# Patient Record
Sex: FEMALE | Race: BLACK OR AFRICAN AMERICAN | Employment: OTHER | URBAN - METROPOLITAN AREA
[De-identification: names, ages, dates, MRNs, and addresses within clinical notes are randomized per-mention and may not be internally consistent; named-entity substitution may affect disease eponyms.]

---

## 2020-04-14 ENCOUNTER — APPOINTMENT (OUTPATIENT)
Dept: GENERAL RADIOLOGY | Age: 70
DRG: 871 | End: 2020-04-14
Payer: MEDICARE

## 2020-04-14 ENCOUNTER — HOSPITAL ENCOUNTER (INPATIENT)
Age: 70
LOS: 14 days | Discharge: NURSING, REHAB OR LONG-TERM CARE | DRG: 871 | End: 2020-04-28
Attending: EMERGENCY MEDICINE | Admitting: INTERNAL MEDICINE
Payer: MEDICARE

## 2020-04-14 PROBLEM — Z20.822 SUSPECTED COVID-19 VIRUS INFECTION: Status: ACTIVE | Noted: 2020-04-14

## 2020-04-14 PROBLEM — N19 RENAL FAILURE: Status: ACTIVE | Noted: 2020-04-14

## 2020-04-14 PROBLEM — N17.9 ACUTE RENAL FAILURE SUPERIMPOSED ON STAGE 3 CHRONIC KIDNEY DISEASE (HCC): Status: ACTIVE | Noted: 2020-04-14

## 2020-04-14 PROBLEM — R65.21 SEPTIC SHOCK (HCC): Status: ACTIVE | Noted: 2020-04-14

## 2020-04-14 PROBLEM — R73.9 HYPERGLYCEMIA: Status: ACTIVE | Noted: 2020-04-14

## 2020-04-14 PROBLEM — A41.9 SEPTIC SHOCK (HCC): Status: ACTIVE | Noted: 2020-04-14

## 2020-04-14 PROBLEM — J18.9 PNEUMONIA: Status: ACTIVE | Noted: 2020-04-14

## 2020-04-14 PROBLEM — Z20.822 EXPOSURE TO COVID-19 VIRUS: Status: ACTIVE | Noted: 2020-04-14

## 2020-04-14 PROBLEM — U07.1 COVID-19 VIRUS INFECTION: Status: ACTIVE | Noted: 2020-04-14

## 2020-04-14 PROBLEM — N18.30 HYPERTENSIVE KIDNEY DISEASE, STAGE III (HCC): Status: ACTIVE | Noted: 2018-11-09

## 2020-04-14 PROBLEM — N18.30 ACUTE RENAL FAILURE SUPERIMPOSED ON STAGE 3 CHRONIC KIDNEY DISEASE (HCC): Status: ACTIVE | Noted: 2020-04-14

## 2020-04-14 PROBLEM — A41.9 SEPSIS (HCC): Status: ACTIVE | Noted: 2020-04-14

## 2020-04-14 PROBLEM — R50.9 FEVER: Status: ACTIVE | Noted: 2020-04-14

## 2020-04-14 PROBLEM — I12.9 HYPERTENSIVE KIDNEY DISEASE, STAGE III (HCC): Status: ACTIVE | Noted: 2018-11-09

## 2020-04-14 PROBLEM — J96.01 ACUTE RESPIRATORY FAILURE WITH HYPOXIA (HCC): Status: ACTIVE | Noted: 2020-04-14

## 2020-04-14 PROBLEM — F03.90 DEMENTIA (HCC): Status: ACTIVE | Noted: 2020-04-14

## 2020-04-14 PROBLEM — R00.0 TACHYCARDIA: Status: ACTIVE | Noted: 2020-04-14

## 2020-04-14 LAB
ANION GAP SERPL CALCULATED.3IONS-SCNC: 10 MMOL/L (ref 3–16)
ANION GAP SERPL CALCULATED.3IONS-SCNC: 16 MMOL/L (ref 3–16)
BASE EXCESS VENOUS: 1.4 MMOL/L
BASE EXCESS VENOUS: 1.9 MMOL/L
BASOPHILS ABSOLUTE: 0 K/UL (ref 0–0.2)
BASOPHILS ABSOLUTE: 0.1 K/UL (ref 0–0.2)
BASOPHILS RELATIVE PERCENT: 0.6 %
BASOPHILS RELATIVE PERCENT: 1 %
BILIRUBIN URINE: NEGATIVE
BLOOD, URINE: ABNORMAL
BUN BLDV-MCNC: 23 MG/DL (ref 7–20)
BUN BLDV-MCNC: 23 MG/DL (ref 7–20)
CALCIUM SERPL-MCNC: 8.5 MG/DL (ref 8.3–10.6)
CALCIUM SERPL-MCNC: 9.3 MG/DL (ref 8.3–10.6)
CARBOXYHEMOGLOBIN: 1 %
CARBOXYHEMOGLOBIN: 1.2 %
CHLORIDE BLD-SCNC: 104 MMOL/L (ref 99–110)
CHLORIDE BLD-SCNC: 97 MMOL/L (ref 99–110)
CLARITY: CLEAR
CO2: 25 MMOL/L (ref 21–32)
CO2: 26 MMOL/L (ref 21–32)
COLOR: YELLOW
CREAT SERPL-MCNC: 1.4 MG/DL (ref 0.6–1.2)
CREAT SERPL-MCNC: 1.6 MG/DL (ref 0.6–1.2)
EOSINOPHILS ABSOLUTE: 0.1 K/UL (ref 0–0.6)
EOSINOPHILS ABSOLUTE: 0.1 K/UL (ref 0–0.6)
EOSINOPHILS RELATIVE PERCENT: 2 %
EOSINOPHILS RELATIVE PERCENT: 2.1 %
EPITHELIAL CELLS, UA: 3 /HPF (ref 0–5)
GFR AFRICAN AMERICAN: 38
GFR AFRICAN AMERICAN: 45
GFR NON-AFRICAN AMERICAN: 32
GFR NON-AFRICAN AMERICAN: 37
GLUCOSE BLD-MCNC: 104 MG/DL (ref 70–99)
GLUCOSE BLD-MCNC: 106 MG/DL (ref 70–99)
GLUCOSE BLD-MCNC: 118 MG/DL (ref 70–99)
GLUCOSE BLD-MCNC: 121 MG/DL (ref 70–99)
GLUCOSE BLD-MCNC: 125 MG/DL (ref 70–99)
GLUCOSE BLD-MCNC: 126 MG/DL (ref 70–99)
GLUCOSE BLD-MCNC: 256 MG/DL (ref 70–99)
GLUCOSE URINE: NEGATIVE MG/DL
HCO3 VENOUS: 28 MMOL/L (ref 23–29)
HCO3 VENOUS: 29 MMOL/L (ref 23–29)
HCT VFR BLD CALC: 30.4 % (ref 36–48)
HCT VFR BLD CALC: 37.1 % (ref 36–48)
HEMOGLOBIN: 11.9 G/DL (ref 12–16)
HEMOGLOBIN: 9.4 G/DL (ref 12–16)
HYALINE CASTS: 11 /LPF (ref 0–8)
KETONES, URINE: NEGATIVE MG/DL
L. PNEUMOPHILA SEROGP 1 UR AG: NORMAL
LACTIC ACID: 0.6 MMOL/L (ref 0.4–2)
LACTIC ACID: 1.3 MMOL/L (ref 0.4–2)
LEUKOCYTE ESTERASE, URINE: ABNORMAL
LYMPHOCYTES ABSOLUTE: 1.5 K/UL (ref 1–5.1)
LYMPHOCYTES ABSOLUTE: 1.5 K/UL (ref 1–5.1)
LYMPHOCYTES RELATIVE PERCENT: 25.9 %
LYMPHOCYTES RELATIVE PERCENT: 28.1 %
MAGNESIUM: 1.9 MG/DL (ref 1.8–2.4)
MCH RBC QN AUTO: 25.8 PG (ref 26–34)
MCH RBC QN AUTO: 26.4 PG (ref 26–34)
MCHC RBC AUTO-ENTMCNC: 31 G/DL (ref 31–36)
MCHC RBC AUTO-ENTMCNC: 32 G/DL (ref 31–36)
MCV RBC AUTO: 82.4 FL (ref 80–100)
MCV RBC AUTO: 83.1 FL (ref 80–100)
METHEMOGLOBIN VENOUS: 0.6 %
METHEMOGLOBIN VENOUS: 0.7 %
MICROSCOPIC EXAMINATION: YES
MONOCYTES ABSOLUTE: 0.4 K/UL (ref 0–1.3)
MONOCYTES ABSOLUTE: 0.5 K/UL (ref 0–1.3)
MONOCYTES RELATIVE PERCENT: 6.8 %
MONOCYTES RELATIVE PERCENT: 8.9 %
MRSA SCREEN RT-PCR: NORMAL
NEUTROPHILS ABSOLUTE: 3.2 K/UL (ref 1.7–7.7)
NEUTROPHILS ABSOLUTE: 3.7 K/UL (ref 1.7–7.7)
NEUTROPHILS RELATIVE PERCENT: 60.3 %
NEUTROPHILS RELATIVE PERCENT: 64.3 %
NITRITE, URINE: NEGATIVE
O2 CONTENT, VEN: 10 ML/DL
O2 CONTENT, VEN: 11 ML/DL
O2 SAT, VEN: 78 %
O2 SAT, VEN: 80 %
O2 THERAPY: ABNORMAL
O2 THERAPY: ABNORMAL
PCO2, VEN: 56.5 MMHG (ref 40–50)
PCO2, VEN: 58.4 MMHG (ref 40–50)
PDW BLD-RTO: 14.2 % (ref 12.4–15.4)
PDW BLD-RTO: 14.3 % (ref 12.4–15.4)
PERFORMED ON: ABNORMAL
PH UA: 5.5 (ref 5–8)
PH VENOUS: 7.31 (ref 7.35–7.45)
PH VENOUS: 7.31 (ref 7.35–7.45)
PHOSPHORUS: 3.8 MG/DL (ref 2.5–4.9)
PLATELET # BLD: 167 K/UL (ref 135–450)
PLATELET # BLD: 185 K/UL (ref 135–450)
PMV BLD AUTO: 9.6 FL (ref 5–10.5)
PMV BLD AUTO: 9.7 FL (ref 5–10.5)
PO2, VEN: 47 MMHG
PO2, VEN: 48 MMHG
POTASSIUM REFLEX MAGNESIUM: 4.5 MMOL/L (ref 3.5–5.1)
POTASSIUM REFLEX MAGNESIUM: 4.6 MMOL/L (ref 3.5–5.1)
PROCALCITONIN: 0.17 NG/ML (ref 0–0.15)
PROTEIN UA: ABNORMAL MG/DL
RAPID INFLUENZA  B AGN: NEGATIVE
RAPID INFLUENZA A AGN: NEGATIVE
RBC # BLD: 3.65 M/UL (ref 4–5.2)
RBC # BLD: 4.5 M/UL (ref 4–5.2)
RBC UA: 25 /HPF (ref 0–4)
SARS-COV-2, PCR: DETECTED
SODIUM BLD-SCNC: 138 MMOL/L (ref 136–145)
SODIUM BLD-SCNC: 140 MMOL/L (ref 136–145)
SPECIFIC GRAVITY UA: 1.02 (ref 1–1.03)
TCO2 CALC VENOUS: 30 MMOL/L
TCO2 CALC VENOUS: 31 MMOL/L
TOTAL CK: 45 U/L (ref 26–192)
URINE REFLEX TO CULTURE: YES
URINE TYPE: ABNORMAL
UROBILINOGEN, URINE: 0.2 E.U./DL
WBC # BLD: 5.4 K/UL (ref 4–11)
WBC # BLD: 5.8 K/UL (ref 4–11)
WBC UA: 52 /HPF (ref 0–5)

## 2020-04-14 PROCEDURE — 6370000000 HC RX 637 (ALT 250 FOR IP): Performed by: EMERGENCY MEDICINE

## 2020-04-14 PROCEDURE — 6360000002 HC RX W HCPCS: Performed by: INTERNAL MEDICINE

## 2020-04-14 PROCEDURE — 94761 N-INVAS EAR/PLS OXIMETRY MLT: CPT

## 2020-04-14 PROCEDURE — 99285 EMERGENCY DEPT VISIT HI MDM: CPT

## 2020-04-14 PROCEDURE — 6360000002 HC RX W HCPCS: Performed by: EMERGENCY MEDICINE

## 2020-04-14 PROCEDURE — 83735 ASSAY OF MAGNESIUM: CPT

## 2020-04-14 PROCEDURE — 87040 BLOOD CULTURE FOR BACTERIA: CPT

## 2020-04-14 PROCEDURE — APPNB15 APP NON BILLABLE TIME 0-15 MINS: Performed by: NURSE PRACTITIONER

## 2020-04-14 PROCEDURE — 02HV33Z INSERTION OF INFUSION DEVICE INTO SUPERIOR VENA CAVA, PERCUTANEOUS APPROACH: ICD-10-PCS | Performed by: EMERGENCY MEDICINE

## 2020-04-14 PROCEDURE — 36415 COLL VENOUS BLD VENIPUNCTURE: CPT

## 2020-04-14 PROCEDURE — 6370000000 HC RX 637 (ALT 250 FOR IP): Performed by: INTERNAL MEDICINE

## 2020-04-14 PROCEDURE — 82803 BLOOD GASES ANY COMBINATION: CPT

## 2020-04-14 PROCEDURE — 71045 X-RAY EXAM CHEST 1 VIEW: CPT

## 2020-04-14 PROCEDURE — 81001 URINALYSIS AUTO W/SCOPE: CPT

## 2020-04-14 PROCEDURE — 2060000000 HC ICU INTERMEDIATE R&B

## 2020-04-14 PROCEDURE — 51702 INSERT TEMP BLADDER CATH: CPT

## 2020-04-14 PROCEDURE — U0002 COVID-19 LAB TEST NON-CDC: HCPCS

## 2020-04-14 PROCEDURE — 6360000002 HC RX W HCPCS: Performed by: NURSE PRACTITIONER

## 2020-04-14 PROCEDURE — 87804 INFLUENZA ASSAY W/OPTIC: CPT

## 2020-04-14 PROCEDURE — 2500000003 HC RX 250 WO HCPCS: Performed by: INTERNAL MEDICINE

## 2020-04-14 PROCEDURE — 83605 ASSAY OF LACTIC ACID: CPT

## 2020-04-14 PROCEDURE — 82550 ASSAY OF CK (CPK): CPT

## 2020-04-14 PROCEDURE — 87086 URINE CULTURE/COLONY COUNT: CPT

## 2020-04-14 PROCEDURE — 2580000003 HC RX 258: Performed by: EMERGENCY MEDICINE

## 2020-04-14 PROCEDURE — 2580000003 HC RX 258: Performed by: INTERNAL MEDICINE

## 2020-04-14 PROCEDURE — 2700000000 HC OXYGEN THERAPY PER DAY

## 2020-04-14 PROCEDURE — 6370000000 HC RX 637 (ALT 250 FOR IP): Performed by: NURSE PRACTITIONER

## 2020-04-14 PROCEDURE — 85025 COMPLETE CBC W/AUTO DIFF WBC: CPT

## 2020-04-14 PROCEDURE — 99223 1ST HOSP IP/OBS HIGH 75: CPT | Performed by: INTERNAL MEDICINE

## 2020-04-14 PROCEDURE — 2580000003 HC RX 258: Performed by: NURSE PRACTITIONER

## 2020-04-14 PROCEDURE — 80048 BASIC METABOLIC PNL TOTAL CA: CPT

## 2020-04-14 PROCEDURE — 84145 PROCALCITONIN (PCT): CPT

## 2020-04-14 PROCEDURE — 87449 NOS EACH ORGANISM AG IA: CPT

## 2020-04-14 PROCEDURE — 87641 MR-STAPH DNA AMP PROBE: CPT

## 2020-04-14 PROCEDURE — 84100 ASSAY OF PHOSPHORUS: CPT

## 2020-04-14 RX ORDER — DEXTROSE MONOHYDRATE 25 G/50ML
12.5 INJECTION, SOLUTION INTRAVENOUS PRN
Status: DISCONTINUED | OUTPATIENT
Start: 2020-04-14 | End: 2020-04-28 | Stop reason: HOSPADM

## 2020-04-14 RX ORDER — PREGABALIN 150 MG/1
150 CAPSULE ORAL EVERY 12 HOURS SCHEDULED
COMMUNITY

## 2020-04-14 RX ORDER — FLUTICASONE PROPIONATE 50 MCG
2 SPRAY, SUSPENSION (ML) NASAL DAILY
Status: ON HOLD | COMMUNITY
End: 2021-12-12

## 2020-04-14 RX ORDER — LANOLIN ALCOHOL/MO/W.PET/CERES
3 CREAM (GRAM) TOPICAL NIGHTLY
COMMUNITY

## 2020-04-14 RX ORDER — SIMVASTATIN 10 MG
10 TABLET ORAL NIGHTLY
COMMUNITY

## 2020-04-14 RX ORDER — ASCORBATE CALCIUM 500 MG
100 TABLET ORAL DAILY
COMMUNITY

## 2020-04-14 RX ORDER — LOSARTAN POTASSIUM 100 MG/1
100 TABLET ORAL DAILY
Status: ON HOLD | COMMUNITY
End: 2020-04-28 | Stop reason: HOSPADM

## 2020-04-14 RX ORDER — HYDRALAZINE HYDROCHLORIDE 25 MG/1
25 TABLET, FILM COATED ORAL 3 TIMES DAILY
COMMUNITY

## 2020-04-14 RX ORDER — ACETAMINOPHEN 325 MG/1
650 TABLET ORAL EVERY 4 HOURS PRN
Status: DISCONTINUED | OUTPATIENT
Start: 2020-04-14 | End: 2020-04-28 | Stop reason: HOSPADM

## 2020-04-14 RX ORDER — PHENYTOIN 50 MG/1
100 TABLET, CHEWABLE ORAL 3 TIMES DAILY
COMMUNITY

## 2020-04-14 RX ORDER — MAGNESIUM SULFATE IN WATER 40 MG/ML
2 INJECTION, SOLUTION INTRAVENOUS ONCE
Status: COMPLETED | OUTPATIENT
Start: 2020-04-14 | End: 2020-04-14

## 2020-04-14 RX ORDER — POTASSIUM CHLORIDE 750 MG/1
10 TABLET, EXTENDED RELEASE ORAL DAILY
Status: ON HOLD | COMMUNITY
End: 2021-12-12 | Stop reason: ALTCHOICE

## 2020-04-14 RX ORDER — OMEPRAZOLE 20 MG/1
20 CAPSULE, DELAYED RELEASE ORAL NIGHTLY
Status: ON HOLD | COMMUNITY
End: 2021-12-12 | Stop reason: ALTCHOICE

## 2020-04-14 RX ORDER — HYDROXYCHLOROQUINE SULFATE 200 MG/1
300 TABLET, FILM COATED ORAL DAILY
Status: DISCONTINUED | OUTPATIENT
Start: 2020-04-15 | End: 2020-04-21

## 2020-04-14 RX ORDER — PHENYTOIN SODIUM 100 MG/1
100 CAPSULE, EXTENDED RELEASE ORAL 2 TIMES DAILY
Status: DISCONTINUED | OUTPATIENT
Start: 2020-04-14 | End: 2020-04-14

## 2020-04-14 RX ORDER — OXYCODONE HYDROCHLORIDE AND ACETAMINOPHEN 5; 325 MG/1; MG/1
1 TABLET ORAL EVERY 6 HOURS PRN
Status: ON HOLD | COMMUNITY
End: 2020-04-28 | Stop reason: SDUPTHER

## 2020-04-14 RX ORDER — ACETAMINOPHEN 325 MG/1
650 TABLET ORAL ONCE
Status: COMPLETED | OUTPATIENT
Start: 2020-04-14 | End: 2020-04-14

## 2020-04-14 RX ORDER — FUROSEMIDE 20 MG/1
20 TABLET ORAL DAILY
Status: ON HOLD | COMMUNITY
End: 2022-02-14 | Stop reason: SDUPTHER

## 2020-04-14 RX ORDER — ONDANSETRON 2 MG/ML
4 INJECTION INTRAMUSCULAR; INTRAVENOUS EVERY 4 HOURS PRN
Status: DISCONTINUED | OUTPATIENT
Start: 2020-04-14 | End: 2020-04-28 | Stop reason: HOSPADM

## 2020-04-14 RX ORDER — TIOTROPIUM BROMIDE INHALATION SPRAY 1.56 UG/1
2 SPRAY, METERED RESPIRATORY (INHALATION) DAILY
Status: ON HOLD | COMMUNITY
End: 2021-12-12 | Stop reason: ALTCHOICE

## 2020-04-14 RX ORDER — LEVETIRACETAM 500 MG/1
500 TABLET ORAL 2 TIMES DAILY
COMMUNITY

## 2020-04-14 RX ORDER — HYDROXYCHLOROQUINE SULFATE 200 MG/1
400 TABLET, FILM COATED ORAL 2 TIMES DAILY
Status: DISCONTINUED | OUTPATIENT
Start: 2020-04-14 | End: 2020-04-14

## 2020-04-14 RX ORDER — SODIUM CHLORIDE 9 MG/ML
INJECTION, SOLUTION INTRAVENOUS CONTINUOUS
Status: DISCONTINUED | OUTPATIENT
Start: 2020-04-14 | End: 2020-04-20

## 2020-04-14 RX ORDER — GABAPENTIN 100 MG/1
100 CAPSULE ORAL NIGHTLY
Status: DISCONTINUED | OUTPATIENT
Start: 2020-04-14 | End: 2020-04-28 | Stop reason: HOSPADM

## 2020-04-14 RX ORDER — ASPIRIN 81 MG/1
81 TABLET, CHEWABLE ORAL DAILY
Status: DISCONTINUED | OUTPATIENT
Start: 2020-04-14 | End: 2020-04-28 | Stop reason: HOSPADM

## 2020-04-14 RX ORDER — FLUTICASONE FUROATE AND VILANTEROL TRIFENATATE 100; 25 UG/1; UG/1
1 POWDER RESPIRATORY (INHALATION) DAILY
COMMUNITY

## 2020-04-14 RX ORDER — SODIUM CHLORIDE 0.9 % (FLUSH) 0.9 %
10 SYRINGE (ML) INJECTION PRN
Status: DISCONTINUED | OUTPATIENT
Start: 2020-04-14 | End: 2020-04-20 | Stop reason: SDUPTHER

## 2020-04-14 RX ORDER — NICOTINE POLACRILEX 4 MG
15 LOZENGE BUCCAL PRN
Status: DISCONTINUED | OUTPATIENT
Start: 2020-04-14 | End: 2020-04-28 | Stop reason: HOSPADM

## 2020-04-14 RX ORDER — LANOLIN ALCOHOL/MO/W.PET/CERES
6 CREAM (GRAM) TOPICAL NIGHTLY
Status: DISCONTINUED | OUTPATIENT
Start: 2020-04-14 | End: 2020-04-28 | Stop reason: HOSPADM

## 2020-04-14 RX ORDER — BUDESONIDE AND FORMOTEROL FUMARATE DIHYDRATE 80; 4.5 UG/1; UG/1
2 AEROSOL RESPIRATORY (INHALATION) 2 TIMES DAILY
Status: DISCONTINUED | OUTPATIENT
Start: 2020-04-14 | End: 2020-04-28 | Stop reason: HOSPADM

## 2020-04-14 RX ORDER — ASPIRIN 81 MG/1
81 TABLET, CHEWABLE ORAL DAILY
COMMUNITY

## 2020-04-14 RX ORDER — ESCITALOPRAM OXALATE 10 MG/1
10 TABLET ORAL DAILY
Status: DISCONTINUED | OUTPATIENT
Start: 2020-04-14 | End: 2020-04-28 | Stop reason: HOSPADM

## 2020-04-14 RX ORDER — HYDROXYCHLOROQUINE SULFATE 200 MG/1
300 TABLET, FILM COATED ORAL DAILY
Status: ON HOLD | COMMUNITY
End: 2020-04-28 | Stop reason: SDUPTHER

## 2020-04-14 RX ORDER — GABAPENTIN 100 MG/1
100 CAPSULE ORAL NIGHTLY
COMMUNITY

## 2020-04-14 RX ORDER — PHENYTOIN SODIUM 100 MG/1
100 CAPSULE, EXTENDED RELEASE ORAL 3 TIMES DAILY
Status: DISCONTINUED | OUTPATIENT
Start: 2020-04-14 | End: 2020-04-20

## 2020-04-14 RX ORDER — LEVETIRACETAM 500 MG/1
500 TABLET ORAL 2 TIMES DAILY
Status: DISCONTINUED | OUTPATIENT
Start: 2020-04-14 | End: 2020-04-18

## 2020-04-14 RX ORDER — 0.9 % SODIUM CHLORIDE 0.9 %
500 INTRAVENOUS SOLUTION INTRAVENOUS ONCE
Status: COMPLETED | OUTPATIENT
Start: 2020-04-14 | End: 2020-04-14

## 2020-04-14 RX ORDER — ACETAMINOPHEN 650 MG/1
650 SUPPOSITORY RECTAL EVERY 4 HOURS PRN
Status: DISCONTINUED | OUTPATIENT
Start: 2020-04-14 | End: 2020-04-28 | Stop reason: HOSPADM

## 2020-04-14 RX ORDER — ALLOPURINOL 100 MG/1
100 TABLET ORAL NIGHTLY
COMMUNITY

## 2020-04-14 RX ORDER — FERROUS SULFATE TAB EC 324 MG (65 MG FE EQUIVALENT) 324 (65 FE) MG
324 TABLET DELAYED RESPONSE ORAL
Status: DISCONTINUED | OUTPATIENT
Start: 2020-04-15 | End: 2020-04-28 | Stop reason: HOSPADM

## 2020-04-14 RX ORDER — BISACODYL 10 MG
10 SUPPOSITORY, RECTAL RECTAL DAILY PRN
Status: DISCONTINUED | OUTPATIENT
Start: 2020-04-14 | End: 2020-04-28 | Stop reason: HOSPADM

## 2020-04-14 RX ORDER — ALLOPURINOL 100 MG/1
100 TABLET ORAL NIGHTLY
Status: DISCONTINUED | OUTPATIENT
Start: 2020-04-14 | End: 2020-04-28 | Stop reason: HOSPADM

## 2020-04-14 RX ORDER — FERROUS SULFATE 325(65) MG
325 TABLET ORAL
COMMUNITY

## 2020-04-14 RX ORDER — OXYCODONE HYDROCHLORIDE AND ACETAMINOPHEN 5; 325 MG/1; MG/1
1 TABLET ORAL EVERY 6 HOURS PRN
Status: DISCONTINUED | OUTPATIENT
Start: 2020-04-14 | End: 2020-04-28 | Stop reason: HOSPADM

## 2020-04-14 RX ORDER — ZINC CITRATE/PHYTASE 25MG-500MG
1 CAPSULE ORAL EVERY 12 HOURS SCHEDULED
Status: ON HOLD | COMMUNITY
End: 2021-12-12

## 2020-04-14 RX ORDER — MONTELUKAST SODIUM 10 MG/1
10 TABLET ORAL NIGHTLY
Status: DISCONTINUED | OUTPATIENT
Start: 2020-04-14 | End: 2020-04-28 | Stop reason: HOSPADM

## 2020-04-14 RX ORDER — LACOSAMIDE 50 MG/1
50 TABLET ORAL EVERY 12 HOURS SCHEDULED
COMMUNITY

## 2020-04-14 RX ORDER — LACTOBACILLUS RHAMNOSUS GG 10B CELL
1 CAPSULE ORAL 2 TIMES DAILY WITH MEALS
Status: DISCONTINUED | OUTPATIENT
Start: 2020-04-14 | End: 2020-04-28 | Stop reason: HOSPADM

## 2020-04-14 RX ORDER — ESCITALOPRAM OXALATE 10 MG/1
10 TABLET ORAL DAILY
Status: ON HOLD | COMMUNITY
End: 2021-12-12 | Stop reason: ALTCHOICE

## 2020-04-14 RX ORDER — LACOSAMIDE 100 MG/1
100 TABLET ORAL 2 TIMES DAILY
Status: DISCONTINUED | OUTPATIENT
Start: 2020-04-14 | End: 2020-04-20

## 2020-04-14 RX ORDER — PREGABALIN 75 MG/1
150 CAPSULE ORAL EVERY 12 HOURS SCHEDULED
Status: DISCONTINUED | OUTPATIENT
Start: 2020-04-14 | End: 2020-04-28 | Stop reason: HOSPADM

## 2020-04-14 RX ORDER — HEPARIN SODIUM 5000 [USP'U]/ML
5000 INJECTION, SOLUTION INTRAVENOUS; SUBCUTANEOUS EVERY 8 HOURS SCHEDULED
Status: DISCONTINUED | OUTPATIENT
Start: 2020-04-14 | End: 2020-04-28 | Stop reason: HOSPADM

## 2020-04-14 RX ORDER — HYDROXYCHLOROQUINE SULFATE 200 MG/1
200 TABLET, FILM COATED ORAL 2 TIMES DAILY
Status: DISCONTINUED | OUTPATIENT
Start: 2020-04-15 | End: 2020-04-14

## 2020-04-14 RX ORDER — DEXTROSE MONOHYDRATE 50 MG/ML
100 INJECTION, SOLUTION INTRAVENOUS PRN
Status: DISCONTINUED | OUTPATIENT
Start: 2020-04-14 | End: 2020-04-28 | Stop reason: HOSPADM

## 2020-04-14 RX ORDER — MONTELUKAST SODIUM 10 MG/1
10 TABLET ORAL NIGHTLY
Status: ON HOLD | COMMUNITY
End: 2021-12-12

## 2020-04-14 RX ORDER — TOLTERODINE 2 MG/1
2 CAPSULE, EXTENDED RELEASE ORAL DAILY
COMMUNITY

## 2020-04-14 RX ORDER — SODIUM CHLORIDE 0.9 % (FLUSH) 0.9 %
10 SYRINGE (ML) INJECTION EVERY 12 HOURS SCHEDULED
Status: DISCONTINUED | OUTPATIENT
Start: 2020-04-14 | End: 2020-04-20 | Stop reason: SDUPTHER

## 2020-04-14 RX ADMIN — ESCITALOPRAM OXALATE 10 MG: 10 TABLET ORAL at 18:20

## 2020-04-14 RX ADMIN — CEFEPIME HYDROCHLORIDE 2 G: 2 INJECTION, POWDER, FOR SOLUTION INTRAVENOUS at 11:37

## 2020-04-14 RX ADMIN — ONDANSETRON HYDROCHLORIDE 4 MG: 2 SOLUTION INTRAMUSCULAR; INTRAVENOUS at 03:11

## 2020-04-14 RX ADMIN — PHENYTOIN SODIUM 100 MG: 100 CAPSULE ORAL at 13:40

## 2020-04-14 RX ADMIN — HYDROXYCHLOROQUINE SULFATE 400 MG: 200 TABLET, FILM COATED ORAL at 05:32

## 2020-04-14 RX ADMIN — VANCOMYCIN HYDROCHLORIDE 1000 MG: 1 INJECTION, POWDER, LYOPHILIZED, FOR SOLUTION INTRAVENOUS at 03:45

## 2020-04-14 RX ADMIN — PHENYTOIN SODIUM 100 MG: 100 CAPSULE ORAL at 20:33

## 2020-04-14 RX ADMIN — ANORECTAL OINTMENT 1 EACH: 15.7; .44; 24; 20.6 OINTMENT TOPICAL at 13:41

## 2020-04-14 RX ADMIN — LACOSAMIDE 100 MG: 100 TABLET, FILM COATED ORAL at 20:33

## 2020-04-14 RX ADMIN — Medication 2 MCG/MIN: at 04:05

## 2020-04-14 RX ADMIN — LEVETIRACETAM 500 MG: 500 TABLET ORAL at 20:33

## 2020-04-14 RX ADMIN — AZITHROMYCIN MONOHYDRATE 500 MG: 500 INJECTION, POWDER, LYOPHILIZED, FOR SOLUTION INTRAVENOUS at 02:37

## 2020-04-14 RX ADMIN — MAGNESIUM SULFATE HEPTAHYDRATE 2 G: 40 INJECTION, SOLUTION INTRAVENOUS at 11:34

## 2020-04-14 RX ADMIN — ALLOPURINOL 100 MG: 100 TABLET ORAL at 20:33

## 2020-04-14 RX ADMIN — OXYCODONE HYDROCHLORIDE AND ACETAMINOPHEN 1 TABLET: 5; 325 TABLET ORAL at 17:30

## 2020-04-14 RX ADMIN — SODIUM CHLORIDE: 9 INJECTION, SOLUTION INTRAVENOUS at 05:33

## 2020-04-14 RX ADMIN — LEVETIRACETAM 500 MG: 500 TABLET ORAL at 13:44

## 2020-04-14 RX ADMIN — HEPARIN SODIUM 5000 UNITS: 5000 INJECTION INTRAVENOUS; SUBCUTANEOUS at 06:50

## 2020-04-14 RX ADMIN — SODIUM CHLORIDE 500 ML: 9 INJECTION, SOLUTION INTRAVENOUS at 20:38

## 2020-04-14 RX ADMIN — ANORECTAL OINTMENT 4 EACH: 15.7; .44; 24; 20.6 OINTMENT TOPICAL at 20:33

## 2020-04-14 RX ADMIN — SODIUM CHLORIDE 500 ML: 9 INJECTION, SOLUTION INTRAVENOUS at 02:37

## 2020-04-14 RX ADMIN — HEPARIN SODIUM 5000 UNITS: 5000 INJECTION INTRAVENOUS; SUBCUTANEOUS at 20:41

## 2020-04-14 RX ADMIN — WATER 1 G: 1 INJECTION INTRAMUSCULAR; INTRAVENOUS; SUBCUTANEOUS at 03:11

## 2020-04-14 RX ADMIN — PREGABALIN 150 MG: 75 CAPSULE ORAL at 20:33

## 2020-04-14 RX ADMIN — SODIUM CHLORIDE, PRESERVATIVE FREE 10 ML: 5 INJECTION INTRAVENOUS at 10:35

## 2020-04-14 RX ADMIN — Medication 1 CAPSULE: at 17:29

## 2020-04-14 RX ADMIN — BUDESONIDE AND FORMOTEROL FUMARATE DIHYDRATE 2 PUFF: 80; 4.5 AEROSOL RESPIRATORY (INHALATION) at 21:50

## 2020-04-14 RX ADMIN — LACOSAMIDE 100 MG: 100 TABLET, FILM COATED ORAL at 13:41

## 2020-04-14 RX ADMIN — ACETAMINOPHEN 650 MG: 325 TABLET, FILM COATED ORAL at 02:38

## 2020-04-14 RX ADMIN — ASPIRIN 81 MG 81 MG: 81 TABLET ORAL at 18:20

## 2020-04-14 RX ADMIN — MONTELUKAST SODIUM 10 MG: 10 TABLET, FILM COATED ORAL at 20:33

## 2020-04-14 RX ADMIN — GABAPENTIN 100 MG: 100 CAPSULE ORAL at 20:33

## 2020-04-14 RX ADMIN — HEPARIN SODIUM 5000 UNITS: 5000 INJECTION INTRAVENOUS; SUBCUTANEOUS at 13:53

## 2020-04-14 SDOH — HEALTH STABILITY: MENTAL HEALTH: HOW OFTEN DO YOU HAVE A DRINK CONTAINING ALCOHOL?: NEVER

## 2020-04-14 ASSESSMENT — PAIN SCALES - GENERAL
PAINLEVEL_OUTOF10: 5
PAINLEVEL_OUTOF10: 0
PAINLEVEL_OUTOF10: 0
PAINLEVEL_OUTOF10: 5
PAINLEVEL_OUTOF10: 0

## 2020-04-14 NOTE — ED PROVIDER NOTES
[facility]  eMERGENCY dEPARTMENT eNCOUnter      Pt Name: Bibiana Villegas  MRN: [id number]  [place] 1950  Date of evaluation: 4/14/2020  Provider: Fuad Baxter MD    CHIEF COMPLAINT       Chief Complaint   Patient presents with    Fever     102.6 x 2 days, nursing hoem reports multiple covid patients at her facility. HISTORY OF PRESENT ILLNESS    Bibiana Villegas is a 65 y.o. female who presents to the emergency department with SOB. Presents from NH with SOB and fever. Patient endorses SOB and chills on arrival. She has dementia therefore history limited. + for COVID exposure at [facility]. Nursing Notes were reviewed. REVIEW OF SYSTEMS       Review of Systems   Unable to perform ROS: Dementia     PAST MEDICAL HISTORY   No past medical history on file. SURGICAL HISTORY     No past surgical history on file. CURRENT MEDICATIONS       Previous Medications    No medications on file       ALLERGIES     Patient has no allergy information on record. FAMILY HISTORY      No family history on file.     SOCIAL HISTORY       Social History     Socioeconomic History    Marital status: Not on file     Spouse name: Not on file    Number of children: Not on file    Years of education: Not on file    Highest education level: Not on file   Occupational History    Not on file   Social Needs    Financial resource strain: Not on file    Food insecurity     Worry: Not on file     Inability: Not on file    Transportation needs     Medical: Not on file     Non-medical: Not on file   Tobacco Use    Smoking status: Not on file   Substance and Sexual Activity    Alcohol use: Not on file    Drug use: Not on file    Sexual activity: Not on file   Lifestyle    Physical activity     Days per week: Not on file     Minutes per session: Not on file    Stress: Not on file   Relationships    Social connections     Talks on phone: Not on file     Gets together: Not on file     Attends muscle tone.            DIAGNOSTIC RESULTS     EKG: All EKG's are interpreted by the Emergency Department Physician who either signs or Co-signs this chart in the absence of acardiologist.    None    RADIOLOGY:   Non-plain film images such as CT, Ultrasoundand MRI are read by the radiologist. Plain radiographic images are visualized and preliminarily interpreted by the emergency physician with the below findings:    CXR shows interstitial pattern     ED BEDSIDE ULTRASOUND:   Performed by ED Physician - none    LABS:  Labs Reviewed   CBC WITH AUTO DIFFERENTIAL - Abnormal; Notable for the following components:       Result Value    Hemoglobin 11.9 (*)     All other components within normal limits    Narrative:     Performed at:  [address] [facility] [address]   Phone [phone]   BASIC METABOLIC PANEL W/ REFLEX TO MG FOR LOW K - Abnormal; Notable for the following components:    Chloride 97 (*)     Glucose 104 (*)     BUN 23 (*)     CREATININE 1.6 (*)     GFR Non- 32 (*)     GFR  38 (*)     All other components within normal limits    Narrative:     Performed at:  [address] [facility] [address]   Phone [phone]   PROCALCITONIN - Abnormal; Notable for the following components:    Procalcitonin 0.17 (*)     All other components within normal limits    Narrative:     Performed at:  [address] [facility] [address]   Phone [phone]   BLOOD GAS, VENOUS - Abnormal; Notable for the following components:    pH, Marcello 7.310 (*)     pCO2, Marcello 56.5 (*)     All other components within normal limits    Narrative:     Performed at:  [address] [facility] [address]   Phone [phone]   POCT GLUCOSE - Abnormal; Notable for the following components:    POC Glucose Consent:     Consent obtained:  Verbal and emergent situation    Risks discussed:  Arterial puncture, bleeding, infection, incorrect placement, pneumothorax and nerve damage  Pre-procedure details:     Hand hygiene: Hand hygiene performed prior to insertion      Sterile barrier technique: All elements of maximal sterile technique followed      Skin preparation:  2% chlorhexidine    Skin preparation agent: Skin preparation agent completely dried prior to procedure    Procedure details:     Location:  R femoral    Procedural supplies:  Triple lumen    Landmarks identified: yes      Ultrasound guidance: yes      Sterile ultrasound techniques: Sterile gel and sterile probe covers were used      Number of attempts:  1    Successful placement: yes    Post-procedure details:     Post-procedure:  Dressing applied    Assessment:  Blood return through all ports and free fluid flow    Patient tolerance of procedure: Tolerated well, no immediate complications      FINAL IMPRESSION      1.  Hypoxia          DISPOSITION/PLAN   DISPOSITION      Admission    (Please note that portions ofthis note were completed with a voice recognition program.  Efforts were made to edit the dictations but occasionally words are mis-transcribed.)    Anthony Lin MD(electronically signed)  Attending Emergency Physician         Anthony Lin MD  04/14/20 5478

## 2020-04-14 NOTE — CARE COORDINATION
Advance Care Planning         Date of ACP Conversation: 4/14/2020    Conversation Conducted with:    Healthcare Decision Maker: Named in Advance Directive or Healthcare Power of  (name) Faustino Gutierres [phone]      Current Designated Health Care Decision Maker: Faustino Gutierres [phone] daughter/MOLINA    Hospitalization: \"If your health were to worsen and it became clear that your chance of recovery was unlikely, what would your preference be regarding hospitalization? Yes       Ventilation: \"If you were in your present state of health and suddenly became very ill and were unable to breathe on your own, what would your preference be about the use of a ventilator (breathing machine) if it were available to you? \" yes    \"If your health were to worsen and it became clear that your chance of recovery was unlikely, would that change your answer? \" yes, daughter going to speak with her brother before making decision    Resuscitation:  \"CPR works best to restart the heart when there is a sudden event, like a heart attack, in someone who is otherwise healthy. Unfortunately, CPR does not typically restart the heart for people who have serious health conditions or who are very sick. \"    \"In the event your heart stopped, would you want attempts to restart your heart (answer \"yes\" for attempt to resuscitate) or would you prefer a natural death (answer \"no\" for do not attempt to resuscitate)? \" yes    \"If your health were to worsen and it became clear that your chance of recovery was unlikely, would that change your answer? \"   yes, daughter going to speak to her brother         Conversation Outcomes / Follow-Up Plan: Faustino Gutierres, marianela/MOLINA going to discuss code status with her brother before changing code status.        Length of ACP Conversation in minutes:  20 minutes    Electronically signed by Sindy Fisher RN,CHPN on 4/14/2020 at 12:17 PM  Palliative Care Nurse  Phone [phone]

## 2020-04-14 NOTE — PROGRESS NOTES
Hospitalist ICU Progress Note    CC: COVID-19 virus infection    Hospital course:  65yo AAF with advanced dementia, iron def anemia, asthma, CKDIII, DMII with CKD, GERD, gout, hyerlipidemia, HTN, COPD, OA, weight gain, seizure disorder who presents from her long term care facility with shortness of breath, fever, acute metabolic encephalopathy. Pt initially admitted to the ICU for septic shock criteria with hypotension refractory to IVF bolus and requiring levophed. Test did return positive for COVID19. Admit date: 2020  Days in hospital:  0    24 Hour Events: now off of levophed    Subjective: awaiting testing results - pt more alert and oriented to self today - hungry - [facility] currently has an outbreak, so suspicion is high for COVID19 and now confirmed- oxygenation down from 6L to 2L - still febrile - hypotension is improved and levophed is turned off, creatinine improved    ROS:  Review of systems not obtained due to patient factors. dementia, but awake and alert and hungry.     Objective:    VITALS:  BP (!) 125/55   Pulse 64   Temp 99.8 °F (37.7 °C) (Oral)   Resp 16   Ht 5' 4\" (1.626 m)   Wt 185 lb 13.6 oz (84.3 kg)   SpO2 99%   BMI 31.90 kg/m²   MAXIMUM TEMPERATURE OVER 24HRS:  Temp (24hrs), Av °F (38.3 °C), Min:99.8 °F (37.7 °C), Max:102.6 °F (39.2 °C)    24HR PULSE OXIMETRY RANGE:  SpO2  Av.7 %  Min: 77 %  Max: 100 %  24HR INTAKE/OUTPUT:      Intake/Output Summary (Last 24 hours) at 2020 1333  Last data filed at 2020 1208  Gross per 24 hour   Intake 120 ml   Output 1490 ml   Net -1370 ml       Physical exam:      ( based on new provisions and guidance offered by [facility] on 2020 in setting of COVID 19 outbreak and in order to preserve personal protective equipment in accordance with the flexibilities announced by CMS on 2020)   References: range of motion. General: No deformity. Skin:     General: Skin is warm. Findings: No erythema or rash. Neurological:      Mental Status: She is alert. Cranial Nerves: No cranial nerve deficit. Motor: No atrophy or abnormal muscle tone. Radiology (personally reviewed by me):  CXR 4/14:     Impression:       Cardiomegaly.  Interstitial opacities compatible pulmonary edema or  interstitial pneumonitis.                 Assessment:    Principal Problem:    COVID-19 virus infection  Active Problems:    Acute respiratory failure with hypoxia (HCC)    Pneumonia    Sepsis ([facility] [address]    Fever    Tachycardia    Exposure to COVID-19 virus    Hyperglycemia    Dementia (HCC)    Renal failure    Asthma    Essential hypertension    Systemic lupus erythematosus ([facility] [address]    Acute renal failure superimposed on stage 3 chronic kidney disease (HCC)    Septic shock ([facility])  Resolved Problems:    * No resolved hospital problems. *      Plan:  1. Suspected COVID19 based on outbreak at nursing facility along with clinical symptoms - on plaquenil already and adding azithromycin  2. Septic shock - POA based on hypotension, documented pneumonia, leukopenia, tachypnea, fever, elevated procalcitonin - now improved and off of levophed  3. Acute resp failure with hypoxia - weaning oxygen as tolerated - down to 2L from 6L on admission  4. Viral pneumonia - awaiting COVID19 testing - pt started on plaquenil and azithromycin as well as cefepime and adding lactobacillus  5. Acute renal failure - improving with gentle IV hydration  6. Seizure disorder - will need to add keppra, vimpat and phenytoin back ASAP and await rest of med rec completion from pharmacy for correct dosing. 7.  DMII = accuchecks and SSI  8.   Acute metabolic encephalopathy - much improved today        Prognosis:  Fair - palliative care consulted and have had discussions with POA - family will address code status further in light of current

## 2020-04-14 NOTE — PLAN OF CARE
Problem: Falls - Risk of:  Goal: Will remain free from falls  Description: Will remain free from falls  4/14/2020 1616 by Cosette Skiff, RN  Outcome: Ongoing  4/14/2020 1615 by Cosette Skiff, RN  Outcome: Ongoing     Problem: Infection:  Goal: Will remain free from infection  Description: Will remain free from infection  Outcome: Ongoing     Problem: Daily Care:  Goal: Daily care needs are met  Description: Daily care needs are met  Outcome: Ongoing

## 2020-04-14 NOTE — CARE COORDINATION
Wound consult noted. Due to COVID-19 isolation, and efforts to decrease traffic on unit, pt not seen. Chart reviewed. Consult was placed for skin wounds. Charting reflects excoriation to periarea and inner thighs. Has order for Calmoseptine. REC: cont prevention, calmoseptine. If pt goes to floor she will need an air mattress; Shekhar score is 13.  Elkin Cool, MSN, RN, [employer]

## 2020-04-14 NOTE — ED TRIAGE NOTES
Patient brought by EMS for two days of fever 102. 6. Patient resides at [facility], with multiple COVID-19 positive patients. Patient with a productive cough, denies vomiting and diarrhea. Patient denies SOB, but patient is not a valid historian due to dementia. Patient was desaturating on arrival at 78% while on RA. Patient was then placed on 6L NC to recover her O2 Sats. Patient with diminished breath sounds in bases. BP stable, HR 90. Patient is tearful over IV sticks, and not feeling well.

## 2020-04-14 NOTE — H&P
cooperative  HEENT: Head: Normocephalic, no lesions, without obvious abnormality. Eye: Normal external eye, conjunctiva, lids cornea, TAHIR. Ears: Normal external ears. Non-tender. Nose: Normal external nose, mucus membranes and septum. Pharynx: Dental Hygiene adequate. Normal buccal mucosa. Normal pharynx. Neck: no adenopathy, no carotid bruit, no JVD, supple, symmetrical, trachea midline and thyroid not enlarged, symmetric, no tenderness/mass/nodules  Lungs: Making a verbal \"Cackling\" sound during exam, but lungs are clear to auscultation bilaterally and no use of accessory muscles. Heart: regular rate and rhythm, S1, S2 normal, no murmur, click, rub or gallop and normal apical impulse  Abdomen: soft, non-tender; bowel sounds normal; no masses,  no organomegaly  Extremities: extremities atraumatic, no cyanosis or edema and Homans sign is negative, no sign of DVT. Capillary Refill: Acceptable < 3 seconds  Peripheral Pulses: +3 easily felt, not easily obliterated with pressures  Skin: Skin color, texture, turgor normal. No rashes or lesions on exposed skin  Neurologic: Neurovascularly intact without any focal sensory/motor deficits. Cranial nerves: II-XII intact, grossly non-focal. Gait was not tested. Psychiatric: Alert and oriented to self only    CBC:   Recent Labs     04/14/20  0200   WBC 5.8   HGB 11.9*        BMP:    Recent Labs     04/14/20  0200      K 4.6   CL 97*   CO2 25   BUN 23*   CREATININE 1.6*   GLUCOSE 104*     Troponin: No results for input(s): TROPONINI in the last 72 hours. PT/INR:  No results found for: PTINR  U/A:  No results found for: LEUKOCYTESUR, NITRITE, RBCUA, SPECGRAV, [address], BILIRUBINUR, BLOODU, GLUCOSEU, [address]      RAD:   I have independently reviewed and interpreted the imaging studies below and based my recommendations to the patient on those findings.     Xr Chest Portable    Result Date: 4/14/2020  EXAMINATION: ONE XRAY VIEW OF THE CHEST 4/14/2020

## 2020-04-14 NOTE — PROGRESS NOTES
Patient drowsy but arouses. Doesn't want anything to eat or drink at this time. Aware of place, month, and name. I called her daughter and updated her per phone. titrating levo and O2.

## 2020-04-14 NOTE — CONSULTS
Patient is seen at the request of Dr. Josue Stewart for respiratory failure     PCP: Natasha Beckett    Please note that most history is obtained from chart. Patient is unable to provide a reliable history due to dementia. HISTORY OF PRESENT ILLNESS: 65y.o. year old female who was admitted on 4/14/20 for respiratory failure. She was sent from her nursing home due to fever and shortness of breath. Per report her nursing home has had several patients with COVID-19. In the ER she was found to be febrile to 102.6 °F.  She was also hypoxic to 77% on room air. While in the emergency room she became hypotensive to 80s/20s and started on Levophed.       PAST MEDICAL HISTORY:  Past Medical History:   Diagnosis Date    Anemia     Asthma     CKD stage 3 due to type 2 diabetes mellitus (HCC)     COPD (chronic obstructive pulmonary disease) (HCC)     Dementia (HCC)     DM (diabetes mellitus) (HCC)     GERD (gastroesophageal reflux disease)     Gout     HTN (hypertension)     Seizure (HCC)        MEDICATIONS:  Current Facility-Administered Medications: sodium chloride flush 0.9 % injection 10 mL, 10 mL, Intravenous, 2 times per day  sodium chloride flush 0.9 % injection 10 mL, 10 mL, Intravenous, PRN  acetaminophen (TYLENOL) tablet 650 mg, 650 mg, Oral, Q4H PRN **OR** acetaminophen (TYLENOL) suppository 650 mg, 650 mg, Rectal, Q4H PRN  ondansetron (ZOFRAN) injection 4 mg, 4 mg, Intravenous, Q4H PRN  0.9 % sodium chloride infusion, , Intravenous, Continuous  bisacodyl (DULCOLAX) suppository 10 mg, 10 mg, Rectal, Daily PRN  heparin (porcine) injection 5,000 Units, 5,000 Units, Subcutaneous, 3 times per day  ceFEPIme (MAXIPIME) 2 g in sterile water 20 mL IV syringe, 2 g, Intravenous, Q12H  [START ON 4/15/2020] azithromycin (ZITHROMAX) 500 mg in D5W 250ml addavial, 500 mg, Intravenous, Q24H  vancomycin (VANCOCIN) intermittent dosing (placeholder), , Other, RX Placeholder  insulin lispro (HUMALOG) injection vial Transportation needs     Medical: Not on file     Non-medical: Not on file   Tobacco Use    Smoking status: Never Smoker    Smokeless tobacco: Never Used   Substance and Sexual Activity    Alcohol use: Never     Frequency: Never    Drug use: Never    Sexual activity: Not Currently   Lifestyle    Physical activity     Days per week: Not on file     Minutes per session: Not on file    Stress: Not on file   Relationships    Social connections     Talks on phone: Not on file     Gets together: Not on file     Attends [employer] service: Not on file     Active member of club or organization: Not on file     Attends meetings of clubs or organizations: Not on file     Relationship status: Not on file    Intimate partner violence     Fear of current or ex partner: Not on file     Emotionally abused: Not on file     Physically abused: Not on file     Forced sexual activity: Not on file   Other Topics Concern    Not on file   Social History Narrative    Not on file      reports that she has never smoked. She has never used smokeless tobacco.    REVIEW OF SYSTEMS:  Constitutional: + for fever  Eyes: Negative for redness   Respiratory: +for dyspnea, no cough  Gastrointestinal: Negative for vomiting, diarrhea   Genitourinary: Negative for hematuria   Skin: Negative for rash  Neurological: +dementia   Hematological: Negative for adenopathy  Psychiatric/Behavorial: Negative for anxiety    Otherwise unable to obtain due to dementia    Objective:   PHYSICAL EXAM:  Blood pressure (!) 112/47, pulse 65, temperature 99.8 °F (37.7 °C), temperature source Oral, resp. rate 19, height 5' 4\" (1.626 m), weight 185 lb 13.6 oz (84.3 kg), SpO2 95 %. on 6L NC    Gen: Well developed; well nourished  Eyes: No scleral icterus. No conjunctival injection. ENT:  Oropharynx clear. External appearance of ears and nose normal.  Neck: Trachea midline. No obvious mass.  No visible thyroid enlargement    Respiratory: Clear to auscultation bilaterally on anterior exam, no accessory muscle use  Cardiovascular: Regular rate and rhythm, no appreciable murmurs. No edema. Gastrointestinal: Soft, non-tender. No hernia  Skin: Warm and dry. No rashes or ulcers on visible areas. Normal texture and turgor  Lymphatic: No cervical LAD. No supraclavicular LAD. Musculoskeletal: No cyanosis, clubbing or joint deformity. Psychiatric: Normal mood and affect; exhibits diminished insight and judgement       Data Reviewed:   LABS:  CBC:   Recent Labs     04/14/20  0200 04/14/20  0653   WBC 5.8 5.4   HGB 11.9* 9.4*   HCT 37.1 30.4*   MCV 82.4 83.1    167     BMP:   Recent Labs     04/14/20  0200 04/14/20  0653    140   K 4.6 4.5   CL 97* 104   CO2 25 26   PHOS  --  3.8   BUN 23* 23*   CREATININE 1.6* 1.4*     LIVER PROFILE: No results for input(s): AST, ALT, LIPASE, BILIDIR, BILITOT, ALKPHOS in the last 72 hours. Invalid input(s): AMYLASE,  ALB  PT/INR: No results for input(s): PROTIME, INR in the last 72 hours. APTT: No results for input(s): APTT in the last 72 hours. Images and reports from chest imaging were reviewed by me. My interpretation is:  CXR (4/14/20): Subtle bilateral infiltrates       Assessment:     Acute hypoxic respiratory failure  COVID-19 pneumonia  Septic shock  Chronic kidney disease    Plan:      Acute hypoxic respiratory failure  -Due to COVID-19 pneumonia  -Wean supplemental oxygen as able to keep saturation greater than 90%    COVID-19 pneumonia  -Azithromycin and Plaquenil    Septic shock  -IV fluids  -Cefepime and vancomycin empirically  -Levophed to keep MAP greater than 65    Chronic kidney disease  -Avoid nephrotoxins  -Strict I/Os      Prophylaxis  DVT- SQ heparin  C.difficile- lactobacillus    Patient is at high risk given the presence of septic shock and respiratory failure.     Prabhjot Weller MD  [facility]

## 2020-04-15 LAB
ALBUMIN SERPL-MCNC: 3.1 G/DL (ref 3.4–5)
ALP BLD-CCNC: 132 U/L (ref 40–129)
ALT SERPL-CCNC: 20 U/L (ref 10–40)
ANION GAP SERPL CALCULATED.3IONS-SCNC: 11 MMOL/L (ref 3–16)
AST SERPL-CCNC: 33 U/L (ref 15–37)
BASOPHILS ABSOLUTE: 0 K/UL (ref 0–0.2)
BASOPHILS RELATIVE PERCENT: 0.4 %
BILIRUB SERPL-MCNC: <0.2 MG/DL (ref 0–1)
BILIRUBIN DIRECT: <0.2 MG/DL (ref 0–0.3)
BILIRUBIN, INDIRECT: ABNORMAL MG/DL (ref 0–1)
BUN BLDV-MCNC: 18 MG/DL (ref 7–20)
C-REACTIVE PROTEIN: 167.2 MG/L (ref 0–5.1)
CALCIUM SERPL-MCNC: 8.5 MG/DL (ref 8.3–10.6)
CHLORIDE BLD-SCNC: 104 MMOL/L (ref 99–110)
CO2: 24 MMOL/L (ref 21–32)
CREAT SERPL-MCNC: 1 MG/DL (ref 0.6–1.2)
D DIMER: 912 NG/ML DDU (ref 0–229)
EOSINOPHILS ABSOLUTE: 0.2 K/UL (ref 0–0.6)
EOSINOPHILS RELATIVE PERCENT: 4.6 %
FERRITIN: 1194 NG/ML (ref 15–150)
GFR AFRICAN AMERICAN: >60
GFR NON-AFRICAN AMERICAN: 54
GLUCOSE BLD-MCNC: 104 MG/DL (ref 70–99)
GLUCOSE BLD-MCNC: 106 MG/DL (ref 70–99)
GLUCOSE BLD-MCNC: 115 MG/DL (ref 70–99)
GLUCOSE BLD-MCNC: 126 MG/DL (ref 70–99)
GLUCOSE BLD-MCNC: 88 MG/DL (ref 70–99)
GLUCOSE BLD-MCNC: 89 MG/DL (ref 70–99)
HCT VFR BLD CALC: 29.6 % (ref 36–48)
HEMOGLOBIN: 9.3 G/DL (ref 12–16)
LACTATE DEHYDROGENASE: 246 U/L (ref 100–190)
LYMPHOCYTES ABSOLUTE: 0.9 K/UL (ref 1–5.1)
LYMPHOCYTES RELATIVE PERCENT: 20 %
MAGNESIUM: 2.3 MG/DL (ref 1.8–2.4)
MCH RBC QN AUTO: 25.9 PG (ref 26–34)
MCHC RBC AUTO-ENTMCNC: 31.4 G/DL (ref 31–36)
MCV RBC AUTO: 82.5 FL (ref 80–100)
MONOCYTES ABSOLUTE: 0.4 K/UL (ref 0–1.3)
MONOCYTES RELATIVE PERCENT: 9.7 %
NEUTROPHILS ABSOLUTE: 2.8 K/UL (ref 1.7–7.7)
NEUTROPHILS RELATIVE PERCENT: 65.3 %
PDW BLD-RTO: 14.1 % (ref 12.4–15.4)
PERFORMED ON: ABNORMAL
PERFORMED ON: NORMAL
PERFORMED ON: NORMAL
PLATELET # BLD: 183 K/UL (ref 135–450)
PMV BLD AUTO: 9.7 FL (ref 5–10.5)
POTASSIUM REFLEX MAGNESIUM: 4.8 MMOL/L (ref 3.5–5.1)
PROCALCITONIN: 0.17 NG/ML (ref 0–0.15)
RBC # BLD: 3.59 M/UL (ref 4–5.2)
SEDIMENTATION RATE, ERYTHROCYTE: 56 MM/HR (ref 0–30)
SODIUM BLD-SCNC: 139 MMOL/L (ref 136–145)
STREP PNEUMONIAE ANTIGEN, URINE: NORMAL
TOTAL PROTEIN: 6.3 G/DL (ref 6.4–8.2)
URINE CULTURE, ROUTINE: NORMAL
WBC # BLD: 4.3 K/UL (ref 4–11)

## 2020-04-15 PROCEDURE — 2060000000 HC ICU INTERMEDIATE R&B

## 2020-04-15 PROCEDURE — 86140 C-REACTIVE PROTEIN: CPT

## 2020-04-15 PROCEDURE — 80076 HEPATIC FUNCTION PANEL: CPT

## 2020-04-15 PROCEDURE — 83735 ASSAY OF MAGNESIUM: CPT

## 2020-04-15 PROCEDURE — 6370000000 HC RX 637 (ALT 250 FOR IP): Performed by: INTERNAL MEDICINE

## 2020-04-15 PROCEDURE — 6360000002 HC RX W HCPCS: Performed by: INTERNAL MEDICINE

## 2020-04-15 PROCEDURE — 84145 PROCALCITONIN (PCT): CPT

## 2020-04-15 PROCEDURE — 82728 ASSAY OF FERRITIN: CPT

## 2020-04-15 PROCEDURE — 80048 BASIC METABOLIC PNL TOTAL CA: CPT

## 2020-04-15 PROCEDURE — 83615 LACTATE (LD) (LDH) ENZYME: CPT

## 2020-04-15 PROCEDURE — 85025 COMPLETE CBC W/AUTO DIFF WBC: CPT

## 2020-04-15 PROCEDURE — 2580000003 HC RX 258: Performed by: INTERNAL MEDICINE

## 2020-04-15 PROCEDURE — 85652 RBC SED RATE AUTOMATED: CPT

## 2020-04-15 PROCEDURE — 85379 FIBRIN DEGRADATION QUANT: CPT

## 2020-04-15 RX ADMIN — LACOSAMIDE 100 MG: 100 TABLET, FILM COATED ORAL at 08:18

## 2020-04-15 RX ADMIN — Medication 1 CAPSULE: at 17:55

## 2020-04-15 RX ADMIN — GABAPENTIN 100 MG: 100 CAPSULE ORAL at 20:37

## 2020-04-15 RX ADMIN — PHENYTOIN SODIUM 100 MG: 100 CAPSULE ORAL at 14:48

## 2020-04-15 RX ADMIN — HYDROXYCHLOROQUINE SULFATE 300 MG: 200 TABLET ORAL at 08:19

## 2020-04-15 RX ADMIN — ESCITALOPRAM OXALATE 10 MG: 10 TABLET ORAL at 08:18

## 2020-04-15 RX ADMIN — ASPIRIN 81 MG 81 MG: 81 TABLET ORAL at 08:18

## 2020-04-15 RX ADMIN — LACOSAMIDE 100 MG: 100 TABLET, FILM COATED ORAL at 20:37

## 2020-04-15 RX ADMIN — PREGABALIN 150 MG: 75 CAPSULE ORAL at 08:18

## 2020-04-15 RX ADMIN — ACETAMINOPHEN 650 MG: 325 TABLET ORAL at 08:18

## 2020-04-15 RX ADMIN — Medication 1 CAPSULE: at 08:18

## 2020-04-15 RX ADMIN — FERROUS SULFATE TAB EC 324 MG (65 MG FE EQUIVALENT) 324 MG: 324 (65 FE) TABLET DELAYED RESPONSE at 08:19

## 2020-04-15 RX ADMIN — SODIUM CHLORIDE, PRESERVATIVE FREE 10 ML: 5 INJECTION INTRAVENOUS at 08:36

## 2020-04-15 RX ADMIN — HEPARIN SODIUM 5000 UNITS: 5000 INJECTION INTRAVENOUS; SUBCUTANEOUS at 14:50

## 2020-04-15 RX ADMIN — PHENYTOIN SODIUM 100 MG: 100 CAPSULE ORAL at 08:19

## 2020-04-15 RX ADMIN — CEFEPIME HYDROCHLORIDE 2 G: 2 INJECTION, POWDER, FOR SOLUTION INTRAVENOUS at 11:25

## 2020-04-15 RX ADMIN — AZITHROMYCIN MONOHYDRATE 500 MG: 500 INJECTION, POWDER, LYOPHILIZED, FOR SOLUTION INTRAVENOUS at 03:20

## 2020-04-15 RX ADMIN — ACETAMINOPHEN 650 MG: 325 TABLET ORAL at 20:39

## 2020-04-15 RX ADMIN — MONTELUKAST SODIUM 10 MG: 10 TABLET, FILM COATED ORAL at 20:37

## 2020-04-15 RX ADMIN — CEFEPIME HYDROCHLORIDE 2 G: 2 INJECTION, POWDER, FOR SOLUTION INTRAVENOUS at 00:19

## 2020-04-15 RX ADMIN — LEVETIRACETAM 500 MG: 500 TABLET ORAL at 08:18

## 2020-04-15 RX ADMIN — PREGABALIN 150 MG: 75 CAPSULE ORAL at 20:37

## 2020-04-15 RX ADMIN — HEPARIN SODIUM 5000 UNITS: 5000 INJECTION INTRAVENOUS; SUBCUTANEOUS at 20:43

## 2020-04-15 RX ADMIN — BUDESONIDE AND FORMOTEROL FUMARATE DIHYDRATE 2 PUFF: 80; 4.5 AEROSOL RESPIRATORY (INHALATION) at 20:38

## 2020-04-15 RX ADMIN — ALLOPURINOL 100 MG: 100 TABLET ORAL at 20:37

## 2020-04-15 RX ADMIN — BUDESONIDE AND FORMOTEROL FUMARATE DIHYDRATE 2 PUFF: 80; 4.5 AEROSOL RESPIRATORY (INHALATION) at 08:36

## 2020-04-15 RX ADMIN — ANORECTAL OINTMENT 2 EACH: 15.7; .44; 24; 20.6 OINTMENT TOPICAL at 20:37

## 2020-04-15 RX ADMIN — PHENYTOIN SODIUM 100 MG: 100 CAPSULE ORAL at 20:37

## 2020-04-15 RX ADMIN — MELATONIN 6 MG: at 20:37

## 2020-04-15 RX ADMIN — OXYCODONE HYDROCHLORIDE AND ACETAMINOPHEN 1 TABLET: 5; 325 TABLET ORAL at 08:19

## 2020-04-15 RX ADMIN — LEVETIRACETAM 500 MG: 500 TABLET ORAL at 20:37

## 2020-04-15 RX ADMIN — HEPARIN SODIUM 5000 UNITS: 5000 INJECTION INTRAVENOUS; SUBCUTANEOUS at 04:00

## 2020-04-15 ASSESSMENT — PAIN - FUNCTIONAL ASSESSMENT: PAIN_FUNCTIONAL_ASSESSMENT: PREVENTS OR INTERFERES WITH ALL ACTIVE AND SOME PASSIVE ACTIVITIES

## 2020-04-15 ASSESSMENT — PAIN DESCRIPTION - PAIN TYPE
TYPE: ACUTE PAIN;CHRONIC PAIN
TYPE: CHRONIC PAIN

## 2020-04-15 ASSESSMENT — PAIN SCALES - GENERAL
PAINLEVEL_OUTOF10: 5
PAINLEVEL_OUTOF10: 5
PAINLEVEL_OUTOF10: 4
PAINLEVEL_OUTOF10: 0
PAINLEVEL_OUTOF10: 0
PAINLEVEL_OUTOF10: 8
PAINLEVEL_OUTOF10: 0

## 2020-04-15 ASSESSMENT — PAIN DESCRIPTION - ORIENTATION
ORIENTATION: LEFT;RIGHT
ORIENTATION: RIGHT;LEFT

## 2020-04-15 ASSESSMENT — PAIN DESCRIPTION - FREQUENCY: FREQUENCY: INTERMITTENT

## 2020-04-15 ASSESSMENT — PAIN DESCRIPTION - ONSET: ONSET: ON-GOING

## 2020-04-15 ASSESSMENT — PAIN DESCRIPTION - LOCATION
LOCATION: GENERALIZED
LOCATION: KNEE

## 2020-04-15 ASSESSMENT — PAIN DESCRIPTION - DESCRIPTORS: DESCRIPTORS: ACHING;CONSTANT

## 2020-04-15 ASSESSMENT — PAIN DESCRIPTION - PROGRESSION: CLINICAL_PROGRESSION: NOT CHANGED

## 2020-04-15 NOTE — PROGRESS NOTES
Hospitalist Progress Note      PCP: Lela Lefort    Date of Admission: 4/14/2020    Chief Complaint: COVID 19  infectio    Hospital Course: This is a 77-year-old female with history of advanced dementia, asthma, CKD stage III, diabetes mellitus type 2 COPD, seizure disorder resident of [facility] was admitted for metabolic encephalopathy and septic shock requiring pressure support with Levophed. Subjective: Patient was drowsy today but arousable. She cannot recall why she came into the hospital.  Denies any nausea, vomiting, fever, chills, chest pain, shortness of breath. Still on 2 L of oxygen through nasal cannula.       Medications:  Reviewed    Infusion Medications    sodium chloride 50 mL/hr at 04/14/20 1545    dextrose       Scheduled Medications    sodium chloride flush  10 mL Intravenous 2 times per day    heparin (porcine)  5,000 Units Subcutaneous 3 times per day    cefepime  2 g Intravenous Q12H    azithromycin  500 mg Intravenous Q24H    insulin lispro  0-12 Units Subcutaneous TID WC    insulin lispro  0-6 Units Subcutaneous Nightly    lactobacillus  1 capsule Oral BID WC    levETIRAcetam  500 mg Oral BID    lacosamide  100 mg Oral BID    allopurinol  100 mg Oral Nightly    aspirin  81 mg Oral Daily    escitalopram  10 mg Oral Daily    ferrous sulfate  324 mg Oral Daily with breakfast    budesonide-formoterol  2 puff Inhalation BID    gabapentin  100 mg Oral Nightly    hydroxychloroquine  300 mg Oral Daily    melatonin  6 mg Oral Nightly    montelukast  10 mg Oral Nightly    pregabalin  150 mg Oral 2 times per day    phenytoin  100 mg Oral TID     PRN Meds: sodium chloride flush, acetaminophen **OR** acetaminophen, ondansetron, bisacodyl, glucose, dextrose, glucagon (rDNA), dextrose, menthol-zinc oxide, oxyCODONE-acetaminophen      Intake/Output Summary (Last 24 hours) at 4/15/2020 1647  Last data filed at 4/15/2020 0836  Gross per 24 hour   Intake 656 ml   Output 950 ml pneumonitis. Assessment/Plan:    Active Hospital Problems    Diagnosis Date Noted    Acute respiratory failure with hypoxia ([facility] [J96.01] 04/14/2020    COVID-19 virus infection [U07.1] 04/14/2020    Pneumonia [J18.9] 04/14/2020    Sepsis ([facility] [A41.9] 04/14/2020    Fever [R50.9] 04/14/2020    Tachycardia [R00.0] 04/14/2020    Exposure to COVID-19 virus [Z20.828] 04/14/2020    Hyperglycemia [R73.9] 04/14/2020    Dementia ([facility] [F03.90] 04/14/2020    Renal failure [N19] 04/14/2020    Acute renal failure superimposed on stage 3 chronic kidney disease ([facility] [N17.9, N18.3] 04/14/2020    Septic shock ([facility] [A41.9, R65.21] 04/14/2020    Pneumonia due to COVID-19 virus [U07.1, J12.89]     Asthma [J45.909] 10/04/2005    Systemic lupus erythematosus ([facility] [M32.9] 10/04/2005    Essential hypertension [I10] 10/04/2005       COVID 19 positive   - droplet plus isolation  - Palliative care following  - Plaquenil and Azithromycin day 2    Pneumonia 2/2 COVID 19possible -  - Legionella pneumophilia and Strep pneumoniae urine neg  - Rapid Influenza A&B negative  - MRSA DNA Nasal Probe negative  -d/c cefepime     Septic Shock: Improved   - Blood cultures x 2 NGTD     #Acute respiratory failure/Hypoxia/Shortness of Breath - due to above; provide supplemental oxygen as necessary to keep SaO2 92% or greater.   Pulmonary following    #Cough - will provide antitussive medications as needed.      #LUCIA on CKD stage II likely prerenal improving  Continue IV fluids.     #Hyperglycemia - Pt does not have a history of Diabetes Mellitus. Likely secondary to steroid use.   Hemoglobin A1c pending, and start Sliding Scale insulin and a carbohydrate Controlled Diet.      #Dementia ([facility] - Will monitor and provide supportive care    DVT Prophylaxis: SQ heparin  Diet: DIET CARB CONTROL;  Code Status: Full Code    PT/OT Eval Status: consulted    Dispo - ECF once medical issues resolves    Adrienne Gan MD

## 2020-04-15 NOTE — PLAN OF CARE
Ability to maintain a clear airway will improve  Outcome: Ongoing     Problem: Fluid Volume - Deficit:  Goal: Achieves intake and output within specified parameters  Description: Achieves intake and output within specified parameters  Outcome: Ongoing     Problem: Gas Exchange - Impaired:  Goal: Levels of oxygenation will improve  Description: Levels of oxygenation will improve  Outcome: Ongoing

## 2020-04-15 NOTE — PROGRESS NOTES
2000H - Pt BP is 82/46. Rechecked three times but still hypotensive. Radial pulse are weak. Informed NP Ms. Ingrid Warner and she reviewed pt's chart. 4247L - order 500ml NSS bolus.

## 2020-04-16 LAB
ANION GAP SERPL CALCULATED.3IONS-SCNC: 12 MMOL/L (ref 3–16)
BASOPHILS ABSOLUTE: 0 K/UL (ref 0–0.2)
BASOPHILS RELATIVE PERCENT: 0.3 %
BUN BLDV-MCNC: 18 MG/DL (ref 7–20)
CALCIUM SERPL-MCNC: 8.7 MG/DL (ref 8.3–10.6)
CHLORIDE BLD-SCNC: 103 MMOL/L (ref 99–110)
CO2: 24 MMOL/L (ref 21–32)
CREAT SERPL-MCNC: 1.2 MG/DL (ref 0.6–1.2)
EOSINOPHILS ABSOLUTE: 0.2 K/UL (ref 0–0.6)
EOSINOPHILS RELATIVE PERCENT: 5.9 %
GFR AFRICAN AMERICAN: 53
GFR NON-AFRICAN AMERICAN: 44
GLUCOSE BLD-MCNC: 102 MG/DL (ref 70–99)
GLUCOSE BLD-MCNC: 104 MG/DL (ref 70–99)
GLUCOSE BLD-MCNC: 110 MG/DL (ref 70–99)
GLUCOSE BLD-MCNC: 112 MG/DL (ref 70–99)
GLUCOSE BLD-MCNC: 113 MG/DL (ref 70–99)
GLUCOSE BLD-MCNC: 88 MG/DL (ref 70–99)
HCT VFR BLD CALC: 28.4 % (ref 36–48)
HEMOGLOBIN: 9 G/DL (ref 12–16)
LYMPHOCYTES ABSOLUTE: 1.5 K/UL (ref 1–5.1)
LYMPHOCYTES RELATIVE PERCENT: 39.3 %
MAGNESIUM: 2.3 MG/DL (ref 1.8–2.4)
MCH RBC QN AUTO: 26.4 PG (ref 26–34)
MCHC RBC AUTO-ENTMCNC: 31.8 G/DL (ref 31–36)
MCV RBC AUTO: 83 FL (ref 80–100)
MONOCYTES ABSOLUTE: 0.4 K/UL (ref 0–1.3)
MONOCYTES RELATIVE PERCENT: 9.8 %
NEUTROPHILS ABSOLUTE: 1.7 K/UL (ref 1.7–7.7)
NEUTROPHILS RELATIVE PERCENT: 44.7 %
PDW BLD-RTO: 14.1 % (ref 12.4–15.4)
PERFORMED ON: ABNORMAL
PLATELET # BLD: 200 K/UL (ref 135–450)
PMV BLD AUTO: 9 FL (ref 5–10.5)
POTASSIUM REFLEX MAGNESIUM: 4.6 MMOL/L (ref 3.5–5.1)
RBC # BLD: 3.42 M/UL (ref 4–5.2)
SODIUM BLD-SCNC: 139 MMOL/L (ref 136–145)
WBC # BLD: 3.7 K/UL (ref 4–11)

## 2020-04-16 PROCEDURE — 97167 OT EVAL HIGH COMPLEX 60 MIN: CPT

## 2020-04-16 PROCEDURE — 6370000000 HC RX 637 (ALT 250 FOR IP): Performed by: INTERNAL MEDICINE

## 2020-04-16 PROCEDURE — 80048 BASIC METABOLIC PNL TOTAL CA: CPT

## 2020-04-16 PROCEDURE — 2580000003 HC RX 258: Performed by: INTERNAL MEDICINE

## 2020-04-16 PROCEDURE — 97163 PT EVAL HIGH COMPLEX 45 MIN: CPT

## 2020-04-16 PROCEDURE — 6360000002 HC RX W HCPCS: Performed by: INTERNAL MEDICINE

## 2020-04-16 PROCEDURE — 85025 COMPLETE CBC W/AUTO DIFF WBC: CPT

## 2020-04-16 PROCEDURE — 97535 SELF CARE MNGMENT TRAINING: CPT

## 2020-04-16 PROCEDURE — 97530 THERAPEUTIC ACTIVITIES: CPT

## 2020-04-16 PROCEDURE — 83735 ASSAY OF MAGNESIUM: CPT

## 2020-04-16 PROCEDURE — 2060000000 HC ICU INTERMEDIATE R&B

## 2020-04-16 RX ADMIN — ALLOPURINOL 100 MG: 100 TABLET ORAL at 21:22

## 2020-04-16 RX ADMIN — HYDROXYCHLOROQUINE SULFATE 300 MG: 200 TABLET ORAL at 08:58

## 2020-04-16 RX ADMIN — SODIUM CHLORIDE: 9 INJECTION, SOLUTION INTRAVENOUS at 15:07

## 2020-04-16 RX ADMIN — OXYCODONE HYDROCHLORIDE AND ACETAMINOPHEN 1 TABLET: 5; 325 TABLET ORAL at 06:32

## 2020-04-16 RX ADMIN — GABAPENTIN 100 MG: 100 CAPSULE ORAL at 21:22

## 2020-04-16 RX ADMIN — PHENYTOIN SODIUM 100 MG: 100 CAPSULE ORAL at 08:58

## 2020-04-16 RX ADMIN — PHENYTOIN SODIUM 100 MG: 100 CAPSULE ORAL at 15:03

## 2020-04-16 RX ADMIN — MONTELUKAST SODIUM 10 MG: 10 TABLET, FILM COATED ORAL at 21:22

## 2020-04-16 RX ADMIN — ESCITALOPRAM OXALATE 10 MG: 10 TABLET ORAL at 08:57

## 2020-04-16 RX ADMIN — AZITHROMYCIN MONOHYDRATE 500 MG: 500 INJECTION, POWDER, LYOPHILIZED, FOR SOLUTION INTRAVENOUS at 04:32

## 2020-04-16 RX ADMIN — SODIUM CHLORIDE, PRESERVATIVE FREE 10 ML: 5 INJECTION INTRAVENOUS at 09:02

## 2020-04-16 RX ADMIN — SODIUM CHLORIDE, PRESERVATIVE FREE 10 ML: 5 INJECTION INTRAVENOUS at 21:22

## 2020-04-16 RX ADMIN — BUDESONIDE AND FORMOTEROL FUMARATE DIHYDRATE 2 PUFF: 80; 4.5 AEROSOL RESPIRATORY (INHALATION) at 09:02

## 2020-04-16 RX ADMIN — HEPARIN SODIUM 5000 UNITS: 5000 INJECTION INTRAVENOUS; SUBCUTANEOUS at 23:07

## 2020-04-16 RX ADMIN — ACETAMINOPHEN 650 MG: 325 TABLET ORAL at 21:22

## 2020-04-16 RX ADMIN — HEPARIN SODIUM 5000 UNITS: 5000 INJECTION INTRAVENOUS; SUBCUTANEOUS at 04:40

## 2020-04-16 RX ADMIN — LACOSAMIDE 100 MG: 100 TABLET, FILM COATED ORAL at 08:58

## 2020-04-16 RX ADMIN — PHENYTOIN SODIUM 100 MG: 100 CAPSULE ORAL at 21:22

## 2020-04-16 RX ADMIN — LACOSAMIDE 100 MG: 100 TABLET, FILM COATED ORAL at 21:22

## 2020-04-16 RX ADMIN — PREGABALIN 150 MG: 75 CAPSULE ORAL at 08:58

## 2020-04-16 RX ADMIN — ASPIRIN 81 MG 81 MG: 81 TABLET ORAL at 08:58

## 2020-04-16 RX ADMIN — LEVETIRACETAM 500 MG: 500 TABLET ORAL at 08:58

## 2020-04-16 RX ADMIN — LEVETIRACETAM 500 MG: 500 TABLET ORAL at 21:21

## 2020-04-16 RX ADMIN — BUDESONIDE AND FORMOTEROL FUMARATE DIHYDRATE 2 PUFF: 80; 4.5 AEROSOL RESPIRATORY (INHALATION) at 21:23

## 2020-04-16 RX ADMIN — FERROUS SULFATE TAB EC 324 MG (65 MG FE EQUIVALENT) 324 MG: 324 (65 FE) TABLET DELAYED RESPONSE at 08:57

## 2020-04-16 RX ADMIN — Medication 1 CAPSULE: at 17:22

## 2020-04-16 RX ADMIN — MELATONIN 6 MG: at 21:21

## 2020-04-16 RX ADMIN — PREGABALIN 150 MG: 75 CAPSULE ORAL at 21:21

## 2020-04-16 RX ADMIN — Medication 1 CAPSULE: at 08:57

## 2020-04-16 RX ADMIN — HEPARIN SODIUM 5000 UNITS: 5000 INJECTION INTRAVENOUS; SUBCUTANEOUS at 12:25

## 2020-04-16 ASSESSMENT — PAIN DESCRIPTION - LOCATION: LOCATION: GENERALIZED

## 2020-04-16 ASSESSMENT — PAIN DESCRIPTION - DESCRIPTORS: DESCRIPTORS: ACHING;CONSTANT;DISCOMFORT

## 2020-04-16 ASSESSMENT — PAIN DESCRIPTION - ONSET: ONSET: ON-GOING

## 2020-04-16 ASSESSMENT — PAIN DESCRIPTION - PROGRESSION: CLINICAL_PROGRESSION: NOT CHANGED

## 2020-04-16 ASSESSMENT — PAIN DESCRIPTION - PAIN TYPE: TYPE: ACUTE PAIN;CHRONIC PAIN

## 2020-04-16 ASSESSMENT — PAIN DESCRIPTION - FREQUENCY: FREQUENCY: CONTINUOUS

## 2020-04-16 ASSESSMENT — PAIN - FUNCTIONAL ASSESSMENT: PAIN_FUNCTIONAL_ASSESSMENT: PREVENTS OR INTERFERES WITH MANY ACTIVE NOT PASSIVE ACTIVITIES

## 2020-04-16 NOTE — PROGRESS NOTES
Orientation Status: Impaired(difficulty assessing due to pt minimally verbal)  Orientation Level: Oriented to person;Disoriented to person;Disoriented to place; Disoriented to time     Balance  Sitting Balance: Unable to assess(comment)  Standing Balance: Unable to assess(comment)  ADL  Feeding: Dependent/Total;Bringing food to mouth assist;Beverage management(assist to feed self seated in bed; pt attempting to feed self but difficulty grasping utensil; pt reports feeding self at baseline although unsure of accuracy )  Additional Comments: Anticipate pt needing Total A for ADLs including dressing, bathing, and toileting based on ROM, strength, and cognition; pt needing significant assist at baseline  Tone RUE  RUE Tone: Normotonic  Tone LUE  LUE Tone: Normotonic     Bed mobility  Rolling to Left: 2 Person assistance;Dependent/Total  Rolling to Right: 2 Person assistance;Dependent/Total  Scootin Person assistance;Dependent/Total(scooting up to [facility])  Transfers  Sit to stand: Unable to assess  Stand to sit: Unable to assess  Transfer Comments: pt w/c bound at baseline     Cognition  Overall Cognitive Status: Exceptions  Arousal/Alertness: Delayed responses to stimuli;Inconsistent responses to stimuli  Following Commands: Follows one step commands with increased time; Follows one step commands with repetition; Inconsistently follows commands  Attention Span: Attends with cues to redirect  Initiation: Requires cues for all  Sequencing: Requires cues for all  Cognition Comment: difficulty assessing due to pt minimally verbal        Sensation  Overall Sensation Status: (N/T)        LUE PROM (degrees)  LUE PROM: WFL  LUE AROM (degrees)  LUE AROM : Exceptions  LUE General AROM: difficulty assessing; WFL with AAROM  RUE PROM (degrees)  RUE PROM: WFL  RUE AROM (degrees)  RUE AROM : Exceptions  RUE General AROM: difficulty assessing; WFL with AAROM  LUE Strength  Gross LUE Strength: Exceptions to [facility]  L Hand General: 2+/5  RUE Strength  Gross RUE Strength: Exceptions to [facility] Hand General: 2+/5                   Plan   Plan  Times per week: D/C from OT      AM-PAC Score        AM-PAC Inpatient Daily Activity Raw Score: 6 (04/16/20 0922)  AM-PAC Inpatient ADL T-Scale Score : 17.07 (04/16/20 0922)  ADL Inpatient CMS 0-100% Score: 100 (04/16/20 0922)  ADL Inpatient CMS G-Code Modifier : CN (04/16/20 9947)    Goals  Short term goals  Time Frame for Short term goals: no ongoing OT goals  Patient Goals   Patient goals : no reported goals       Therapy Time   Individual Concurrent Group Co-treatment   Time In 0833         Time Out 0900         Minutes 27         Timed Code Treatment Minutes: 12 Minutes(15 minute eval)       Renee Aranda OTR/L

## 2020-04-16 NOTE — PLAN OF CARE
Problem: Falls - Risk of:  Goal: Will remain free from falls  Description: Will remain free from falls  Outcome: Ongoing  Note: Fall risk assessment completed every shift. All precautions in place. Pt has call light within reach at all times. Room clear of clutter. Pt aware to call for assistance when getting up. Bed wheels locked. Side rails raised. Nonskid socks on. Bed at lowest setting. Bed alarm on. Goal: Absence of physical injury  Description: Absence of physical injury  Outcome: Ongoing     Problem: Infection:  Goal: Will remain free from infection  Description: Will remain free from infection  Outcome: Ongoing     Problem: Safety:  Goal: Free from accidental physical injury  Description: Free from accidental physical injury  Outcome: Ongoing  Goal: Free from intentional harm  Description: Free from intentional harm  Outcome: Ongoing     Problem: Pain:  Goal: Patient's pain/discomfort is manageable  Description: Patient's pain/discomfort is manageable  Outcome: Ongoing  Note: Pain/discomfort being managed with PRN analgesics per MD orders. Pt able to express presence and absence of pain and rate pain appropriately using numerical scale. With pain whenever moved, refuses percocet but agreed to take tylenol. Problem: Skin Integrity:  Goal: Skin integrity will stabilize  Description: Skin integrity will stabilize  Outcome: Ongoing  Placed patient on a specialty mattress. Interdry placed under breast and abdominal pannus. Periarea cleaned with bathwipes and calmoseptine applied. Assisted in turning when possible. Problem: Discharge Planning:  Goal: Patients continuum of care needs are met  Description: Patients continuum of care needs are met  Outcome: Ongoing     Problem: Airway Clearance - Ineffective:  Goal: Clear lung sounds  Description: Clear lung sounds  Outcome: Ongoing  Note: With productive cough but pt can cough it out. Placed an oral suction for patient to use.  With concerns regarding

## 2020-04-17 LAB
ANION GAP SERPL CALCULATED.3IONS-SCNC: 13 MMOL/L (ref 3–16)
BASOPHILS ABSOLUTE: 0 K/UL (ref 0–0.2)
BASOPHILS RELATIVE PERCENT: 1.1 %
BUN BLDV-MCNC: 12 MG/DL (ref 7–20)
CALCIUM SERPL-MCNC: 9.1 MG/DL (ref 8.3–10.6)
CHLORIDE BLD-SCNC: 102 MMOL/L (ref 99–110)
CO2: 23 MMOL/L (ref 21–32)
CREAT SERPL-MCNC: 0.8 MG/DL (ref 0.6–1.2)
EOSINOPHILS ABSOLUTE: 0.1 K/UL (ref 0–0.6)
EOSINOPHILS RELATIVE PERCENT: 3.3 %
GFR AFRICAN AMERICAN: >60
GFR NON-AFRICAN AMERICAN: >60
GLUCOSE BLD-MCNC: 103 MG/DL (ref 70–99)
GLUCOSE BLD-MCNC: 88 MG/DL (ref 70–99)
GLUCOSE BLD-MCNC: 93 MG/DL (ref 70–99)
GLUCOSE BLD-MCNC: 93 MG/DL (ref 70–99)
GLUCOSE BLD-MCNC: 99 MG/DL (ref 70–99)
HCT VFR BLD CALC: 30 % (ref 36–48)
HEMOGLOBIN: 9.7 G/DL (ref 12–16)
LYMPHOCYTES ABSOLUTE: 1.2 K/UL (ref 1–5.1)
LYMPHOCYTES RELATIVE PERCENT: 27.8 %
MAGNESIUM: 2 MG/DL (ref 1.8–2.4)
MCH RBC QN AUTO: 26.5 PG (ref 26–34)
MCHC RBC AUTO-ENTMCNC: 32.3 G/DL (ref 31–36)
MCV RBC AUTO: 82 FL (ref 80–100)
MONOCYTES ABSOLUTE: 0.3 K/UL (ref 0–1.3)
MONOCYTES RELATIVE PERCENT: 8.3 %
NEUTROPHILS ABSOLUTE: 2.5 K/UL (ref 1.7–7.7)
NEUTROPHILS RELATIVE PERCENT: 59.5 %
PDW BLD-RTO: 14.1 % (ref 12.4–15.4)
PERFORMED ON: ABNORMAL
PERFORMED ON: NORMAL
PLATELET # BLD: 221 K/UL (ref 135–450)
PMV BLD AUTO: 9.2 FL (ref 5–10.5)
POTASSIUM REFLEX MAGNESIUM: 4.4 MMOL/L (ref 3.5–5.1)
RBC # BLD: 3.65 M/UL (ref 4–5.2)
SODIUM BLD-SCNC: 138 MMOL/L (ref 136–145)
WBC # BLD: 4.2 K/UL (ref 4–11)

## 2020-04-17 PROCEDURE — 85025 COMPLETE CBC W/AUTO DIFF WBC: CPT

## 2020-04-17 PROCEDURE — 6370000000 HC RX 637 (ALT 250 FOR IP): Performed by: INTERNAL MEDICINE

## 2020-04-17 PROCEDURE — 2580000003 HC RX 258: Performed by: INTERNAL MEDICINE

## 2020-04-17 PROCEDURE — 6360000002 HC RX W HCPCS: Performed by: INTERNAL MEDICINE

## 2020-04-17 PROCEDURE — 83735 ASSAY OF MAGNESIUM: CPT

## 2020-04-17 PROCEDURE — 2060000000 HC ICU INTERMEDIATE R&B

## 2020-04-17 PROCEDURE — 80048 BASIC METABOLIC PNL TOTAL CA: CPT

## 2020-04-17 RX ADMIN — ACETAMINOPHEN 650 MG: 650 SUPPOSITORY RECTAL at 15:58

## 2020-04-17 RX ADMIN — AZITHROMYCIN MONOHYDRATE 500 MG: 500 INJECTION, POWDER, LYOPHILIZED, FOR SOLUTION INTRAVENOUS at 05:03

## 2020-04-17 RX ADMIN — SODIUM CHLORIDE, PRESERVATIVE FREE 10 ML: 5 INJECTION INTRAVENOUS at 09:36

## 2020-04-17 RX ADMIN — HEPARIN SODIUM 5000 UNITS: 5000 INJECTION INTRAVENOUS; SUBCUTANEOUS at 06:44

## 2020-04-17 RX ADMIN — ACETAMINOPHEN 650 MG: 650 SUPPOSITORY RECTAL at 21:56

## 2020-04-17 RX ADMIN — HEPARIN SODIUM 5000 UNITS: 5000 INJECTION INTRAVENOUS; SUBCUTANEOUS at 13:49

## 2020-04-17 RX ADMIN — HEPARIN SODIUM 5000 UNITS: 5000 INJECTION INTRAVENOUS; SUBCUTANEOUS at 23:14

## 2020-04-17 RX ADMIN — ACETAMINOPHEN 650 MG: 650 SUPPOSITORY RECTAL at 09:27

## 2020-04-17 RX ADMIN — SODIUM CHLORIDE: 9 INJECTION, SOLUTION INTRAVENOUS at 15:48

## 2020-04-17 RX ADMIN — SODIUM CHLORIDE, PRESERVATIVE FREE 10 ML: 5 INJECTION INTRAVENOUS at 21:45

## 2020-04-17 ASSESSMENT — PAIN SCALES - WONG BAKER: WONGBAKER_NUMERICALRESPONSE: 4

## 2020-04-17 ASSESSMENT — PAIN DESCRIPTION - LOCATION: LOCATION: GENERALIZED

## 2020-04-17 ASSESSMENT — PAIN SCALES - GENERAL: PAINLEVEL_OUTOF10: 0

## 2020-04-17 ASSESSMENT — PAIN DESCRIPTION - PAIN TYPE: TYPE: ACUTE PAIN;CHRONIC PAIN

## 2020-04-17 NOTE — PLAN OF CARE
Problem: Falls - Risk of:  Goal: Will remain free from falls  Description: Will remain free from falls  Outcome: Ongoing     Problem: Falls - Risk of:  Goal: Absence of physical injury  Description: Absence of physical injury  Outcome: Ongoing     Problem: Infection:  Goal: Will remain free from infection  Description: Will remain free from infection  Outcome: Ongoing     Problem: Safety:  Goal: Free from accidental physical injury  Description: Free from accidental physical injury  Outcome: Ongoing     Problem: Safety:  Goal: Free from intentional harm  Description: Free from intentional harm  Outcome: Ongoing     Problem: Daily Care:  Goal: Daily care needs are met  Description: Daily care needs are met  Outcome: Ongoing     Problem: Pain:  Goal: Patient's pain/discomfort is manageable  Description: Patient's pain/discomfort is manageable  Outcome: Ongoing     Problem: Skin Integrity:  Goal: Skin integrity will stabilize  Description: Skin integrity will stabilize  Outcome: Ongoing     Problem: Discharge Planning:  Goal: Patients continuum of care needs are met  Description: Patients continuum of care needs are met  Outcome: Ongoing     Problem: Airway Clearance - Ineffective:  Goal: Clear lung sounds  Description: Clear lung sounds  Outcome: Ongoing     Problem: Airway Clearance - Ineffective:  Goal: Ability to maintain a clear airway will improve  Description: Ability to maintain a clear airway will improve  Outcome: Ongoing     Problem: Fluid Volume - Deficit:  Goal: Achieves intake and output within specified parameters  Description: Achieves intake and output within specified parameters  Outcome: Ongoing     Problem: Gas Exchange - Impaired:  Goal: Levels of oxygenation will improve  Description: Levels of oxygenation will improve  Outcome: Ongoing

## 2020-04-17 NOTE — PROGRESS NOTES
185 lb 13.6 oz (84.3 kg)   SpO2 91%   BMI 31.90 kg/m²     General appearance: Drowsy but arousable  HEENT: Pupils equal, round, and reactive to light. Conjunctivae/corneas clear. Neck: Supple, with full range of motion. No jugular venous distention. Trachea midline. Respiratory:  Normal respiratory effort. Clear to auscultation, bilaterally without Rales/Wheezes/Rhonchi. Cardiovascular: Regular rate and rhythm with normal S1/S2 without murmurs, rubs or gallops. Abdomen: Soft, non-tender, non-distended with normal bowel sounds. Musculoskeletal: No clubbing, cyanosis or edema bilaterally. Full range of motion without deformity. Skin: Skin color, texture, turgor normal.  No rashes or lesions. Neurologic:  Neurovascularly intact without any focal sensory/motor deficits. Cranial nerves: II-XII intact, grossly non-focal.  Psychiatric: Alert     Labs:   Recent Labs     04/15/20  0415 04/16/20  0455 04/17/20  0511   WBC 4.3 3.7* 4.2   HGB 9.3* 9.0* 9.7*   HCT 29.6* 28.4* 30.0*    200 221     Recent Labs     04/15/20  0415 04/16/20  0455 04/17/20  0511    139 138   K 4.8 4.6 4.4    103 102   CO2 24 24 23   BUN 18 18 12   CREATININE 1.0 1.2 0.8   CALCIUM 8.5 8.7 9.1     Recent Labs     04/15/20  0415   AST 33   ALT 20   BILIDIR <0.2   BILITOT <0.2   ALKPHOS 132*     No results for input(s): INR in the last 72 hours. No results for input(s): Gare[place] Junk in the last 72 hours. Urinalysis:      Lab Results   Component Value Date    NITRU Negative 04/14/2020    WBCUA 52 04/14/2020    RBCUA 25 04/14/2020    BLOODU MODERATE 04/14/2020    SPECGRAV 1.025 04/14/2020    GLUCOSEU Negative 04/14/2020       Radiology:  XR CHEST PORTABLE   Final Result   Cardiomegaly. Interstitial opacities compatible pulmonary edema or   interstitial pneumonitis.                  Assessment/Plan:    Active Hospital Problems    Diagnosis Date Noted    Acute respiratory failure with hypoxia ([address] [J96.01] 04/14/2020

## 2020-04-17 NOTE — PROGRESS NOTES
Pt current temp is 101.9- Tylenol suppository given. Pt is refusing to take anything PO and is constantly moaning out like the pt is in pain. MD made aware, will continue to monitor.          Electronically signed by Андрей López RN on 4/17/2020 at 9:55 AM

## 2020-04-18 LAB
ANION GAP SERPL CALCULATED.3IONS-SCNC: 18 MMOL/L (ref 3–16)
BANDED NEUTROPHILS RELATIVE PERCENT: 2 % (ref 0–7)
BASOPHILS ABSOLUTE: 0 K/UL (ref 0–0.2)
BASOPHILS RELATIVE PERCENT: 0 %
BLOOD CULTURE, ROUTINE: NORMAL
BUN BLDV-MCNC: 9 MG/DL (ref 7–20)
CALCIUM SERPL-MCNC: 9.2 MG/DL (ref 8.3–10.6)
CHLORIDE BLD-SCNC: 103 MMOL/L (ref 99–110)
CO2: 20 MMOL/L (ref 21–32)
CREAT SERPL-MCNC: 0.7 MG/DL (ref 0.6–1.2)
EOSINOPHILS ABSOLUTE: 0 K/UL (ref 0–0.6)
EOSINOPHILS RELATIVE PERCENT: 0 %
GFR AFRICAN AMERICAN: >60
GFR NON-AFRICAN AMERICAN: >60
GLUCOSE BLD-MCNC: 100 MG/DL (ref 70–99)
GLUCOSE BLD-MCNC: 103 MG/DL (ref 70–99)
GLUCOSE BLD-MCNC: 84 MG/DL (ref 70–99)
GLUCOSE BLD-MCNC: 92 MG/DL (ref 70–99)
GLUCOSE BLD-MCNC: 99 MG/DL (ref 70–99)
HCT VFR BLD CALC: 30.9 % (ref 36–48)
HEMOGLOBIN: 9.9 G/DL (ref 12–16)
LYMPHOCYTES ABSOLUTE: 0.5 K/UL (ref 1–5.1)
LYMPHOCYTES RELATIVE PERCENT: 10 %
MAGNESIUM: 1.8 MG/DL (ref 1.8–2.4)
MCH RBC QN AUTO: 26.2 PG (ref 26–34)
MCHC RBC AUTO-ENTMCNC: 32.1 G/DL (ref 31–36)
MCV RBC AUTO: 81.5 FL (ref 80–100)
MONOCYTES ABSOLUTE: 0.4 K/UL (ref 0–1.3)
MONOCYTES RELATIVE PERCENT: 8 %
NEUTROPHILS ABSOLUTE: 4.4 K/UL (ref 1.7–7.7)
NEUTROPHILS RELATIVE PERCENT: 80 %
PDW BLD-RTO: 14.2 % (ref 12.4–15.4)
PERFORMED ON: ABNORMAL
PERFORMED ON: NORMAL
PLATELET # BLD: 271 K/UL (ref 135–450)
PLATELET SLIDE REVIEW: ADEQUATE
PMV BLD AUTO: 8.8 FL (ref 5–10.5)
POTASSIUM REFLEX MAGNESIUM: 4.1 MMOL/L (ref 3.5–5.1)
RBC # BLD: 3.8 M/UL (ref 4–5.2)
RBC # BLD: NORMAL 10*6/UL
SLIDE REVIEW: ABNORMAL
SODIUM BLD-SCNC: 141 MMOL/L (ref 136–145)
WBC # BLD: 5.4 K/UL (ref 4–11)

## 2020-04-18 PROCEDURE — 83735 ASSAY OF MAGNESIUM: CPT

## 2020-04-18 PROCEDURE — 85025 COMPLETE CBC W/AUTO DIFF WBC: CPT

## 2020-04-18 PROCEDURE — 83036 HEMOGLOBIN GLYCOSYLATED A1C: CPT

## 2020-04-18 PROCEDURE — 2580000003 HC RX 258: Performed by: INTERNAL MEDICINE

## 2020-04-18 PROCEDURE — 6360000002 HC RX W HCPCS: Performed by: INTERNAL MEDICINE

## 2020-04-18 PROCEDURE — C1751 CATH, INF, PER/CENT/MIDLINE: HCPCS

## 2020-04-18 PROCEDURE — 36569 INSJ PICC 5 YR+ W/O IMAGING: CPT

## 2020-04-18 PROCEDURE — 76937 US GUIDE VASCULAR ACCESS: CPT

## 2020-04-18 PROCEDURE — 6370000000 HC RX 637 (ALT 250 FOR IP): Performed by: INTERNAL MEDICINE

## 2020-04-18 PROCEDURE — 6360000002 HC RX W HCPCS: Performed by: NURSE PRACTITIONER

## 2020-04-18 PROCEDURE — 2060000000 HC ICU INTERMEDIATE R&B

## 2020-04-18 PROCEDURE — 80048 BASIC METABOLIC PNL TOTAL CA: CPT

## 2020-04-18 PROCEDURE — 2500000003 HC RX 250 WO HCPCS: Performed by: INTERNAL MEDICINE

## 2020-04-18 RX ORDER — LEVETIRACETAM 5 MG/ML
500 INJECTION INTRAVASCULAR EVERY 12 HOURS
Status: DISCONTINUED | OUTPATIENT
Start: 2020-04-18 | End: 2020-04-27

## 2020-04-18 RX ORDER — SODIUM CHLORIDE 0.9 % (FLUSH) 0.9 %
10 SYRINGE (ML) INJECTION EVERY 12 HOURS SCHEDULED
Status: DISCONTINUED | OUTPATIENT
Start: 2020-04-18 | End: 2020-04-28 | Stop reason: HOSPADM

## 2020-04-18 RX ORDER — MAGNESIUM SULFATE IN WATER 40 MG/ML
2 INJECTION, SOLUTION INTRAVENOUS ONCE
Status: COMPLETED | OUTPATIENT
Start: 2020-04-18 | End: 2020-04-18

## 2020-04-18 RX ORDER — PHENYTOIN SODIUM 50 MG/ML
100 INJECTION, SOLUTION INTRAMUSCULAR; INTRAVENOUS ONCE
Status: COMPLETED | OUTPATIENT
Start: 2020-04-18 | End: 2020-04-18

## 2020-04-18 RX ORDER — LOPERAMIDE HYDROCHLORIDE 2 MG/1
2 CAPSULE ORAL 4 TIMES DAILY PRN
Status: DISCONTINUED | OUTPATIENT
Start: 2020-04-18 | End: 2020-04-28 | Stop reason: HOSPADM

## 2020-04-18 RX ORDER — PHENYTOIN SODIUM 50 MG/ML
100 INJECTION, SOLUTION INTRAMUSCULAR; INTRAVENOUS EVERY 8 HOURS
Status: DISCONTINUED | OUTPATIENT
Start: 2020-04-18 | End: 2020-04-18

## 2020-04-18 RX ORDER — LIDOCAINE HYDROCHLORIDE 10 MG/ML
5 INJECTION, SOLUTION EPIDURAL; INFILTRATION; INTRACAUDAL; PERINEURAL ONCE
Status: COMPLETED | OUTPATIENT
Start: 2020-04-18 | End: 2020-04-18

## 2020-04-18 RX ORDER — SODIUM CHLORIDE 0.9 % (FLUSH) 0.9 %
10 SYRINGE (ML) INJECTION PRN
Status: DISCONTINUED | OUTPATIENT
Start: 2020-04-18 | End: 2020-04-28 | Stop reason: HOSPADM

## 2020-04-18 RX ADMIN — HEPARIN SODIUM 5000 UNITS: 5000 INJECTION INTRAVENOUS; SUBCUTANEOUS at 07:52

## 2020-04-18 RX ADMIN — HEPARIN SODIUM 5000 UNITS: 5000 INJECTION INTRAVENOUS; SUBCUTANEOUS at 12:19

## 2020-04-18 RX ADMIN — HEPARIN SODIUM 5000 UNITS: 5000 INJECTION INTRAVENOUS; SUBCUTANEOUS at 22:47

## 2020-04-18 RX ADMIN — ACETAMINOPHEN 650 MG: 325 TABLET ORAL at 05:30

## 2020-04-18 RX ADMIN — ONDANSETRON HYDROCHLORIDE 4 MG: 2 SOLUTION INTRAMUSCULAR; INTRAVENOUS at 00:17

## 2020-04-18 RX ADMIN — PHENYTOIN SODIUM 100 MG: 50 INJECTION INTRAMUSCULAR; INTRAVENOUS at 22:47

## 2020-04-18 RX ADMIN — AZITHROMYCIN MONOHYDRATE 500 MG: 500 INJECTION, POWDER, LYOPHILIZED, FOR SOLUTION INTRAVENOUS at 05:16

## 2020-04-18 RX ADMIN — LIDOCAINE HYDROCHLORIDE 5 ML: 10 INJECTION, SOLUTION EPIDURAL; INFILTRATION; INTRACAUDAL; PERINEURAL at 21:50

## 2020-04-18 RX ADMIN — MAGNESIUM SULFATE HEPTAHYDRATE 2 G: 40 INJECTION, SOLUTION INTRAVENOUS at 15:03

## 2020-04-18 RX ADMIN — LEVETIRACETAM 500 MG: 5 INJECTION INTRAVENOUS at 22:47

## 2020-04-18 ASSESSMENT — PAIN DESCRIPTION - DESCRIPTORS: DESCRIPTORS: PATIENT UNABLE TO DESCRIBE

## 2020-04-18 ASSESSMENT — PAIN DESCRIPTION - ORIENTATION: ORIENTATION: OTHER (COMMENT)

## 2020-04-18 ASSESSMENT — PAIN DESCRIPTION - ONSET: ONSET: ON-GOING

## 2020-04-18 ASSESSMENT — PAIN DESCRIPTION - PAIN TYPE: TYPE: ACUTE PAIN

## 2020-04-18 ASSESSMENT — PAIN SCALES - WONG BAKER: WONGBAKER_NUMERICALRESPONSE: 6

## 2020-04-18 ASSESSMENT — PAIN DESCRIPTION - LOCATION: LOCATION: OTHER (COMMENT)

## 2020-04-18 ASSESSMENT — PAIN DESCRIPTION - PROGRESSION: CLINICAL_PROGRESSION: OTHER (COMMENT)

## 2020-04-18 ASSESSMENT — PAIN DESCRIPTION - FREQUENCY: FREQUENCY: CONTINUOUS

## 2020-04-18 NOTE — PROGRESS NOTES
assess due to advanced dementia. Assessment:    Principal Problem:    COVID-19 virus infection  Active Problems:    Acute respiratory failure with hypoxia (HCC)    Pneumonia    Sepsis (HCC)    Fever    Tachycardia    Exposure to COVID-19 virus    Hyperglycemia    Dementia (HCC)    Renal failure    Asthma    Essential hypertension    Systemic lupus erythematosus ([facility]    Acute renal failure superimposed on stage 3 chronic kidney disease ([facility]    Septic shock (HCC)    Pneumonia due to COVID-19 virus  Resolved Problems:    * No resolved hospital problems. *      Plan:  1. COVID 19 infection - still febrile and hypoxic  2. Acute resp failure with hypoxia -  on 3L oxygen at present  3. Seizure disorder - continue with dilantin, neurontin, vimpat, keppra - no seizures noted  4. Sepsis POA based on fever, tachypnea, documented COVID19 infection, suspected UTI  5. Diarrhea - add immodium    Prognosis:  Guarded    Code status:  Full code    DVT prophylaxis: Heparin SQ  GI prophylaxis: none  Antibiotic prophylaxis indicated:   no  Diet:  DIET CARB CONTROL;   Dietary Nutrition Supplements: Diabetic Oral Supplement    Disposition:  Long term nursing facility or group home    Medications:  Scheduled Meds:   sodium chloride flush  10 mL Intravenous 2 times per day    heparin (porcine)  5,000 Units Subcutaneous 3 times per day    azithromycin  500 mg Intravenous Q24H    insulin lispro  0-12 Units Subcutaneous TID WC    insulin lispro  0-6 Units Subcutaneous Nightly    lactobacillus  1 capsule Oral BID WC    levETIRAcetam  500 mg Oral BID    lacosamide  100 mg Oral BID    allopurinol  100 mg Oral Nightly    aspirin  81 mg Oral Daily    escitalopram  10 mg Oral Daily    ferrous sulfate  324 mg Oral Daily with breakfast    budesonide-formoterol  2 puff Inhalation BID    gabapentin  100 mg Oral Nightly    hydroxychloroquine  300 mg Oral Daily    melatonin  6 mg Oral Nightly    montelukast  10 mg Oral Nightly    pregabalin  150 mg Oral 2 times per day    phenytoin  100 mg Oral TID       PRN Meds:  sodium chloride flush, acetaminophen **OR** acetaminophen, ondansetron, bisacodyl, glucose, dextrose, glucagon (rDNA), dextrose, menthol-zinc oxide, oxyCODONE-acetaminophen    IV:   sodium chloride 50 mL/hr at 04/17/20 1548    dextrose           Intake/Output Summary (Last 24 hours) at 4/18/2020 1133  Last data filed at 4/18/2020 0526  Gross per 24 hour   Intake 1354 ml   Output 1475 ml   Net -121 ml       Results:  CBC:   Recent Labs     04/16/20  0455 04/17/20  0511 04/18/20  0540   WBC 3.7* 4.2 5.4   HGB 9.0* 9.7* 9.9*   HCT 28.4* 30.0* 30.9*   MCV 83.0 82.0 81.5    221 271     BMP:   Recent Labs     04/16/20  0455 04/17/20  0511 04/18/20  0540    138 141   K 4.6 4.4 4.1    102 103   CO2 24 23 20*   BUN 18 12 9   CREATININE 1.2 0.8 0.7     Mag: No results for input(s): MAG in the last 72 hours. Phos:   Lab Results   Component Value Date    PHOS 3.8 04/14/2020     No components found for: GLU    LIVER PROFILE: No results for input(s): AST, ALT, LIPASE, BILIDIR, BILITOT, ALKPHOS in the last 72 hours. Invalid input(s): AMYLASE,  ALB  PT/INR: No results for input(s): PROTIME, INR in the last 72 hours. APTT: No results for input(s): APTT in the last 72 hours. UA:No results for input(s): NITRITE, COLORU, PHUR, LABCAST, WBCUA, RBCUA, MUCUS, TRICHOMONAS, YEAST, BACTERIA, CLARITYU, SPECGRAV, LEUKOCYTESUR, UROBILINOGEN, BILIRUBINUR, BLOODU, GLUCOSEU, AMORPHOUS in the last 72 hours.     Invalid input(s): Radha Medina input(s): ABG  Lab Results   Component Value Date    CALCIUM 9.2 04/18/2020    PHOS 3.8 04/14/2020                 Electronically signed by Alfredo Leahy MD on 4/18/2020 at 11:33 AM

## 2020-04-19 LAB
ANION GAP SERPL CALCULATED.3IONS-SCNC: 20 MMOL/L (ref 3–16)
BUN BLDV-MCNC: 8 MG/DL (ref 7–20)
CALCIUM SERPL-MCNC: 9 MG/DL (ref 8.3–10.6)
CHLORIDE BLD-SCNC: 105 MMOL/L (ref 99–110)
CO2: 16 MMOL/L (ref 21–32)
CREAT SERPL-MCNC: 0.7 MG/DL (ref 0.6–1.2)
ESTIMATED AVERAGE GLUCOSE: 114 MG/DL
GFR AFRICAN AMERICAN: >60
GFR NON-AFRICAN AMERICAN: >60
GLUCOSE BLD-MCNC: 102 MG/DL (ref 70–99)
GLUCOSE BLD-MCNC: 89 MG/DL (ref 70–99)
GLUCOSE BLD-MCNC: 94 MG/DL (ref 70–99)
HBA1C MFR BLD: 5.6 %
MAGNESIUM: 2.1 MG/DL (ref 1.8–2.4)
PERFORMED ON: NORMAL
PERFORMED ON: NORMAL
POTASSIUM REFLEX MAGNESIUM: 4.5 MMOL/L (ref 3.5–5.1)
REASON FOR REJECTION: NORMAL
REJECTED TEST: NORMAL
SODIUM BLD-SCNC: 141 MMOL/L (ref 136–145)

## 2020-04-19 PROCEDURE — 2580000003 HC RX 258: Performed by: INTERNAL MEDICINE

## 2020-04-19 PROCEDURE — 80048 BASIC METABOLIC PNL TOTAL CA: CPT

## 2020-04-19 PROCEDURE — 6360000002 HC RX W HCPCS: Performed by: NURSE PRACTITIONER

## 2020-04-19 PROCEDURE — 2060000000 HC ICU INTERMEDIATE R&B

## 2020-04-19 PROCEDURE — 6360000002 HC RX W HCPCS: Performed by: INTERNAL MEDICINE

## 2020-04-19 PROCEDURE — 83735 ASSAY OF MAGNESIUM: CPT

## 2020-04-19 RX ORDER — KETOROLAC TROMETHAMINE 15 MG/ML
15 INJECTION, SOLUTION INTRAMUSCULAR; INTRAVENOUS ONCE
Status: COMPLETED | OUTPATIENT
Start: 2020-04-19 | End: 2020-04-19

## 2020-04-19 RX ORDER — KETOROLAC TROMETHAMINE 15 MG/ML
15 INJECTION, SOLUTION INTRAMUSCULAR; INTRAVENOUS EVERY 6 HOURS PRN
Status: DISCONTINUED | OUTPATIENT
Start: 2020-04-19 | End: 2020-04-22

## 2020-04-19 RX ADMIN — LEVETIRACETAM 500 MG: 5 INJECTION INTRAVENOUS at 13:54

## 2020-04-19 RX ADMIN — SODIUM CHLORIDE: 9 INJECTION, SOLUTION INTRAVENOUS at 20:41

## 2020-04-19 RX ADMIN — HEPARIN SODIUM 5000 UNITS: 5000 INJECTION INTRAVENOUS; SUBCUTANEOUS at 04:57

## 2020-04-19 RX ADMIN — HEPARIN SODIUM 5000 UNITS: 5000 INJECTION INTRAVENOUS; SUBCUTANEOUS at 13:53

## 2020-04-19 RX ADMIN — LEVETIRACETAM 500 MG: 5 INJECTION INTRAVENOUS at 20:41

## 2020-04-19 RX ADMIN — KETOROLAC TROMETHAMINE 15 MG: 15 INJECTION, SOLUTION INTRAMUSCULAR; INTRAVENOUS at 05:27

## 2020-04-19 RX ADMIN — HEPARIN SODIUM 5000 UNITS: 5000 INJECTION INTRAVENOUS; SUBCUTANEOUS at 20:40

## 2020-04-19 RX ADMIN — SODIUM CHLORIDE, PRESERVATIVE FREE 10 ML: 5 INJECTION INTRAVENOUS at 12:25

## 2020-04-19 RX ADMIN — AZITHROMYCIN MONOHYDRATE 500 MG: 500 INJECTION, POWDER, LYOPHILIZED, FOR SOLUTION INTRAVENOUS at 04:57

## 2020-04-19 RX ADMIN — KETOROLAC TROMETHAMINE 15 MG: 15 INJECTION, SOLUTION INTRAMUSCULAR; INTRAVENOUS at 21:39

## 2020-04-19 ASSESSMENT — PAIN SCALES - GENERAL
PAINLEVEL_OUTOF10: 0
PAINLEVEL_OUTOF10: 0

## 2020-04-19 ASSESSMENT — PAIN SCALES - WONG BAKER
WONGBAKER_NUMERICALRESPONSE: 4
WONGBAKER_NUMERICALRESPONSE: 6

## 2020-04-19 NOTE — PROGRESS NOTES
Hospitalist Progress Note    CC: COVID-19 virus infection    Hospital course:  63yo AAF with advanced dementia, iron def anemia, asthma, CKDIII, DMII with CKD, GERD, gout, hyerlipidemia, HTN, COPD, OA, weight gain, seizure disorder who presents from her long term care facility with shortness of breath, fever, acute metabolic encephalopathy. Pt initially admitted to the ICU for septic shock criteria with hypotension refractory to IVF bolus and requiring levophed. Test did return positive for COVID19. Only moans currently - still with fevers and ongoing acute resp failure with hypoxia. Palliative care involved, but family unrealistic about expectations for getting better. Pt is part of outbreak of COVID19 at [facility]. Admit date: 4/14/2020  Days in hospital:  5    24 Hour Events: still febrile, looks chronically ill    Subjective: still febrile - leukopenia present today - ferritin elevated - currently has completed course of zithromax and is on chronic plaquenil - pt with profuse diarrhea - pt refusing to eat -     ROS:   Review of systems not obtained due to patient factors. advanced dementia. Objective:    BP (!) 170/78   Pulse 111   Temp 102.6 °F (39.2 °C) (Axillary)   Resp 26   Ht 5' 4\" (1.626 m)   Wt 185 lb 13.6 oz (84.3 kg)   SpO2 91%   BMI 31.90 kg/m²     Gen: chronically ill appearing  HEENT: NC/AT, moist mucous membranes, no oropharyngeal erythema or exudate  Neck: supple, trachea midline, no anterior cervical or SC LAD  Heart:  Normal s1/s2, RRR, no murmurs, gallops, or rubs.  no leg edema  Lungs:  diminished bilaterally, no wheeze, no rales, no rhonchi, no crackles, no use of accessory muscles  Abd: bowel sounds present, soft, nontender, nondistended, no masses  Extrem:  No clubbing, cyanosis,  no edema  Skin: no rashes or lesions  Psych: Not oriented to date or Not oriented to place  Neuro: weak Unable to fully assess due to advanced dementia. Assessment:    Principal Problem:    COVID-19 virus infection  Active Problems:    Acute respiratory failure with hypoxia (HCC)    Pneumonia    Sepsis (HCC)    Fever    Tachycardia    Exposure to COVID-19 virus    Hyperglycemia    Dementia (HCC)    Renal failure    Asthma    Essential hypertension    Systemic lupus erythematosus ([facility]    Acute renal failure superimposed on stage 3 chronic kidney disease ([facility]    Septic shock (HCC)    Pneumonia due to COVID-19 virus  Resolved Problems:    * No resolved hospital problems. *      Plan:  1. COVID 19 infection - still febrile and hypoxic - not really improving at present  2. Acute resp failure with hypoxia -  on 3L oxygen at present  3. Seizure disorder - continue with dilantin, neurontin, vimpat, keppra - no seizures noted  4. Sepsis POA based on fever, tachypnea, documented COVID19 infection  5. Diarrhea - add immodium    Prognosis:  Guarded    Code status:  Full code    DVT prophylaxis: Heparin SQ  GI prophylaxis: none  Antibiotic prophylaxis indicated:   no  Diet:  DIET CARB CONTROL;   Dietary Nutrition Supplements: Diabetic Oral Supplement    Disposition:  Long term nursing facility or group home    Medications:  Scheduled Meds:   sodium chloride flush  10 mL Intravenous 2 times per day    levetiracetam  500 mg Intravenous Q12H    sodium chloride flush  10 mL Intravenous 2 times per day    heparin (porcine)  5,000 Units Subcutaneous 3 times per day    azithromycin  500 mg Intravenous Q24H    insulin lispro  0-12 Units Subcutaneous TID WC    insulin lispro  0-6 Units Subcutaneous Nightly    lactobacillus  1 capsule Oral BID WC    lacosamide  100 mg Oral BID    allopurinol  100 mg Oral Nightly    aspirin  81 mg Oral Daily    escitalopram  10 mg Oral Daily    ferrous sulfate  324 mg Oral Daily with breakfast    budesonide-formoterol  2 puff Inhalation BID    gabapentin  100 mg Oral Nightly    hydroxychloroquine  300 mg Oral

## 2020-04-19 NOTE — PROGRESS NOTES
DIT arrived to place PICC for pt. Call to Leonidas Charissa, daughter, for consent to be obtained. Jessika Garcia spoke to both this RN and Estella Aguilar and agreed for PICC placement.

## 2020-04-20 ENCOUNTER — APPOINTMENT (OUTPATIENT)
Dept: GENERAL RADIOLOGY | Age: 70
DRG: 871 | End: 2020-04-20
Payer: MEDICARE

## 2020-04-20 LAB
ANION GAP SERPL CALCULATED.3IONS-SCNC: 23 MMOL/L (ref 3–16)
BASE EXCESS ARTERIAL: -9.2 MMOL/L (ref -3–3)
BUN BLDV-MCNC: 10 MG/DL (ref 7–20)
C-REACTIVE PROTEIN: 270.4 MG/L (ref 0–5.1)
CALCIUM SERPL-MCNC: 9.1 MG/DL (ref 8.3–10.6)
CARBOXYHEMOGLOBIN ARTERIAL: 1 % (ref 0–1.5)
CHLORIDE BLD-SCNC: 110 MMOL/L (ref 99–110)
CO2: 12 MMOL/L (ref 21–32)
CREAT SERPL-MCNC: 0.8 MG/DL (ref 0.6–1.2)
GFR AFRICAN AMERICAN: >60
GFR NON-AFRICAN AMERICAN: >60
GLUCOSE BLD-MCNC: 104 MG/DL (ref 70–99)
GLUCOSE BLD-MCNC: 123 MG/DL (ref 70–99)
GLUCOSE BLD-MCNC: 93 MG/DL (ref 70–99)
GLUCOSE BLD-MCNC: 96 MG/DL (ref 70–99)
GLUCOSE BLD-MCNC: 99 MG/DL (ref 70–99)
HCO3 ARTERIAL: 15.5 MMOL/L (ref 21–29)
HEMOGLOBIN, ART, EXTENDED: 11 G/DL (ref 12–16)
LACTIC ACID: 0.9 MMOL/L (ref 0.4–2)
MAGNESIUM: 2 MG/DL (ref 1.8–2.4)
METHEMOGLOBIN ARTERIAL: 0.5 %
O2 CONTENT ARTERIAL: 14 ML/DL
O2 SAT, ARTERIAL: 92.7 %
O2 THERAPY: ABNORMAL
PCO2 ARTERIAL: 29.5 MMHG (ref 35–45)
PERFORMED ON: ABNORMAL
PERFORMED ON: ABNORMAL
PERFORMED ON: NORMAL
PERFORMED ON: NORMAL
PH ARTERIAL: 7.33 (ref 7.35–7.45)
PO2 ARTERIAL: 65.7 MMHG (ref 75–108)
POTASSIUM REFLEX MAGNESIUM: 3.7 MMOL/L (ref 3.5–5.1)
PRO-BNP: 6279 PG/ML (ref 0–124)
REASON FOR REJECTION: NORMAL
REJECTED TEST: NORMAL
SODIUM BLD-SCNC: 145 MMOL/L (ref 136–145)
TCO2 ARTERIAL: 16.4 MMOL/L

## 2020-04-20 PROCEDURE — 71045 X-RAY EXAM CHEST 1 VIEW: CPT

## 2020-04-20 PROCEDURE — 99222 1ST HOSP IP/OBS MODERATE 55: CPT | Performed by: INTERNAL MEDICINE

## 2020-04-20 PROCEDURE — 86140 C-REACTIVE PROTEIN: CPT

## 2020-04-20 PROCEDURE — 2060000000 HC ICU INTERMEDIATE R&B

## 2020-04-20 PROCEDURE — 2580000003 HC RX 258: Performed by: INTERNAL MEDICINE

## 2020-04-20 PROCEDURE — 6360000002 HC RX W HCPCS: Performed by: INTERNAL MEDICINE

## 2020-04-20 PROCEDURE — 82803 BLOOD GASES ANY COMBINATION: CPT

## 2020-04-20 PROCEDURE — 83735 ASSAY OF MAGNESIUM: CPT

## 2020-04-20 PROCEDURE — 36600 WITHDRAWAL OF ARTERIAL BLOOD: CPT

## 2020-04-20 PROCEDURE — 2700000000 HC OXYGEN THERAPY PER DAY

## 2020-04-20 PROCEDURE — C9254 INJECTION, LACOSAMIDE: HCPCS | Performed by: FAMILY MEDICINE

## 2020-04-20 PROCEDURE — 6360000002 HC RX W HCPCS: Performed by: FAMILY MEDICINE

## 2020-04-20 PROCEDURE — 2500000003 HC RX 250 WO HCPCS: Performed by: FAMILY MEDICINE

## 2020-04-20 PROCEDURE — 6360000002 HC RX W HCPCS: Performed by: NURSE PRACTITIONER

## 2020-04-20 PROCEDURE — 83605 ASSAY OF LACTIC ACID: CPT

## 2020-04-20 PROCEDURE — 94760 N-INVAS EAR/PLS OXIMETRY 1: CPT

## 2020-04-20 PROCEDURE — 83880 ASSAY OF NATRIURETIC PEPTIDE: CPT

## 2020-04-20 PROCEDURE — 80048 BASIC METABOLIC PNL TOTAL CA: CPT

## 2020-04-20 PROCEDURE — 2580000003 HC RX 258: Performed by: FAMILY MEDICINE

## 2020-04-20 RX ORDER — FUROSEMIDE 10 MG/ML
20 INJECTION INTRAMUSCULAR; INTRAVENOUS ONCE
Status: COMPLETED | OUTPATIENT
Start: 2020-04-20 | End: 2020-04-20

## 2020-04-20 RX ORDER — PHENYTOIN SODIUM 50 MG/ML
100 INJECTION, SOLUTION INTRAMUSCULAR; INTRAVENOUS EVERY 8 HOURS
Status: DISCONTINUED | OUTPATIENT
Start: 2020-04-20 | End: 2020-04-28 | Stop reason: HOSPADM

## 2020-04-20 RX ADMIN — HEPARIN SODIUM 5000 UNITS: 5000 INJECTION INTRAVENOUS; SUBCUTANEOUS at 20:52

## 2020-04-20 RX ADMIN — PHENYTOIN SODIUM 100 MG: 50 INJECTION INTRAMUSCULAR; INTRAVENOUS at 23:11

## 2020-04-20 RX ADMIN — KETOROLAC TROMETHAMINE 15 MG: 15 INJECTION, SOLUTION INTRAMUSCULAR; INTRAVENOUS at 19:37

## 2020-04-20 RX ADMIN — PIPERACILLIN AND TAZOBACTAM 3.38 G: 3; .375 INJECTION, POWDER, LYOPHILIZED, FOR SOLUTION INTRAVENOUS at 19:36

## 2020-04-20 RX ADMIN — KETOROLAC TROMETHAMINE 15 MG: 15 INJECTION, SOLUTION INTRAMUSCULAR; INTRAVENOUS at 09:59

## 2020-04-20 RX ADMIN — DEXTROSE MONOHYDRATE 100 MG: 50 INJECTION, SOLUTION INTRAVENOUS at 23:35

## 2020-04-20 RX ADMIN — LEVETIRACETAM 500 MG: 5 INJECTION INTRAVENOUS at 09:58

## 2020-04-20 RX ADMIN — PIPERACILLIN AND TAZOBACTAM 3.38 G: 3; .375 INJECTION, POWDER, LYOPHILIZED, FOR SOLUTION INTRAVENOUS at 11:12

## 2020-04-20 RX ADMIN — SODIUM CHLORIDE, PRESERVATIVE FREE 10 ML: 5 INJECTION INTRAVENOUS at 21:28

## 2020-04-20 RX ADMIN — HEPARIN SODIUM 5000 UNITS: 5000 INJECTION INTRAVENOUS; SUBCUTANEOUS at 15:52

## 2020-04-20 RX ADMIN — SODIUM BICARBONATE 25 MEQ: 84 INJECTION, SOLUTION INTRAVENOUS at 19:35

## 2020-04-20 RX ADMIN — HEPARIN SODIUM 5000 UNITS: 5000 INJECTION INTRAVENOUS; SUBCUTANEOUS at 04:47

## 2020-04-20 RX ADMIN — PHENYTOIN SODIUM 100 MG: 50 INJECTION INTRAMUSCULAR; INTRAVENOUS at 15:46

## 2020-04-20 RX ADMIN — FUROSEMIDE 20 MG: 10 INJECTION, SOLUTION INTRAMUSCULAR; INTRAVENOUS at 19:38

## 2020-04-20 RX ADMIN — FUROSEMIDE 20 MG: 10 INJECTION, SOLUTION INTRAMUSCULAR; INTRAVENOUS at 13:12

## 2020-04-20 RX ADMIN — LEVETIRACETAM 500 MG: 5 INJECTION INTRAVENOUS at 23:09

## 2020-04-20 RX ADMIN — DEXTROSE MONOHYDRATE 100 MG: 50 INJECTION, SOLUTION INTRAVENOUS at 15:46

## 2020-04-20 ASSESSMENT — PAIN SCALES - PAIN ASSESSMENT IN ADVANCED DEMENTIA (PAINAD)
FACIALEXPRESSION: 1
CONSOLABILITY: 1
NEGVOCALIZATION: 1
CONSOLABILITY: 1
BODYLANGUAGE: 1
BODYLANGUAGE: 1
NEGVOCALIZATION: 1
TOTALSCORE: 5
TOTALSCORE: 5
BREATHING: 1
BREATHING: 1
FACIALEXPRESSION: 1

## 2020-04-20 ASSESSMENT — PAIN SCALES - GENERAL: PAINLEVEL_OUTOF10: 0

## 2020-04-20 ASSESSMENT — PAIN SCALES - WONG BAKER: WONGBAKER_NUMERICALRESPONSE: 2

## 2020-04-20 NOTE — PROGRESS NOTES
Supple, with full range of motion. No jugular venous distention. Trachea midline. Respiratory:  Normal respiratory effort. Clear to auscultation, bilaterally without Rales/Wheezes/Rhonchi. Cardiovascular: Regular rhythm with tachycardia in low 100's, with normal S1/S2 without murmurs, rubs or gallops. Abdomen: Soft, non-tender, non-distended with normal bowel sounds. Musculoskeletal: No clubbing, cyanosis or edema bilaterally. Full range of motion without deformity. Skin: Skin color, texture, turgor normal.  No rashes or lesions. Neurologic:  Moans and withdraws to painful stimuli. Cranial nerves: II-XII intact, grossly non-focal.  Capillary Refill: Brisk,< 3 seconds   Peripheral Pulses: +2 palpable, equal bilaterally       Labs:   Recent Labs     04/18/20  0540   WBC 5.4   HGB 9.9*   HCT 30.9*        Recent Labs     04/18/20  0540 04/19/20  0830    141   K 4.1 4.5    105   CO2 20* 16*   BUN 9 8   CREATININE 0.7 0.7   CALCIUM 9.2 9.0     No results for input(s): AST, ALT, BILIDIR, BILITOT, ALKPHOS in the last 72 hours. No results for input(s): INR in the last 72 hours. No results for input(s): Williemae Brennan in the last 72 hours. Urinalysis:      Lab Results   Component Value Date    NITRU Negative 04/14/2020    WBCUA 52 04/14/2020    RBCUA 25 04/14/2020    BLOODU MODERATE 04/14/2020    SPECGRAV 1.025 04/14/2020    GLUCOSEU Negative 04/14/2020       Radiology:  XR CHEST PORTABLE   Final Result   Cardiomegaly. Interstitial opacities compatible pulmonary edema or   interstitial pneumonitis.          XR CHEST 1 VW    (Results Pending)           Assessment/Plan:    Active Hospital Problems    Diagnosis Date Noted    Acute respiratory failure with hypoxia ([facility] [J96.01] 04/14/2020    COVID-19 virus infection [U07.1] 04/14/2020    Pneumonia [J18.9] 04/14/2020    Sepsis ([facility] [A41.9] 04/14/2020    Fever [R50.9] 04/14/2020    Tachycardia [R00.0] 04/14/2020    Exposure to COVID-19

## 2020-04-20 NOTE — PROGRESS NOTES
RN attempted to give patient medication and breakfast. Pt did not open her eyes and would turn her head away from nurse. Pt would scream when RN would touch her to give any care.      Electronically signed by Tana Berry RN on 4/20/2020 at 10:16 AM

## 2020-04-21 ENCOUNTER — APPOINTMENT (OUTPATIENT)
Dept: GENERAL RADIOLOGY | Age: 70
DRG: 871 | End: 2020-04-21
Payer: MEDICARE

## 2020-04-21 LAB
BASE EXCESS ARTERIAL: 1 MMOL/L (ref -3–3)
CARBOXYHEMOGLOBIN ARTERIAL: 0.7 % (ref 0–1.5)
EKG ATRIAL RATE: 107 BPM
EKG DIAGNOSIS: NORMAL
EKG P AXIS: 57 DEGREES
EKG P-R INTERVAL: 184 MS
EKG Q-T INTERVAL: 448 MS
EKG QRS DURATION: 134 MS
EKG QTC CALCULATION (BAZETT): 598 MS
EKG R AXIS: -58 DEGREES
EKG T AXIS: -24 DEGREES
EKG VENTRICULAR RATE: 107 BPM
GLUCOSE BLD-MCNC: 111 MG/DL (ref 70–99)
GLUCOSE BLD-MCNC: 113 MG/DL (ref 70–99)
GLUCOSE BLD-MCNC: 116 MG/DL (ref 70–99)
GLUCOSE BLD-MCNC: 131 MG/DL (ref 70–99)
HCO3 ARTERIAL: 25.7 MMOL/L (ref 21–29)
HEMOGLOBIN, ART, EXTENDED: 10.9 G/DL (ref 12–16)
METHEMOGLOBIN ARTERIAL: 0 %
O2 CONTENT ARTERIAL: 14 ML/DL
O2 SAT, ARTERIAL: 91.7 %
O2 THERAPY: ABNORMAL
PCO2 ARTERIAL: 40.4 MMHG (ref 35–45)
PERFORMED ON: ABNORMAL
PH ARTERIAL: 7.41 (ref 7.35–7.45)
PO2 ARTERIAL: 59.7 MMHG (ref 75–108)
TCO2 ARTERIAL: 26.9 MMOL/L

## 2020-04-21 PROCEDURE — 2060000000 HC ICU INTERMEDIATE R&B

## 2020-04-21 PROCEDURE — C9254 INJECTION, LACOSAMIDE: HCPCS | Performed by: FAMILY MEDICINE

## 2020-04-21 PROCEDURE — 6360000002 HC RX W HCPCS: Performed by: FAMILY MEDICINE

## 2020-04-21 PROCEDURE — 71045 X-RAY EXAM CHEST 1 VIEW: CPT

## 2020-04-21 PROCEDURE — 2500000003 HC RX 250 WO HCPCS: Performed by: INTERNAL MEDICINE

## 2020-04-21 PROCEDURE — 6370000000 HC RX 637 (ALT 250 FOR IP): Performed by: INTERNAL MEDICINE

## 2020-04-21 PROCEDURE — 6360000002 HC RX W HCPCS: Performed by: INTERNAL MEDICINE

## 2020-04-21 PROCEDURE — 36415 COLL VENOUS BLD VENIPUNCTURE: CPT

## 2020-04-21 PROCEDURE — 6360000002 HC RX W HCPCS: Performed by: NURSE PRACTITIONER

## 2020-04-21 PROCEDURE — 2500000003 HC RX 250 WO HCPCS: Performed by: FAMILY MEDICINE

## 2020-04-21 PROCEDURE — 99223 1ST HOSP IP/OBS HIGH 75: CPT | Performed by: INTERNAL MEDICINE

## 2020-04-21 PROCEDURE — 93010 ELECTROCARDIOGRAM REPORT: CPT | Performed by: INTERNAL MEDICINE

## 2020-04-21 PROCEDURE — 93005 ELECTROCARDIOGRAM TRACING: CPT | Performed by: FAMILY MEDICINE

## 2020-04-21 PROCEDURE — 82803 BLOOD GASES ANY COMBINATION: CPT

## 2020-04-21 PROCEDURE — 2580000003 HC RX 258: Performed by: FAMILY MEDICINE

## 2020-04-21 PROCEDURE — 6370000000 HC RX 637 (ALT 250 FOR IP): Performed by: FAMILY MEDICINE

## 2020-04-21 RX ORDER — ATROPINE SULFATE 10 MG/ML
1 SOLUTION/ DROPS OPHTHALMIC EVERY 4 HOURS PRN
Status: DISCONTINUED | OUTPATIENT
Start: 2020-04-21 | End: 2020-04-28 | Stop reason: HOSPADM

## 2020-04-21 RX ORDER — HYDROXYCHLOROQUINE SULFATE 200 MG/1
400 TABLET, FILM COATED ORAL 2 TIMES DAILY
Status: DISCONTINUED | OUTPATIENT
Start: 2020-04-21 | End: 2020-04-21

## 2020-04-21 RX ORDER — LIDOCAINE HYDROCHLORIDE 20 MG/ML
JELLY TOPICAL ONCE
Status: COMPLETED | OUTPATIENT
Start: 2020-04-21 | End: 2020-04-21

## 2020-04-21 RX ORDER — METHYLPREDNISOLONE SODIUM SUCCINATE 40 MG/ML
40 INJECTION, POWDER, LYOPHILIZED, FOR SOLUTION INTRAMUSCULAR; INTRAVENOUS DAILY
Status: DISCONTINUED | OUTPATIENT
Start: 2020-04-21 | End: 2020-04-28 | Stop reason: HOSPADM

## 2020-04-21 RX ORDER — HYDROXYCHLOROQUINE SULFATE 200 MG/1
200 TABLET, FILM COATED ORAL 2 TIMES DAILY
Status: DISCONTINUED | OUTPATIENT
Start: 2020-04-22 | End: 2020-04-21

## 2020-04-21 RX ORDER — OXYMETAZOLINE HYDROCHLORIDE 0.05 G/100ML
2 SPRAY NASAL ONCE
Status: COMPLETED | OUTPATIENT
Start: 2020-04-21 | End: 2020-04-21

## 2020-04-21 RX ORDER — FUROSEMIDE 10 MG/ML
20 INJECTION INTRAMUSCULAR; INTRAVENOUS ONCE
Status: COMPLETED | OUTPATIENT
Start: 2020-04-21 | End: 2020-04-21

## 2020-04-21 RX ADMIN — SODIUM BICARBONATE 100 MEQ: 84 INJECTION, SOLUTION INTRAVENOUS at 11:34

## 2020-04-21 RX ADMIN — MONTELUKAST SODIUM 10 MG: 10 TABLET, FILM COATED ORAL at 21:23

## 2020-04-21 RX ADMIN — Medication 1 CAPSULE: at 18:42

## 2020-04-21 RX ADMIN — PIPERACILLIN AND TAZOBACTAM 3.38 G: 3; .375 INJECTION, POWDER, LYOPHILIZED, FOR SOLUTION INTRAVENOUS at 22:48

## 2020-04-21 RX ADMIN — LEVETIRACETAM 500 MG: 5 INJECTION INTRAVENOUS at 08:45

## 2020-04-21 RX ADMIN — PHENYTOIN SODIUM 100 MG: 50 INJECTION INTRAMUSCULAR; INTRAVENOUS at 21:44

## 2020-04-21 RX ADMIN — GABAPENTIN 100 MG: 100 CAPSULE ORAL at 21:23

## 2020-04-21 RX ADMIN — DEXTROSE MONOHYDRATE 100 MG: 50 INJECTION, SOLUTION INTRAVENOUS at 22:03

## 2020-04-21 RX ADMIN — ALLOPURINOL 100 MG: 100 TABLET ORAL at 21:23

## 2020-04-21 RX ADMIN — LEVETIRACETAM 500 MG: 5 INJECTION INTRAVENOUS at 21:39

## 2020-04-21 RX ADMIN — LIDOCAINE HYDROCHLORIDE: 20 JELLY TOPICAL at 11:34

## 2020-04-21 RX ADMIN — PHENYTOIN SODIUM 100 MG: 50 INJECTION INTRAMUSCULAR; INTRAVENOUS at 15:37

## 2020-04-21 RX ADMIN — OXYCODONE HYDROCHLORIDE AND ACETAMINOPHEN 1 TABLET: 5; 325 TABLET ORAL at 21:23

## 2020-04-21 RX ADMIN — OXYMETAZOLINE HCL 2 SPRAY: 0.05 SPRAY NASAL at 11:34

## 2020-04-21 RX ADMIN — PHENYTOIN SODIUM 100 MG: 50 INJECTION INTRAMUSCULAR; INTRAVENOUS at 05:50

## 2020-04-21 RX ADMIN — HYDROXYCHLOROQUINE SULFATE 400 MG: 200 TABLET ORAL at 15:37

## 2020-04-21 RX ADMIN — HEPARIN SODIUM 5000 UNITS: 5000 INJECTION INTRAVENOUS; SUBCUTANEOUS at 05:15

## 2020-04-21 RX ADMIN — PREGABALIN 150 MG: 75 CAPSULE ORAL at 21:23

## 2020-04-21 RX ADMIN — KETOROLAC TROMETHAMINE 15 MG: 15 INJECTION, SOLUTION INTRAMUSCULAR; INTRAVENOUS at 05:50

## 2020-04-21 RX ADMIN — METHYLPREDNISOLONE SODIUM SUCCINATE 40 MG: 40 INJECTION, POWDER, FOR SOLUTION INTRAMUSCULAR; INTRAVENOUS at 15:37

## 2020-04-21 RX ADMIN — PIPERACILLIN AND TAZOBACTAM 3.38 G: 3; .375 INJECTION, POWDER, LYOPHILIZED, FOR SOLUTION INTRAVENOUS at 05:53

## 2020-04-21 RX ADMIN — DEXTROSE MONOHYDRATE 100 MG: 50 INJECTION, SOLUTION INTRAVENOUS at 11:34

## 2020-04-21 RX ADMIN — FUROSEMIDE 20 MG: 10 INJECTION, SOLUTION INTRAMUSCULAR; INTRAVENOUS at 22:51

## 2020-04-21 RX ADMIN — PIPERACILLIN AND TAZOBACTAM 3.38 G: 3; .375 INJECTION, POWDER, LYOPHILIZED, FOR SOLUTION INTRAVENOUS at 15:48

## 2020-04-21 RX ADMIN — SODIUM BICARBONATE 25 MEQ: 84 INJECTION, SOLUTION INTRAVENOUS at 00:06

## 2020-04-21 RX ADMIN — ATROPINE SULFATE 1 DROP: 10 SOLUTION/ DROPS OPHTHALMIC at 18:42

## 2020-04-21 RX ADMIN — MELATONIN 6 MG: at 21:23

## 2020-04-21 RX ADMIN — HEPARIN SODIUM 5000 UNITS: 5000 INJECTION INTRAVENOUS; SUBCUTANEOUS at 21:27

## 2020-04-21 RX ADMIN — HEPARIN SODIUM 5000 UNITS: 5000 INJECTION INTRAVENOUS; SUBCUTANEOUS at 15:38

## 2020-04-21 ASSESSMENT — PAIN SCALES - PAIN ASSESSMENT IN ADVANCED DEMENTIA (PAINAD)
NEGVOCALIZATION: 0
FACIALEXPRESSION: 0
TOTALSCORE: 1
BREATHING: 1
FACIALEXPRESSION: 0
BODYLANGUAGE: 2
FACIALEXPRESSION: 1
FACIALEXPRESSION: 0
BODYLANGUAGE: 0
TOTALSCORE: 1
FACIALEXPRESSION: 0
NEGVOCALIZATION: 0
BODYLANGUAGE: 0
CONSOLABILITY: 0
NEGVOCALIZATION: 0
CONSOLABILITY: 0
FACIALEXPRESSION: 0
NEGVOCALIZATION: 0
TOTALSCORE: 1
FACIALEXPRESSION: 0
CONSOLABILITY: 2
TOTALSCORE: 1
FACIALEXPRESSION: 0
BREATHING: 1
BREATHING: 1
TOTALSCORE: 4
BREATHING: 1
NEGVOCALIZATION: 0
CONSOLABILITY: 0
BODYLANGUAGE: 0
TOTALSCORE: 1
FACIALEXPRESSION: 0
TOTALSCORE: 1
BREATHING: 1
CONSOLABILITY: 0
TOTALSCORE: 1
CONSOLABILITY: 2
TOTALSCORE: 10
TOTALSCORE: 1
CONSOLABILITY: 0
CONSOLABILITY: 0
NEGVOCALIZATION: 1
BREATHING: 2
BODYLANGUAGE: 0
BODYLANGUAGE: 0
CONSOLABILITY: 0
CONSOLABILITY: 1
TOTALSCORE: 1
BODYLANGUAGE: 0
FACIALEXPRESSION: 0
NEGVOCALIZATION: 2
BREATHING: 1
BREATHING: 2
BREATHING: 1
BODYLANGUAGE: 0
BREATHING: 1
BREATHING: 1
CONSOLABILITY: 0
BODYLANGUAGE: 1
NEGVOCALIZATION: 0
BODYLANGUAGE: 0
NEGVOCALIZATION: 0
BODYLANGUAGE: 0
FACIALEXPRESSION: 2
FACIALEXPRESSION: 0
CONSOLABILITY: 0
BREATHING: 1
NEGVOCALIZATION: 0
NEGVOCALIZATION: 0
TOTALSCORE: 8
NEGVOCALIZATION: 2
BODYLANGUAGE: 1

## 2020-04-21 NOTE — PROGRESS NOTES
Intake, Diet Tolerance, Skin Integrity, Mental Status/Confusion, Weight, Pertinent Labs      Electronically signed by Elian Lema RD, CAM on 4/21/20 at 12:25 PM EDT    Contact Number: [phone]

## 2020-04-21 NOTE — PROGRESS NOTES
Hospitalist Progress Note      PCP: Harpreet Roque    Date of Admission: 4/14/2020    Chief Complaint:     Subjective:   Contines to be febrile, tachycardic, tachypneic, moaning. Hypoxia briefly responded to lasix, but hypoxia soon rebounds.     NGT attempted for medication administration, especially plaquenil, but unsuccessful    Medications:  Reviewed    Infusion Medications    dextrose       Scheduled Medications    sodium bicarbonate  100 mEq Intravenous Once    piperacillin-tazobactam  3.375 g Intravenous Q8H    lacosamide (VIMPAT) IVPB  100 mg Intravenous BID    phenytoin  100 mg Intravenous Q8H    sodium chloride flush  10 mL Intravenous 2 times per day    levetiracetam  500 mg Intravenous Q12H    heparin (porcine)  5,000 Units Subcutaneous 3 times per day    insulin lispro  0-12 Units Subcutaneous TID WC    insulin lispro  0-6 Units Subcutaneous Nightly    lactobacillus  1 capsule Oral BID WC    allopurinol  100 mg Oral Nightly    aspirin  81 mg Oral Daily    escitalopram  10 mg Oral Daily    ferrous sulfate  324 mg Oral Daily with breakfast    budesonide-formoterol  2 puff Inhalation BID    gabapentin  100 mg Oral Nightly    hydroxychloroquine  300 mg Oral Daily    melatonin  6 mg Oral Nightly    montelukast  10 mg Oral Nightly    pregabalin  150 mg Oral 2 times per day     PRN Meds: ketorolac, loperamide, sodium chloride flush, acetaminophen **OR** acetaminophen, ondansetron, bisacodyl, glucose, dextrose, glucagon (rDNA), dextrose, menthol-zinc oxide, oxyCODONE-acetaminophen      Intake/Output Summary (Last 24 hours) at 4/21/2020 1005  Last data filed at 4/21/2020 0600  Gross per 24 hour   Intake 3957.7 ml   Output 1750 ml   Net 2207.7 ml       Exam:    /72   Pulse 100   Temp 99 °F (37.2 °C) (Oral)   Resp 24   Ht 5' 4\" (1.626 m)   Wt 185 lb 13.6 oz (84.3 kg)   SpO2 99%   BMI 31.90 kg/m²     General appearance: Encephalopathic, moaning, mild distress  HEENT: Pupils equal, round, and reactive to light. Conjunctivae/corneas clear. Neck: Supple, with full range of motion. No jugular venous distention. Trachea midline. Respiratory:  Normal respiratory effort. Clear to auscultation, bilaterally without Rales/Wheezes/Rhonchi. Cardiovascular: Regular rhythm with tachycardia in low 100's, with normal S1/S2 without murmurs, rubs or gallops. Abdomen: Soft, non-tender, non-distended with normal bowel sounds. Musculoskeletal: No clubbing, cyanosis or edema bilaterally. Full range of motion without deformity. Skin: Skin color, texture, turgor normal.  No rashes or lesions. Neurologic:  Moans and withdraws to painful stimuli. Cranial nerves: II-XII intact, grossly non-focal.  Capillary Refill: Brisk,< 3 seconds   Peripheral Pulses: +2 palpable, equal bilaterally       Labs:   No results for input(s): WBC, HGB, HCT, PLT in the last 72 hours. Recent Labs     04/19/20  0830 04/20/20  1145    145   K 4.5 3.7    110   CO2 16* 12*   BUN 8 10   CREATININE 0.7 0.8   CALCIUM 9.0 9.1     No results for input(s): AST, ALT, BILIDIR, BILITOT, ALKPHOS in the last 72 hours. No results for input(s): INR in the last 72 hours. No results for input(s): [facility] in the last 72 hours. Urinalysis:      Lab Results   Component Value Date    NITRU Negative 04/14/2020    WBCUA 52 04/14/2020    RBCUA 25 04/14/2020    BLOODU MODERATE 04/14/2020    SPECGRAV 1.025 04/14/2020    GLUCOSEU Negative 04/14/2020       Radiology:  XR CHEST 1 VW   Final Result   CHF with mixed pattern of airspace change with slight improvement on the left   and slight worsening on the right. Overall pattern would favor underlying   pulmonary edema. An underlying viral infection can not be excluded. XR CHEST PORTABLE   Final Result   Cardiomegaly. Interstitial opacities compatible pulmonary edema or   interstitial pneumonitis.                  Assessment/Plan:    Active Hospital Problems Diabetic Oral Supplement  DIET CARB CONTROL; Low Sodium (2 GM);  Daily Fluid Restriction: 1800 ml  Code Status: Full Code    Dispo - inpatient, pending clinical improvement    Iqra Law MD

## 2020-04-21 NOTE — CONSULTS
ferrous sulfate (IRON 325) 325 (65 Fe) MG tablet Take 325 mg by mouth daily (with breakfast)   Yes Historical Provider, MD   fluticasone (FLONASE) 50 MCG/ACT nasal spray 2 sprays by Each Nostril route daily   Yes Historical Provider, MD   furosemide (LASIX) 20 MG tablet Take 20 mg by mouth daily   Yes Historical Provider, MD   gabapentin (NEURONTIN) 100 MG capsule Take 100 mg by mouth nightly. Yes Historical Provider, MD   hydrALAZINE (APRESOLINE) 25 MG tablet Take 25 mg by mouth every 8 hours   Yes Historical Provider, MD   hydroxychloroquine (PLAQUENIL) 200 MG tablet Take 300 mg by mouth daily   Yes Historical Provider, MD   levETIRAcetam (KEPPRA) 500 MG tablet Take 500 mg by mouth every 12 hours   Yes Historical Provider, MD   losartan (COZAAR) 100 MG tablet Take 100 mg by mouth daily   Yes Historical Provider, MD   pregabalin (LYRICA) 150 MG capsule Take 150 mg by mouth every 12 hours. Yes Historical Provider, MD   melatonin 3 MG TABS tablet Take 6 mg by mouth nightly   Yes Historical Provider, MD   magnesium hydroxide (MILK OF MAGNESIA) 400 MG/5ML suspension Take by mouth daily as needed for Constipation   Yes Historical Provider, MD   montelukast (SINGULAIR) 10 MG tablet Take 10 mg by mouth nightly   Yes Historical Provider, MD   omeprazole (PRILOSEC) 20 MG delayed release capsule Take 20 mg by mouth nightly   Yes Historical Provider, MD   oxyCODONE-acetaminophen (PERCOCET) 5-325 MG per tablet Take 1 tablet by mouth every 6 hours as needed for Pain.    Yes Historical Provider, MD   phenytoin (DILANTIN) 50 MG tablet Take 100 mg by mouth every 8 hours   Yes Historical Provider, MD   potassium chloride (KLOR-CON M) 10 MEQ extended release tablet Take 10 mEq by mouth daily   Yes Historical Provider, MD   Light Mineral Oil-Mineral Oil (RETAINE MGD) 0.5-0.5 % EMUL Place 1 drop into both eyes every 12 hours Brand name Retaine MGD ophthalmic emulsion   Yes Historical Provider, MD   simvastatin (ZOCOR) 10 MG Subcutaneous, Nightly  glucose (GLUTOSE) 40 % oral gel 15 g, 15 g, Oral, PRN  dextrose 50 % IV solution, 12.5 g, Intravenous, PRN  glucagon (rDNA) injection 1 mg, 1 mg, Intramuscular, PRN  dextrose 5 % solution, 100 mL/hr, Intravenous, PRN  menthol-zinc oxide (CALMOSEPTINE) 0.44-20.6 % ointment 1-4 each, 1-4 each, Topical, BID PRN  lactobacillus (CULTURELLE) capsule 1 capsule, 1 capsule, Oral, BID WC  allopurinol (ZYLOPRIM) tablet 100 mg, 100 mg, Oral, Nightly  aspirin chewable tablet 81 mg, 81 mg, Oral, Daily  escitalopram (LEXAPRO) tablet 10 mg, 10 mg, Oral, Daily  ferrous sulfate EC tablet 324 mg, 324 mg, Oral, Daily with breakfast  budesonide-formoterol (SYMBICORT) 80-4.5 MCG/ACT inhaler 2 puff, 2 puff, Inhalation, BID  gabapentin (NEURONTIN) capsule 100 mg, 100 mg, Oral, Nightly  hydroxychloroquine (PLAQUENIL) tablet 300 mg, 300 mg, Oral, Daily  melatonin tablet 6 mg, 6 mg, Oral, Nightly  montelukast (SINGULAIR) tablet 10 mg, 10 mg, Oral, Nightly  oxyCODONE-acetaminophen (PERCOCET) 5-325 MG per tablet 1 tablet, 1 tablet, Oral, Q6H PRN  pregabalin (LYRICA) capsule 150 mg, 150 mg, Oral, 2 times per day     Cardiac testing     EKG:     Echo:     Stress Test:     Cath:      All above diagnostic testing was independently visualized and reviewed by me (not simply review of report)     Patient Active Problem List   Diagnosis    Acute respiratory failure with hypoxia ([facility]    COVID-19 virus infection    Pneumonia    Sepsis ([address]    Fever    Tachycardia    Exposure to COVID-19 virus    Hyperglycemia    Dementia ([address]    Renal failure    Asthma    Essential hypertension    Systemic lupus erythematosus (HCC)    Acute renal failure superimposed on stage 3 chronic kidney disease (HCC)    Septic shock ([address]    Pneumonia due to COVID-19 virus         Assessment and Plan   1) Pulmonary edema  - likely component of diastolic dysfunction  - responded to lasix, okay to continue PO as needed  - poor overall

## 2020-04-21 NOTE — CONSULTS
an additional 2 amps of sodium bicarbonate    Tachypnea  - In compensation for metabolic acidosis  - Sodium bicarbonate as above       Patient is at high risk due to the presence of COVID-19 pneumonia with acute respiratory failure and acute encephalopathy. Thank you for allowing me to participate in the care of this patient. Will follow.      Ary Silva MD  [facility] Pulmonary, Critical Care and Sleep

## 2020-04-21 NOTE — PLAN OF CARE
Ongoing  Goal: Demonstrates accurate environmental perceptions  Description: Demonstrates accurate environmental perceptions  Outcome: Ongoing  Goal: Able to distinguish between reality-based and nonreality-based thinking  Description: Able to distinguish between reality-based and nonreality-based thinking  Outcome: Ongoing  Goal: Able to interrupt nonreality-based thinking  Description: Able to interrupt nonreality-based thinking  Outcome: Ongoing     Problem: Sleep Pattern Disturbance:  Goal: Appears well-rested  Description: Appears well-rested  Outcome: Ongoing

## 2020-04-22 LAB
A/G RATIO: 0.9 (ref 1.1–2.2)
ALBUMIN SERPL-MCNC: 2.7 G/DL (ref 3.4–5)
ALP BLD-CCNC: 211 U/L (ref 40–129)
ALT SERPL-CCNC: 28 U/L (ref 10–40)
ANION GAP SERPL CALCULATED.3IONS-SCNC: 14 MMOL/L (ref 3–16)
AST SERPL-CCNC: 58 U/L (ref 15–37)
BANDED NEUTROPHILS RELATIVE PERCENT: 2 % (ref 0–7)
BASE EXCESS ARTERIAL: 2.3 MMOL/L (ref -3–3)
BASOPHILS ABSOLUTE: 0 K/UL (ref 0–0.2)
BASOPHILS RELATIVE PERCENT: 0 %
BILIRUB SERPL-MCNC: 0.4 MG/DL (ref 0–1)
BUN BLDV-MCNC: 18 MG/DL (ref 7–20)
CALCIUM SERPL-MCNC: 8.9 MG/DL (ref 8.3–10.6)
CARBOXYHEMOGLOBIN ARTERIAL: 1.2 % (ref 0–1.5)
CHLORIDE BLD-SCNC: 114 MMOL/L (ref 99–110)
CO2: 25 MMOL/L (ref 21–32)
CREAT SERPL-MCNC: 1.3 MG/DL (ref 0.6–1.2)
EKG ATRIAL RATE: 64 BPM
EKG ATRIAL RATE: 81 BPM
EKG DIAGNOSIS: NORMAL
EKG DIAGNOSIS: NORMAL
EKG P AXIS: 27 DEGREES
EKG P AXIS: 39 DEGREES
EKG P-R INTERVAL: 134 MS
EKG P-R INTERVAL: 138 MS
EKG Q-T INTERVAL: 458 MS
EKG Q-T INTERVAL: 468 MS
EKG QRS DURATION: 158 MS
EKG QRS DURATION: 164 MS
EKG QTC CALCULATION (BAZETT): 482 MS
EKG QTC CALCULATION (BAZETT): 532 MS
EKG R AXIS: -52 DEGREES
EKG R AXIS: -59 DEGREES
EKG T AXIS: -33 DEGREES
EKG T AXIS: -35 DEGREES
EKG VENTRICULAR RATE: 64 BPM
EKG VENTRICULAR RATE: 81 BPM
EOSINOPHILS ABSOLUTE: 0.1 K/UL (ref 0–0.6)
EOSINOPHILS RELATIVE PERCENT: 2 %
GFR AFRICAN AMERICAN: 49
GFR NON-AFRICAN AMERICAN: 40
GLOBULIN: 3 G/DL
GLUCOSE BLD-MCNC: 102 MG/DL (ref 70–99)
GLUCOSE BLD-MCNC: 121 MG/DL (ref 70–99)
GLUCOSE BLD-MCNC: 124 MG/DL (ref 70–99)
GLUCOSE BLD-MCNC: 158 MG/DL (ref 70–99)
GLUCOSE BLD-MCNC: 173 MG/DL (ref 70–99)
HCO3 ARTERIAL: 27.8 MMOL/L (ref 21–29)
HCT VFR BLD CALC: 26.4 % (ref 36–48)
HEMOGLOBIN, ART, EXTENDED: 8.4 G/DL (ref 12–16)
HEMOGLOBIN: 8.4 G/DL (ref 12–16)
LYMPHOCYTES ABSOLUTE: 1 K/UL (ref 1–5.1)
LYMPHOCYTES RELATIVE PERCENT: 13 %
MAGNESIUM: 2 MG/DL (ref 1.8–2.4)
MCH RBC QN AUTO: 25.7 PG (ref 26–34)
MCHC RBC AUTO-ENTMCNC: 31.7 G/DL (ref 31–36)
MCV RBC AUTO: 80.9 FL (ref 80–100)
METHEMOGLOBIN ARTERIAL: 0.5 %
MONOCYTES ABSOLUTE: 0.2 K/UL (ref 0–1.3)
MONOCYTES RELATIVE PERCENT: 3 %
NEUTROPHILS ABSOLUTE: 6.1 K/UL (ref 1.7–7.7)
NEUTROPHILS RELATIVE PERCENT: 80 %
O2 CONTENT ARTERIAL: 11 ML/DL
O2 SAT, ARTERIAL: 96.3 %
O2 THERAPY: ABNORMAL
OVALOCYTES: ABNORMAL
PCO2 ARTERIAL: 46.9 MMHG (ref 35–45)
PDW BLD-RTO: 14.6 % (ref 12.4–15.4)
PERFORMED ON: ABNORMAL
PH ARTERIAL: 7.38 (ref 7.35–7.45)
PLATELET # BLD: 417 K/UL (ref 135–450)
PMV BLD AUTO: 8.4 FL (ref 5–10.5)
PO2 ARTERIAL: 79.1 MMHG (ref 75–108)
POTASSIUM REFLEX MAGNESIUM: 3.1 MMOL/L (ref 3.5–5.1)
RBC # BLD: 3.26 M/UL (ref 4–5.2)
SODIUM BLD-SCNC: 153 MMOL/L (ref 136–145)
TARGET CELLS: ABNORMAL
TCO2 ARTERIAL: 29.2 MMOL/L
TOTAL PROTEIN: 5.7 G/DL (ref 6.4–8.2)
WBC # BLD: 7.4 K/UL (ref 4–11)

## 2020-04-22 PROCEDURE — 6360000002 HC RX W HCPCS: Performed by: FAMILY MEDICINE

## 2020-04-22 PROCEDURE — 99232 SBSQ HOSP IP/OBS MODERATE 35: CPT | Performed by: INTERNAL MEDICINE

## 2020-04-22 PROCEDURE — 6360000002 HC RX W HCPCS: Performed by: NURSE PRACTITIONER

## 2020-04-22 PROCEDURE — 2580000003 HC RX 258: Performed by: INTERNAL MEDICINE

## 2020-04-22 PROCEDURE — 6360000002 HC RX W HCPCS: Performed by: INTERNAL MEDICINE

## 2020-04-22 PROCEDURE — 82803 BLOOD GASES ANY COMBINATION: CPT

## 2020-04-22 PROCEDURE — 2060000000 HC ICU INTERMEDIATE R&B

## 2020-04-22 PROCEDURE — 2580000003 HC RX 258: Performed by: FAMILY MEDICINE

## 2020-04-22 PROCEDURE — 80053 COMPREHEN METABOLIC PANEL: CPT

## 2020-04-22 PROCEDURE — 6370000000 HC RX 637 (ALT 250 FOR IP): Performed by: INTERNAL MEDICINE

## 2020-04-22 PROCEDURE — 93010 ELECTROCARDIOGRAM REPORT: CPT | Performed by: INTERNAL MEDICINE

## 2020-04-22 PROCEDURE — C9254 INJECTION, LACOSAMIDE: HCPCS | Performed by: FAMILY MEDICINE

## 2020-04-22 PROCEDURE — 85025 COMPLETE CBC W/AUTO DIFF WBC: CPT

## 2020-04-22 PROCEDURE — 83735 ASSAY OF MAGNESIUM: CPT

## 2020-04-22 PROCEDURE — 93005 ELECTROCARDIOGRAM TRACING: CPT | Performed by: INTERNAL MEDICINE

## 2020-04-22 RX ORDER — POTASSIUM CHLORIDE 7.45 MG/ML
10 INJECTION INTRAVENOUS
Status: COMPLETED | OUTPATIENT
Start: 2020-04-22 | End: 2020-04-22

## 2020-04-22 RX ADMIN — PIPERACILLIN AND TAZOBACTAM 3.38 G: 3; .375 INJECTION, POWDER, LYOPHILIZED, FOR SOLUTION INTRAVENOUS at 05:31

## 2020-04-22 RX ADMIN — Medication 10 MEQ: at 12:44

## 2020-04-22 RX ADMIN — METHYLPREDNISOLONE SODIUM SUCCINATE 40 MG: 40 INJECTION, POWDER, FOR SOLUTION INTRAMUSCULAR; INTRAVENOUS at 08:30

## 2020-04-22 RX ADMIN — Medication 1 CAPSULE: at 17:04

## 2020-04-22 RX ADMIN — PREGABALIN 150 MG: 75 CAPSULE ORAL at 08:29

## 2020-04-22 RX ADMIN — LEVETIRACETAM 500 MG: 5 INJECTION INTRAVENOUS at 08:30

## 2020-04-22 RX ADMIN — PHENYTOIN SODIUM 100 MG: 50 INJECTION INTRAMUSCULAR; INTRAVENOUS at 05:25

## 2020-04-22 RX ADMIN — PHENYTOIN SODIUM 100 MG: 50 INJECTION INTRAMUSCULAR; INTRAVENOUS at 15:21

## 2020-04-22 RX ADMIN — MONTELUKAST SODIUM 10 MG: 10 TABLET, FILM COATED ORAL at 23:32

## 2020-04-22 RX ADMIN — MELATONIN 6 MG: at 23:34

## 2020-04-22 RX ADMIN — GABAPENTIN 100 MG: 100 CAPSULE ORAL at 23:30

## 2020-04-22 RX ADMIN — Medication 10 MEQ: at 11:17

## 2020-04-22 RX ADMIN — LEVETIRACETAM 500 MG: 5 INJECTION INTRAVENOUS at 23:16

## 2020-04-22 RX ADMIN — PREGABALIN 150 MG: 75 CAPSULE ORAL at 23:27

## 2020-04-22 RX ADMIN — Medication 10 MEQ: at 15:21

## 2020-04-22 RX ADMIN — SODIUM CHLORIDE, PRESERVATIVE FREE 10 ML: 5 INJECTION INTRAVENOUS at 09:26

## 2020-04-22 RX ADMIN — BUDESONIDE AND FORMOTEROL FUMARATE DIHYDRATE 2 PUFF: 80; 4.5 AEROSOL RESPIRATORY (INHALATION) at 20:19

## 2020-04-22 RX ADMIN — HEPARIN SODIUM 5000 UNITS: 5000 INJECTION INTRAVENOUS; SUBCUTANEOUS at 23:38

## 2020-04-22 RX ADMIN — PIPERACILLIN AND TAZOBACTAM 3.38 G: 3; .375 INJECTION, POWDER, LYOPHILIZED, FOR SOLUTION INTRAVENOUS at 17:00

## 2020-04-22 RX ADMIN — INSULIN LISPRO 2 UNITS: 100 INJECTION, SOLUTION INTRAVENOUS; SUBCUTANEOUS at 11:20

## 2020-04-22 RX ADMIN — ALLOPURINOL 100 MG: 100 TABLET ORAL at 23:06

## 2020-04-22 RX ADMIN — DEXTROSE MONOHYDRATE 100 MG: 50 INJECTION, SOLUTION INTRAVENOUS at 10:55

## 2020-04-22 RX ADMIN — HEPARIN SODIUM 5000 UNITS: 5000 INJECTION INTRAVENOUS; SUBCUTANEOUS at 05:25

## 2020-04-22 RX ADMIN — Medication 1 CAPSULE: at 08:29

## 2020-04-22 RX ADMIN — ASPIRIN 81 MG 81 MG: 81 TABLET ORAL at 08:29

## 2020-04-22 RX ADMIN — ESCITALOPRAM OXALATE 10 MG: 10 TABLET ORAL at 08:29

## 2020-04-22 RX ADMIN — INSULIN LISPRO 2 UNITS: 100 INJECTION, SOLUTION INTRAVENOUS; SUBCUTANEOUS at 15:46

## 2020-04-22 RX ADMIN — Medication 10 MEQ: at 15:43

## 2020-04-22 RX ADMIN — SODIUM CHLORIDE, PRESERVATIVE FREE 10 ML: 5 INJECTION INTRAVENOUS at 23:38

## 2020-04-22 RX ADMIN — OXYCODONE HYDROCHLORIDE AND ACETAMINOPHEN 1 TABLET: 5; 325 TABLET ORAL at 05:49

## 2020-04-22 RX ADMIN — PHENYTOIN SODIUM 100 MG: 50 INJECTION INTRAMUSCULAR; INTRAVENOUS at 23:10

## 2020-04-22 RX ADMIN — HEPARIN SODIUM 5000 UNITS: 5000 INJECTION INTRAVENOUS; SUBCUTANEOUS at 15:20

## 2020-04-22 ASSESSMENT — PAIN SCALES - PAIN ASSESSMENT IN ADVANCED DEMENTIA (PAINAD)
FACIALEXPRESSION: 1
BREATHING: 1
NEGVOCALIZATION: 2
FACIALEXPRESSION: 1
CONSOLABILITY: 2
FACIALEXPRESSION: 1
TOTALSCORE: 7
NEGVOCALIZATION: 2
FACIALEXPRESSION: 0
CONSOLABILITY: 2
BODYLANGUAGE: 1
BREATHING: 1
FACIALEXPRESSION: 1
NEGVOCALIZATION: 1
NEGVOCALIZATION: 1
BREATHING: 0
CONSOLABILITY: 0
FACIALEXPRESSION: 1
BODYLANGUAGE: 1
FACIALEXPRESSION: 1
FACIALEXPRESSION: 1
BODYLANGUAGE: 1
BREATHING: 1
FACIALEXPRESSION: 1
CONSOLABILITY: 1
NEGVOCALIZATION: 0
FACIALEXPRESSION: 0
BREATHING: 1
BODYLANGUAGE: 1
NEGVOCALIZATION: 2
TOTALSCORE: 7
CONSOLABILITY: 2
BREATHING: 1
CONSOLABILITY: 2
BREATHING: 0
CONSOLABILITY: 2
TOTALSCORE: 7
BODYLANGUAGE: 1
TOTALSCORE: 7
BREATHING: 1
TOTALSCORE: 4
NEGVOCALIZATION: 2
BREATHING: 1
CONSOLABILITY: 2
BREATHING: 1
BREATHING: 1
TOTALSCORE: 1
BODYLANGUAGE: 1
TOTALSCORE: 7
FACIALEXPRESSION: 1
NEGVOCALIZATION: 2
NEGVOCALIZATION: 2
FACIALEXPRESSION: 1
BODYLANGUAGE: 1
TOTALSCORE: 7
NEGVOCALIZATION: 2
TOTALSCORE: 7
BODYLANGUAGE: 0
TOTALSCORE: 7
TOTALSCORE: 0
TOTALSCORE: 5
BODYLANGUAGE: 1
NEGVOCALIZATION: 2
BODYLANGUAGE: 1
BODYLANGUAGE: 0
BODYLANGUAGE: 0
BREATHING: 1
CONSOLABILITY: 2
CONSOLABILITY: 2
NEGVOCALIZATION: 2
TOTALSCORE: 7
CONSOLABILITY: 1
CONSOLABILITY: 0
FACIALEXPRESSION: 1
BODYLANGUAGE: 1
CONSOLABILITY: 2
NEGVOCALIZATION: 1
BREATHING: 1

## 2020-04-22 ASSESSMENT — PAIN SCALES - GENERAL: PAINLEVEL_OUTOF10: 0

## 2020-04-22 NOTE — PROGRESS NOTES
Pt arousable to verbal stimuli. Pt  Repositioned. She tolerated with minimal yelling. Asked pt if she would like to listen to music. Pt sighed and nodded. TV turned to music channel. Dr. Titi Tamayo in and placed O2 at 3L. Glucerna given per NG tube. Pt is afebrile at this time.

## 2020-04-22 NOTE — PLAN OF CARE
ADVANCED CARE PLANNING    Name:Dyan Rascon Siobhan:  1950              MRN:  [id number]  Admission Date: 4/14/2020  1:33 AM  Date of Discussion:  04/21/20       Purpose of Encounter: Advanced care planning in light of worsening respiratory status. Parties in attendance: :Juan Bolton, Cuauhtemoc Dietz, APRN - CNP, Family members:daughter, Chinyere De La O. Decisional Capacity:patient no, daughter yes      Objective/Medical Story: THis is a 64 y/o female with a h/o COPD, dementia, seizures, CKD, Dm and GERD who presented to us initially with c/o fever and had been exposed to COVID at [facility]. She is positive. She is now oliguric, not eating or drinking. O2 requirements are increasing and she is now on 7L NC with worsening CXR. I spoke with Chinyere De La O about this and she agreed to to change her code status as she does not want her mother to suffer. She requested to speak with her over the phone and I asked nursing to accommodate. They agreed. Active Diagnoses:     Active Hospital Problems    Diagnosis Date Noted    Acute metabolic encephalopathy [E56.84]     Metabolic acidosis [E88.7]     Tachypnea [R06.82]     Acute respiratory failure with hypoxia (HCC) [J96.01] 04/14/2020    COVID-19 virus infection [U07.1] 04/14/2020    Pneumonia [J18.9] 04/14/2020    Sepsis ([facility] [A41.9] 04/14/2020    Fever [R50.9] 04/14/2020    Tachycardia [R00.0] 04/14/2020    Exposure to COVID-19 virus [Z20.828] 04/14/2020    Hyperglycemia [R73.9] 04/14/2020    Dementia ([facility] [F03.90] 04/14/2020    Renal failure [N19] 04/14/2020    Acute renal failure superimposed on stage 3 chronic kidney disease ([address] [N17.9, N18.3] 04/14/2020    Septic shock ([facility] [A41.9, R65.21] 04/14/2020    Pneumonia due to COVID-19 virus [U07.1, J12.89]     Asthma [J45.909] 10/04/2005    Systemic lupus erythematosus ([facility] [M32.9] 10/04/2005    Essential hypertension [I10] 10/04/2005         Subjective/Patient Story: Patient understands that his/her

## 2020-04-22 NOTE — PLAN OF CARE
Pt is a high fall risk. GCS 3/2/5. Bed in locked, low position with side rails up X 3. Bed alarm function not available because bed is not currently zeroed appropriately. Baby monitor at bedside so that RN is able to have access to continuous monitoring of pt's activity. Pt does not make attempts to get out of bed or make major position changes independently. She requires total care. Room door is closed d/t droplet isolation status. Pt is wearing non skid socks and a fall risk bracelet. Fall risk dome light activated. Pt had an NG placed today with pCXR confirmation. RN paged NP for nursing communication order stating that it was okay to use the tube for meds. RN asked for clarification regarding the care of the NG tube. In report, RN was told it should be clamped, however there are orders to place it to [facility]. She has a rectal tube in place with output. Pt has not been eating. Per report she hasn't had any episodes of emesis. NP placed nursing communication order stating it was okay to use NG for meds. NP also said it was okay to give pt oral glucose supplement via NG since she doesn't have any orders for tube feeds at this time. NG clamped after med/supplements. NG not placed to ILWS at this time. NP aware. RN and NP had discussion about plan of care goals. Pt's mental status has been declining since admission. Per notes, pt was able to feed herself prior to admission and was oriented to person/place/month. Pt will not take anything PO and turns her head away/clamps her mouth shut anytime staff attempt to feed her or do any oral care. She is resistant to any care. RN has tried several times explaining care before touching the patient and getting the patient to acknowledge need to provide care but pt minimally interactive. Her O2 requirement has been steadily increasing. She appears uncomfortable. NP contacted the pt's daughter to discuss code status and care goals.  After telephone conversation with

## 2020-04-22 NOTE — PROGRESS NOTES
Conjunctivae/corneas clear. Neck: Supple, with full range of motion. No jugular venous distention. Trachea midline. Respiratory:  Normal respiratory effort. Clear to auscultation, bilaterally without Rales/Wheezes/Rhonchi. Cardiovascular: Regular rhythm and rate, with normal S1/S2 without murmurs, rubs or gallops. Abdomen: Soft, non-tender, non-distended with normal bowel sounds. Musculoskeletal: No clubbing, cyanosis or edema bilaterally. Full range of motion without deformity. Skin: Skin color, texture, turgor normal.  No rashes or lesions. Neurologic:  Encephalopathic, resting comfortably. Opens eyes to verbal stimuli, nods head yes or no. Cranial nerves: II-XII intact, grossly non-focal.  Capillary Refill: Brisk,< 3 seconds   Peripheral Pulses: +2 palpable, equal bilaterally       Labs:   No results for input(s): WBC, HGB, HCT, PLT in the last 72 hours. Recent Labs     04/19/20  0830 04/20/20  1145    145   K 4.5 3.7    110   CO2 16* 12*   BUN 8 10   CREATININE 0.7 0.8   CALCIUM 9.0 9.1     No results for input(s): AST, ALT, BILIDIR, BILITOT, ALKPHOS in the last 72 hours. No results for input(s): INR in the last 72 hours. No results for input(s): Bola Fitzpatrick in the last 72 hours. Urinalysis:      Lab Results   Component Value Date    NITRU Negative 04/14/2020    WBCUA 52 04/14/2020    RBCUA 25 04/14/2020    BLOODU MODERATE 04/14/2020    SPECGRAV 1.025 04/14/2020    GLUCOSEU Negative 04/14/2020       Radiology:  XR CHEST PORTABLE   Final Result   Congestive heart failure. NG tube tip and proximal side-port reside in the gastric fundus. XR CHEST 1 VW   Final Result   CHF with mixed pattern of airspace change with slight improvement on the left   and slight worsening on the right. Overall pattern would favor underlying   pulmonary edema. An underlying viral infection can not be excluded. XR CHEST PORTABLE   Final Result   Cardiomegaly.   Interstitial

## 2020-04-22 NOTE — PROGRESS NOTES
Late Entry:     Pt given full head to toe bath around 0600 yesterday morning (4/21). Pt's skin is dry and flaky. RN applied lotion and placed Interdry Ag in pannus, breast, and underarm folds. New mepilex placed over old R fem CVAD site. Pt did not tolerate the bath well. She yelled most of the time during her bath despite RN explaining care and attempts to console. She desaturated into the mid 80's on 3 L NC during her bath and had increased WOB during bath and for approximately 15 minutes after the bath. She had to be increased to 6 L NC to maintain an O2 sat > 90%. RN was able to when her back down to 4 L NC by the time the shift ended. Pt also developed a fever of 102.9 around that time and was medicated with prn toradol. Fever came down to 100.6 by the time RN left. RN unable to obtain daily weight. Pt is on a specialty bed and is a maxi move. Bed not zeroed correctly so daily weight cannot be obtained. Pt continues to refuse to take anything PO and resist care.

## 2020-04-23 ENCOUNTER — APPOINTMENT (OUTPATIENT)
Dept: GENERAL RADIOLOGY | Age: 70
DRG: 871 | End: 2020-04-23
Payer: MEDICARE

## 2020-04-23 LAB
GLUCOSE BLD-MCNC: 115 MG/DL (ref 70–99)
GLUCOSE BLD-MCNC: 76 MG/DL (ref 70–99)
GLUCOSE BLD-MCNC: 92 MG/DL (ref 70–99)
PERFORMED ON: ABNORMAL
PERFORMED ON: NORMAL

## 2020-04-23 PROCEDURE — 99232 SBSQ HOSP IP/OBS MODERATE 35: CPT | Performed by: INTERNAL MEDICINE

## 2020-04-23 PROCEDURE — 6370000000 HC RX 637 (ALT 250 FOR IP): Performed by: INTERNAL MEDICINE

## 2020-04-23 PROCEDURE — 6360000002 HC RX W HCPCS: Performed by: INTERNAL MEDICINE

## 2020-04-23 PROCEDURE — C9254 INJECTION, LACOSAMIDE: HCPCS | Performed by: FAMILY MEDICINE

## 2020-04-23 PROCEDURE — 6360000002 HC RX W HCPCS: Performed by: NURSE PRACTITIONER

## 2020-04-23 PROCEDURE — 2580000003 HC RX 258: Performed by: INTERNAL MEDICINE

## 2020-04-23 PROCEDURE — 71045 X-RAY EXAM CHEST 1 VIEW: CPT

## 2020-04-23 PROCEDURE — 6360000002 HC RX W HCPCS: Performed by: FAMILY MEDICINE

## 2020-04-23 PROCEDURE — 2060000000 HC ICU INTERMEDIATE R&B

## 2020-04-23 PROCEDURE — 2580000003 HC RX 258: Performed by: FAMILY MEDICINE

## 2020-04-23 RX ORDER — HYDROXYCHLOROQUINE SULFATE 200 MG/1
200 TABLET, FILM COATED ORAL 2 TIMES DAILY
Status: DISCONTINUED | OUTPATIENT
Start: 2020-04-23 | End: 2020-04-23

## 2020-04-23 RX ADMIN — LEVETIRACETAM 500 MG: 5 INJECTION INTRAVENOUS at 21:16

## 2020-04-23 RX ADMIN — Medication 1 CAPSULE: at 18:58

## 2020-04-23 RX ADMIN — MONTELUKAST SODIUM 10 MG: 10 TABLET, FILM COATED ORAL at 21:29

## 2020-04-23 RX ADMIN — BUDESONIDE AND FORMOTEROL FUMARATE DIHYDRATE 2 PUFF: 80; 4.5 AEROSOL RESPIRATORY (INHALATION) at 21:14

## 2020-04-23 RX ADMIN — DEXTROSE MONOHYDRATE 100 MG: 50 INJECTION, SOLUTION INTRAVENOUS at 11:27

## 2020-04-23 RX ADMIN — PHENYTOIN SODIUM 100 MG: 50 INJECTION INTRAMUSCULAR; INTRAVENOUS at 05:54

## 2020-04-23 RX ADMIN — SODIUM CHLORIDE, PRESERVATIVE FREE 10 ML: 5 INJECTION INTRAVENOUS at 08:28

## 2020-04-23 RX ADMIN — ESCITALOPRAM OXALATE 10 MG: 10 TABLET ORAL at 08:26

## 2020-04-23 RX ADMIN — MELATONIN 6 MG: at 21:29

## 2020-04-23 RX ADMIN — HEPARIN SODIUM 5000 UNITS: 5000 INJECTION INTRAVENOUS; SUBCUTANEOUS at 21:29

## 2020-04-23 RX ADMIN — HEPARIN SODIUM 5000 UNITS: 5000 INJECTION INTRAVENOUS; SUBCUTANEOUS at 14:30

## 2020-04-23 RX ADMIN — PREGABALIN 150 MG: 75 CAPSULE ORAL at 08:27

## 2020-04-23 RX ADMIN — PIPERACILLIN AND TAZOBACTAM 3.38 G: 3; .375 INJECTION, POWDER, LYOPHILIZED, FOR SOLUTION INTRAVENOUS at 23:46

## 2020-04-23 RX ADMIN — LEVETIRACETAM 500 MG: 5 INJECTION INTRAVENOUS at 08:27

## 2020-04-23 RX ADMIN — PIPERACILLIN AND TAZOBACTAM 3.38 G: 3; .375 INJECTION, POWDER, LYOPHILIZED, FOR SOLUTION INTRAVENOUS at 14:29

## 2020-04-23 RX ADMIN — ASPIRIN 81 MG 81 MG: 81 TABLET ORAL at 08:26

## 2020-04-23 RX ADMIN — PHENYTOIN SODIUM 100 MG: 50 INJECTION INTRAMUSCULAR; INTRAVENOUS at 14:29

## 2020-04-23 RX ADMIN — ALLOPURINOL 100 MG: 100 TABLET ORAL at 21:29

## 2020-04-23 RX ADMIN — Medication 1 CAPSULE: at 08:27

## 2020-04-23 RX ADMIN — PHENYTOIN SODIUM 100 MG: 50 INJECTION INTRAMUSCULAR; INTRAVENOUS at 21:18

## 2020-04-23 RX ADMIN — PIPERACILLIN AND TAZOBACTAM 3.38 G: 3; .375 INJECTION, POWDER, LYOPHILIZED, FOR SOLUTION INTRAVENOUS at 00:43

## 2020-04-23 RX ADMIN — PIPERACILLIN AND TAZOBACTAM 3.38 G: 3; .375 INJECTION, POWDER, LYOPHILIZED, FOR SOLUTION INTRAVENOUS at 05:54

## 2020-04-23 RX ADMIN — HEPARIN SODIUM 5000 UNITS: 5000 INJECTION INTRAVENOUS; SUBCUTANEOUS at 05:54

## 2020-04-23 RX ADMIN — GABAPENTIN 100 MG: 100 CAPSULE ORAL at 21:29

## 2020-04-23 RX ADMIN — OXYCODONE HYDROCHLORIDE AND ACETAMINOPHEN 1 TABLET: 5; 325 TABLET ORAL at 01:20

## 2020-04-23 RX ADMIN — DEXTROSE MONOHYDRATE 100 MG: 50 INJECTION, SOLUTION INTRAVENOUS at 00:08

## 2020-04-23 RX ADMIN — METHYLPREDNISOLONE SODIUM SUCCINATE 40 MG: 40 INJECTION, POWDER, FOR SOLUTION INTRAMUSCULAR; INTRAVENOUS at 08:27

## 2020-04-23 RX ADMIN — DEXTROSE MONOHYDRATE 100 MG: 50 INJECTION, SOLUTION INTRAVENOUS at 22:12

## 2020-04-23 ASSESSMENT — PAIN DESCRIPTION - FREQUENCY: FREQUENCY: CONTINUOUS

## 2020-04-23 ASSESSMENT — PAIN SCALES - PAIN ASSESSMENT IN ADVANCED DEMENTIA (PAINAD)
TOTALSCORE: 7
NEGVOCALIZATION: 2
BREATHING: 0
FACIALEXPRESSION: 1
TOTALSCORE: 6
FACIALEXPRESSION: 1
NEGVOCALIZATION: 2
BODYLANGUAGE: 1
BREATHING: 1
BODYLANGUAGE: 1
FACIALEXPRESSION: 0
CONSOLABILITY: 0
TOTALSCORE: 0
NEGVOCALIZATION: 0
BODYLANGUAGE: 0
CONSOLABILITY: 2
BREATHING: 0
CONSOLABILITY: 2

## 2020-04-23 ASSESSMENT — PAIN DESCRIPTION - LOCATION: LOCATION: OTHER (COMMENT)

## 2020-04-23 ASSESSMENT — PAIN SCALES - GENERAL
PAINLEVEL_OUTOF10: 6
PAINLEVEL_OUTOF10: 0
PAINLEVEL_OUTOF10: 0

## 2020-04-23 ASSESSMENT — PAIN - FUNCTIONAL ASSESSMENT: PAIN_FUNCTIONAL_ASSESSMENT: PREVENTS OR INTERFERES SOME ACTIVE ACTIVITIES AND ADLS

## 2020-04-23 ASSESSMENT — PAIN DESCRIPTION - ONSET: ONSET: ON-GOING

## 2020-04-23 ASSESSMENT — PAIN DESCRIPTION - PAIN TYPE: TYPE: ACUTE PAIN

## 2020-04-23 ASSESSMENT — PAIN DESCRIPTION - DESCRIPTORS: DESCRIPTORS: PATIENT UNABLE TO DESCRIBE

## 2020-04-23 ASSESSMENT — PAIN DESCRIPTION - ORIENTATION: ORIENTATION: OTHER (COMMENT)

## 2020-04-23 ASSESSMENT — PAIN DESCRIPTION - PROGRESSION: CLINICAL_PROGRESSION: OTHER (COMMENT)

## 2020-04-23 NOTE — PLAN OF CARE
Problem: Nutrition  Goal: Optimal nutrition therapy  Outcome: Ongoing   Nutrition Problem: Inadequate oral intake  Intervention: Food and/or Nutrient Delivery: Start Tube Feeding  Nutritional Goals: tolerate TF at goal rate during admission

## 2020-04-23 NOTE — PROGRESS NOTES
BILITOT 0.4   ALKPHOS 211*     PT/INR: No results for input(s): PROTIME, INR in the last 72 hours. APTT: No results for input(s): APTT in the last 72 hours. Cultures:   Blood culture (4/14): Negative  Urine culture (4/14): Negative  Urine strep and legionella antigens: Negative  Nasal MRSA probe: Negative  Flu swab: Negative      Films:  Chest images and reports were reviewed by me. My interpretation is:  CXR (4/23/20): Bilateral infiltrates (increased in the LLL compared to 4/21)      Assessment:     Acute hypoxic respiratory failure  COVID-19 pneumonia  Acute metabolic encephalopathy  Metabolic acidosis  Tachypnea      Plan:    Acute hypoxic respiratory failure  - Due to COVID-19 pneumonia  - Wean supplemental oxygen as able to keep saturation>90%     COVID-19 pneumonia  - QT has normalized. Resume hydroxychloroquine and check EKG in a.m.  - Solumedrol 40mg IV daily (day #3/5)     Acute metabolic encephalopathy  - Likely due to COVID-19 pneumonia. Continue Solumedrol x 5 days      Metabolic acidosis  - Resolved with sodium bicarbonate      Tachypnea  - Resolved      Thank you for allowing me to participate in the care of this patient. Will follow.      Gordo Boyle MD  [facility] Pulmonary, Critical Care and Sleep

## 2020-04-23 NOTE — ACP (ADVANCE CARE PLANNING)
Per chart review, ACP was completed by palliative care.      Electronically signed by Ludmila Estevez RN on 4/23/2020 at 3:15 PM

## 2020-04-23 NOTE — PROGRESS NOTES
Conjunctivae/corneas clear. Neck: Supple, with full range of motion. No jugular venous distention. Trachea midline. Respiratory:  Normal respiratory effort. Clear to auscultation, bilaterally without Rales/Wheezes/Rhonchi. Cardiovascular: Regular rhythm and rate, with normal S1/S2 without murmurs, rubs or gallops. Abdomen: Soft, non-tender, non-distended with normal bowel sounds. Musculoskeletal: No clubbing, cyanosis or edema bilaterally. Full range of motion without deformity. Skin: Skin color, texture, turgor normal.  No rashes or lesions. Neurologic:  Encephalopathic, resting comfortably  Opens eyes to verbal stimuli, nods head yes or no. Cranial nerves: II-XII intact, grossly non-focal.  Capillary Refill: Brisk,< 3 seconds   Peripheral Pulses: +2 palpable, equal bilaterally       Labs:   Recent Labs     04/22/20  0759   WBC 7.4   HGB 8.4*   HCT 26.4*        Recent Labs     04/20/20  1145 04/22/20  0800    153*   K 3.7 3.1*    114*   CO2 12* 25   BUN 10 18   CREATININE 0.8 1.3*   CALCIUM 9.1 8.9     Recent Labs     04/22/20  0800   AST 58*   ALT 28   BILITOT 0.4   ALKPHOS 211*     No results for input(s): INR in the last 72 hours. No results for input(s): Dany Bending in the last 72 hours. Urinalysis:      Lab Results   Component Value Date    NITRU Negative 04/14/2020    WBCUA 52 04/14/2020    RBCUA 25 04/14/2020    BLOODU MODERATE 04/14/2020    SPECGRAV 1.025 04/14/2020    GLUCOSEU Negative 04/14/2020       Radiology:  XR CHEST PORTABLE   Final Result   Congestive heart failure. NG tube tip and proximal side-port reside in the gastric fundus. XR CHEST 1 VW   Final Result   CHF with mixed pattern of airspace change with slight improvement on the left   and slight worsening on the right. Overall pattern would favor underlying   pulmonary edema. An underlying viral infection can not be excluded. XR CHEST PORTABLE   Final Result   Cardiomegaly.

## 2020-04-23 NOTE — PROGRESS NOTES
RN provided cheikh care to pt and noticed the rectal tube was inflated or in place. RN tried to replace rectal tube. The pt was upset and refused the rectal tube be replaced.

## 2020-04-24 ENCOUNTER — APPOINTMENT (OUTPATIENT)
Dept: GENERAL RADIOLOGY | Age: 70
DRG: 871 | End: 2020-04-24
Payer: MEDICARE

## 2020-04-24 LAB
A/G RATIO: 0.7 (ref 1.1–2.2)
ALBUMIN SERPL-MCNC: 2.5 G/DL (ref 3.4–5)
ALP BLD-CCNC: 191 U/L (ref 40–129)
ALT SERPL-CCNC: 23 U/L (ref 10–40)
ANION GAP SERPL CALCULATED.3IONS-SCNC: 15 MMOL/L (ref 3–16)
ANISOCYTOSIS: ABNORMAL
AST SERPL-CCNC: 35 U/L (ref 15–37)
BANDED NEUTROPHILS RELATIVE PERCENT: 1 % (ref 0–7)
BASOPHILS ABSOLUTE: 0 K/UL (ref 0–0.2)
BASOPHILS RELATIVE PERCENT: 0 %
BILIRUB SERPL-MCNC: 0.3 MG/DL (ref 0–1)
BUN BLDV-MCNC: 21 MG/DL (ref 7–20)
CALCIUM SERPL-MCNC: 8.9 MG/DL (ref 8.3–10.6)
CHLORIDE BLD-SCNC: 110 MMOL/L (ref 99–110)
CO2: 22 MMOL/L (ref 21–32)
CREAT SERPL-MCNC: 1.3 MG/DL (ref 0.6–1.2)
EKG ATRIAL RATE: 95 BPM
EKG DIAGNOSIS: NORMAL
EKG P AXIS: 38 DEGREES
EKG P-R INTERVAL: 140 MS
EKG Q-T INTERVAL: 416 MS
EKG QRS DURATION: 156 MS
EKG QTC CALCULATION (BAZETT): 522 MS
EKG R AXIS: -50 DEGREES
EKG T AXIS: 70 DEGREES
EKG VENTRICULAR RATE: 95 BPM
EOSINOPHILS ABSOLUTE: 0.2 K/UL (ref 0–0.6)
EOSINOPHILS RELATIVE PERCENT: 2 %
GFR AFRICAN AMERICAN: 49
GFR NON-AFRICAN AMERICAN: 40
GLOBULIN: 3.4 G/DL
GLUCOSE BLD-MCNC: 120 MG/DL (ref 70–99)
GLUCOSE BLD-MCNC: 124 MG/DL (ref 70–99)
GLUCOSE BLD-MCNC: 133 MG/DL (ref 70–99)
GLUCOSE BLD-MCNC: 164 MG/DL (ref 70–99)
GLUCOSE BLD-MCNC: 80 MG/DL (ref 70–99)
GLUCOSE BLD-MCNC: 94 MG/DL (ref 70–99)
HCT VFR BLD CALC: 26.3 % (ref 36–48)
HCT VFR BLD CALC: 29.4 % (ref 36–48)
HEMOGLOBIN: 8.5 G/DL (ref 12–16)
HEMOGLOBIN: 9.1 G/DL (ref 12–16)
HYPOCHROMIA: ABNORMAL
LYMPHOCYTES ABSOLUTE: 1.1 K/UL (ref 1–5.1)
LYMPHOCYTES RELATIVE PERCENT: 10 %
MAGNESIUM: 2 MG/DL (ref 1.8–2.4)
MCH RBC QN AUTO: 25.5 PG (ref 26–34)
MCHC RBC AUTO-ENTMCNC: 30.8 G/DL (ref 31–36)
MCV RBC AUTO: 83 FL (ref 80–100)
METAMYELOCYTES RELATIVE PERCENT: 3 %
MONOCYTES ABSOLUTE: 0.7 K/UL (ref 0–1.3)
MONOCYTES RELATIVE PERCENT: 7 %
NEUTROPHILS ABSOLUTE: 8.6 K/UL (ref 1.7–7.7)
NEUTROPHILS RELATIVE PERCENT: 77 %
OCCULT BLOOD, OTHER: NORMAL
PDW BLD-RTO: 15 % (ref 12.4–15.4)
PERFORMED ON: ABNORMAL
PERFORMED ON: NORMAL
PERFORMED ON: NORMAL
PH, GASTRIC: NORMAL
PLATELET # BLD: 202 K/UL (ref 135–450)
PLATELET SLIDE REVIEW: ADEQUATE
PMV BLD AUTO: 8.7 FL (ref 5–10.5)
POTASSIUM REFLEX MAGNESIUM: 4.1 MMOL/L (ref 3.5–5.1)
RBC # BLD: 3.55 M/UL (ref 4–5.2)
SLIDE REVIEW: ABNORMAL
SODIUM BLD-SCNC: 147 MMOL/L (ref 136–145)
TOTAL PROTEIN: 5.9 G/DL (ref 6.4–8.2)
WBC # BLD: 10.6 K/UL (ref 4–11)

## 2020-04-24 PROCEDURE — 6360000002 HC RX W HCPCS: Performed by: FAMILY MEDICINE

## 2020-04-24 PROCEDURE — C9113 INJ PANTOPRAZOLE SODIUM, VIA: HCPCS | Performed by: FAMILY MEDICINE

## 2020-04-24 PROCEDURE — 6360000002 HC RX W HCPCS: Performed by: INTERNAL MEDICINE

## 2020-04-24 PROCEDURE — 6360000002 HC RX W HCPCS: Performed by: NURSE PRACTITIONER

## 2020-04-24 PROCEDURE — C9254 INJECTION, LACOSAMIDE: HCPCS | Performed by: FAMILY MEDICINE

## 2020-04-24 PROCEDURE — 99232 SBSQ HOSP IP/OBS MODERATE 35: CPT | Performed by: INTERNAL MEDICINE

## 2020-04-24 PROCEDURE — 71045 X-RAY EXAM CHEST 1 VIEW: CPT

## 2020-04-24 PROCEDURE — 85025 COMPLETE CBC W/AUTO DIFF WBC: CPT

## 2020-04-24 PROCEDURE — 80053 COMPREHEN METABOLIC PANEL: CPT

## 2020-04-24 PROCEDURE — 93005 ELECTROCARDIOGRAM TRACING: CPT | Performed by: INTERNAL MEDICINE

## 2020-04-24 PROCEDURE — 2060000000 HC ICU INTERMEDIATE R&B

## 2020-04-24 PROCEDURE — 6370000000 HC RX 637 (ALT 250 FOR IP): Performed by: INTERNAL MEDICINE

## 2020-04-24 PROCEDURE — 83735 ASSAY OF MAGNESIUM: CPT

## 2020-04-24 PROCEDURE — 93010 ELECTROCARDIOGRAM REPORT: CPT | Performed by: INTERNAL MEDICINE

## 2020-04-24 PROCEDURE — 2580000003 HC RX 258: Performed by: FAMILY MEDICINE

## 2020-04-24 PROCEDURE — 82271 OCCULT BLOOD OTHER SOURCES: CPT

## 2020-04-24 PROCEDURE — 85018 HEMOGLOBIN: CPT

## 2020-04-24 PROCEDURE — 85014 HEMATOCRIT: CPT

## 2020-04-24 RX ORDER — HYDROXYCHLOROQUINE SULFATE 200 MG/1
200 TABLET, FILM COATED ORAL 2 TIMES DAILY
Status: DISCONTINUED | OUTPATIENT
Start: 2020-04-24 | End: 2020-04-24

## 2020-04-24 RX ADMIN — HEPARIN SODIUM 5000 UNITS: 5000 INJECTION INTRAVENOUS; SUBCUTANEOUS at 23:06

## 2020-04-24 RX ADMIN — ASPIRIN 81 MG 81 MG: 81 TABLET ORAL at 08:43

## 2020-04-24 RX ADMIN — DEXTROSE MONOHYDRATE 100 MG: 50 INJECTION, SOLUTION INTRAVENOUS at 22:09

## 2020-04-24 RX ADMIN — PREGABALIN 150 MG: 75 CAPSULE ORAL at 23:06

## 2020-04-24 RX ADMIN — METHYLPREDNISOLONE SODIUM SUCCINATE 40 MG: 40 INJECTION, POWDER, FOR SOLUTION INTRAMUSCULAR; INTRAVENOUS at 08:43

## 2020-04-24 RX ADMIN — PIPERACILLIN AND TAZOBACTAM 3.38 G: 3; .375 INJECTION, POWDER, LYOPHILIZED, FOR SOLUTION INTRAVENOUS at 13:14

## 2020-04-24 RX ADMIN — LEVETIRACETAM 500 MG: 5 INJECTION INTRAVENOUS at 20:54

## 2020-04-24 RX ADMIN — ESCITALOPRAM OXALATE 10 MG: 10 TABLET ORAL at 08:42

## 2020-04-24 RX ADMIN — DEXTROSE MONOHYDRATE 100 MG: 50 INJECTION, SOLUTION INTRAVENOUS at 11:28

## 2020-04-24 RX ADMIN — INSULIN LISPRO 2 UNITS: 100 INJECTION, SOLUTION INTRAVENOUS; SUBCUTANEOUS at 13:19

## 2020-04-24 RX ADMIN — MONTELUKAST SODIUM 10 MG: 10 TABLET, FILM COATED ORAL at 23:05

## 2020-04-24 RX ADMIN — GABAPENTIN 100 MG: 100 CAPSULE ORAL at 23:05

## 2020-04-24 RX ADMIN — MELATONIN 6 MG: at 23:05

## 2020-04-24 RX ADMIN — PHENYTOIN SODIUM 100 MG: 50 INJECTION INTRAMUSCULAR; INTRAVENOUS at 20:51

## 2020-04-24 RX ADMIN — ALLOPURINOL 100 MG: 100 TABLET ORAL at 23:05

## 2020-04-24 RX ADMIN — Medication 1 CAPSULE: at 08:43

## 2020-04-24 RX ADMIN — PREGABALIN 150 MG: 75 CAPSULE ORAL at 08:43

## 2020-04-24 RX ADMIN — BUDESONIDE AND FORMOTEROL FUMARATE DIHYDRATE 2 PUFF: 80; 4.5 AEROSOL RESPIRATORY (INHALATION) at 20:50

## 2020-04-24 RX ADMIN — SODIUM CHLORIDE 8 MG/HR: 9 INJECTION, SOLUTION INTRAVENOUS at 17:54

## 2020-04-24 RX ADMIN — LEVETIRACETAM 500 MG: 5 INJECTION INTRAVENOUS at 11:01

## 2020-04-24 RX ADMIN — HEPARIN SODIUM 5000 UNITS: 5000 INJECTION INTRAVENOUS; SUBCUTANEOUS at 13:19

## 2020-04-24 RX ADMIN — PHENYTOIN SODIUM 100 MG: 50 INJECTION INTRAMUSCULAR; INTRAVENOUS at 13:14

## 2020-04-24 ASSESSMENT — PAIN SCALES - GENERAL: PAINLEVEL_OUTOF10: 0

## 2020-04-24 NOTE — PROGRESS NOTES
Hospitalist Progress Note      PCP: Vanessa Moore    Date of Admission: 4/14/2020    Chief Complaint: hypoxia    Subjective:   Hypoxia worsened overnight to 6L, weaned down to 4L this morning. Had tube feed residuals and tube feeds were stopped. Later in the day attempted to pull back residuals and tube was clogged. Appeared to have coffee grounds in residuals and occult sample was sent but negative.     Medications:  Reviewed    Infusion Medications    dextrose       Scheduled Medications    methylPREDNISolone  40 mg Intravenous Daily    piperacillin-tazobactam  3.375 g Intravenous Q8H    lacosamide (VIMPAT) IVPB  100 mg Intravenous BID    phenytoin  100 mg Intravenous Q8H    sodium chloride flush  10 mL Intravenous 2 times per day    levetiracetam  500 mg Intravenous Q12H    heparin (porcine)  5,000 Units Subcutaneous 3 times per day    insulin lispro  0-12 Units Subcutaneous TID WC    insulin lispro  0-6 Units Subcutaneous Nightly    lactobacillus  1 capsule Oral BID WC    allopurinol  100 mg Oral Nightly    aspirin  81 mg Oral Daily    escitalopram  10 mg Oral Daily    ferrous sulfate  324 mg Oral Daily with breakfast    budesonide-formoterol  2 puff Inhalation BID    gabapentin  100 mg Oral Nightly    melatonin  6 mg Oral Nightly    montelukast  10 mg Oral Nightly    pregabalin  150 mg Oral 2 times per day     PRN Meds: atropine, loperamide, sodium chloride flush, acetaminophen **OR** acetaminophen, ondansetron, bisacodyl, glucose, dextrose, glucagon (rDNA), dextrose, menthol-zinc oxide, oxyCODONE-acetaminophen      Intake/Output Summary (Last 24 hours) at 4/24/2020 1040  Last data filed at 4/24/2020 1007  Gross per 24 hour   Intake 825 ml   Output 700 ml   Net 125 ml       Exam:    BP (!) 140/70   Pulse 99   Temp 98.9 °F (37.2 °C) (Axillary)   Resp 22   Ht 5' 4\" (1.626 m)   Wt 185 lb 13.6 oz (84.3 kg)   SpO2 98%   BMI 31.90 kg/m²     General appearance: Bilateral airspace disease is re-identified. XR CHEST PORTABLE   Final Result   1. Sideholes of the enteric tube are at the gastroesophageal junction,   recommend advancing by 6 cm. 2. No significant change in bilateral airspace disease. XR CHEST PORTABLE   Final Result   1. Recommend advancing enteric tube by 8 cm. 2. Slight interval worsening of perihilar opacities may reflect worsening   edema versus a multifocal infectious/inflammatory process. 3.  Interval obscuration of the left hemidiaphragm noted which may be on the   basis of atelectasis, pleural effusion or an infectious/inflammatory process. XR CHEST PORTABLE   Final Result   Congestive heart failure. NG tube tip and proximal side-port reside in the gastric fundus. XR CHEST 1 VW   Final Result   CHF with mixed pattern of airspace change with slight improvement on the left   and slight worsening on the right. Overall pattern would favor underlying   pulmonary edema. An underlying viral infection can not be excluded. XR CHEST PORTABLE   Final Result   Cardiomegaly. Interstitial opacities compatible pulmonary edema or   interstitial pneumonitis.                  Assessment/Plan:    Active Hospital Problems    Diagnosis Date Noted    Acute metabolic encephalopathy [G31.43]     Metabolic acidosis [A47.7]     Tachypnea [R06.82]     Acute respiratory failure with hypoxia (HCC) [J96.01] 04/14/2020    COVID-19 virus infection [U07.1] 04/14/2020    Pneumonia [J18.9] 04/14/2020    Sepsis ([address] [A41.9] 04/14/2020    Fever [R50.9] 04/14/2020    Tachycardia [R00.0] 04/14/2020    Exposure to COVID-19 virus [Z20.828] 04/14/2020    Hyperglycemia [R73.9] 04/14/2020    Dementia ([address] [F03.90] 04/14/2020    Renal failure [N19] 04/14/2020    Acute renal failure superimposed on stage 3 chronic kidney disease ([address] [N17.9, N18.3] 04/14/2020    Septic shock ([facility] [address] [A41.9, R65.21] 04/14/2020    Pneumonia due

## 2020-04-24 NOTE — CONSULTS
Non-medical: Not on file   Tobacco Use    Smoking status: Never Smoker    Smokeless tobacco: Never Used   Substance and Sexual Activity    Alcohol use: Never     Frequency: Never    Drug use: Never    Sexual activity: Not Currently   Lifestyle    Physical activity     Days per week: Not on file     Minutes per session: Not on file    Stress: Not on file   Relationships    Social connections     Talks on phone: Not on file     Gets together: Not on file     Attends Mandaeism service: Not on file     Active member of club or organization: Not on file     Attends meetings of clubs or organizations: Not on file     Relationship status: Not on file    Intimate partner violence     Fear of current or ex partner: Not on file     Emotionally abused: Not on file     Physically abused: Not on file     Forced sexual activity: Not on file   Other Topics Concern    Not on file   Social History Narrative    Not on file       Family History:   No family history on file.   REVIEW OF SYSTEMS:    CONSTITUTIONAL:  negative  EYES:  negative  HEENT:  negative  RESPIRATORY:  negative  CARDIOVASCULAR:  negative  GASTROINTESTINAL:  negative  INTEGUMENT/BREAST:  negative  HEMATOLOGIC/LYMPHATIC:  negative  ENDOCRINE:  negative  MUSCULOSKELETAL:  negative  NEUROLOGICAL:  negative  PHYSICAL EXAM:    General Appearance: alert and oriented to person, place and time, well developed and well- nourished, in no acute distress  Skin: warm and dry, no rash or erythema  Head: normocephalic and atraumatic  Eyes: pupils equal, round, and reactive to light, extraocular eye movements intact, conjunctivae normal  ENT: tympanic membrane, external ear and ear canal normal bilaterally, nose without deformity, nasal mucosa and turbinates normal without polyps  Neck: supple and non-tender without mass, no thyromegaly or thyroid nodules, no cervical lymphadenopathy  Pulmonary/Chest: clear to auscultation bilaterally- no wheezes, rales or rhonchi, normal air movement, no respiratory distress  Cardiovascular: normal rate, regular rhythm, normal S1 and S2, no murmurs, rubs, clicks, or gallops, distal pulses intact, no carotid bruits  Abdomen: soft, non-tender, non-distended, normal bowel sounds, no masses or organomegaly  Extremities: no cyanosis, clubbing or edema  Musculoskeletal: normal range of motion, no joint swelling, deformity or tenderness  Neurologic: reflexes normal and symmetric, no cranial nerve deficit, gait, coordination and speech normal  Vitals:    /70   Pulse 80   Temp 98.7 °F (37.1 °C) (Axillary)   Resp 20   Ht 5' 4\" (1.626 m)   Wt 185 lb 13.6 oz (84.3 kg)   SpO2 98%   BMI 31.90 kg/m²     DATA:    CBC with Differential:    Lab Results   Component Value Date    WBC 10.6 04/24/2020    RBC 3.55 04/24/2020    HGB 8.5 04/24/2020    HCT 26.3 04/24/2020     04/24/2020    MCV 83.0 04/24/2020    MCH 25.5 04/24/2020    MCHC 30.8 04/24/2020    RDW 15.0 04/24/2020    BANDSPCT 1 04/24/2020    METASPCT 3 04/24/2020    LYMPHOPCT 10.0 04/24/2020    MONOPCT 7.0 04/24/2020    BASOPCT 0.0 04/24/2020    MONOSABS 0.7 04/24/2020    LYMPHSABS 1.1 04/24/2020    EOSABS 0.2 04/24/2020    BASOSABS 0.0 04/24/2020     CMP:    Lab Results   Component Value Date     04/24/2020    K 4.1 04/24/2020     04/24/2020    CO2 22 04/24/2020    BUN 21 04/24/2020    CREATININE 1.3 04/24/2020    GFRAA 49 04/24/2020    AGRATIO 0.7 04/24/2020    LABGLOM 40 04/24/2020    GLUCOSE 133 04/24/2020    PROT 5.9 04/24/2020    LABALBU 2.5 04/24/2020    CALCIUM 8.9 04/24/2020    BILITOT 0.3 04/24/2020    ALKPHOS 191 04/24/2020    AST 35 04/24/2020    ALT 23 04/24/2020       IMPRESSION/RECOMMENDATIONS:      1. Covid pneumonia. Obtunded from hypoxic respiratory failure and pneumonia. She is not bleeding. Gastroccult is negative. Blood count is stable. No indication for EGD. Can reattempt tube feedings at 10 ml/hr and advance as tolerated.     Electronically signed by Ayala Lucia

## 2020-04-24 NOTE — PLAN OF CARE
Problem: Falls - Risk of:  Goal: Will remain free from falls  Description: Will remain free from falls  Outcome: Ongoing  Goal: Absence of physical injury  Description: Absence of physical injury  Outcome: Ongoing     Problem: Infection:  Goal: Will remain free from infection  Description: Will remain free from infection  Outcome: Ongoing     Problem: Safety:  Goal: Free from accidental physical injury  Description: Free from accidental physical injury  Outcome: Ongoing  Goal: Free from intentional harm  Description: Free from intentional harm  Outcome: Ongoing     Problem: Daily Care:  Goal: Daily care needs are met  Description: Daily care needs are met  Outcome: Ongoing     Problem: Pain:  Goal: Patient's pain/discomfort is manageable  Description: Patient's pain/discomfort is manageable  Outcome: Ongoing  Goal: Pain level will decrease  Description: Pain level will decrease  Outcome: Ongoing  Goal: Control of acute pain  Description: Control of acute pain  Outcome: Ongoing  Goal: Control of chronic pain  Description: Control of chronic pain  Outcome: Ongoing     Problem: Skin Integrity:  Goal: Skin integrity will stabilize  Description: Skin integrity will stabilize  Outcome: Ongoing     Problem: Discharge Planning:  Goal: Patients continuum of care needs are met  Description: Patients continuum of care needs are met  Outcome: Ongoing     Problem: Airway Clearance - Ineffective:  Goal: Clear lung sounds  Description: Clear lung sounds  Outcome: Ongoing  Goal: Ability to maintain a clear airway will improve  Description: Ability to maintain a clear airway will improve  Outcome: Ongoing     Problem: Fluid Volume - Deficit:  Goal: Achieves intake and output within specified parameters  Description: Achieves intake and output within specified parameters  Outcome: Ongoing     Problem: Gas Exchange - Impaired:  Goal: Levels of oxygenation will improve  Description: Levels of oxygenation will improve  Outcome:

## 2020-04-24 NOTE — PLAN OF CARE
Problem: Falls - Risk of:  Goal: Will remain free from falls  Description: Will remain free from falls  Outcome: Ongoing  Goal: Absence of physical injury  Description: Absence of physical injury  Outcome: Ongoing     Problem: Infection:  Goal: Will remain free from infection  Description: Will remain free from infection  Outcome: Ongoing     Problem: Daily Care:  Goal: Daily care needs are met  Description: Daily care needs are met  Outcome: Ongoing     Problem: Pain:  Description: Pain management should include both nonpharmacologic and pharmacologic interventions.   Goal: Patient's pain/discomfort is manageable  Description: Patient's pain/discomfort is manageable  Outcome: Ongoing  Goal: Pain level will decrease  Description: Pain level will decrease  Outcome: Ongoing     Problem: Skin Integrity:  Goal: Skin integrity will stabilize  Description: Skin integrity will stabilize  Outcome: Ongoing     Problem: Airway Clearance - Ineffective:  Goal: Clear lung sounds  Description: Clear lung sounds  Outcome: Ongoing  Goal: Ability to maintain a clear airway will improve  Description: Ability to maintain a clear airway will improve  Outcome: Ongoing     Problem: Fluid Volume - Deficit:  Goal: Achieves intake and output within specified parameters  Description: Achieves intake and output within specified parameters  Outcome: Ongoing     Problem: Gas Exchange - Impaired:  Goal: Levels of oxygenation will improve  Description: Levels of oxygenation will improve  Outcome: Ongoing     Problem: Injury - Risk of, Physical Injury:  Goal: Will remain free from falls  Description: Will remain free from falls  Outcome: Ongoing  Goal: Absence of physical injury  Description: Absence of physical injury  Outcome: Ongoing     Problem: Sleep Pattern Disturbance:  Goal: Appears well-rested  Description: Appears well-rested  Outcome: Ongoing

## 2020-04-25 LAB
GLUCOSE BLD-MCNC: 113 MG/DL (ref 70–99)
GLUCOSE BLD-MCNC: 130 MG/DL (ref 70–99)
GLUCOSE BLD-MCNC: 132 MG/DL (ref 70–99)
GLUCOSE BLD-MCNC: 67 MG/DL (ref 70–99)
GLUCOSE BLD-MCNC: 71 MG/DL (ref 70–99)
GLUCOSE BLD-MCNC: 84 MG/DL (ref 70–99)
PERFORMED ON: ABNORMAL
PERFORMED ON: NORMAL
PERFORMED ON: NORMAL

## 2020-04-25 PROCEDURE — 6360000002 HC RX W HCPCS: Performed by: NURSE PRACTITIONER

## 2020-04-25 PROCEDURE — 6370000000 HC RX 637 (ALT 250 FOR IP): Performed by: INTERNAL MEDICINE

## 2020-04-25 PROCEDURE — 6360000002 HC RX W HCPCS: Performed by: INTERNAL MEDICINE

## 2020-04-25 PROCEDURE — 6370000000 HC RX 637 (ALT 250 FOR IP): Performed by: FAMILY MEDICINE

## 2020-04-25 PROCEDURE — 2060000000 HC ICU INTERMEDIATE R&B

## 2020-04-25 PROCEDURE — C9254 INJECTION, LACOSAMIDE: HCPCS | Performed by: FAMILY MEDICINE

## 2020-04-25 PROCEDURE — 6360000002 HC RX W HCPCS: Performed by: FAMILY MEDICINE

## 2020-04-25 PROCEDURE — 51702 INSERT TEMP BLADDER CATH: CPT

## 2020-04-25 PROCEDURE — 2580000003 HC RX 258: Performed by: INTERNAL MEDICINE

## 2020-04-25 PROCEDURE — 2580000003 HC RX 258: Performed by: FAMILY MEDICINE

## 2020-04-25 PROCEDURE — 99232 SBSQ HOSP IP/OBS MODERATE 35: CPT | Performed by: INTERNAL MEDICINE

## 2020-04-25 RX ORDER — PANTOPRAZOLE SODIUM 40 MG/1
40 TABLET, DELAYED RELEASE ORAL
Status: DISCONTINUED | OUTPATIENT
Start: 2020-04-25 | End: 2020-04-26 | Stop reason: CLARIF

## 2020-04-25 RX ADMIN — ASPIRIN 81 MG 81 MG: 81 TABLET ORAL at 10:34

## 2020-04-25 RX ADMIN — ATROPINE SULFATE 1 DROP: 10 SOLUTION/ DROPS OPHTHALMIC at 17:58

## 2020-04-25 RX ADMIN — PIPERACILLIN AND TAZOBACTAM 3.38 G: 3; .375 INJECTION, POWDER, LYOPHILIZED, FOR SOLUTION INTRAVENOUS at 14:00

## 2020-04-25 RX ADMIN — BUDESONIDE AND FORMOTEROL FUMARATE DIHYDRATE 2 PUFF: 80; 4.5 AEROSOL RESPIRATORY (INHALATION) at 23:25

## 2020-04-25 RX ADMIN — HEPARIN SODIUM 5000 UNITS: 5000 INJECTION INTRAVENOUS; SUBCUTANEOUS at 23:26

## 2020-04-25 RX ADMIN — PHENYTOIN SODIUM 100 MG: 50 INJECTION INTRAMUSCULAR; INTRAVENOUS at 23:58

## 2020-04-25 RX ADMIN — FERROUS SULFATE TAB EC 324 MG (65 MG FE EQUIVALENT) 324 MG: 324 (65 FE) TABLET DELAYED RESPONSE at 10:28

## 2020-04-25 RX ADMIN — Medication 1 CAPSULE: at 17:59

## 2020-04-25 RX ADMIN — PREGABALIN 150 MG: 75 CAPSULE ORAL at 23:24

## 2020-04-25 RX ADMIN — ESCITALOPRAM OXALATE 10 MG: 10 TABLET ORAL at 10:29

## 2020-04-25 RX ADMIN — PIPERACILLIN AND TAZOBACTAM 3.38 G: 3; .375 INJECTION, POWDER, LYOPHILIZED, FOR SOLUTION INTRAVENOUS at 06:13

## 2020-04-25 RX ADMIN — PIPERACILLIN AND TAZOBACTAM 3.38 G: 3; .375 INJECTION, POWDER, LYOPHILIZED, FOR SOLUTION INTRAVENOUS at 00:46

## 2020-04-25 RX ADMIN — LEVETIRACETAM 500 MG: 5 INJECTION INTRAVENOUS at 23:15

## 2020-04-25 RX ADMIN — BUDESONIDE AND FORMOTEROL FUMARATE DIHYDRATE 2 PUFF: 80; 4.5 AEROSOL RESPIRATORY (INHALATION) at 11:27

## 2020-04-25 RX ADMIN — PREGABALIN 150 MG: 75 CAPSULE ORAL at 10:28

## 2020-04-25 RX ADMIN — DEXTROSE 15 G: 15 GEL ORAL at 23:12

## 2020-04-25 RX ADMIN — MELATONIN 6 MG: at 23:24

## 2020-04-25 RX ADMIN — Medication 1 CAPSULE: at 10:29

## 2020-04-25 RX ADMIN — HEPARIN SODIUM 5000 UNITS: 5000 INJECTION INTRAVENOUS; SUBCUTANEOUS at 06:13

## 2020-04-25 RX ADMIN — GABAPENTIN 100 MG: 100 CAPSULE ORAL at 23:23

## 2020-04-25 RX ADMIN — ALLOPURINOL 100 MG: 100 TABLET ORAL at 23:24

## 2020-04-25 RX ADMIN — DEXTROSE MONOHYDRATE 100 MG: 50 INJECTION, SOLUTION INTRAVENOUS at 11:25

## 2020-04-25 RX ADMIN — HEPARIN SODIUM 5000 UNITS: 5000 INJECTION INTRAVENOUS; SUBCUTANEOUS at 14:08

## 2020-04-25 RX ADMIN — LEVETIRACETAM 500 MG: 5 INJECTION INTRAVENOUS at 10:28

## 2020-04-25 RX ADMIN — PANTOPRAZOLE SODIUM 40 MG: 40 TABLET, DELAYED RELEASE ORAL at 17:59

## 2020-04-25 RX ADMIN — MONTELUKAST SODIUM 10 MG: 10 TABLET, FILM COATED ORAL at 23:24

## 2020-04-25 RX ADMIN — METHYLPREDNISOLONE SODIUM SUCCINATE 40 MG: 40 INJECTION, POWDER, FOR SOLUTION INTRAMUSCULAR; INTRAVENOUS at 10:29

## 2020-04-25 RX ADMIN — PHENYTOIN SODIUM 100 MG: 50 INJECTION INTRAMUSCULAR; INTRAVENOUS at 06:12

## 2020-04-25 RX ADMIN — SODIUM CHLORIDE, PRESERVATIVE FREE 10 ML: 5 INJECTION INTRAVENOUS at 11:59

## 2020-04-25 ASSESSMENT — PAIN SCALES - GENERAL
PAINLEVEL_OUTOF10: 0
PAINLEVEL_OUTOF10: 0

## 2020-04-25 ASSESSMENT — PAIN SCALES - WONG BAKER
WONGBAKER_NUMERICALRESPONSE: 2

## 2020-04-25 NOTE — PLAN OF CARE
Patient free from falls this shift. Fall precautions in place at all times. Bed in lowest position with two side rails up and wheels locked. Call light within reach. Patient able and agreeable to contact for safety appropriately. Safe environment was maintained for patient during this shift. Bed in lowest position with wheels locked. Call light in reach of patient and appropriate ID bands in place. Problem: Infection:  Goal: Will remain free from infection  Description: Will remain free from infection  4/25/2020 1530 by Raúl Donovan RN  Outcome: Ongoing  4/25/2020 0700 by Temple Cockayne, RN  Outcome: Ongoing   In collaboration with the healthcare team, the patient's daily care needs are meet. Patient is encouraged to perform tasks independently per ability. Pt able to express presence/absence of pain and rate pain appropriately using numerical scale. Pain/discomfort being managed with PRN analgesics per MD orders (see MAR). Pain assessed every shift and after interventions. Skin assessment performed each shift per protocol. Patient turned and repositioned every two hours and prn with pillow support. Patient checked for incontence every two hours. Continuing to work with patient and health care team on discharge plan. Discharge instructions and medication management will be reviewed prior to discharge.     Problem: Airway Clearance - Ineffective:  Goal: Clear lung sounds  Description: Clear lung sounds  4/25/2020 1530 by Raúl Donovan RN  Outcome: Ongoing  4/25/2020 0700 by Temple Cockayne, RN  Outcome: Ongoing     Problem: Fluid Volume - Deficit:  Goal: Achieves intake and output within specified parameters  Description: Achieves intake and output within specified parameters  Outcome: Ongoing     Problem: Gas Exchange - Impaired:  Goal: Levels of oxygenation will improve  Description: Levels of oxygenation will improve  4/25/2020 1530 by Raúl Donovan RN  Outcome: Ongoing  4/25/2020 0700 by Vanessa Hernandes

## 2020-04-25 NOTE — CONSULTS
Nutrition Assessment (Enteral Nutrition)    Type and Reason for Visit: Reassess, Consult(TF ordering / mgmt)    Nutrition Recommendations:   Start TF per NG using Glucerna 1.5 goal rate of 45 ml per hour  Flush per MD (currently at 100 ml every 6 hours)  Monitor need to adj TF rate should Dilantin be transitioned to po    Nutrition Assessment: Noted NG placement with decision to start TF. Glucerna 1.5 started with goal of 45 ml per hour + 100 ml flush every 6 hours, per Dietitian's recommendations. Noted residuals on the 24th & 25th. TF to restart slowly, advancing to goal as tolerated. Noted pt with + BM on 4/24. Noted Dilantin on board tid, but currently administered via IV. Malnutrition Assessment:  · Malnutrition Status: At risk for malnutrition  · Context: Acute illness or injury  · Findings of the 6 clinical characteristics of malnutrition (Minimum of 2 out of 6 clinical characteristics is required to make the diagnosis of moderate or severe Protein Calorie Malnutrition based on AND/ASPEN Guidelines):  1. Energy Intake-Less than or equal to 50% of estimated energy requirement, Greater than or equal to 5 days    2. Weight Loss-Unable to assess,    3. Fat Loss-Unable to assess,    4. Muscle Loss-Unable to assess,    5. Fluid Accumulation-No significant fluid accumulation, Extremities  6.  Strength-Not measured    Nutrition Risk Level: High    Nutrition Needs:  · Estimated Daily Total Kcal: 4052-1389 kcal (15-18 kcal/kg CBW)  · Estimated Daily Protein (g):  gm (1.2-2 gm/kg IBW)  · Estimated Daily Fluid (ml/day): 1 mL/kcal    Nutrition Diagnosis:   · Problem: Inadequate oral intake  · Etiology: related to Cognitive or neurological impairment     Signs and symptoms:  as evidenced by Diet history of poor intake    Objective Information:  · Nutrition-Focused Physical Findings: Noted BM 4/24.  Noted trace edema to BLE  · Wound Type: (Noted necrotic toes to bilateral feet)  · Current Nutrition Therapies:  · Oral Diet Orders: NPO   · Oral Diet intake: NPO  · Oral Nutrition Supplement (ONS) Orders: None  · ONS intake: NPO  · Tube Feeding (TF) Orders:   · Feeding Route: Nasogastric  · Formula: 1.5 Diabetic  · Rate (ml/hr):45 (rate adjusted for routine Nursing care (~20 hour run)    · Volume (ml/day): 900  · Duration: Continuous  · Additives/Modulars:    · Water Flushes: per provider  · Current TF & Flush Orders Provides: 900 ml TV / 1350 kcals / 74 gms pro / 683 ml free water per day Flush per MD adds 400 additional ml free water for total of 1083 ml per day  · Anthropometric Measures:  · Ht: 5' 4\" (162.6 cm)   · Current Body Wt: (Nursing unable to obtain updated wt r/t bed needing to be zero'd out.)  · Weight Change: unsure of wt changes, no hx in EMR   · Ideal Body Wt: 120 lb (54.4 kg), % Ideal Body    · BMI Classification: BMI 30.0 - 34.9 Obese Class I    Nutrition Interventions:   Continue current Tube Feeding  Continued Inpatient Monitoring    Nutrition Evaluation:   · Evaluation: Progressing toward goals(slowly)   · Goals: tolerate TF at goal rate during admission   · Monitoring: TF Intake, TF Tolerance, Skin Integrity, Weight, Pertinent Labs, Nausea or Vomiting, Diarrhea, Constipation      Electronically signed by Edward Pinzon RD, CAM on 4/25/20 at 2:38 PM EDT    Contact Number: [phone]

## 2020-04-25 NOTE — PROGRESS NOTES
Hospitalist Progress Note      PCP: Vignesh Duenas    Date of Admission: 4/14/2020    Chief Complaint: hypoxia    Subjective:   Pt requesting applesauce. Almost weaned to RA. Had high tube feed residuals this morning. Rechecked this afternoon and pulled back <50cc. Medications:  Reviewed    Infusion Medications    pantoprozole (PROTONIX) infusion 8 mg/hr (04/24/20 1754)    dextrose       Scheduled Medications    methylPREDNISolone  40 mg Intravenous Daily    piperacillin-tazobactam  3.375 g Intravenous Q8H    lacosamide (VIMPAT) IVPB  100 mg Intravenous BID    phenytoin  100 mg Intravenous Q8H    sodium chloride flush  10 mL Intravenous 2 times per day    levetiracetam  500 mg Intravenous Q12H    heparin (porcine)  5,000 Units Subcutaneous 3 times per day    insulin lispro  0-12 Units Subcutaneous TID WC    insulin lispro  0-6 Units Subcutaneous Nightly    lactobacillus  1 capsule Oral BID WC    allopurinol  100 mg Oral Nightly    aspirin  81 mg Oral Daily    escitalopram  10 mg Oral Daily    ferrous sulfate  324 mg Oral Daily with breakfast    budesonide-formoterol  2 puff Inhalation BID    gabapentin  100 mg Oral Nightly    melatonin  6 mg Oral Nightly    montelukast  10 mg Oral Nightly    pregabalin  150 mg Oral 2 times per day     PRN Meds: atropine, loperamide, sodium chloride flush, acetaminophen **OR** acetaminophen, ondansetron, bisacodyl, glucose, dextrose, glucagon (rDNA), dextrose, menthol-zinc oxide, oxyCODONE-acetaminophen      Intake/Output Summary (Last 24 hours) at 4/25/2020 0912  Last data filed at 4/25/2020 1772  Gross per 24 hour   Intake 950 ml   Output 350 ml   Net 600 ml       Exam:    /64   Pulse 90   Temp 98.4 °F (36.9 °C) (Axillary)   Resp 18   Ht 5' 4\" (1.626 m)   Wt 185 lb 13.6 oz (84.3 kg)   SpO2 96%   BMI 31.90 kg/m²     General appearance: Encephalopathic, resting comfortably. HEENT: Pupils equal, round, and reactive to light. Conjunctivae/corneas clear. Neck: Supple, with full range of motion. No jugular venous distention. Trachea midline. Respiratory:  Normal respiratory effort. Clear to auscultation, bilaterally without Rales/Wheezes/Rhonchi. Cardiovascular: Regular rhythm and rate, with normal S1/S2 without murmurs, rubs or gallops. Abdomen: Soft, non-tender, non-distended with normal bowel sounds. Musculoskeletal: No clubbing, cyanosis or edema bilaterally. Full range of motion without deformity. Skin: Skin color, texture, turgor normal.  No rashes or lesions. Neurologic:  Encephalopathic, resting comfortably  Repetitively verbalizes food she wants to eat  Cranial nerves: II-XII intact, grossly non-focal.  Capillary Refill: Brisk,< 3 seconds   Peripheral Pulses: +2 palpable, equal bilaterally        Labs:   Recent Labs     04/24/20  0959 04/24/20  1605   WBC 10.6  --    HGB 9.1* 8.5*   HCT 29.4* 26.3*     --      Recent Labs     04/24/20  0958   *   K 4.1      CO2 22   BUN 21*   CREATININE 1.3*   CALCIUM 8.9     Recent Labs     04/24/20  0958   AST 35   ALT 23   BILITOT 0.3   ALKPHOS 191*     No results for input(s): INR in the last 72 hours. No results for input(s): [place] in the last 72 hours. Urinalysis:      Lab Results   Component Value Date    NITRU Negative 04/14/2020    WBCUA 52 04/14/2020    RBCUA 25 04/14/2020    BLOODU MODERATE 04/14/2020    SPECGRAV 1.025 04/14/2020    GLUCOSEU Negative 04/14/2020       Radiology:  XR CHEST PORTABLE   Final Result   Stable exam with patchy/hazy bilateral airspace opacities along with   interstitial opacities and suspected small bilateral pleural effusions. XR CHEST PORTABLE   Final Result   The enteric tube has its tip off of the inferior aspect of the radiograph. Bilateral airspace disease is re-identified. XR CHEST PORTABLE   Final Result   1.  Sideholes of the enteric tube are at the gastroesophageal junction,   recommend

## 2020-04-25 NOTE — PROGRESS NOTES
Restarted tube feed last night at 2209 at 10 mL/hr. Residual at 0245 was 5 mL; advanced to 20 mL/hr. Residual check at 0635 was 450 mL. Tube feed stopped. Vital signs stable. Patient resting.

## 2020-04-25 NOTE — PLAN OF CARE
Chayo Palomares RN  Outcome: Ongoing     Problem: Nutrition  Goal: Optimal nutrition therapy  4/25/2020 0700 by Ashtyn Pan RN  Outcome: Ongoing

## 2020-04-26 LAB
GLUCOSE BLD-MCNC: 101 MG/DL (ref 70–99)
GLUCOSE BLD-MCNC: 110 MG/DL (ref 70–99)
GLUCOSE BLD-MCNC: 141 MG/DL (ref 70–99)
GLUCOSE BLD-MCNC: 147 MG/DL (ref 70–99)
GLUCOSE BLD-MCNC: 165 MG/DL (ref 70–99)
GLUCOSE BLD-MCNC: 93 MG/DL (ref 70–99)
GLUCOSE BLD-MCNC: 99 MG/DL (ref 70–99)
PERFORMED ON: ABNORMAL
PERFORMED ON: NORMAL
PERFORMED ON: NORMAL

## 2020-04-26 PROCEDURE — 6370000000 HC RX 637 (ALT 250 FOR IP): Performed by: FAMILY MEDICINE

## 2020-04-26 PROCEDURE — C9254 INJECTION, LACOSAMIDE: HCPCS | Performed by: FAMILY MEDICINE

## 2020-04-26 PROCEDURE — 51702 INSERT TEMP BLADDER CATH: CPT

## 2020-04-26 PROCEDURE — 6360000002 HC RX W HCPCS: Performed by: FAMILY MEDICINE

## 2020-04-26 PROCEDURE — 6370000000 HC RX 637 (ALT 250 FOR IP): Performed by: INTERNAL MEDICINE

## 2020-04-26 PROCEDURE — 6360000002 HC RX W HCPCS: Performed by: NURSE PRACTITIONER

## 2020-04-26 PROCEDURE — 2060000000 HC ICU INTERMEDIATE R&B

## 2020-04-26 PROCEDURE — 6360000002 HC RX W HCPCS: Performed by: INTERNAL MEDICINE

## 2020-04-26 PROCEDURE — 2580000003 HC RX 258: Performed by: FAMILY MEDICINE

## 2020-04-26 PROCEDURE — 2580000003 HC RX 258: Performed by: INTERNAL MEDICINE

## 2020-04-26 RX ORDER — PANTOPRAZOLE SODIUM 40 MG/1
40 GRANULE, DELAYED RELEASE ORAL
Status: DISCONTINUED | OUTPATIENT
Start: 2020-04-27 | End: 2020-04-28 | Stop reason: HOSPADM

## 2020-04-26 RX ADMIN — PHENYTOIN SODIUM 100 MG: 50 INJECTION INTRAMUSCULAR; INTRAVENOUS at 05:33

## 2020-04-26 RX ADMIN — Medication 1 CAPSULE: at 09:18

## 2020-04-26 RX ADMIN — GABAPENTIN 100 MG: 100 CAPSULE ORAL at 22:45

## 2020-04-26 RX ADMIN — LEVETIRACETAM 500 MG: 5 INJECTION INTRAVENOUS at 10:38

## 2020-04-26 RX ADMIN — PIPERACILLIN AND TAZOBACTAM 3.38 G: 3; .375 INJECTION, POWDER, LYOPHILIZED, FOR SOLUTION INTRAVENOUS at 23:22

## 2020-04-26 RX ADMIN — PANTOPRAZOLE SODIUM 40 MG: 40 TABLET, DELAYED RELEASE ORAL at 18:12

## 2020-04-26 RX ADMIN — SODIUM CHLORIDE, PRESERVATIVE FREE 10 ML: 5 INJECTION INTRAVENOUS at 22:53

## 2020-04-26 RX ADMIN — PANTOPRAZOLE SODIUM 40 MG: 40 TABLET, DELAYED RELEASE ORAL at 06:32

## 2020-04-26 RX ADMIN — DEXTROSE MONOHYDRATE 100 MG: 50 INJECTION, SOLUTION INTRAVENOUS at 00:32

## 2020-04-26 RX ADMIN — PIPERACILLIN AND TAZOBACTAM 3.38 G: 3; .375 INJECTION, POWDER, LYOPHILIZED, FOR SOLUTION INTRAVENOUS at 13:38

## 2020-04-26 RX ADMIN — PREGABALIN 150 MG: 75 CAPSULE ORAL at 22:54

## 2020-04-26 RX ADMIN — MONTELUKAST SODIUM 10 MG: 10 TABLET, FILM COATED ORAL at 22:54

## 2020-04-26 RX ADMIN — PHENYTOIN SODIUM 100 MG: 50 INJECTION INTRAMUSCULAR; INTRAVENOUS at 22:54

## 2020-04-26 RX ADMIN — HEPARIN SODIUM 5000 UNITS: 5000 INJECTION INTRAVENOUS; SUBCUTANEOUS at 05:33

## 2020-04-26 RX ADMIN — HEPARIN SODIUM 5000 UNITS: 5000 INJECTION INTRAVENOUS; SUBCUTANEOUS at 22:54

## 2020-04-26 RX ADMIN — FERROUS SULFATE TAB EC 324 MG (65 MG FE EQUIVALENT) 324 MG: 324 (65 FE) TABLET DELAYED RESPONSE at 10:11

## 2020-04-26 RX ADMIN — LEVETIRACETAM 500 MG: 5 INJECTION INTRAVENOUS at 22:29

## 2020-04-26 RX ADMIN — ASPIRIN 81 MG 81 MG: 81 TABLET ORAL at 09:19

## 2020-04-26 RX ADMIN — METHYLPREDNISOLONE SODIUM SUCCINATE 40 MG: 40 INJECTION, POWDER, FOR SOLUTION INTRAMUSCULAR; INTRAVENOUS at 09:19

## 2020-04-26 RX ADMIN — DEXTROSE MONOHYDRATE 100 MG: 50 INJECTION, SOLUTION INTRAVENOUS at 22:49

## 2020-04-26 RX ADMIN — Medication 1 CAPSULE: at 17:48

## 2020-04-26 RX ADMIN — PHENYTOIN SODIUM 100 MG: 50 INJECTION INTRAMUSCULAR; INTRAVENOUS at 13:39

## 2020-04-26 RX ADMIN — PREGABALIN 150 MG: 75 CAPSULE ORAL at 09:19

## 2020-04-26 RX ADMIN — DEXTROSE MONOHYDRATE 100 MG: 50 INJECTION, SOLUTION INTRAVENOUS at 10:33

## 2020-04-26 RX ADMIN — INSULIN LISPRO 2 UNITS: 100 INJECTION, SOLUTION INTRAVENOUS; SUBCUTANEOUS at 18:12

## 2020-04-26 RX ADMIN — HEPARIN SODIUM 5000 UNITS: 5000 INJECTION INTRAVENOUS; SUBCUTANEOUS at 13:58

## 2020-04-26 RX ADMIN — PIPERACILLIN AND TAZOBACTAM 3.38 G: 3; .375 INJECTION, POWDER, LYOPHILIZED, FOR SOLUTION INTRAVENOUS at 01:59

## 2020-04-26 RX ADMIN — BUDESONIDE AND FORMOTEROL FUMARATE DIHYDRATE 2 PUFF: 80; 4.5 AEROSOL RESPIRATORY (INHALATION) at 23:15

## 2020-04-26 RX ADMIN — ALLOPURINOL 100 MG: 100 TABLET ORAL at 22:54

## 2020-04-26 RX ADMIN — ESCITALOPRAM OXALATE 10 MG: 10 TABLET ORAL at 09:19

## 2020-04-26 RX ADMIN — PIPERACILLIN AND TAZOBACTAM 3.38 G: 3; .375 INJECTION, POWDER, LYOPHILIZED, FOR SOLUTION INTRAVENOUS at 06:32

## 2020-04-26 RX ADMIN — MELATONIN 6 MG: at 22:54

## 2020-04-26 ASSESSMENT — PAIN SCALES - GENERAL
PAINLEVEL_OUTOF10: 0
PAINLEVEL_OUTOF10: 0

## 2020-04-26 NOTE — PLAN OF CARE
Patient free from falls this shift. Fall precautions in place at all times. Bed in lowest position with two side rails up and wheels locked. Call light within reach. Patient able and agreeable to contact for safety appropriately. Problem: Infection:  Goal: Will remain free from infection  Description: Will remain free from infection  4/26/2020 1404 by Nagi Aguirre RN  Outcome: Ongoing  4/26/2020 0334 by Corinna Briones RN  Outcome: Ongoing   Safe environment was maintained for patient during this shift. Bed in lowest position with wheels locked. Call light in reach of patient and appropriate ID bands in place. Pt able to express presence/absence of pain and rate pain appropriately using numerical scale. Pain/discomfort being managed with PRN analgesics per MD orders (see MAR). Pain assessed every shift and after interventions. Skin assessment performed each shift per protocol. Patient turned and repositioned every two hours and prn with pillow support. Patient checked for incontence every two hours. Problem: Confusion - Acute:  Goal: Absence of continued neurological deterioration signs and symptoms  Description: Absence of continued neurological deterioration signs and symptoms  Outcome: Ongoing   Continuing to work with patient and health care team on discharge plan. Discharge instructions and medication management will be reviewed prior to discharge.

## 2020-04-27 ENCOUNTER — APPOINTMENT (OUTPATIENT)
Dept: GENERAL RADIOLOGY | Age: 70
DRG: 871 | End: 2020-04-27
Payer: MEDICARE

## 2020-04-27 LAB
A/G RATIO: 1.1 (ref 1.1–2.2)
ALBUMIN SERPL-MCNC: 3.1 G/DL (ref 3.4–5)
ALP BLD-CCNC: 163 U/L (ref 40–129)
ALT SERPL-CCNC: 16 U/L (ref 10–40)
ANION GAP SERPL CALCULATED.3IONS-SCNC: 13 MMOL/L (ref 3–16)
AST SERPL-CCNC: 26 U/L (ref 15–37)
BILIRUB SERPL-MCNC: <0.2 MG/DL (ref 0–1)
BUN BLDV-MCNC: 14 MG/DL (ref 7–20)
CALCIUM SERPL-MCNC: 9.1 MG/DL (ref 8.3–10.6)
CHLORIDE BLD-SCNC: 110 MMOL/L (ref 99–110)
CO2: 26 MMOL/L (ref 21–32)
CREAT SERPL-MCNC: 0.7 MG/DL (ref 0.6–1.2)
GFR AFRICAN AMERICAN: >60
GFR NON-AFRICAN AMERICAN: >60
GLOBULIN: 2.8 G/DL
GLUCOSE BLD-MCNC: 128 MG/DL (ref 70–99)
GLUCOSE BLD-MCNC: 130 MG/DL (ref 70–99)
GLUCOSE BLD-MCNC: 134 MG/DL (ref 70–99)
GLUCOSE BLD-MCNC: 141 MG/DL (ref 70–99)
GLUCOSE BLD-MCNC: 93 MG/DL (ref 70–99)
GLUCOSE BLD-MCNC: 97 MG/DL (ref 70–99)
PERFORMED ON: ABNORMAL
PERFORMED ON: NORMAL
PERFORMED ON: NORMAL
POTASSIUM REFLEX MAGNESIUM: 4.2 MMOL/L (ref 3.5–5.1)
POTASSIUM SERPL-SCNC: 4.2 MMOL/L (ref 3.5–5.1)
SODIUM BLD-SCNC: 149 MMOL/L (ref 136–145)
TOTAL PROTEIN: 5.9 G/DL (ref 6.4–8.2)

## 2020-04-27 PROCEDURE — 6370000000 HC RX 637 (ALT 250 FOR IP): Performed by: NURSE PRACTITIONER

## 2020-04-27 PROCEDURE — 6360000002 HC RX W HCPCS: Performed by: FAMILY MEDICINE

## 2020-04-27 PROCEDURE — 6360000002 HC RX W HCPCS: Performed by: INTERNAL MEDICINE

## 2020-04-27 PROCEDURE — 6370000000 HC RX 637 (ALT 250 FOR IP): Performed by: FAMILY MEDICINE

## 2020-04-27 PROCEDURE — 2580000003 HC RX 258: Performed by: INTERNAL MEDICINE

## 2020-04-27 PROCEDURE — 6370000000 HC RX 637 (ALT 250 FOR IP): Performed by: INTERNAL MEDICINE

## 2020-04-27 PROCEDURE — 71045 X-RAY EXAM CHEST 1 VIEW: CPT

## 2020-04-27 PROCEDURE — 80053 COMPREHEN METABOLIC PANEL: CPT

## 2020-04-27 PROCEDURE — 85025 COMPLETE CBC W/AUTO DIFF WBC: CPT

## 2020-04-27 PROCEDURE — 2580000003 HC RX 258: Performed by: FAMILY MEDICINE

## 2020-04-27 PROCEDURE — 2060000000 HC ICU INTERMEDIATE R&B

## 2020-04-27 RX ORDER — LACOSAMIDE 100 MG/1
100 TABLET ORAL 2 TIMES DAILY
Status: DISCONTINUED | OUTPATIENT
Start: 2020-04-27 | End: 2020-04-28 | Stop reason: HOSPADM

## 2020-04-27 RX ORDER — LEVETIRACETAM 100 MG/ML
500 SOLUTION ORAL 2 TIMES DAILY
Status: DISCONTINUED | OUTPATIENT
Start: 2020-04-27 | End: 2020-04-28 | Stop reason: HOSPADM

## 2020-04-27 RX ADMIN — PIPERACILLIN AND TAZOBACTAM 3.38 G: 3; .375 INJECTION, POWDER, LYOPHILIZED, FOR SOLUTION INTRAVENOUS at 22:35

## 2020-04-27 RX ADMIN — PANTOPRAZOLE SODIUM 40 MG: 40 GRANULE, DELAYED RELEASE ORAL at 17:00

## 2020-04-27 RX ADMIN — PIPERACILLIN AND TAZOBACTAM 3.38 G: 3; .375 INJECTION, POWDER, LYOPHILIZED, FOR SOLUTION INTRAVENOUS at 13:08

## 2020-04-27 RX ADMIN — Medication 1 CAPSULE: at 08:05

## 2020-04-27 RX ADMIN — PHENYTOIN SODIUM 100 MG: 50 INJECTION INTRAMUSCULAR; INTRAVENOUS at 06:29

## 2020-04-27 RX ADMIN — HEPARIN SODIUM 5000 UNITS: 5000 INJECTION INTRAVENOUS; SUBCUTANEOUS at 06:03

## 2020-04-27 RX ADMIN — LACOSAMIDE 100 MG: 100 TABLET, FILM COATED ORAL at 08:05

## 2020-04-27 RX ADMIN — SODIUM CHLORIDE, PRESERVATIVE FREE 10 ML: 5 INJECTION INTRAVENOUS at 22:30

## 2020-04-27 RX ADMIN — ESCITALOPRAM OXALATE 10 MG: 10 TABLET ORAL at 08:05

## 2020-04-27 RX ADMIN — ANORECTAL OINTMENT 1 EACH: 15.7; .44; 24; 20.6 OINTMENT TOPICAL at 01:45

## 2020-04-27 RX ADMIN — OXYCODONE HYDROCHLORIDE AND ACETAMINOPHEN 1 TABLET: 5; 325 TABLET ORAL at 01:40

## 2020-04-27 RX ADMIN — ASPIRIN 81 MG 81 MG: 81 TABLET ORAL at 08:05

## 2020-04-27 RX ADMIN — PIPERACILLIN AND TAZOBACTAM 3.38 G: 3; .375 INJECTION, POWDER, LYOPHILIZED, FOR SOLUTION INTRAVENOUS at 05:58

## 2020-04-27 RX ADMIN — FERROUS SULFATE TAB EC 324 MG (65 MG FE EQUIVALENT) 324 MG: 324 (65 FE) TABLET DELAYED RESPONSE at 08:05

## 2020-04-27 RX ADMIN — PHENYTOIN SODIUM 100 MG: 50 INJECTION INTRAMUSCULAR; INTRAVENOUS at 13:28

## 2020-04-27 RX ADMIN — HEPARIN SODIUM 5000 UNITS: 5000 INJECTION INTRAVENOUS; SUBCUTANEOUS at 13:08

## 2020-04-27 RX ADMIN — INSULIN LISPRO 2 UNITS: 100 INJECTION, SOLUTION INTRAVENOUS; SUBCUTANEOUS at 13:08

## 2020-04-27 RX ADMIN — PREGABALIN 150 MG: 75 CAPSULE ORAL at 08:05

## 2020-04-27 RX ADMIN — Medication 1 CAPSULE: at 17:00

## 2020-04-27 RX ADMIN — PANTOPRAZOLE SODIUM 40 MG: 40 GRANULE, DELAYED RELEASE ORAL at 06:28

## 2020-04-27 RX ADMIN — LOPERAMIDE HYDROCHLORIDE 2 MG: 2 CAPSULE ORAL at 01:40

## 2020-04-27 RX ADMIN — METHYLPREDNISOLONE SODIUM SUCCINATE 40 MG: 40 INJECTION, POWDER, FOR SOLUTION INTRAMUSCULAR; INTRAVENOUS at 08:05

## 2020-04-27 RX ADMIN — HEPARIN SODIUM 5000 UNITS: 5000 INJECTION INTRAVENOUS; SUBCUTANEOUS at 22:35

## 2020-04-27 RX ADMIN — BUDESONIDE AND FORMOTEROL FUMARATE DIHYDRATE 2 PUFF: 80; 4.5 AEROSOL RESPIRATORY (INHALATION) at 08:06

## 2020-04-27 RX ADMIN — BUDESONIDE AND FORMOTEROL FUMARATE DIHYDRATE 2 PUFF: 80; 4.5 AEROSOL RESPIRATORY (INHALATION) at 22:50

## 2020-04-27 RX ADMIN — LEVETIRACETAM 500 MG: 500 SOLUTION ORAL at 08:05

## 2020-04-27 ASSESSMENT — PAIN SCALES - PAIN ASSESSMENT IN ADVANCED DEMENTIA (PAINAD)
NEGVOCALIZATION: 1
TOTALSCORE: 5
FACIALEXPRESSION: 0
CONSOLABILITY: 1
NEGVOCALIZATION: 0
BREATHING: 0
CONSOLABILITY: 0
BREATHING: 1
CONSOLABILITY: 1
FACIALEXPRESSION: 0
NEGVOCALIZATION: 1
TOTALSCORE: 0
BODYLANGUAGE: 1
BREATHING: 1
BODYLANGUAGE: 1
BODYLANGUAGE: 0
FACIALEXPRESSION: 1
TOTALSCORE: 4

## 2020-04-27 ASSESSMENT — PAIN SCALES - GENERAL
PAINLEVEL_OUTOF10: 0

## 2020-04-27 NOTE — PROGRESS NOTES
clear.  Neck: Supple, with full range of motion. No jugular venous distention. Trachea midline. Respiratory:  Normal respiratory effort. Clear to auscultation, bilaterally without Rales/Wheezes/Rhonchi. Cardiovascular: Regular rhythm and rate, with normal S1/S2 without murmurs, rubs or gallops. Abdomen: Soft, non-tender, non-distended with normal bowel sounds. Musculoskeletal: No clubbing, cyanosis or edema bilaterally. Full range of motion without deformity. Skin: Skin color, texture, turgor normal.  No rashes or lesions. Neurologic:  Encephalopathic, resting comfortably  Repetitively verbalizes food she wants to eat  Cranial nerves: II-XII intact, grossly non-focal.  Capillary Refill: Brisk,< 3 seconds   Peripheral Pulses: +2 palpable, equal bilaterally        Labs:   Recent Labs     04/24/20  0959 04/24/20  1605   WBC 10.6  --    HGB 9.1* 8.5*   HCT 29.4* 26.3*     --      Recent Labs     04/24/20  0958   *   K 4.1      CO2 22   BUN 21*   CREATININE 1.3*   CALCIUM 8.9     Recent Labs     04/24/20  0958   AST 35   ALT 23   BILITOT 0.3   ALKPHOS 191*     No results for input(s): INR in the last 72 hours. No results for input(s): Alexandro Lal in the last 72 hours. Urinalysis:      Lab Results   Component Value Date    NITRU Negative 04/14/2020    WBCUA 52 04/14/2020    RBCUA 25 04/14/2020    BLOODU MODERATE 04/14/2020    SPECGRAV 1.025 04/14/2020    GLUCOSEU Negative 04/14/2020       Radiology:  XR CHEST PORTABLE   Final Result   Stable exam with patchy/hazy bilateral airspace opacities along with   interstitial opacities and suspected small bilateral pleural effusions. XR CHEST PORTABLE   Final Result   The enteric tube has its tip off of the inferior aspect of the radiograph. Bilateral airspace disease is re-identified. XR CHEST PORTABLE   Final Result   1. Sideholes of the enteric tube are at the gastroesophageal junction,   recommend advancing by 6 cm.

## 2020-04-27 NOTE — DISCHARGE SUMMARY
QTC    Acute Respiratory failure:  improving  was on 6L, currently weaned to 1L  Covid positive  CXR on 4/20 indicated CHF  IVF stopped, lasix 20mg IV BID on 4/20              - discussed with pulmonary, hold on further diuresis              - per cardiology:  Lasix PRN     Covid-19 infection:  positive on admission testing  finished 5 days azithromycin  holding plaquenil due to prolonged QT  s/p solu-medrol 40mg IV qday x 5 days     Anion gap Metabolic acidosis:  resolved  mild respiratory compensation  likely infectious cause  zosyn started on 4/20, can stop at DC  blood cultures attempted to be drawn but unsuccessful  urine cultures NGTD  s/p 1amp sodium bicarb on 4/20  per pulmonary, give 2amps sodium bicarb on 4/21     Acute on chronic encephalopathy:  slowly improving  dementia at baseline  likely due to Covid, on empiric zosyn, MRSA swab negative  cont treatment per above  NGT placed on 4/22, tube feeds started  resolved     Seizure d/o:  cont home meds IV given not tolerating PO     Dysphagia  SLP eval and treat today, unable to see due to COVID 19  NG removed as she pulled it half way out  Trial of nectar and honey thick went fine  Advance diet as tolerated    SLE  Was taking plaquenil at home  Held while in hospital due to prolonged QT  Restart in 1 week and also need to follow with PCP regarding restarting      Exam:     BP (!) 156/65   Pulse 90   Temp 98.5 °F (36.9 °C) (Oral)   Resp 18   Ht 5' 4\" (1.626 m)   Wt 196 lb 6.9 oz (89.1 kg)   SpO2 94%   BMI 33.72 kg/m²     General appearance: No apparent distress  HEENT: Pupils equal, round, and reactive to light. Conjunctivae/corneas clear, NG in place  Neck: Supple, with full range of motion. No jugular venous distention. Trachea midline. Respiratory:  Normal respiratory effort. .  Cardiovascular: Regular rate and rhythm with normal S1/S2  Abdomen: Soft, non-tender, non-distended with normal bowel sounds.   Musculoskeletal: No clubbing, cyanosis or Overall pattern would favor underlying   pulmonary edema. An underlying viral infection can not be excluded. XR CHEST PORTABLE   Final Result   Cardiomegaly. Interstitial opacities compatible pulmonary edema or   interstitial pneumonitis. Disposition:  SNF     Condition at Discharge: Stable    Discharge Instructions/Follow-up:  Hold plaquenil until OKed by PCP, advance diet as tolerated, PT/OT, SLP to continue to evaluate at [facility]    Code Status:  DNR-CCA     Activity: activity as tolerated    Diet: Dysphagia 2 Pureed, Nectar thick liquids      Labs: For convenience and continuity at follow-up the following most recent labs are provided:      CBC:    Lab Results   Component Value Date    WBC 11.2 04/28/2020    HGB 8.6 04/28/2020    HCT 27.2 04/28/2020     04/28/2020       Renal:    Lab Results   Component Value Date     04/28/2020    K 4.1 04/28/2020     04/28/2020    CO2 26 04/28/2020    BUN 11 04/28/2020    CREATININE 0.7 04/28/2020    CALCIUM 9.2 04/28/2020    PHOS 2.5 04/28/2020       Discharge Medications:     Current Discharge Medication List           Details   lactobacillus (CULTURELLE) capsule Take 1 capsule by mouth 2 times daily (with meals) for 7 days  Qty: 14 capsule, Refills: 0      loperamide (IMODIUM) 2 MG capsule Take 1 capsule by mouth 4 times daily as needed for Diarrhea  Qty: 40 capsule              Details   oxyCODONE-acetaminophen (PERCOCET) 5-325 MG per tablet Take 1 tablet by mouth every 6 hours as needed for Pain for up to 3 days.   Qty: 9 tablet, Refills: 0    Comments: Reduce doses taken as pain becomes manageable  Associated Diagnoses: Chronic pain syndrome      hydroxychloroquine (PLAQUENIL) 200 MG tablet Take 1.5 tablets by mouth daily  Qty: 30 tablet, Refills: 0    Associated Diagnoses: Systemic lupus erythematosus, unspecified SLE type, unspecified organ involvement status ([facility] [address]              Details   allopurinol (ZYLOPRIM) 100 MG tablet Take 100 mg by mouth nightly      aspirin 81 MG chewable tablet Take 81 mg by mouth daily      fluticasone-vilanterol (BREO ELLIPTA) 100-25 MCG/INH AEPB inhaler Inhale 1 puff into the lungs daily      tolterodine (DETROL LA) 2 MG extended release capsule Take 2 mg by mouth daily      escitalopram (LEXAPRO) 10 MG tablet Take 10 mg by mouth daily      ferrous sulfate (IRON 325) 325 (65 Fe) MG tablet Take 325 mg by mouth daily (with breakfast)      fluticasone (FLONASE) 50 MCG/ACT nasal spray 2 sprays by Each Nostril route daily      furosemide (LASIX) 20 MG tablet Take 20 mg by mouth daily      gabapentin (NEURONTIN) 100 MG capsule Take 100 mg by mouth nightly. hydrALAZINE (APRESOLINE) 25 MG tablet Take 25 mg by mouth every 8 hours      levETIRAcetam (KEPPRA) 500 MG tablet Take 500 mg by mouth every 12 hours      pregabalin (LYRICA) 150 MG capsule Take 150 mg by mouth every 12 hours. melatonin 3 MG TABS tablet Take 6 mg by mouth nightly      magnesium hydroxide (MILK OF MAGNESIA) 400 MG/5ML suspension Take by mouth daily as needed for Constipation      montelukast (SINGULAIR) 10 MG tablet Take 10 mg by mouth nightly      omeprazole (PRILOSEC) 20 MG delayed release capsule Take 20 mg by mouth nightly      phenytoin (DILANTIN) 50 MG tablet Take 100 mg by mouth every 8 hours      potassium chloride (KLOR-CON M) 10 MEQ extended release tablet Take 10 mEq by mouth daily      Light Mineral Oil-Mineral Oil (RETAINE MGD) 0.5-0.5 % EMUL Place 1 drop into both eyes every 12 hours Brand name Retaine MGD ophthalmic emulsion      simvastatin (ZOCOR) 10 MG tablet Take 10 mg by mouth nightly      tiotropium (SPIRIVA RESPIMAT) 1.25 MCG/ACT AERS inhaler Inhale 2 puffs into the lungs daily      albuterol sulfate HFA (VENTOLIN HFA) 108 (90 Base) MCG/ACT inhaler Inhale 2 puffs into the lungs every 6 hours as needed for Shortness of Breath      lacosamide (VIMPAT) 50 MG TABS tablet Take 100 mg by mouth every 12 hours.       vitamin D 25

## 2020-04-27 NOTE — PROGRESS NOTES
Upon assessment, pt's feeding tube bell was displaced. Pt's voice was gurgled, and sounded as if NG was dislodged from stomach. NG removed. Large amount of secretions were suctioned afterwards. Dr. Dougie Reese notified, and ordered to keep NG out overnight. Will continue to monitor.       Electronically signed by Kayla Silva RN on 4/27/2020 at 6:45 PM

## 2020-04-27 NOTE — PROGRESS NOTES
asthma, CKD stage III, diabetes mellitus type 2 COPD, seizure disorder resident of [facility] was admitted for metabolic encephalopathy and septic shock requiring pressure support with Levophed. Patient also noted to have acute hypoxemic respiratory failure along with COVID-19 pneumonia. Patient's respiratory failure continue to improve and the patient's COVID-19 pneumonia was treated with supportive care. Patient was also given Solu-Medrol for 5 days and then stop. Unable to give Plaquenil due to prolonged QTC    Current Diet level:  ·  npo  NG tube; however problems as keeps getting dislodged. · As of 4/28/2020; MD requesting it stay out and attempt staff assisted po    Primary Complaint  · npo  NG tube; however problems as keeps getting dislodged. · As of 4/28/2020; MD requesting it stay out and attempt staff assisted po    Assessment Impression  Dysphagia in a pt with Medical DX of covid 19 pneumonia; metabolic encephalopathy. Pulmonology and GI following . Pt with known history of GERD; Oral Dysphagia and documentation of \"advanced Dementia\"; Seizure Disorder. Pt was reportedly active with SLP in the past for Cognitive-Linguistic remediation; and OT and PT in the past. Pt with improvement on swallow screen. With MD orders will trial dysphagia puree with thin liquids. Oral suctioning at bedside if needed. Pt may be discharged back to SNF at 4:00 today    ·  Cognitive/Communication  RN reporting pt is doing better this am regarding orientation  Pt is verbally responsive but insight is a concern. Pt with known history of Dementia    · Respiratory status   1L/min via O2 via nasal maintained     · NSG swallow screen   4/27/2020: Mouth care; needs oral suctioning  4/28/2020: NS is reporting pt is controlling oral secretions. SLP ed in NS swallow screen strategies  · RN reported suctioned pt before/and after session.  No po presentations were suctioned out as pt appeared to swallow and clear  · Pt appeared to

## 2020-04-27 NOTE — PLAN OF CARE
Pt is a high fall risk. All required precautions are in place except her room door must remained closed at all times d/t droplet isolation status. She does not attempt to get OOB independently. Maxi move lift used to get pt OOB so her bed could be zeroed. Daily bed weight obtained. Bed in locked, low position with side rails up X 3 and an active bed alarm. Call light and suction amykauer within reach. Baby monitor at bedside to allow for continuous monitoring of the pt. Pt with NG. TF at goal. Contacted pharmacy to have pt's Protonix tablet switched to a form that can be administered via NG. Provider contacted to have pt's IVPB keppra and vimpat changed for NG administration. Pt with diarrhea since started on TF. She has had 2 BMs since the beginning of the shift. Pt given imodium X 1. Pt has not had another BM yet. Plan to do bath tomorrow with pt if she is still here. She deferred it this morning. After using the lift to zero the bed, she asked to be left alone so she could rest some more. Pt c/o pain in her B feet that \"hurts. \" Pt medicated with prn percocet. She states that she is having pain after the lift was used. RN explained that she was too early to receive the percocet but that RN would pass along in report that pt requesting it. Pt with fragile pink areas in B gluteal folds between the bottom of her buttocks and upper part of her thighs. Areas covered with calmoseptine after last incontinence episode. Pt continues to have B fine crackles. Her O2 sat maintained > 90% on 2 L NC. Care clustered as much as possible to allow for periods of uninterrupted rest. Decreased stimulation and lights out at bed time. During last rounds, RN opened blinds to let in natural light.

## 2020-04-27 NOTE — PROGRESS NOTES
testing  finished 5 days azithromycin  holding plaquenil due to prolonged QT  s/p solu-medrol 40mg IV qday x 5 days     Anion gap Metabolic acidosis:  resolved  mild respiratory compensation  likely infectious cause  zosyn started on 4/20, can stop at DC  blood cultures attempted to be drawn but unsuccessful  urine cultures NGTD  s/p 1amp sodium bicarb on 4/20  per pulmonary, give 2amps sodium bicarb on 4/21     Acute on chronic encephalopathy:  slowly improving  dementia at baseline  likely due to Covid, on empiric zosyn, MRSA swab negative  cont treatment per above  NGT placed on 4/22, tube feeds started     Seizure d/o:  cont home meds IV given not tolerating PO    Dysphagia  SLP eval and treat today  Hopefully pull NG and start feeds    DVT Prophylaxis: lovenox  Diet: Diet Tube Feed Continuous/Cyclic w/ Diet  Code Status: DNR-CCA    PT/OT Eval Status: LTC    Dispo - Inpt DC once feeding situation resolved    Stephanie Bo MD

## 2020-04-27 NOTE — PLAN OF CARE
Harinder Villar RN  Outcome: Ongoing     Problem: Skin Integrity:  Goal: Skin integrity will stabilize  Description: Skin integrity will stabilize  4/27/2020 1744 by Camryn Lockett RN  Outcome: Ongoing  4/27/2020 0659 by Harinder Villar RN  Outcome: Ongoing     Problem: Discharge Planning:  Goal: Patients continuum of care needs are met  Description: Patients continuum of care needs are met  4/27/2020 1744 by Camryn Lockett RN  Outcome: Ongoing  4/27/2020 0659 by Harinder Villar RN  Outcome: Ongoing     Problem: Airway Clearance - Ineffective:  Goal: Clear lung sounds  Description: Clear lung sounds  4/27/2020 1744 by Camryn Lockett RN  Outcome: Ongoing  4/27/2020 0659 by Harinder Villar RN  Outcome: Ongoing  Goal: Ability to maintain a clear airway will improve  Description: Ability to maintain a clear airway will improve  4/27/2020 1744 by Camryn Lockett RN  Outcome: Ongoing  4/27/2020 0659 by Harinder Villar RN  Outcome: Ongoing     Problem: Fluid Volume - Deficit:  Goal: Achieves intake and output within specified parameters  Description: Achieves intake and output within specified parameters  4/27/2020 1744 by Camryn Lockett RN  Outcome: Ongoing  4/27/2020 0659 by Harinder Villar RN  Outcome: Ongoing     Problem: Gas Exchange - Impaired:  Goal: Levels of oxygenation will improve  Description: Levels of oxygenation will improve  4/27/2020 1744 by Camryn Lockett RN  Outcome: Ongoing  4/27/2020 0659 by Harinder Villar RN  Outcome: Ongoing     Problem: Confusion - Acute:  Goal: Absence of continued neurological deterioration signs and symptoms  Description: Absence of continued neurological deterioration signs and symptoms  4/27/2020 1744 by Camryn Lockett RN  Outcome: Ongoing  4/27/2020 0659 by Harinder Villar RN  Outcome: Ongoing  Goal: Mental status will be restored to baseline  Description: Mental status will be restored to baseline  4/27/2020 1744 by Camryn Lockett RN  Outcome: Ongoing  4/27/2020 0659 by Harinder Villar RN  Outcome:

## 2020-04-28 VITALS
OXYGEN SATURATION: 100 % | HEART RATE: 80 BPM | RESPIRATION RATE: 18 BRPM | TEMPERATURE: 98.5 F | BODY MASS INDEX: 33.54 KG/M2 | SYSTOLIC BLOOD PRESSURE: 150 MMHG | DIASTOLIC BLOOD PRESSURE: 66 MMHG | HEIGHT: 64 IN | WEIGHT: 196.43 LBS

## 2020-04-28 LAB
A/G RATIO: 1.1 (ref 1.1–2.2)
ALBUMIN SERPL-MCNC: 2.9 G/DL (ref 3.4–5)
ALP BLD-CCNC: 146 U/L (ref 40–129)
ALT SERPL-CCNC: 14 U/L (ref 10–40)
ANION GAP SERPL CALCULATED.3IONS-SCNC: 11 MMOL/L (ref 3–16)
AST SERPL-CCNC: 24 U/L (ref 15–37)
ATYPICAL LYMPHOCYTE RELATIVE PERCENT: 1 % (ref 0–6)
BANDED NEUTROPHILS RELATIVE PERCENT: 1 % (ref 0–7)
BANDED NEUTROPHILS RELATIVE PERCENT: 4 % (ref 0–7)
BASOPHILS ABSOLUTE: 0 K/UL (ref 0–0.2)
BASOPHILS ABSOLUTE: 0 K/UL (ref 0–0.2)
BASOPHILS RELATIVE PERCENT: 0 %
BASOPHILS RELATIVE PERCENT: 0 %
BILIRUB SERPL-MCNC: <0.2 MG/DL (ref 0–1)
BUN BLDV-MCNC: 11 MG/DL (ref 7–20)
CALCIUM SERPL-MCNC: 9.2 MG/DL (ref 8.3–10.6)
CHLORIDE BLD-SCNC: 108 MMOL/L (ref 99–110)
CO2: 26 MMOL/L (ref 21–32)
CREAT SERPL-MCNC: 0.7 MG/DL (ref 0.6–1.2)
EOSINOPHILS ABSOLUTE: 0.6 K/UL (ref 0–0.6)
EOSINOPHILS ABSOLUTE: 0.7 K/UL (ref 0–0.6)
EOSINOPHILS RELATIVE PERCENT: 5 %
EOSINOPHILS RELATIVE PERCENT: 6 %
GFR AFRICAN AMERICAN: >60
GFR NON-AFRICAN AMERICAN: >60
GLOBULIN: 2.6 G/DL
GLUCOSE BLD-MCNC: 104 MG/DL (ref 70–99)
GLUCOSE BLD-MCNC: 148 MG/DL (ref 70–99)
GLUCOSE BLD-MCNC: 89 MG/DL (ref 70–99)
GLUCOSE BLD-MCNC: 94 MG/DL (ref 70–99)
GLUCOSE BLD-MCNC: 98 MG/DL (ref 70–99)
HCT VFR BLD CALC: 27.2 % (ref 36–48)
HCT VFR BLD CALC: 29.6 % (ref 36–48)
HEMATOLOGY PATH CONSULT: NORMAL
HEMATOLOGY PATH CONSULT: YES
HEMOGLOBIN: 8.6 G/DL (ref 12–16)
HEMOGLOBIN: 9.2 G/DL (ref 12–16)
HYPOCHROMIA: ABNORMAL
LYMPHOCYTES ABSOLUTE: 1.8 K/UL (ref 1–5.1)
LYMPHOCYTES ABSOLUTE: 2.4 K/UL (ref 1–5.1)
LYMPHOCYTES RELATIVE PERCENT: 16 %
LYMPHOCYTES RELATIVE PERCENT: 20 %
MAGNESIUM: 1.6 MG/DL (ref 1.8–2.4)
MCH RBC QN AUTO: 25.3 PG (ref 26–34)
MCH RBC QN AUTO: 25.5 PG (ref 26–34)
MCHC RBC AUTO-ENTMCNC: 31.1 G/DL (ref 31–36)
MCHC RBC AUTO-ENTMCNC: 31.5 G/DL (ref 31–36)
MCV RBC AUTO: 81 FL (ref 80–100)
MCV RBC AUTO: 81.3 FL (ref 80–100)
METAMYELOCYTES RELATIVE PERCENT: 2 %
METAMYELOCYTES RELATIVE PERCENT: 2 %
MONOCYTES ABSOLUTE: 0.9 K/UL (ref 0–1.3)
MONOCYTES ABSOLUTE: 1.7 K/UL (ref 0–1.3)
MONOCYTES RELATIVE PERCENT: 15 %
MONOCYTES RELATIVE PERCENT: 8 %
MYELOCYTE PERCENT: 4 %
NEUTROPHILS ABSOLUTE: 7.3 K/UL (ref 1.7–7.7)
NEUTROPHILS ABSOLUTE: 7.3 K/UL (ref 1.7–7.7)
NEUTROPHILS RELATIVE PERCENT: 57 %
NEUTROPHILS RELATIVE PERCENT: 59 %
PDW BLD-RTO: 14.7 % (ref 12.4–15.4)
PDW BLD-RTO: 14.9 % (ref 12.4–15.4)
PERFORMED ON: ABNORMAL
PERFORMED ON: NORMAL
PHOSPHORUS: 2.5 MG/DL (ref 2.5–4.9)
PLATELET # BLD: 466 K/UL (ref 135–450)
PLATELET # BLD: 494 K/UL (ref 135–450)
PLATELET SLIDE REVIEW: ABNORMAL
PMV BLD AUTO: 8.6 FL (ref 5–10.5)
PMV BLD AUTO: 8.7 FL (ref 5–10.5)
POTASSIUM REFLEX MAGNESIUM: 4.1 MMOL/L (ref 3.5–5.1)
RBC # BLD: 3.35 M/UL (ref 4–5.2)
RBC # BLD: 3.64 M/UL (ref 4–5.2)
RBC # BLD: NORMAL 10*6/UL
SLIDE REVIEW: ABNORMAL
SODIUM BLD-SCNC: 145 MMOL/L (ref 136–145)
TARGET CELLS: ABNORMAL
TOTAL PROTEIN: 5.5 G/DL (ref 6.4–8.2)
TOXIC GRANULATION: PRESENT
VACUOLATED NEUTROPHILS: PRESENT
WBC # BLD: 11.2 K/UL (ref 4–11)
WBC # BLD: 11.4 K/UL (ref 4–11)

## 2020-04-28 PROCEDURE — 92610 EVALUATE SWALLOWING FUNCTION: CPT

## 2020-04-28 PROCEDURE — 80053 COMPREHEN METABOLIC PANEL: CPT

## 2020-04-28 PROCEDURE — 6360000002 HC RX W HCPCS: Performed by: INTERNAL MEDICINE

## 2020-04-28 PROCEDURE — 83735 ASSAY OF MAGNESIUM: CPT

## 2020-04-28 PROCEDURE — 6360000002 HC RX W HCPCS: Performed by: FAMILY MEDICINE

## 2020-04-28 PROCEDURE — 2580000003 HC RX 258: Performed by: INTERNAL MEDICINE

## 2020-04-28 PROCEDURE — 85025 COMPLETE CBC W/AUTO DIFF WBC: CPT

## 2020-04-28 PROCEDURE — 2580000003 HC RX 258: Performed by: FAMILY MEDICINE

## 2020-04-28 PROCEDURE — 6370000000 HC RX 637 (ALT 250 FOR IP): Performed by: INTERNAL MEDICINE

## 2020-04-28 PROCEDURE — 84100 ASSAY OF PHOSPHORUS: CPT

## 2020-04-28 PROCEDURE — 6370000000 HC RX 637 (ALT 250 FOR IP): Performed by: NURSE PRACTITIONER

## 2020-04-28 RX ORDER — OXYCODONE HYDROCHLORIDE AND ACETAMINOPHEN 5; 325 MG/1; MG/1
1 TABLET ORAL EVERY 6 HOURS PRN
Qty: 9 TABLET | Refills: 0 | Status: SHIPPED | OUTPATIENT
Start: 2020-04-28 | End: 2020-05-01

## 2020-04-28 RX ORDER — LOPERAMIDE HYDROCHLORIDE 2 MG/1
2 CAPSULE ORAL 4 TIMES DAILY PRN
Qty: 40 CAPSULE | DISCHARGE
Start: 2020-04-28 | End: 2020-05-08

## 2020-04-28 RX ORDER — LACTOBACILLUS RHAMNOSUS GG 10B CELL
1 CAPSULE ORAL 2 TIMES DAILY WITH MEALS
Qty: 14 CAPSULE | Refills: 0 | DISCHARGE
Start: 2020-04-28 | End: 2020-05-05

## 2020-04-28 RX ORDER — HYDROXYCHLOROQUINE SULFATE 200 MG/1
300 TABLET, FILM COATED ORAL DAILY
Qty: 30 TABLET | Refills: 0 | Status: ON HOLD
Start: 2020-05-05 | End: 2022-02-14 | Stop reason: SDUPTHER

## 2020-04-28 RX ORDER — MAGNESIUM SULFATE IN WATER 40 MG/ML
2 INJECTION, SOLUTION INTRAVENOUS ONCE
Status: COMPLETED | OUTPATIENT
Start: 2020-04-28 | End: 2020-04-28

## 2020-04-28 RX ADMIN — PHENYTOIN SODIUM 100 MG: 50 INJECTION INTRAMUSCULAR; INTRAVENOUS at 11:57

## 2020-04-28 RX ADMIN — LACOSAMIDE 100 MG: 100 TABLET, FILM COATED ORAL at 08:48

## 2020-04-28 RX ADMIN — PREGABALIN 150 MG: 75 CAPSULE ORAL at 08:48

## 2020-04-28 RX ADMIN — LEVETIRACETAM 500 MG: 500 SOLUTION ORAL at 09:01

## 2020-04-28 RX ADMIN — ESCITALOPRAM OXALATE 10 MG: 10 TABLET ORAL at 08:48

## 2020-04-28 RX ADMIN — HEPARIN SODIUM 5000 UNITS: 5000 INJECTION INTRAVENOUS; SUBCUTANEOUS at 05:32

## 2020-04-28 RX ADMIN — BUDESONIDE AND FORMOTEROL FUMARATE DIHYDRATE 2 PUFF: 80; 4.5 AEROSOL RESPIRATORY (INHALATION) at 09:43

## 2020-04-28 RX ADMIN — SODIUM CHLORIDE, PRESERVATIVE FREE 10 ML: 5 INJECTION INTRAVENOUS at 10:40

## 2020-04-28 RX ADMIN — MAGNESIUM SULFATE HEPTAHYDRATE 2 G: 40 INJECTION, SOLUTION INTRAVENOUS at 11:57

## 2020-04-28 RX ADMIN — METHYLPREDNISOLONE SODIUM SUCCINATE 40 MG: 40 INJECTION, POWDER, FOR SOLUTION INTRAMUSCULAR; INTRAVENOUS at 08:49

## 2020-04-28 RX ADMIN — PIPERACILLIN AND TAZOBACTAM 3.38 G: 3; .375 INJECTION, POWDER, LYOPHILIZED, FOR SOLUTION INTRAVENOUS at 05:41

## 2020-04-28 RX ADMIN — ASPIRIN 81 MG 81 MG: 81 TABLET ORAL at 08:49

## 2020-04-28 RX ADMIN — Medication 1 CAPSULE: at 08:48

## 2020-04-28 RX ADMIN — FERROUS SULFATE TAB EC 324 MG (65 MG FE EQUIVALENT) 324 MG: 324 (65 FE) TABLET DELAYED RESPONSE at 08:48

## 2020-04-28 RX ADMIN — HEPARIN SODIUM 5000 UNITS: 5000 INJECTION INTRAVENOUS; SUBCUTANEOUS at 11:57

## 2020-04-28 RX ADMIN — OXYCODONE HYDROCHLORIDE AND ACETAMINOPHEN 1 TABLET: 5; 325 TABLET ORAL at 08:48

## 2020-04-28 ASSESSMENT — PAIN DESCRIPTION - PAIN TYPE: TYPE: CHRONIC PAIN

## 2020-04-28 ASSESSMENT — PAIN DESCRIPTION - LOCATION: LOCATION: BACK

## 2020-04-28 ASSESSMENT — PAIN SCALES - GENERAL
PAINLEVEL_OUTOF10: 0
PAINLEVEL_OUTOF10: 0
PAINLEVEL_OUTOF10: 8

## 2020-04-28 ASSESSMENT — PAIN DESCRIPTION - FREQUENCY: FREQUENCY: INTERMITTENT

## 2020-04-28 ASSESSMENT — PAIN DESCRIPTION - DESCRIPTORS: DESCRIPTORS: ACHING

## 2020-04-28 ASSESSMENT — PAIN - FUNCTIONAL ASSESSMENT: PAIN_FUNCTIONAL_ASSESSMENT: PREVENTS OR INTERFERES SOME ACTIVE ACTIVITIES AND ADLS

## 2020-04-28 NOTE — CARE COORDINATION
SOCIAL WORK DISCHARGE SUMMARY:      DISCHARGE DATE:                 4/28/2020      DISCHARGE PLACE:                [facility]                REPORT NUMBER:       [phone]               FAX NUMBER:                [phone]    TRANSPORTATION:                [address]             TIME:                              4pm      PREFERRED PHARMACY:     At facility      INSURANCE PRECERT OBTAINED:       Not needed, returning    HENS/PASAAR COMPLETED:                  Not needed, returning    Spoke with Mamta Vigil at [facility]. They confirm they are able to accept back given current diet. IMM to dtg over phone.  Bedside RN aware    Electronically signed by NAYANA Carter on 4/28/2020 at 11:19 AM

## 2020-04-28 NOTE — PROGRESS NOTES
oxyCODONE-acetaminophen      Intake/Output Summary (Last 24 hours) at 4/28/2020 0932  Last data filed at 4/27/2020 2235  Gross per 24 hour   Intake --   Output 950 ml   Net -950 ml       Physical Exam Performed:    BP (!) 156/65   Pulse 90   Temp 98.5 °F (36.9 °C) (Oral)   Resp 18   Ht 5' 4\" (1.626 m)   Wt 196 lb 6.9 oz (89.1 kg)   SpO2 94%   BMI 33.72 kg/m²     General appearance: No apparent distress  HEENT: Pupils equal, round, and reactive to light. Conjunctivae/corneas clear, NG in place  Neck: Supple, with full range of motion. No jugular venous distention. Trachea midline. Respiratory:  Normal respiratory effort. .  Cardiovascular: Regular rate and rhythm with normal S1/S2  Abdomen: Soft, non-tender, non-distended with normal bowel sounds. Musculoskeletal: No clubbing, cyanosis or edema bilaterally. Full range of motion without deformity. Skin: Skin color, texture, turgor normal.  No rashes or lesions. Neurologic:   grossly non-focal.  Psychiatric: Alert   Capillary Refill: Brisk,< 3 seconds   Peripheral Pulses: +2 palpable, equal bilaterally       Labs:   Recent Labs     04/27/20  0755 04/28/20  0730   WBC 11.4* 11.2*   HGB 9.2* 8.6*   HCT 29.6* 27.2*   * 466*     Recent Labs     04/27/20  0755 04/28/20  0730   * 145   K 4.2  4.2 4.1    108   CO2 26 26   BUN 14 11   CREATININE 0.7 0.7   CALCIUM 9.1 9.2   PHOS  --  2.5     Recent Labs     04/27/20  0755 04/28/20  0730   AST 26 24   ALT 16 14   BILITOT <0.2 <0.2   ALKPHOS 163* 146*     No results for input(s): INR in the last 72 hours. No results for input(s): Sarah Gunnels in the last 72 hours.     Urinalysis:      Lab Results   Component Value Date    NITRU Negative 04/14/2020    WBCUA 52 04/14/2020    RBCUA 25 04/14/2020    BLOODU MODERATE 04/14/2020    SPECGRAV 1.025 04/14/2020    GLUCOSEU Negative 04/14/2020       Radiology:  XR CHEST 1 VW   Final Result   Gastric tube tip in the region of the gastric fundus with side infection:  positive on admission testing  finished 5 days azithromycin  holding plaquenil due to prolonged QT  s/p solu-medrol 40mg IV qday x 5 days     Anion gap Metabolic acidosis:  resolved  mild respiratory compensation  likely infectious cause  zosyn started on 4/20, can stop at DC  blood cultures attempted to be drawn but unsuccessful  urine cultures NGTD  s/p 1amp sodium bicarb on 4/20  per pulmonary, give 2amps sodium bicarb on 4/21     Acute on chronic encephalopathy:  slowly improving  dementia at baseline  likely due to Covid, on empiric zosyn, MRSA swab negative  cont treatment per above  NGT placed on 4/22, tube feeds started     Seizure d/o:  cont home meds IV given not tolerating PO    Dysphagia  SLP eval and treat today  Hopefully pull NG and start feeds    DVT Prophylaxis: lovenox  Diet: Diet Tube Feed Continuous/Cyclic w/ Diet  Code Status: DNR-CCA    PT/OT Eval Status: LTC    Dispo - Inpt DC once feeding situation resolved    Shari Webber MD

## 2020-04-28 NOTE — DISCHARGE INSTR - COC
Continuity of Care Form    Patient Name: Lila Silverman   :  1950  MRN:  [id number]    Admit date:  2020  Discharge date:  2020    Code Status Order: DNR-CCA   Advance Directives:   Advance Care Flowsheet Documentation     Date/Time Healthcare Directive Type of Healthcare Directive Copy in [address] Agent's Name Healthcare Agent's Phone Number    20 2320  No, patient does not have an advance directive for healthcare treatment -- -- -- -- --          Admitting Physician:  Teagan Castillo MD  PCP: Melissa Cotton    Discharging Nurse: [facility] Unit/Room#: A1X-0306/8365-91  Discharging Unit Phone Number: [phone]    Emergency Contact:   Extended Emergency Contact Information  Primary Emergency Contact: Beth Bain Phone: [phone]  Relation: Child    Past Surgical History:  No past surgical history on file. Immunization History: There is no immunization history on file for this patient.     Active Problems:  Patient Active Problem List   Diagnosis Code    Acute respiratory failure with hypoxia (HCC) J96.01    COVID-19 virus infection U07.1    Pneumonia J18.9    Sepsis ([address] A41.9    Fever R50.9    Tachycardia R00.0    Exposure to COVID-19 virus Z20.828    Hyperglycemia R73.9    Dementia ([facility] [address] F03.90    Renal failure N19    Asthma J45.909    Essential hypertension I10    Systemic lupus erythematosus (HCC) M32.9    Acute renal failure superimposed on stage 3 chronic kidney disease (HCC) N17.9, N18.3    Septic shock ([facility]) A41.9, R65.21    Pneumonia due to COVID-19 virus U07.1, J12.89    Acute metabolic encephalopathy L79.91    Metabolic acidosis F02.8    Tachypnea R06.82       Isolation/Infection:   Isolation          Droplet Plus        Patient Infection Status     Infection Onset Added Last Indicated Last Indicated By Review Planned Expiration Resolved Resolved By    COVID-19 20 COVID-19        Resolved    COVID-19 Rule Out 04/14/20 04/14/20 04/14/20 COVID-19 (Ordered)   04/14/20 Rule-Out Test Resulted          Nurse Assessment:  Last Vital Signs: BP (!) 156/65   Pulse 90   Temp 98.5 °F (36.9 °C) (Oral)   Resp 18   Ht 5' 4\" (1.626 m)   Wt 196 lb 6.9 oz (89.1 kg)   SpO2 94%   BMI 33.72 kg/m²     Last documented pain score (0-10 scale): Pain Level: 0  Last Weight:   Wt Readings from Last 1 Encounters:   04/28/20 196 lb 6.9 oz (89.1 kg)     Mental Status:  oriented    IV Access:  - None    Nursing Mobility/ADLs:  Walking   Dependent  Transfer  Dependent  Bathing  Dependent  Dressing  Dependent  Toileting  Dependent  Feeding  Dependent  Med Admin  Dependent  Med Delivery   crushed with puree    Wound Care Documentation and Therapy:        Elimination:  Continence:   · Bowel: No  · Bladder: No  Urinary Catheter: Removal Date 04/28/20   Colostomy/Ileostomy/Ileal Conduit: No  [REMOVED] Rectal Tube With balloon-Stool Appearance: Loose  [REMOVED] Rectal Tube With balloon-Stool Color: Brown  [REMOVED] Rectal Tube With balloon-Stool Amount: Other (Comment)(osmin)    Date of Last BM: 04/28/20    Intake/Output Summary (Last 24 hours) at 4/28/2020 1027  Last data filed at 4/27/2020 2235  Gross per 24 hour   Intake --   Output 950 ml   Net -950 ml     I/O last 3 completed shifts: In: 166 [NG/GT:166]  Out: 950 [Urine:950]    Safety Concerns: At Risk for Falls and Aspiration Risk    Impairments/Disabilities:      None and Vision    Nutrition Therapy:  Current Nutrition Therapy:   - Oral Diet:  Dysphagia 1 pureed    Routes of Feeding: Oral  Liquids: Nectar Thick Liquids  Daily Fluid Restriction: no  Last Modified Barium Swallow with Video (Video Swallowing Test): not done    Treatments at the Time of Hospital Discharge:   Respiratory Treatments: see mar  Oxygen Therapy:  is on oxygen at 1 L/min per nasal cannula.   Ventilator:    - No ventilator support    Rehab Therapies: Physical Therapy,

## 2020-04-28 NOTE — DISCHARGE SUMMARY
Hospital Medicine Discharge Summary    Patient ID: Bryan Stewart      Patient's PCP: Robina Gu    Admit Date: 4/14/2020     Discharge Date:   4/28/20    Admitting Physician: Yusra Clarke MD     Discharge Physician: Jorge Vanessa MD     Discharge Diagnoses: Active Hospital Problems    Diagnosis Date Noted    Acute metabolic encephalopathy [X87.43]     Metabolic acidosis [N42.5]     Tachypnea [R06.82]     Acute respiratory failure with hypoxia (HCC) [J96.01] 04/14/2020    COVID-19 virus infection [U07.1] 04/14/2020    Pneumonia [J18.9] 04/14/2020    Sepsis ([address] [A41.9] 04/14/2020    Fever [R50.9] 04/14/2020    Tachycardia [R00.0] 04/14/2020    Exposure to COVID-19 virus [Z20.828] 04/14/2020    Hyperglycemia [R73.9] 04/14/2020    Dementia ([facility] [F03.90] 04/14/2020    Renal failure [N19] 04/14/2020    Acute renal failure superimposed on stage 3 chronic kidney disease ([address] [N17.9, N18.3] 04/14/2020    Septic shock ([address] [A41.9, R65.21] 04/14/2020    Pneumonia due to COVID-19 virus [U07.1, J12.89]     Asthma [J45.909] 10/04/2005    Systemic lupus erythematosus ([facility] [M32.9] 10/04/2005    Essential hypertension [I10] 10/04/2005       The patient was seen and examined on day of discharge and this discharge summary is in conjunction with any daily progress note from day of discharge. Hospital Course: This is a 68-year-old female with history of advanced dementia, asthma, CKD stage III, diabetes mellitus type 2 COPD, seizure disorder resident of [facility] was admitted for metabolic encephalopathy and septic shock requiring pressure support with Levophed. Patient also noted to have acute hypoxemic respiratory failure along with COVID-19 pneumonia. Patient's respiratory failure continue to improve and the patient's COVID-19 pneumonia was treated with supportive care. Patient was also given Solu-Medrol for 5 days and then stop.   Unable to give Plaquenil due to prolonged

## 2020-04-28 NOTE — PLAN OF CARE
Problem: Falls - Risk of:  Goal: Will remain free from falls  Description: Will remain free from falls  4/28/2020 0354 by Rachid Marquez RN  Outcome: Ongoing  4/27/2020 1744 by Norbert Villalta RN  Outcome: Ongoing  Goal: Absence of physical injury  Description: Absence of physical injury  4/28/2020 0354 by Rachid Marquez RN  Outcome: Ongoing  4/27/2020 1744 by Norbert Villalta RN  Outcome: Ongoing     Problem: Infection:  Goal: Will remain free from infection  Description: Will remain free from infection  4/28/2020 0354 by Rachdi Marquez RN  Outcome: Ongoing  4/27/2020 1744 by Norbert Villalta RN  Outcome: Ongoing     Problem: Safety:  Goal: Free from accidental physical injury  Description: Free from accidental physical injury  4/28/2020 0354 by Rachid Marquez RN  Outcome: Ongoing  4/27/2020 1744 by Norbert Villalta RN  Outcome: Ongoing  Goal: Free from intentional harm  Description: Free from intentional harm  4/28/2020 0354 by Rachid Marquez RN  Outcome: Ongoing  4/27/2020 1744 by Norbert Villalta RN  Outcome: Ongoing     Problem: Daily Care:  Goal: Daily care needs are met  Description: Daily care needs are met  4/28/2020 0354 by Rachid Marquez RN  Outcome: Ongoing  4/27/2020 1744 by Norbert Villalta RN  Outcome: Ongoing     Problem: Pain:  Goal: Patient's pain/discomfort is manageable  Description: Patient's pain/discomfort is manageable  4/28/2020 0354 by Rachid Marquez RN  Outcome: Ongoing  4/27/2020 1744 by Norbert Villalta RN  Outcome: Ongoing  Goal: Pain level will decrease  Description: Pain level will decrease  4/28/2020 0354 by Rachid Marquez RN  Outcome: Ongoing  4/27/2020 1744 by Norbert Villalta RN  Outcome: Ongoing  Goal: Control of acute pain  Description: Control of acute pain  4/28/2020 0354 by Rachid Marquez RN  Outcome: Ongoing  4/27/2020 1744 by Norbert Villalta RN  Outcome: Ongoing  Goal: Control of chronic pain  Description: Control of chronic pain  4/28/2020 0354 by Rachid Marquez RN  Outcome: Ongoing  4/27/2020 1744 by Norbert Villalta RN  Outcome: Ongoing     Problem: Skin Integrity:  Goal: Skin integrity will stabilize  Description: Skin integrity will stabilize  4/28/2020 0354 by Jer Michel RN  Outcome: Ongoing  4/27/2020 1744 by Carson Bear RN  Outcome: Ongoing     Problem: Discharge Planning:  Goal: Patients continuum of care needs are met  Description: Patients continuum of care needs are met  4/28/2020 0354 by Jer Michel RN  Outcome: Ongoing  4/27/2020 1744 by Carson Bear RN  Outcome: Ongoing     Problem: Airway Clearance - Ineffective:  Goal: Clear lung sounds  Description: Clear lung sounds  4/28/2020 0354 by Jer Michel RN  Outcome: Ongoing  4/27/2020 1744 by Carson Bear RN  Outcome: Ongoing  Goal: Ability to maintain a clear airway will improve  Description: Ability to maintain a clear airway will improve  4/28/2020 0354 by Jer Michel RN  Outcome: Ongoing  4/27/2020 1744 by Carson Bear RN  Outcome: Ongoing     Problem: Fluid Volume - Deficit:  Goal: Achieves intake and output within specified parameters  Description: Achieves intake and output within specified parameters  4/28/2020 0354 by Jer Michel RN  Outcome: Ongoing  4/27/2020 1744 by Carson Bear RN  Outcome: Ongoing     Problem: Gas Exchange - Impaired:  Goal: Levels of oxygenation will improve  Description: Levels of oxygenation will improve  4/28/2020 0354 by Jer Michel RN  Outcome: Ongoing  4/27/2020 1744 by Carson Bear RN  Outcome: Ongoing     Problem: Confusion - Acute:  Goal: Absence of continued neurological deterioration signs and symptoms  Description: Absence of continued neurological deterioration signs and symptoms  4/28/2020 0354 by Jer Michel RN  Outcome: Ongoing  4/27/2020 1744 by Carson Bear RN  Outcome: Ongoing  Goal: Mental status will be restored to baseline  Description: Mental status will be restored to baseline  4/28/2020 0354 by Jer Michel RN  Outcome: Ongoing  4/27/2020 1744 by Carson Bear RN  Outcome: Ongoing     Problem: Discharge Planning:  Goal: increase  4/28/2020 0354 by Jeromy Markham RN  Outcome: Ongoing  4/27/2020 1744 by Ankit Callahan RN  Outcome: Ongoing  Goal: Decrease in sensory misperception frequency  Description: Decrease in sensory misperception frequency  4/28/2020 0354 by Jeromy Markham RN  Outcome: Ongoing  4/27/2020 1744 by Ankit Callahan RN  Outcome: Ongoing  Goal: Able to refrain from responding to false sensory perceptions  Description: Able to refrain from responding to false sensory perceptions  4/28/2020 0354 by Jeromy Markham RN  Outcome: Ongoing  4/27/2020 1744 by Ankit Callahan RN  Outcome: Ongoing  Goal: Demonstrates accurate environmental perceptions  Description: Demonstrates accurate environmental perceptions  4/28/2020 0354 by Jeromy Markham RN  Outcome: Ongoing  4/27/2020 1744 by Ankit Callahan RN  Outcome: Ongoing  Goal: Able to distinguish between reality-based and nonreality-based thinking  Description: Able to distinguish between reality-based and nonreality-based thinking  4/28/2020 0354 by Jeromy Markham RN  Outcome: Ongoing  4/27/2020 1744 by Ankit Callahan RN  Outcome: Ongoing  Goal: Able to interrupt nonreality-based thinking  Description: Able to interrupt nonreality-based thinking  4/28/2020 0354 by Jeromy Markham RN  Outcome: Ongoing  4/27/2020 1744 by Ankit Callahan RN  Outcome: Ongoing     Problem: Sleep Pattern Disturbance:  Goal: Appears well-rested  Description: Appears well-rested  4/28/2020 0354 by Jeromy Markham RN  Outcome: Ongoing  4/27/2020 1744 by Ankit Callahan RN  Outcome: Ongoing     Problem: OXYGENATION/RESPIRATORY FUNCTION  Goal: Patient will maintain patent airway  4/28/2020 0354 by Jeromy Markham RN  Outcome: Ongoing  4/27/2020 1744 by Ankit Callahan RN  Outcome: Ongoing  Goal: Patient will achieve/maintain normal respiratory rate/effort  Description: Respiratory rate and effort will be within normal limits for the patient  4/28/2020 0354 by Jeromy Markham RN  Outcome: Ongoing  4/27/2020 1744 by Ankit Callahan RN  Outcome: Ongoing

## 2021-12-11 ENCOUNTER — HOSPITAL ENCOUNTER (INPATIENT)
Age: 71
LOS: 3 days | Discharge: NURSING, REHAB OR LONG-TERM CARE | DRG: 690 | End: 2021-12-15
Attending: EMERGENCY MEDICINE | Admitting: INTERNAL MEDICINE
Payer: MEDICARE

## 2021-12-11 ENCOUNTER — APPOINTMENT (OUTPATIENT)
Dept: GENERAL RADIOLOGY | Age: 71
DRG: 690 | End: 2021-12-11
Payer: MEDICARE

## 2021-12-11 DIAGNOSIS — R77.8 TROPONIN LEVEL ELEVATED: ICD-10-CM

## 2021-12-11 DIAGNOSIS — M54.50 CHRONIC MIDLINE LOW BACK PAIN WITHOUT SCIATICA: Primary | ICD-10-CM

## 2021-12-11 DIAGNOSIS — G89.29 CHRONIC MIDLINE LOW BACK PAIN WITHOUT SCIATICA: Primary | ICD-10-CM

## 2021-12-11 DIAGNOSIS — R07.9 CHEST PAIN, UNSPECIFIED TYPE: ICD-10-CM

## 2021-12-11 LAB
A/G RATIO: 1.2 (ref 1.1–2.2)
ALBUMIN SERPL-MCNC: 3.8 G/DL (ref 3.4–5)
ALP BLD-CCNC: 184 U/L (ref 40–129)
ALT SERPL-CCNC: 15 U/L (ref 10–40)
ANION GAP SERPL CALCULATED.3IONS-SCNC: 14 MMOL/L (ref 3–16)
AST SERPL-CCNC: 20 U/L (ref 15–37)
BASE EXCESS VENOUS: 2.5 MMOL/L
BASOPHILS ABSOLUTE: 0.1 K/UL (ref 0–0.2)
BASOPHILS RELATIVE PERCENT: 0.9 %
BILIRUB SERPL-MCNC: <0.2 MG/DL (ref 0–1)
BUN BLDV-MCNC: 23 MG/DL (ref 7–20)
CALCIUM SERPL-MCNC: 9.1 MG/DL (ref 8.3–10.6)
CARBOXYHEMOGLOBIN: 1.2 %
CHLORIDE BLD-SCNC: 105 MMOL/L (ref 99–110)
CO2: 22 MMOL/L (ref 21–32)
CREAT SERPL-MCNC: 1.1 MG/DL (ref 0.6–1.2)
EOSINOPHILS ABSOLUTE: 0.1 K/UL (ref 0–0.6)
EOSINOPHILS RELATIVE PERCENT: 2.5 %
GFR AFRICAN AMERICAN: 59
GFR NON-AFRICAN AMERICAN: 49
GLUCOSE BLD-MCNC: 107 MG/DL (ref 70–99)
GLUCOSE BLD-MCNC: 107 MG/DL (ref 70–99)
HCO3 VENOUS: 32 MMOL/L (ref 23–29)
HCT VFR BLD CALC: 37.7 % (ref 36–48)
HEMOGLOBIN: 11.5 G/DL (ref 12–16)
LIPASE: 19 U/L (ref 13–60)
LYMPHOCYTES ABSOLUTE: 1.5 K/UL (ref 1–5.1)
LYMPHOCYTES RELATIVE PERCENT: 26.2 %
MCH RBC QN AUTO: 25.9 PG (ref 26–34)
MCHC RBC AUTO-ENTMCNC: 30.5 G/DL (ref 31–36)
MCV RBC AUTO: 84.9 FL (ref 80–100)
METHEMOGLOBIN VENOUS: 0.4 %
MONOCYTES ABSOLUTE: 0.4 K/UL (ref 0–1.3)
MONOCYTES RELATIVE PERCENT: 7.4 %
NEUTROPHILS ABSOLUTE: 3.7 K/UL (ref 1.7–7.7)
NEUTROPHILS RELATIVE PERCENT: 63 %
O2 SAT, VEN: 65 %
O2 THERAPY: ABNORMAL
PCO2, VEN: 78.3 MMHG (ref 40–50)
PDW BLD-RTO: 14.8 % (ref 12.4–15.4)
PERFORMED ON: ABNORMAL
PH VENOUS: 7.22 (ref 7.35–7.45)
PLATELET # BLD: 179 K/UL (ref 135–450)
PMV BLD AUTO: 9 FL (ref 5–10.5)
PO2, VEN: 39 MMHG
POTASSIUM REFLEX MAGNESIUM: 5.2 MMOL/L (ref 3.5–5.1)
RBC # BLD: 4.44 M/UL (ref 4–5.2)
SARS-COV-2, NAAT: NOT DETECTED
SODIUM BLD-SCNC: 141 MMOL/L (ref 136–145)
TCO2 CALC VENOUS: 35 MMOL/L
TOTAL PROTEIN: 6.9 G/DL (ref 6.4–8.2)
TROPONIN: 0.03 NG/ML
WBC # BLD: 5.9 K/UL (ref 4–11)

## 2021-12-11 PROCEDURE — 94640 AIRWAY INHALATION TREATMENT: CPT

## 2021-12-11 PROCEDURE — 6370000000 HC RX 637 (ALT 250 FOR IP): Performed by: INTERNAL MEDICINE

## 2021-12-11 PROCEDURE — 83690 ASSAY OF LIPASE: CPT

## 2021-12-11 PROCEDURE — 93005 ELECTROCARDIOGRAM TRACING: CPT | Performed by: EMERGENCY MEDICINE

## 2021-12-11 PROCEDURE — 99284 EMERGENCY DEPT VISIT MOD MDM: CPT

## 2021-12-11 PROCEDURE — 82803 BLOOD GASES ANY COMBINATION: CPT

## 2021-12-11 PROCEDURE — 2700000000 HC OXYGEN THERAPY PER DAY

## 2021-12-11 PROCEDURE — G0378 HOSPITAL OBSERVATION PER HR: HCPCS

## 2021-12-11 PROCEDURE — 84484 ASSAY OF TROPONIN QUANT: CPT

## 2021-12-11 PROCEDURE — 36415 COLL VENOUS BLD VENIPUNCTURE: CPT

## 2021-12-11 PROCEDURE — 94761 N-INVAS EAR/PLS OXIMETRY MLT: CPT

## 2021-12-11 PROCEDURE — 71045 X-RAY EXAM CHEST 1 VIEW: CPT

## 2021-12-11 PROCEDURE — 85025 COMPLETE CBC W/AUTO DIFF WBC: CPT

## 2021-12-11 PROCEDURE — 80053 COMPREHEN METABOLIC PANEL: CPT

## 2021-12-11 PROCEDURE — 6370000000 HC RX 637 (ALT 250 FOR IP): Performed by: EMERGENCY MEDICINE

## 2021-12-11 PROCEDURE — 87635 SARS-COV-2 COVID-19 AMP PRB: CPT

## 2021-12-11 RX ORDER — IPRATROPIUM BROMIDE AND ALBUTEROL SULFATE 2.5; .5 MG/3ML; MG/3ML
1 SOLUTION RESPIRATORY (INHALATION) ONCE
Status: COMPLETED | OUTPATIENT
Start: 2021-12-11 | End: 2021-12-11

## 2021-12-11 RX ORDER — SODIUM CHLORIDE 0.9 % (FLUSH) 0.9 %
5-40 SYRINGE (ML) INJECTION EVERY 12 HOURS SCHEDULED
Status: DISCONTINUED | OUTPATIENT
Start: 2021-12-11 | End: 2021-12-11 | Stop reason: SDUPTHER

## 2021-12-11 RX ORDER — POTASSIUM CHLORIDE 20 MEQ/1
40 TABLET, EXTENDED RELEASE ORAL PRN
Status: DISCONTINUED | OUTPATIENT
Start: 2021-12-11 | End: 2021-12-15 | Stop reason: HOSPADM

## 2021-12-11 RX ORDER — HYDRALAZINE HYDROCHLORIDE 25 MG/1
25 TABLET, FILM COATED ORAL EVERY 8 HOURS SCHEDULED
Status: DISCONTINUED | OUTPATIENT
Start: 2021-12-12 | End: 2021-12-15 | Stop reason: HOSPADM

## 2021-12-11 RX ORDER — ESCITALOPRAM OXALATE 10 MG/1
10 TABLET ORAL DAILY
Status: DISCONTINUED | OUTPATIENT
Start: 2021-12-11 | End: 2021-12-12

## 2021-12-11 RX ORDER — SODIUM CHLORIDE 9 MG/ML
25 INJECTION, SOLUTION INTRAVENOUS PRN
Status: DISCONTINUED | OUTPATIENT
Start: 2021-12-11 | End: 2021-12-15 | Stop reason: HOSPADM

## 2021-12-11 RX ORDER — FUROSEMIDE 40 MG/1
20 TABLET ORAL DAILY
Status: DISCONTINUED | OUTPATIENT
Start: 2021-12-12 | End: 2021-12-13

## 2021-12-11 RX ORDER — NITROGLYCERIN 0.4 MG/1
0.4 TABLET SUBLINGUAL EVERY 5 MIN PRN
Status: DISCONTINUED | OUTPATIENT
Start: 2021-12-11 | End: 2021-12-15 | Stop reason: HOSPADM

## 2021-12-11 RX ORDER — MAGNESIUM SULFATE IN WATER 40 MG/ML
2000 INJECTION, SOLUTION INTRAVENOUS PRN
Status: DISCONTINUED | OUTPATIENT
Start: 2021-12-11 | End: 2021-12-15 | Stop reason: HOSPADM

## 2021-12-11 RX ORDER — POTASSIUM CHLORIDE 7.45 MG/ML
10 INJECTION INTRAVENOUS PRN
Status: DISCONTINUED | OUTPATIENT
Start: 2021-12-11 | End: 2021-12-15 | Stop reason: HOSPADM

## 2021-12-11 RX ORDER — PREGABALIN 75 MG/1
150 CAPSULE ORAL EVERY 12 HOURS SCHEDULED
Status: DISCONTINUED | OUTPATIENT
Start: 2021-12-11 | End: 2021-12-15 | Stop reason: HOSPADM

## 2021-12-11 RX ORDER — ACETAMINOPHEN 325 MG/1
650 TABLET ORAL EVERY 6 HOURS PRN
Status: DISCONTINUED | OUTPATIENT
Start: 2021-12-11 | End: 2021-12-15 | Stop reason: HOSPADM

## 2021-12-11 RX ORDER — ACETAMINOPHEN 650 MG/1
650 SUPPOSITORY RECTAL EVERY 6 HOURS PRN
Status: DISCONTINUED | OUTPATIENT
Start: 2021-12-11 | End: 2021-12-15 | Stop reason: HOSPADM

## 2021-12-11 RX ORDER — TROSPIUM CHLORIDE 20 MG/1
20 TABLET, FILM COATED ORAL
Status: DISCONTINUED | OUTPATIENT
Start: 2021-12-12 | End: 2021-12-15 | Stop reason: HOSPADM

## 2021-12-11 RX ORDER — NICOTINE POLACRILEX 4 MG
15 LOZENGE BUCCAL PRN
Status: DISCONTINUED | OUTPATIENT
Start: 2021-12-11 | End: 2021-12-15 | Stop reason: HOSPADM

## 2021-12-11 RX ORDER — ALBUTEROL SULFATE 90 UG/1
2 AEROSOL, METERED RESPIRATORY (INHALATION) EVERY 6 HOURS PRN
Status: DISCONTINUED | OUTPATIENT
Start: 2021-12-11 | End: 2021-12-12

## 2021-12-11 RX ORDER — GABAPENTIN 100 MG/1
100 CAPSULE ORAL NIGHTLY
Status: DISCONTINUED | OUTPATIENT
Start: 2021-12-11 | End: 2021-12-15 | Stop reason: HOSPADM

## 2021-12-11 RX ORDER — LIDOCAINE HYDROCHLORIDE 10 MG/ML
5 INJECTION, SOLUTION EPIDURAL; INFILTRATION; INTRACAUDAL; PERINEURAL ONCE
Status: DISCONTINUED | OUTPATIENT
Start: 2021-12-11 | End: 2021-12-15 | Stop reason: HOSPADM

## 2021-12-11 RX ORDER — PROMETHAZINE HYDROCHLORIDE 25 MG/1
12.5 TABLET ORAL EVERY 6 HOURS PRN
Status: DISCONTINUED | OUTPATIENT
Start: 2021-12-11 | End: 2021-12-15 | Stop reason: HOSPADM

## 2021-12-11 RX ORDER — ASPIRIN 81 MG/1
81 TABLET, CHEWABLE ORAL DAILY
Status: DISCONTINUED | OUTPATIENT
Start: 2021-12-12 | End: 2021-12-15 | Stop reason: HOSPADM

## 2021-12-11 RX ORDER — SODIUM CHLORIDE 9 MG/ML
25 INJECTION, SOLUTION INTRAVENOUS PRN
Status: DISCONTINUED | OUTPATIENT
Start: 2021-12-11 | End: 2021-12-11 | Stop reason: SDUPTHER

## 2021-12-11 RX ORDER — LEVETIRACETAM 500 MG/1
500 TABLET ORAL EVERY 12 HOURS SCHEDULED
Status: DISCONTINUED | OUTPATIENT
Start: 2021-12-12 | End: 2021-12-15 | Stop reason: HOSPADM

## 2021-12-11 RX ORDER — LANOLIN ALCOHOL/MO/W.PET/CERES
6 CREAM (GRAM) TOPICAL NIGHTLY
Status: DISCONTINUED | OUTPATIENT
Start: 2021-12-12 | End: 2021-12-15 | Stop reason: HOSPADM

## 2021-12-11 RX ORDER — HYDROXYCHLOROQUINE SULFATE 200 MG/1
300 TABLET, FILM COATED ORAL DAILY
Status: DISCONTINUED | OUTPATIENT
Start: 2021-12-12 | End: 2021-12-15 | Stop reason: HOSPADM

## 2021-12-11 RX ORDER — SODIUM CHLORIDE 0.9 % (FLUSH) 0.9 %
10 SYRINGE (ML) INJECTION EVERY 12 HOURS SCHEDULED
Status: DISCONTINUED | OUTPATIENT
Start: 2021-12-11 | End: 2021-12-15 | Stop reason: HOSPADM

## 2021-12-11 RX ORDER — DEXTROSE MONOHYDRATE 25 G/50ML
12.5 INJECTION, SOLUTION INTRAVENOUS PRN
Status: DISCONTINUED | OUTPATIENT
Start: 2021-12-11 | End: 2021-12-15 | Stop reason: HOSPADM

## 2021-12-11 RX ORDER — MONTELUKAST SODIUM 10 MG/1
10 TABLET ORAL NIGHTLY
Status: DISCONTINUED | OUTPATIENT
Start: 2021-12-11 | End: 2021-12-12

## 2021-12-11 RX ORDER — PHENYTOIN 50 MG/1
100 TABLET, CHEWABLE ORAL 3 TIMES DAILY
Status: DISCONTINUED | OUTPATIENT
Start: 2021-12-12 | End: 2021-12-15 | Stop reason: HOSPADM

## 2021-12-11 RX ORDER — ATORVASTATIN CALCIUM 20 MG/1
20 TABLET, FILM COATED ORAL NIGHTLY
Status: DISCONTINUED | OUTPATIENT
Start: 2021-12-12 | End: 2021-12-15 | Stop reason: HOSPADM

## 2021-12-11 RX ORDER — BUDESONIDE AND FORMOTEROL FUMARATE DIHYDRATE 80; 4.5 UG/1; UG/1
2 AEROSOL RESPIRATORY (INHALATION) 2 TIMES DAILY
Status: DISCONTINUED | OUTPATIENT
Start: 2021-12-11 | End: 2021-12-15 | Stop reason: HOSPADM

## 2021-12-11 RX ORDER — DEXTROSE MONOHYDRATE 50 MG/ML
100 INJECTION, SOLUTION INTRAVENOUS PRN
Status: DISCONTINUED | OUTPATIENT
Start: 2021-12-11 | End: 2021-12-15 | Stop reason: HOSPADM

## 2021-12-11 RX ORDER — ONDANSETRON 2 MG/ML
4 INJECTION INTRAMUSCULAR; INTRAVENOUS EVERY 6 HOURS PRN
Status: DISCONTINUED | OUTPATIENT
Start: 2021-12-11 | End: 2021-12-15 | Stop reason: HOSPADM

## 2021-12-11 RX ORDER — FERROUS SULFATE TAB EC 324 MG (65 MG FE EQUIVALENT) 324 (65 FE) MG
324 TABLET DELAYED RESPONSE ORAL
Status: DISCONTINUED | OUTPATIENT
Start: 2021-12-12 | End: 2021-12-15 | Stop reason: HOSPADM

## 2021-12-11 RX ORDER — SODIUM CHLORIDE 0.9 % (FLUSH) 0.9 %
10 SYRINGE (ML) INJECTION PRN
Status: DISCONTINUED | OUTPATIENT
Start: 2021-12-11 | End: 2021-12-15 | Stop reason: HOSPADM

## 2021-12-11 RX ORDER — PANTOPRAZOLE SODIUM 40 MG/1
40 TABLET, DELAYED RELEASE ORAL
Status: DISCONTINUED | OUTPATIENT
Start: 2021-12-12 | End: 2021-12-12

## 2021-12-11 RX ORDER — SODIUM CHLORIDE 0.9 % (FLUSH) 0.9 %
5-40 SYRINGE (ML) INJECTION PRN
Status: DISCONTINUED | OUTPATIENT
Start: 2021-12-11 | End: 2021-12-11 | Stop reason: SDUPTHER

## 2021-12-11 RX ORDER — FLUTICASONE PROPIONATE 50 MCG
2 SPRAY, SUSPENSION (ML) NASAL DAILY
Status: DISCONTINUED | OUTPATIENT
Start: 2021-12-11 | End: 2021-12-12

## 2021-12-11 RX ORDER — LACOSAMIDE 100 MG/1
100 TABLET ORAL EVERY 12 HOURS SCHEDULED
Status: DISCONTINUED | OUTPATIENT
Start: 2021-12-11 | End: 2021-12-15 | Stop reason: HOSPADM

## 2021-12-11 RX ORDER — ALLOPURINOL 100 MG/1
100 TABLET ORAL NIGHTLY
Status: DISCONTINUED | OUTPATIENT
Start: 2021-12-11 | End: 2021-12-15 | Stop reason: HOSPADM

## 2021-12-11 RX ADMIN — ACETAMINOPHEN 650 MG: 325 TABLET ORAL at 21:33

## 2021-12-11 RX ADMIN — IPRATROPIUM BROMIDE AND ALBUTEROL SULFATE 1 AMPULE: .5; 2.5 SOLUTION RESPIRATORY (INHALATION) at 16:12

## 2021-12-11 ASSESSMENT — PAIN DESCRIPTION - DESCRIPTORS: DESCRIPTORS: CONSTANT

## 2021-12-11 ASSESSMENT — ENCOUNTER SYMPTOMS
RHINORRHEA: 0
SORE THROAT: 0
NAUSEA: 1
VOMITING: 1
SHORTNESS OF BREATH: 1
EYE REDNESS: 0
BACK PAIN: 0
COUGH: 0
ABDOMINAL PAIN: 0

## 2021-12-11 ASSESSMENT — PAIN DESCRIPTION - PAIN TYPE: TYPE: ACUTE PAIN

## 2021-12-11 ASSESSMENT — PAIN SCALES - GENERAL
PAINLEVEL_OUTOF10: 8

## 2021-12-11 ASSESSMENT — PAIN DESCRIPTION - LOCATION
LOCATION: KNEE
LOCATION: CHEST

## 2021-12-11 NOTE — ED NOTES
PIV attempted by this nurse x2. PIV attempt by EMS x1. Notified Yaneth Lyons RN for Ultrasound PIV.       Kortney Vidal RN  12/11/21 7242

## 2021-12-11 NOTE — ED NOTES
PIV multiple attempts. Patient tolerated fairly. Charge RN notified, Provider notified. Order for PICC line placed.       Manisha Pineda RN  12/11/21 1062

## 2021-12-11 NOTE — ED PROVIDER NOTES
eMERGENCY dEPARTMENT eNCOUnter      Pt Name: Tammy Benedict  MRN: [id number]  [place] 1950  Date of evaluation: 12/11/2021  Provider: Haydee Arzola MD    CHIEF COMPLAINT       Chief Complaint   Patient presents with    Chest Pain     patient from [facility] patient is here for complaints of chest pain x1 hour. patient was found with oxygen sat of 73% on RA patient is currently on 4L of oxygen via EMS and oxygen sat 94%. HISTORY OF PRESENT ILLNESS   (Location/Symptom, Timing/Onset,Context/Setting, Quality, Duration, Modifying Factors, Severity)  Note limiting factors. Tammy Benedict is a 66 y.o. female who presents to the emergency department planing of chest discomfort. The patient states around 3:00 this morning she developed some chest discomfort this been progressively worsening throughout the morning. She describes the discomfort as a heavy pressure on her chest.  It is associated with nausea and vomiting. No ripping or tearing pain. No fever or chills. No cough. According to EMS the patient had a low O2 sat of 73% on room air when they arrived. With 4 L of oxygen her oxygen saturation increased. HPI    NursingNotes were reviewed. REVIEW OF SYSTEMS    (2-9 systems for level 4, 10 or more for level 5)     Review of Systems   Constitutional: Negative for chills and fever. HENT: Negative for rhinorrhea and sore throat. Eyes: Negative for redness. Respiratory: Positive for shortness of breath. Negative for cough. Cardiovascular: Positive for chest pain. Gastrointestinal: Positive for nausea and vomiting. Negative for abdominal pain. Genitourinary: Negative for flank pain. Musculoskeletal: Negative for back pain. Skin: Negative for rash. Neurological: Negative for headaches. Hematological: Negative for adenopathy. Psychiatric/Behavioral: Negative for confusion. Except as noted above the remainder of the review of systems was reviewed and negative. PAST MEDICAL HISTORY     Past Medical History:   Diagnosis Date    Anemia     Asthma     CKD stage 3 due to type 2 diabetes mellitus (HCC)     COPD (chronic obstructive pulmonary disease) (HCC)     Dementia (HCC)     [facility]     GERD (gastroesophageal reflux disease)     Gout     HTN (hypertension)     Seizure ([facility]          SURGICALHISTORY     History reviewed. No pertinent surgical history. CURRENT MEDICATIONS       Current Discharge Medication List      CONTINUE these medications which have NOT CHANGED    Details   memantine (NAMENDA) 10 MG tablet Take 10 mg by mouth 2 times daily Indications: Dementia due to Vascular Disease      albuterol (PROVENTIL) (2.5 MG/3ML) 0.083% nebulizer solution Take 2.5 mg by nebulization every 8 hours as needed for Wheezing      polyvinyl alcohol-povidone 5-6 MG/ML SOLN opthalmic solution Place 2 drops into both eyes 2 times daily      docusate sodium (COLACE) 100 MG capsule Take 100 mg by mouth daily      dextromethorphan (DELSYM) 30 MG/5ML extended release liquid Take 60 mg by mouth 2 times daily as needed for Cough      DULoxetine (CYMBALTA) 20 MG extended release capsule Take 20 mg by mouth nightly      famotidine (PEPCID) 20 MG tablet Take 20 mg by mouth nightly      Umeclidinium Bromide (INCRUSE ELLIPTA) 62.5 MCG/INH AEPB Inhale 1 puff into the lungs daily      loperamide (IMODIUM) 2 MG capsule Take 2 mg by mouth every 6 hours as needed for Diarrhea      oxyCODONE-acetaminophen (PERCOCET) 5-325 MG per tablet Take 1 tablet by mouth every 6 hours as needed for Pain.       acetaminophen (TYLENOL) 325 MG tablet Take 650 mg by mouth every 4 hours as needed for Pain      albuterol sulfate HFA (VENTOLIN HFA) 108 (90 Base) MCG/ACT inhaler Inhale 2 puffs into the lungs every 6 hours as needed for Wheezing or Shortness of Breath      hydroxychloroquine (PLAQUENIL) 200 MG tablet Take 1.5 tablets by mouth daily  Qty: 30 tablet, Refills: 0 Associated Diagnoses: Systemic lupus erythematosus, unspecified SLE type, unspecified organ involvement status ([facility])      allopurinol (ZYLOPRIM) 100 MG tablet Take 100 mg by mouth nightly      aspirin 81 MG chewable tablet Take 81 mg by mouth daily      fluticasone-vilanterol (BREO ELLIPTA) 100-25 MCG/INH AEPB inhaler Inhale 1 puff into the lungs daily      tolterodine (DETROL LA) 2 MG extended release capsule Take 2 mg by mouth daily      ferrous sulfate (IRON 325) 325 (65 Fe) MG tablet Take 325 mg by mouth daily (with breakfast)      furosemide (LASIX) 20 MG tablet Take 20 mg by mouth daily      gabapentin (NEURONTIN) 100 MG capsule Take 100 mg by mouth nightly. hydrALAZINE (APRESOLINE) 25 MG tablet Take 25 mg by mouth 3 times daily       levETIRAcetam (KEPPRA) 500 MG tablet Take 500 mg by mouth every 12 hours      pregabalin (LYRICA) 150 MG capsule Take 150 mg by mouth every 12 hours. melatonin 3 MG TABS tablet Take 6 mg by mouth nightly      magnesium hydroxide (MILK OF MAGNESIA) 400 MG/5ML suspension Take by mouth daily as needed for Constipation      phenytoin (DILANTIN) 50 MG tablet Take 100 mg by mouth 3 times daily       simvastatin (ZOCOR) 10 MG tablet Take 10 mg by mouth nightly      lacosamide (VIMPAT) 50 MG TABS tablet Take 100 mg by mouth every 12 hours. vitamin D (CHOLECALCIFEROL) 25 MCG (1000 UT) TABS tablet Take 1,000 Units by mouth daily       Vitamin E 100 units TABS Take 100 Units by mouth daily             ALLERGIES     Patient has no known allergies. FAMILY HISTORY     History reviewed. No pertinent family history.        SOCIAL HISTORY       Social History     Socioeconomic History    Marital status: Single     Spouse name: None    Number of children: None    Years of education: None    Highest education level: None   Occupational History    None   Tobacco Use    Smoking status: Never Smoker    Smokeless tobacco: Never Used   Vaping Use    Vaping Use: Never used Substance and Sexual Activity    Alcohol use: Never    Drug use: Never    Sexual activity: Not Currently   Other Topics Concern    None   Social History Narrative    None     Social Determinants of Health     Financial Resource Strain:     Difficulty of Paying Living Expenses: Not on file   Food Insecurity:     Worried About Running Out of Food in the Last Year: Not on file    [place] of Food in the Last Year: Not on file   Transportation Needs:     Lack of Transportation (Medical): Not on file    Lack of Transportation (Non-Medical): Not on file   Physical Activity:     Days of Exercise per Week: Not on file    Minutes of Exercise per Session: Not on file   Stress:     Feeling of Stress : Not on file   Social Connections:     Frequency of Communication with Friends and Family: Not on file    Frequency of Social Gatherings with Friends and Family: Not on file    Attends Anabaptism Services: Not on file    Active Member of [address] [place] [address] RegisterPatient or Organizations: Not on file    Attends Club or Organization Meetings: Not on file    Marital Status: Not on file   Intimate Partner Violence:     Fear of Current or Ex-Partner: Not on file    Emotionally Abused: Not on file    Physically Abused: Not on file    Sexually Abused: Not on file   Housing Stability:     Unable to Pay for Housing in the Last Year: Not on file    Number of [place] in the Last Year: Not on file    Unstable Housing in the Last Year: Not on file       SCREENINGS    Abena Coma Scale  Eye Opening: Spontaneous  Best Verbal Response: Confused  Best Motor Response: Obeys commands  Doran Coma Scale Score: 14        PHYSICAL EXAM    (up to 7 for level 4, 8 or more for level 5)     ED Triage Vitals [12/11/21 1319]   BP Temp Temp src Pulse Resp SpO2 Height Weight   (!) 172/59 -- -- 76 12 98 % -- 242 lb 8.1 oz (110 kg)       Physical Exam  Vitals and nursing note reviewed. Constitutional:       Appearance: She is well-developed.  She is not diaphoretic. Comments: Tearful elderly female who is uncomfortable, NAD   HENT:      Head: Normocephalic and atraumatic. Mouth/Throat:      Mouth: Mucous membranes are moist.   Eyes:      General:         Right eye: No discharge. Left eye: No discharge. Conjunctiva/sclera: Conjunctivae normal.   Cardiovascular:      Rate and Rhythm: Normal rate. Pulses: Normal pulses. Heart sounds: No murmur heard. Pulmonary:      Effort: Pulmonary effort is normal. No respiratory distress. Comments: Decreased breath sounds. I can hear breath sounds bilaterally. No rales or rhonchi appreciated. Abdominal:      Palpations: Abdomen is soft. Comments: BMI 41.6, soft, Non-tender, nondistended. Musculoskeletal:         General: Normal range of motion. Cervical back: Neck supple. Skin:     General: Skin is warm and dry. Capillary Refill: Capillary refill takes less than 2 seconds. Neurological:      Mental Status: She is alert and oriented to person, place, and time. Psychiatric:         Behavior: Behavior normal.         DIAGNOSTIC RESULTS     EKG: All EKG's are interpreted by the Emergency Department Physician who either signs or Co-signsthis chart in the absence of a cardiologist.    The Ekg interpreted by me shows  normal sinus rhythm with a rate of 72  Axis is   Left axis deviation  QTc is  462 msec  Right bundle branch block, left anterior fascicular block     ST Segments: nonspecific changes  No significant change from prior EKG dated the 24th 2020      RADIOLOGY:   Non-plain filmimages such as CT, Ultrasound and MRI are read by the radiologist. Plain radiographic images are visualized and preliminarily interpreted by the emergency physician with the below findings:        Interpretation per the Radiologist below, if available at the time ofthis note:    XR CHEST PORTABLE   Final Result   Pulmonary vascular congestion/interstitial edema. Cardiomegaly. ED BEDSIDE ULTRASOUND:   Performed by ED Physician - none    LABS:  Labs Reviewed   CBC WITH AUTO DIFFERENTIAL - Abnormal; Notable for the following components:       Result Value    Hemoglobin 11.5 (*)     MCH 25.9 (*)     MCHC 30.5 (*)     All other components within normal limits    Narrative:     Performed at:  [address] [employer]   Phone [phone]   COMPREHENSIVE METABOLIC PANEL W/ REFLEX TO MG FOR LOW K - Abnormal; Notable for the following components:    Potassium reflex Magnesium 5.2 (*)     Glucose 107 (*)     BUN 23 (*)     GFR Non- 49 (*)     GFR African American 59 (*)     Alkaline Phosphatase 184 (*)     All other components within normal limits    Narrative:     Performed at:  [address] [employer]   Phone [phone]   TROPONIN - Abnormal; Notable for the following components:    Troponin 0.03 (*)     All other components within normal limits    Narrative:     Performed at:  [address] [employer]   Phone [phone]   BLOOD GAS, VENOUS - Abnormal; Notable for the following components:    pH, Marcello 7.223 (*)     pCO2, Marcello 78.3 (*)     HCO3, Venous 32 (*)     All other components within normal limits    Narrative:     Performed at:  [address] [employer]   Phone [phone]   BASIC METABOLIC PANEL W/ REFLEX TO MG FOR LOW K - Abnormal; Notable for the following components:    Glucose 100 (*)     BUN 25 (*)     GFR Non- 49 (*)     GFR  59 (*)     All other components within normal limits    Narrative:     Performed at:  [address] [employer]   Phone [phone]   CBC - Abnormal; Notable for the following components:    Hemoglobin 10.3 (*)     Hematocrit 33.7 (*)     MCH 25.8 (*)     MCHC 30.6 (*)     All other components within normal limits    Narrative:     Performed at:  [facility]  [address] [facility] [address]   Phone [phone]   POCT GLUCOSE - Abnormal; Notable for the following components:    POC Glucose 107 (*)     All other components within normal limits    Narrative:     Performed at:  [facility]  [address] [place] [address]   Phone [phone]   POCT GLUCOSE - Abnormal; Notable for the following components:    POC Glucose 107 (*)     All other components within normal limits    Narrative:     Performed at:  [facility]  [address] [facility] [address]   Phone [phone]   POCT GLUCOSE - Abnormal; Notable for the following components:    POC Glucose 111 (*)     All other components within normal limits    Narrative:     Performed at:  [facility]  [address] [facility] [address]   Phone [phone] RAPID    Narrative:     Performed at:  [facility]  [address] [place] [address] [place] [address]   Phone [phone]   LIPASE    Narrative:     Performed at:  [facility]  [address] [place] [address] [place]   Phone [phone]   POCT GLUCOSE   POCT GLUCOSE   POCT GLUCOSE   POCT GLUCOSE   POCT GLUCOSE       All other labs were within normal range or not returned as of this dictation.     EMERGENCY DEPARTMENT COURSE and DIFFERENTIAL DIAGNOSIS/MDM:   Vitals:    Vitals:    12/12/21 0538 12/12/21 0730 12/12/21 0848 12/12/21 0928   BP: 132/64   (!) 152/66   Pulse: 68   74   Resp: 18   20   Temp: 98.3 °F (36.8 °C)   98.4 °F (36.9 °C)   TempSrc: Oral   Oral   SpO2: 99%  99% 100%   Weight:  247 lb 5.7 oz (112.2 kg)           MDM   Differential diagnosis includes unstable angina, non-ST elevation MI, pleurisy, Covid, other. The patient was given aspirin in the emergency department and several sublingual nitroglycerin her chest pain abated. The patient's troponin came back at 0.03. She does have some renal insufficiency but I am not sure that that fully explains her elevated troponin. I spoke to Dr. Biggs Hearing about this patient and the plan is to admit the patient to the hospitalist service for further evaluation and work-up. Patient was a difficult IV stick and we have arranged for the patient to have a dual line PICC placed. CRITICAL CARE TIME   Total Critical Care time was 20 minutes, excluding separately reportable procedures. There was a high probability of clinically significant/life threatening deterioration in the patient's condition which required my urgent intervention. CONSULTS:  IP CONSULT TO PHARMACY  IP CONSULT TO CARDIOLOGY    PROCEDURES:  Unless otherwise noted below, none     Procedures    FINAL IMPRESSION      1. Chest pain, unspecified type    2. Troponin level elevated          DISPOSITION/PLAN   DISPOSITION Admitted 12/11/2021 04:00:29 PM      PATIENT REFERRED TO:  No follow-up provider specified.     DISCHARGE MEDICATIONS:  Current Discharge Medication List             (Please note that portions of this note were completed with a voice recognition program.Efforts were made to edit the dictations but occasionally words are mis-transcribed.)    Roger Reyes MD (electronically signed)  Attending Emergency Physician          Roger Reyes MD  12/12/21 [phone]

## 2021-12-11 NOTE — ED NOTES
Bed: C-27  Expected date: 12/11/21  Expected time: 1:01 PM  Means of arrival: [facility]  [facility], O2-70% RA, 324 ASA, no IV established, ROMAN Haro, RN  12/11/21 8852

## 2021-12-11 NOTE — ED NOTES
Patient denies chest pain. Patient declining Nitro at this time. PIV attempted by Charley Mullins x2 unsuccessful.       Kortney Vidal RN  12/11/21 6056

## 2021-12-12 ENCOUNTER — APPOINTMENT (OUTPATIENT)
Dept: GENERAL RADIOLOGY | Age: 71
DRG: 690 | End: 2021-12-12
Payer: MEDICARE

## 2021-12-12 LAB
ANION GAP SERPL CALCULATED.3IONS-SCNC: 8 MMOL/L (ref 3–16)
BACTERIA: ABNORMAL /HPF
BILIRUBIN URINE: NEGATIVE
BLOOD, URINE: NEGATIVE
BUN BLDV-MCNC: 25 MG/DL (ref 7–20)
C-REACTIVE PROTEIN: 33.8 MG/L (ref 0–5.1)
CALCIUM SERPL-MCNC: 9 MG/DL (ref 8.3–10.6)
CHLORIDE BLD-SCNC: 108 MMOL/L (ref 99–110)
CLARITY: ABNORMAL
CO2: 27 MMOL/L (ref 21–32)
COLOR: YELLOW
CREAT SERPL-MCNC: 1.1 MG/DL (ref 0.6–1.2)
EKG ATRIAL RATE: 72 BPM
EKG DIAGNOSIS: NORMAL
EKG P AXIS: 35 DEGREES
EKG P-R INTERVAL: 146 MS
EKG Q-T INTERVAL: 422 MS
EKG QRS DURATION: 166 MS
EKG QTC CALCULATION (BAZETT): 462 MS
EKG R AXIS: -50 DEGREES
EKG T AXIS: 40 DEGREES
EKG VENTRICULAR RATE: 72 BPM
EPITHELIAL CELLS, UA: 2 /HPF (ref 0–5)
GFR AFRICAN AMERICAN: 59
GFR NON-AFRICAN AMERICAN: 49
GLUCOSE BLD-MCNC: 100 MG/DL (ref 70–99)
GLUCOSE BLD-MCNC: 106 MG/DL (ref 70–99)
GLUCOSE BLD-MCNC: 107 MG/DL (ref 70–99)
GLUCOSE BLD-MCNC: 111 MG/DL (ref 70–99)
GLUCOSE BLD-MCNC: 115 MG/DL (ref 70–99)
GLUCOSE BLD-MCNC: 95 MG/DL (ref 70–99)
GLUCOSE URINE: NEGATIVE MG/DL
HCT VFR BLD CALC: 33.7 % (ref 36–48)
HEMOGLOBIN: 10.3 G/DL (ref 12–16)
HYALINE CASTS: 3 /LPF (ref 0–8)
KETONES, URINE: NEGATIVE MG/DL
LACTIC ACID: 0.8 MMOL/L (ref 0.4–2)
LEUKOCYTE ESTERASE, URINE: ABNORMAL
MCH RBC QN AUTO: 25.8 PG (ref 26–34)
MCHC RBC AUTO-ENTMCNC: 30.6 G/DL (ref 31–36)
MCV RBC AUTO: 84.3 FL (ref 80–100)
MICROSCOPIC EXAMINATION: YES
NITRITE, URINE: POSITIVE
PDW BLD-RTO: 14.3 % (ref 12.4–15.4)
PERFORMED ON: ABNORMAL
PERFORMED ON: NORMAL
PH UA: 5 (ref 5–8)
PLATELET # BLD: 191 K/UL (ref 135–450)
PMV BLD AUTO: 8.7 FL (ref 5–10.5)
POTASSIUM REFLEX MAGNESIUM: 4.9 MMOL/L (ref 3.5–5.1)
PRO-BNP: 1062 PG/ML (ref 0–124)
PROTEIN UA: NEGATIVE MG/DL
RBC # BLD: 4 M/UL (ref 4–5.2)
RBC UA: 1 /HPF (ref 0–4)
SODIUM BLD-SCNC: 143 MMOL/L (ref 136–145)
SPECIFIC GRAVITY UA: 1.01 (ref 1–1.03)
URINE REFLEX TO CULTURE: YES
URINE TYPE: ABNORMAL
UROBILINOGEN, URINE: 0.2 E.U./DL
WBC # BLD: 6.8 K/UL (ref 4–11)
WBC UA: 40 /HPF (ref 0–5)

## 2021-12-12 PROCEDURE — 36415 COLL VENOUS BLD VENIPUNCTURE: CPT

## 2021-12-12 PROCEDURE — 6370000000 HC RX 637 (ALT 250 FOR IP): Performed by: INTERNAL MEDICINE

## 2021-12-12 PROCEDURE — 96372 THER/PROPH/DIAG INJ SC/IM: CPT

## 2021-12-12 PROCEDURE — 87040 BLOOD CULTURE FOR BACTERIA: CPT

## 2021-12-12 PROCEDURE — 83880 ASSAY OF NATRIURETIC PEPTIDE: CPT

## 2021-12-12 PROCEDURE — 2700000000 HC OXYGEN THERAPY PER DAY

## 2021-12-12 PROCEDURE — 93010 ELECTROCARDIOGRAM REPORT: CPT | Performed by: INTERNAL MEDICINE

## 2021-12-12 PROCEDURE — G0378 HOSPITAL OBSERVATION PER HR: HCPCS

## 2021-12-12 PROCEDURE — 2580000003 HC RX 258: Performed by: INTERNAL MEDICINE

## 2021-12-12 PROCEDURE — 99222 1ST HOSP IP/OBS MODERATE 55: CPT | Performed by: INTERNAL MEDICINE

## 2021-12-12 PROCEDURE — 1200000000 HC SEMI PRIVATE

## 2021-12-12 PROCEDURE — 87077 CULTURE AEROBIC IDENTIFY: CPT

## 2021-12-12 PROCEDURE — 71045 X-RAY EXAM CHEST 1 VIEW: CPT

## 2021-12-12 PROCEDURE — 85027 COMPLETE CBC AUTOMATED: CPT

## 2021-12-12 PROCEDURE — 80048 BASIC METABOLIC PNL TOTAL CA: CPT

## 2021-12-12 PROCEDURE — 6360000002 HC RX W HCPCS: Performed by: INTERNAL MEDICINE

## 2021-12-12 PROCEDURE — 81001 URINALYSIS AUTO W/SCOPE: CPT

## 2021-12-12 PROCEDURE — 86140 C-REACTIVE PROTEIN: CPT

## 2021-12-12 PROCEDURE — 9990000010 HC NO CHARGE VISIT

## 2021-12-12 PROCEDURE — 87186 SC STD MICRODIL/AGAR DIL: CPT

## 2021-12-12 PROCEDURE — 87086 URINE CULTURE/COLONY COUNT: CPT

## 2021-12-12 PROCEDURE — 94640 AIRWAY INHALATION TREATMENT: CPT

## 2021-12-12 PROCEDURE — 94761 N-INVAS EAR/PLS OXIMETRY MLT: CPT

## 2021-12-12 PROCEDURE — 83605 ASSAY OF LACTIC ACID: CPT

## 2021-12-12 RX ORDER — MEMANTINE HYDROCHLORIDE 10 MG/1
10 TABLET ORAL 2 TIMES DAILY
COMMUNITY

## 2021-12-12 RX ORDER — IPRATROPIUM BROMIDE AND ALBUTEROL SULFATE 2.5; .5 MG/3ML; MG/3ML
1 SOLUTION RESPIRATORY (INHALATION) 4 TIMES DAILY
Status: DISCONTINUED | OUTPATIENT
Start: 2021-12-12 | End: 2021-12-15 | Stop reason: HOSPADM

## 2021-12-12 RX ORDER — DULOXETIN HYDROCHLORIDE 20 MG/1
20 CAPSULE, DELAYED RELEASE ORAL NIGHTLY
COMMUNITY

## 2021-12-12 RX ORDER — FUROSEMIDE 10 MG/ML
INJECTION INTRAMUSCULAR; INTRAVENOUS
Status: DISPENSED
Start: 2021-12-12 | End: 2021-12-13

## 2021-12-12 RX ORDER — ALBUTEROL SULFATE 90 UG/1
2 AEROSOL, METERED RESPIRATORY (INHALATION) EVERY 6 HOURS PRN
COMMUNITY

## 2021-12-12 RX ORDER — ACETAMINOPHEN 325 MG/1
650 TABLET ORAL EVERY 4 HOURS PRN
COMMUNITY

## 2021-12-12 RX ORDER — DEXTROMETHORPHAN POLISTIREX 30 MG/5ML
60 SUSPENSION ORAL 2 TIMES DAILY PRN
COMMUNITY

## 2021-12-12 RX ORDER — DOCUSATE SODIUM 100 MG/1
100 CAPSULE, LIQUID FILLED ORAL DAILY
COMMUNITY

## 2021-12-12 RX ORDER — FAMOTIDINE 20 MG/1
20 TABLET, FILM COATED ORAL NIGHTLY
COMMUNITY

## 2021-12-12 RX ORDER — OXYCODONE HYDROCHLORIDE AND ACETAMINOPHEN 5; 325 MG/1; MG/1
1 TABLET ORAL EVERY 6 HOURS PRN
Status: ON HOLD | COMMUNITY
End: 2021-12-15 | Stop reason: SDUPTHER

## 2021-12-12 RX ORDER — LOPERAMIDE HYDROCHLORIDE 2 MG/1
2 CAPSULE ORAL EVERY 6 HOURS PRN
COMMUNITY

## 2021-12-12 RX ORDER — FUROSEMIDE 10 MG/ML
20 INJECTION INTRAMUSCULAR; INTRAVENOUS ONCE
Status: COMPLETED | OUTPATIENT
Start: 2021-12-12 | End: 2021-12-12

## 2021-12-12 RX ORDER — UMECLIDINIUM 62.5 UG/1
1 AEROSOL, POWDER ORAL DAILY
COMMUNITY

## 2021-12-12 RX ORDER — ALBUTEROL SULFATE 2.5 MG/3ML
2.5 SOLUTION RESPIRATORY (INHALATION) EVERY 6 HOURS PRN
Status: DISCONTINUED | OUTPATIENT
Start: 2021-12-12 | End: 2021-12-15 | Stop reason: HOSPADM

## 2021-12-12 RX ORDER — ALBUTEROL SULFATE 2.5 MG/3ML
2.5 SOLUTION RESPIRATORY (INHALATION) EVERY 8 HOURS PRN
COMMUNITY

## 2021-12-12 RX ADMIN — GABAPENTIN 100 MG: 100 CAPSULE ORAL at 01:08

## 2021-12-12 RX ADMIN — PREGABALIN 150 MG: 75 CAPSULE ORAL at 09:30

## 2021-12-12 RX ADMIN — FUROSEMIDE 20 MG: 40 TABLET ORAL at 09:30

## 2021-12-12 RX ADMIN — ATORVASTATIN CALCIUM 20 MG: 20 TABLET, FILM COATED ORAL at 21:43

## 2021-12-12 RX ADMIN — ASPIRIN 81 MG: 81 TABLET, CHEWABLE ORAL at 09:30

## 2021-12-12 RX ADMIN — LEVETIRACETAM 500 MG: 500 TABLET, FILM COATED ORAL at 09:30

## 2021-12-12 RX ADMIN — SODIUM CHLORIDE, PRESERVATIVE FREE 10 ML: 5 INJECTION INTRAVENOUS at 21:56

## 2021-12-12 RX ADMIN — LEVETIRACETAM 500 MG: 500 TABLET, FILM COATED ORAL at 21:43

## 2021-12-12 RX ADMIN — SODIUM CHLORIDE, PRESERVATIVE FREE 10 ML: 5 INJECTION INTRAVENOUS at 06:10

## 2021-12-12 RX ADMIN — HYDRALAZINE HYDROCHLORIDE 25 MG: 25 TABLET, FILM COATED ORAL at 21:43

## 2021-12-12 RX ADMIN — LACOSAMIDE 100 MG: 100 TABLET, FILM COATED ORAL at 09:30

## 2021-12-12 RX ADMIN — LACOSAMIDE 100 MG: 100 TABLET, FILM COATED ORAL at 21:44

## 2021-12-12 RX ADMIN — TIOTROPIUM BROMIDE INHALATION SPRAY 2 PUFF: 3.12 SPRAY, METERED RESPIRATORY (INHALATION) at 08:48

## 2021-12-12 RX ADMIN — SODIUM CHLORIDE, PRESERVATIVE FREE 10 ML: 5 INJECTION INTRAVENOUS at 09:30

## 2021-12-12 RX ADMIN — FUROSEMIDE 20 MG: 10 INJECTION, SOLUTION INTRAMUSCULAR; INTRAVENOUS at 22:25

## 2021-12-12 RX ADMIN — TROSPIUM CHLORIDE 20 MG: 20 TABLET, FILM COATED ORAL at 06:09

## 2021-12-12 RX ADMIN — LACOSAMIDE 100 MG: 100 TABLET, FILM COATED ORAL at 01:08

## 2021-12-12 RX ADMIN — ENOXAPARIN SODIUM 40 MG: 100 INJECTION SUBCUTANEOUS at 01:08

## 2021-12-12 RX ADMIN — BUDESONIDE AND FORMOTEROL FUMARATE DIHYDRATE 2 PUFF: 80; 4.5 AEROSOL RESPIRATORY (INHALATION) at 19:55

## 2021-12-12 RX ADMIN — PREGABALIN 150 MG: 75 CAPSULE ORAL at 01:08

## 2021-12-12 RX ADMIN — HYDRALAZINE HYDROCHLORIDE 25 MG: 25 TABLET, FILM COATED ORAL at 06:09

## 2021-12-12 RX ADMIN — HYDROXYCHLOROQUINE SULFATE 300 MG: 200 TABLET ORAL at 09:30

## 2021-12-12 RX ADMIN — PHENYTOIN 100 MG: 50 TABLET, CHEWABLE ORAL at 21:52

## 2021-12-12 RX ADMIN — HYDRALAZINE HYDROCHLORIDE 25 MG: 25 TABLET, FILM COATED ORAL at 13:55

## 2021-12-12 RX ADMIN — PHENYTOIN 100 MG: 50 TABLET, CHEWABLE ORAL at 13:55

## 2021-12-12 RX ADMIN — BUDESONIDE AND FORMOTEROL FUMARATE DIHYDRATE 2 PUFF: 80; 4.5 AEROSOL RESPIRATORY (INHALATION) at 08:50

## 2021-12-12 RX ADMIN — FERROUS SULFATE TAB EC 324 MG (65 MG FE EQUIVALENT) 324 MG: 324 (65 FE) TABLET DELAYED RESPONSE at 09:30

## 2021-12-12 RX ADMIN — ACETAMINOPHEN 650 MG: 325 TABLET ORAL at 21:39

## 2021-12-12 RX ADMIN — ENOXAPARIN SODIUM 40 MG: 100 INJECTION SUBCUTANEOUS at 21:53

## 2021-12-12 RX ADMIN — ALLOPURINOL 100 MG: 100 TABLET ORAL at 06:09

## 2021-12-12 RX ADMIN — TROSPIUM CHLORIDE 20 MG: 20 TABLET, FILM COATED ORAL at 22:26

## 2021-12-12 RX ADMIN — IPRATROPIUM BROMIDE AND ALBUTEROL SULFATE 1 AMPULE: .5; 2.5 SOLUTION RESPIRATORY (INHALATION) at 19:55

## 2021-12-12 RX ADMIN — PHENYTOIN 100 MG: 50 TABLET, CHEWABLE ORAL at 09:30

## 2021-12-12 ASSESSMENT — PAIN SCALES - GENERAL
PAINLEVEL_OUTOF10: 0
PAINLEVEL_OUTOF10: 8
PAINLEVEL_OUTOF10: 0
PAINLEVEL_OUTOF10: 0
PAINLEVEL_OUTOF10: 1
PAINLEVEL_OUTOF10: 0

## 2021-12-12 ASSESSMENT — PAIN DESCRIPTION - PAIN TYPE: TYPE: ACUTE PAIN

## 2021-12-12 NOTE — PROGRESS NOTES
4 Eyes Skin Assessment     NAME:  Sophia Cuellar  YOB: 1950  MEDICAL RECORD NUMBER:  [id number]    The patient is being assess for  Admission    I agree that 2 RN's have performed a thorough Head to Toe Skin Assessment on the patient. ALL assessment sites listed below have been assessed. Areas assessed by both nurses:    Head, Face, Ears, Shoulders, Back, Chest, Arms, Elbows, Hands, Sacrum. Buttock, Coccyx, Ischium and Legs. Feet and Heels        Does the Patient have a Wound?  No noted wound(s)       Shekhar Prevention initiated:  No   Wound Care Orders initiated:  No    Pressure Injury (Stage 3,4, Unstageable, DTI, NWPT, and Complex wounds) if present place consult order under [de-identified] No    New and Established Ostomies if present place consult order under : No      Nurse 1 eSignature: Electronically signed by Sarah Maradiaga RN on 12/12/21 at 4:32 AM EST    **SHARE this note so that the co-signing nurse is able to place an eSignature**    Nurse 2 eSignature: Electronically signed by Ayesha Pickard RN on 12/12/21 at 6:19 AM EST

## 2021-12-12 NOTE — CONSULTS
at 12/12/21 0930    sodium chloride flush 0.9 % injection 10 mL  10 mL IntraVENous PRN Leigh Greer, MD        0.9 % sodium chloride infusion  25 mL IntraVENous PRN Leigh Greer, MD        potassium chloride (KLOR-CON M) extended release tablet 40 mEq  40 mEq Oral PRN Leigh Greer, MD        Or    potassium bicarb-citric acid (EFFER-K) effervescent tablet 40 mEq  40 mEq Oral PRN Leigh Greer, MD        Or    potassium chloride 10 mEq/100 mL IVPB (Peripheral Line)  10 mEq IntraVENous PRN Leigh Mean, MD        potassium chloride 10 mEq/100 mL IVPB (Peripheral Line)  10 mEq IntraVENous PRN Leigh Greer, MD        magnesium sulfate 2000 mg in 50 mL IVPB premix  2,000 mg IntraVENous PRN Leigh Greer, MD        enoxaparin (LOVENOX) injection 40 mg  40 mg SubCUTAneous Nightly Leigh Greer, MD   40 mg at 12/12/21 0108    promethazine (PHENERGAN) tablet 12.5 mg  12.5 mg Oral Q6H PRN Leigh Greer MD        Or    ondansetron (ZOFRAN) injection 4 mg  4 mg IntraVENous Q6H PRN Leigh Greer, MD        magnesium hydroxide (MILK OF MAGNESIA) 400 MG/5ML suspension 30 mL  30 mL Oral Daily PRN Leigh Greer MD        acetaminophen (TYLENOL) tablet 650 mg  650 mg Oral Q6H PRN Leigh Greer, MD   650 mg at 12/11/21 2133    Or    acetaminophen (TYLENOL) suppository 650 mg  650 mg Rectal Q6H PRN Leigh Greer, MD        perflutren lipid microspheres (DEFINITY) injection 1.65 mg  1.5 mL IntraVENous ONCE PRN Leigh Greer, MD        allopurinol (ZYLOPRIM) tablet 100 mg  100 mg Oral Nightly Leigh Greer, MD   100 mg at 12/12/21 0609    aspirin chewable tablet 81 mg  81 mg Oral Daily Conner Pederson MD   81 mg at 12/12/21 0930    ferrous sulfate EC tablet 324 mg  324 mg Oral Daily with breakfast Leigh Greer, MD   324 mg at 12/12/21 0930    budesonide-formoterol (SYMBICORT) 80-4.5 MCG/ACT inhaler 2 puff  2 puff Inhalation BID Leigh Greer, MD   2 puff at 12/12/21 0850    furosemide (LASIX) tablet 20 mg  20 mg Oral Daily Conner (112.2 kg)   04/28/20 196 lb 6.9 oz (89.1 kg)     Constitutional: She is lethargic. She appears obese. In no acute distress. Head: Normocephalic and atraumatic. Neck: Neck supple. No JVD present. Carotid bruit is not present. No mass and no thyromegaly present. No lymphadenopathy present. Cardiovascular: Normal rate, regular rhythm, normal heart sounds and intact distal pulses. Exam reveals no gallop and no friction rub. No murmur heard. Pulmonary/Chest: Effort normal and breath sounds normal. No respiratory distress. She has no wheezes, rhonchi or rales. Abdominal: Soft, non-tender. Bowel sounds and aorta are normal. She exhibits no organomegaly, mass or bruit. Extremities: No edema, cyanosis, or clubbing. Pulses are 2+ radial/carotid/dorsalis pedis and posterior tibial bilaterally. Neurological: She is alert and oriented to person, place, and time. She has normal reflexes. No cranial nerve deficit. Coordination normal.   Skin: Skin is warm and dry. There is no rash or diaphoresis. Personally reviewed and interpreted   EKG Interpretation: Sinus rhythm with bifascicular block    Lab Review:   No results found for: TRIG, HDL, LDLCALC, LDLDIRECT, LABVLDL  Lab Results   Component Value Date     12/12/2021    K 4.9 12/12/2021    BUN 25 12/12/2021    CREATININE 1.1 12/12/2021     Recent Labs     12/11/21  1444 12/11/21  1444 12/12/21  0540   WBC 5.9   < > 6.8   HGB 11.5*  --  10.3*   HCT 37.7  --  33.7*     --  191    < > = values in this interval not displayed. Troponin 0.03        Assessment / Plan:    1. Chest Pain, unspecified  The patient appears comfortable without any significant chest discomfort but I can not assess her. Would not pursue any work-up in her current state. Her troponin is just outside the range of normal.     2. Essential Hypertension  Controlled on current therapy.      3. Dementia, unspecified without behavioral disturbance  I am uncertain of whether this is her baseline dementia at play or whether she has anything acute contributing to her lethargy. I will sign off for now. Please call with questions. If she improves to the point we can actually evaluate her we would be happy to do so.

## 2021-12-12 NOTE — PROGRESS NOTES
Pt in bed, pt only alert to person and place. Pt has a slow response . It took 15 mins or so for this pt to answer nurse questions. Pt denies any pain when asked. VSS. Scheduled meds given to the pt as ordered by MD. Pure wick in place and functionally appropriately. Pt resting in bed. Bed alarm on. Call light and item need in reach. Electronically signed by Karen Fair RN on 12/12/2021 at 3:33 PM

## 2021-12-12 NOTE — PLAN OF CARE
Problem: Falls - Risk of:  Goal: Will remain free from falls  Description: Will remain free from falls  12/12/2021 1534 by Brandy Tafoya RN  Outcome: Ongoing  Note: Pt free from falls this shift. Non skid socks provided. Pt educated on use of call light as needed for assistance. Bed alarm on. Call light always within reach. Pt able and agreeable to contact for safety appropriately. 12/12/2021 1534 by Brandy Tafoya RN  Outcome: Ongoing  12/12/2021 0428 by Kortney Lee RN  Outcome: Ongoing     Problem: Falls - Risk of:  Goal: Absence of physical injury  Description: Absence of physical injury  12/12/2021 1534 by Brandy Tafoya RN  Outcome: Ongoing     Problem: Skin Integrity:  Goal: Will show no infection signs and symptoms  Description: Will show no infection signs and symptoms  12/12/2021 1534 by Brandy Tafoya RN  Outcome: Ongoing  Note: Skin assessment performed each shift per protocol. Pt encouraged to reposition often. Will continue to monitor and assess for skin breakdown. Problem: Pain:  Goal: Pain level will decrease  Description: Pain level will decrease  Outcome: Ongoing  Note: Pt able to express presence and absence of pain using numerical pain scale. Pt pain is managed by PRN analgesics as ordered by MD. Pain reassess after each interventions. Will continue to monitor as needed.

## 2021-12-12 NOTE — PROGRESS NOTES
Physical Therapy  Initial Evaluation Attempt    21    Name: Evita Huggins   : 1950    MRN: [id number]    PT order noted. Patient last seen by [facility] therapy department in  - reporting she was likely dependent with care. This PT placed phone call to [facility] to double check this - they report she was dependent for mobility, using brenda lift to get to electric wheelchair. PT will sign off due to baseline mobility at this time. Anticipate return to [facility] without PT.     Electronically signed by Antelmo Mahajan PT on 2021 at 9:06 AM

## 2021-12-12 NOTE — H&P
Historical Provider, MD   furosemide (LASIX) 20 MG tablet Take 20 mg by mouth daily    Historical Provider, MD   gabapentin (NEURONTIN) 100 MG capsule Take 100 mg by mouth nightly. Historical Provider, MD   hydrALAZINE (APRESOLINE) 25 MG tablet Take 25 mg by mouth every 8 hours    Historical Provider, MD   levETIRAcetam (KEPPRA) 500 MG tablet Take 500 mg by mouth every 12 hours    Historical Provider, MD   pregabalin (LYRICA) 150 MG capsule Take 150 mg by mouth every 12 hours. Historical Provider, MD   melatonin 3 MG TABS tablet Take 6 mg by mouth nightly    Historical Provider, MD   magnesium hydroxide (MILK OF MAGNESIA) 400 MG/5ML suspension Take by mouth daily as needed for Constipation    Historical Provider, MD   montelukast (SINGULAIR) 10 MG tablet Take 10 mg by mouth nightly    Historical Provider, MD   omeprazole (PRILOSEC) 20 MG delayed release capsule Take 20 mg by mouth nightly    Historical Provider, MD   phenytoin (DILANTIN) 50 MG tablet Take 100 mg by mouth every 8 hours    Historical Provider, MD   potassium chloride (KLOR-CON M) 10 MEQ extended release tablet Take 10 mEq by mouth daily    Historical Provider, MD   Light Mineral Oil-Mineral Oil (RETAINE MGD) 0.5-0.5 % EMUL Place 1 drop into both eyes every 12 hours Brand name Retaine MGD ophthalmic emulsion    Historical Provider, MD   simvastatin (ZOCOR) 10 MG tablet Take 10 mg by mouth nightly    Historical Provider, MD   tiotropium (SPIRIVA RESPIMAT) 1.25 MCG/ACT AERS inhaler Inhale 2 puffs into the lungs daily    Historical Provider, MD   albuterol sulfate HFA (VENTOLIN HFA) 108 (90 Base) MCG/ACT inhaler Inhale 2 puffs into the lungs every 6 hours as needed for Shortness of Breath    Historical Provider, MD   lacosamide (VIMPAT) 50 MG TABS tablet Take 100 mg by mouth every 12 hours.     Historical Provider, MD   vitamin D 25 MCG (1000 UT) CAPS Take 1,000 Units by mouth daily    Historical Provider, MD   Vitamin E 100 units TABS Take 100 Units by mouth daily    Historical Provider, MD       Allergies:  Patient has no known allergies. Social History:      TOBACCO:   reports that she has never smoked. She has never used smokeless tobacco.  ETOH:   reports no history of alcohol use. Family History:       Reviewed in detail and non contributory      History reviewed. No pertinent family history. REVIEW OF SYSTEMS:   Pertinent positives as noted in the HPI. All other systems reviewed and negative. PHYSICAL EXAM PERFORMED:    BP (!) 131/47   Pulse 77   Temp 98.2 °F (36.8 °C) (Oral)   Resp 12   Wt 242 lb 8.1 oz (110 kg)   SpO2 95%   BMI 41.63 kg/m²     General appearance:  No apparent distress, cooperative. HEENT:  Normal cephalic, atraumatic without obvious deformity. Conjunctivae/corneas clear. Neck: Supple, with full range of motion. No cervical lymphadenopathy  Respiratory:  Normal respiratory effort. Clear to auscultation, bilaterally without Rales/Wheezes/Rhonchi. Cardiovascular:  Regular rate and rhythm with normal S1/S2 without murmurs, rubs or gallops. Abdomen: Soft, non-tender, non-distended, normal bowel sounds. Musculoskeletal:  No edema noted bilaterally. No tenderness on palpation   Skin: no rash visible  Neurologic:  Neurologically intact without any focal sensory/motor deficits. grossly non-focal.  Psychiatric:  Alert and oriented, normal mood  Peripheral Pulses: +2 palpable, equal bilaterally       Labs:     Recent Labs     12/11/21  1444   WBC 5.9   HGB 11.5*   HCT 37.7        Recent Labs     12/11/21  1444      K 5.2*      CO2 22   BUN 23*   CREATININE 1.1   CALCIUM 9.1     Recent Labs     12/11/21  1444   AST 20   ALT 15   BILITOT <0.2   ALKPHOS 184*     No results for input(s): INR in the last 72 hours.   Recent Labs     12/11/21  1444   TROPONINI 0.03*       Urinalysis:      Lab Results   Component Value Date    NITRU Negative 04/14/2020    WBCUA 52 04/14/2020    RBCUA 25 04/14/2020    BLOODU MODERATE 04/14/2020    SPECGRAV 1.025 04/14/2020    GLUCOSEU Negative 04/14/2020       Radiology:       XR CHEST PORTABLE   Final Result   Pulmonary vascular congestion/interstitial edema. Cardiomegaly. Active Hospital Problems    Diagnosis Date Noted    Chest pain [R07.9] 12/11/2021       Patient is a 68-year-old female who presented to the hospital for chest pain, substernal, nonradiating. According to patient her chest pain is 5/10 intensity, substernal, nonradiating, not associate with nausea vomiting, started while she was sitting in chair. Otherwise denied fevers chills nausea vomiting diarrhea constipation dysuria. Patient was admitted for further cardiac evaluation. Assessment  Chest pain rule out ACS  Diabetes mellitus  COPD  CKD stage III  Hypertension  Seizure disorder    Plan  We will monitor on cardiac telemetry, monitor troponin, consult cardiology, will order echocardiogram  Insulin sliding scale  Resume home medications  DVT prophylaxis-Lovenox  Diet: ADULT DIET; Regular  Code Status: Full Code    PT/OT Eval Status: ordered    Dispo - pending clinical improvement       Ricardo Rodriguez MD    The note was completed using EMR and Dragon dictation system. Every effort was made to ensure accuracy; however, inadvertent computerized transcription errors may be present. Thank you Vipul Dickey for the opportunity to be involved in this patient's care. If you have any questions or concerns please feel free to contact me at [phone].     Ricardo Rodriguez MD

## 2021-12-12 NOTE — PROGRESS NOTES
Arrived to place PICC line in patient with Charmaine ([place]) RN at bedside, pre procedure and allergies reviewed, no issues accessing brachial vein however, I was unable to advance catheter into the SVC d/t obstruction. Midline inserted instead at 17 cm in length and 0 cm out. Pt tolerated well, blood return and flushed well. Pt left in stable condition and bed braked and in lowest position. Pt call light within reach. Handoff to RN.

## 2021-12-12 NOTE — PROGRESS NOTES
Pt seems lethargic all day, keep moaning . does not speak much. Assisted pt with dinner feed, pt noted to have pocketing of food in her mouth. Mpt also has low-grade fever. Pt case reviewed and received new order. Electronically signed by Dania Waldrop RN on 12/12/2021 at 5:53 PM

## 2021-12-13 LAB
ANION GAP SERPL CALCULATED.3IONS-SCNC: 14 MMOL/L (ref 3–16)
BUN BLDV-MCNC: 23 MG/DL (ref 7–20)
CALCIUM SERPL-MCNC: 9.7 MG/DL (ref 8.3–10.6)
CHLORIDE BLD-SCNC: 102 MMOL/L (ref 99–110)
CO2: 25 MMOL/L (ref 21–32)
CREAT SERPL-MCNC: 1 MG/DL (ref 0.6–1.2)
EKG ATRIAL RATE: 76 BPM
EKG DIAGNOSIS: NORMAL
EKG P AXIS: 35 DEGREES
EKG P-R INTERVAL: 142 MS
EKG Q-T INTERVAL: 432 MS
EKG QRS DURATION: 166 MS
EKG QTC CALCULATION (BAZETT): 486 MS
EKG R AXIS: -55 DEGREES
EKG T AXIS: 39 DEGREES
EKG VENTRICULAR RATE: 76 BPM
GFR AFRICAN AMERICAN: >60
GFR NON-AFRICAN AMERICAN: 54
GLUCOSE BLD-MCNC: 101 MG/DL (ref 70–99)
GLUCOSE BLD-MCNC: 454 MG/DL (ref 70–99)
GLUCOSE BLD-MCNC: 81 MG/DL (ref 70–99)
GLUCOSE BLD-MCNC: 87 MG/DL (ref 70–99)
GLUCOSE BLD-MCNC: 89 MG/DL (ref 70–99)
GLUCOSE BLD-MCNC: 92 MG/DL (ref 70–99)
GLUCOSE BLD-MCNC: 95 MG/DL (ref 70–99)
HCT VFR BLD CALC: 35.3 % (ref 36–48)
HEMOGLOBIN: 11 G/DL (ref 12–16)
LV EF: 58 %
LVEF MODALITY: NORMAL
MAGNESIUM: 2.3 MG/DL (ref 1.8–2.4)
MCH RBC QN AUTO: 26 PG (ref 26–34)
MCHC RBC AUTO-ENTMCNC: 31.1 G/DL (ref 31–36)
MCV RBC AUTO: 83.7 FL (ref 80–100)
PDW BLD-RTO: 14.3 % (ref 12.4–15.4)
PERFORMED ON: ABNORMAL
PERFORMED ON: NORMAL
PLATELET # BLD: 206 K/UL (ref 135–450)
PMV BLD AUTO: 8.8 FL (ref 5–10.5)
POTASSIUM SERPL-SCNC: 4.7 MMOL/L (ref 3.5–5.1)
PROCALCITONIN: 0.12 NG/ML (ref 0–0.15)
RBC # BLD: 4.22 M/UL (ref 4–5.2)
SODIUM BLD-SCNC: 141 MMOL/L (ref 136–145)
TROPONIN: 0.01 NG/ML
TROPONIN: 0.03 NG/ML
WBC # BLD: 7.9 K/UL (ref 4–11)

## 2021-12-13 PROCEDURE — 85027 COMPLETE CBC AUTOMATED: CPT

## 2021-12-13 PROCEDURE — 93005 ELECTROCARDIOGRAM TRACING: CPT | Performed by: INTERNAL MEDICINE

## 2021-12-13 PROCEDURE — 2580000003 HC RX 258: Performed by: NURSE PRACTITIONER

## 2021-12-13 PROCEDURE — 2700000000 HC OXYGEN THERAPY PER DAY

## 2021-12-13 PROCEDURE — 2580000003 HC RX 258: Performed by: INTERNAL MEDICINE

## 2021-12-13 PROCEDURE — 6370000000 HC RX 637 (ALT 250 FOR IP): Performed by: INTERNAL MEDICINE

## 2021-12-13 PROCEDURE — 6360000002 HC RX W HCPCS: Performed by: INTERNAL MEDICINE

## 2021-12-13 PROCEDURE — 84484 ASSAY OF TROPONIN QUANT: CPT

## 2021-12-13 PROCEDURE — 93010 ELECTROCARDIOGRAM REPORT: CPT | Performed by: INTERNAL MEDICINE

## 2021-12-13 PROCEDURE — 83735 ASSAY OF MAGNESIUM: CPT

## 2021-12-13 PROCEDURE — 94761 N-INVAS EAR/PLS OXIMETRY MLT: CPT

## 2021-12-13 PROCEDURE — 84145 PROCALCITONIN (PCT): CPT

## 2021-12-13 PROCEDURE — 6360000002 HC RX W HCPCS: Performed by: NURSE PRACTITIONER

## 2021-12-13 PROCEDURE — 6370000000 HC RX 637 (ALT 250 FOR IP): Performed by: EMERGENCY MEDICINE

## 2021-12-13 PROCEDURE — 92610 EVALUATE SWALLOWING FUNCTION: CPT

## 2021-12-13 PROCEDURE — 94640 AIRWAY INHALATION TREATMENT: CPT

## 2021-12-13 PROCEDURE — 93306 TTE W/DOPPLER COMPLETE: CPT

## 2021-12-13 PROCEDURE — 80048 BASIC METABOLIC PNL TOTAL CA: CPT

## 2021-12-13 PROCEDURE — 87040 BLOOD CULTURE FOR BACTERIA: CPT

## 2021-12-13 PROCEDURE — 1200000000 HC SEMI PRIVATE

## 2021-12-13 RX ORDER — FUROSEMIDE 10 MG/ML
20 INJECTION INTRAMUSCULAR; INTRAVENOUS 2 TIMES DAILY
Status: DISCONTINUED | OUTPATIENT
Start: 2021-12-13 | End: 2021-12-15

## 2021-12-13 RX ADMIN — FUROSEMIDE 20 MG: 10 INJECTION, SOLUTION INTRAMUSCULAR; INTRAVENOUS at 17:43

## 2021-12-13 RX ADMIN — ONDANSETRON 4 MG: 2 INJECTION INTRAMUSCULAR; INTRAVENOUS at 09:07

## 2021-12-13 RX ADMIN — NITROGLYCERIN 0.4 MG: 0.4 TABLET, ORALLY DISINTEGRATING SUBLINGUAL at 11:22

## 2021-12-13 RX ADMIN — ACETAMINOPHEN 650 MG: 325 TABLET ORAL at 18:35

## 2021-12-13 RX ADMIN — HYDRALAZINE HYDROCHLORIDE 25 MG: 25 TABLET, FILM COATED ORAL at 15:04

## 2021-12-13 RX ADMIN — HYDROXYCHLOROQUINE SULFATE 300 MG: 200 TABLET ORAL at 08:50

## 2021-12-13 RX ADMIN — ENOXAPARIN SODIUM 40 MG: 100 INJECTION SUBCUTANEOUS at 22:13

## 2021-12-13 RX ADMIN — ASPIRIN 81 MG: 81 TABLET, CHEWABLE ORAL at 08:50

## 2021-12-13 RX ADMIN — ATORVASTATIN CALCIUM 20 MG: 20 TABLET, FILM COATED ORAL at 22:12

## 2021-12-13 RX ADMIN — PHENYTOIN 100 MG: 50 TABLET, CHEWABLE ORAL at 22:18

## 2021-12-13 RX ADMIN — Medication 6 MG: at 22:12

## 2021-12-13 RX ADMIN — SODIUM CHLORIDE, PRESERVATIVE FREE 10 ML: 5 INJECTION INTRAVENOUS at 22:13

## 2021-12-13 RX ADMIN — IPRATROPIUM BROMIDE AND ALBUTEROL SULFATE 1 AMPULE: .5; 2.5 SOLUTION RESPIRATORY (INHALATION) at 08:09

## 2021-12-13 RX ADMIN — ACETAMINOPHEN 650 MG: 650 SUPPOSITORY RECTAL at 05:10

## 2021-12-13 RX ADMIN — ALLOPURINOL 100 MG: 100 TABLET ORAL at 22:12

## 2021-12-13 RX ADMIN — LEVETIRACETAM 500 MG: 500 TABLET, FILM COATED ORAL at 08:53

## 2021-12-13 RX ADMIN — LACOSAMIDE 100 MG: 100 TABLET, FILM COATED ORAL at 08:53

## 2021-12-13 RX ADMIN — IPRATROPIUM BROMIDE AND ALBUTEROL SULFATE 1 AMPULE: .5; 2.5 SOLUTION RESPIRATORY (INHALATION) at 19:53

## 2021-12-13 RX ADMIN — FUROSEMIDE 20 MG: 40 TABLET ORAL at 08:53

## 2021-12-13 RX ADMIN — SODIUM CHLORIDE, PRESERVATIVE FREE 10 ML: 5 INJECTION INTRAVENOUS at 09:07

## 2021-12-13 RX ADMIN — FERROUS SULFATE TAB EC 324 MG (65 MG FE EQUIVALENT) 324 MG: 324 (65 FE) TABLET DELAYED RESPONSE at 08:54

## 2021-12-13 RX ADMIN — BUDESONIDE AND FORMOTEROL FUMARATE DIHYDRATE 2 PUFF: 80; 4.5 AEROSOL RESPIRATORY (INHALATION) at 19:54

## 2021-12-13 RX ADMIN — LEVETIRACETAM 500 MG: 500 TABLET, FILM COATED ORAL at 22:12

## 2021-12-13 RX ADMIN — LACOSAMIDE 100 MG: 100 TABLET, FILM COATED ORAL at 22:12

## 2021-12-13 RX ADMIN — TIOTROPIUM BROMIDE INHALATION SPRAY 2 PUFF: 3.12 SPRAY, METERED RESPIRATORY (INHALATION) at 08:15

## 2021-12-13 RX ADMIN — HYDRALAZINE HYDROCHLORIDE 25 MG: 25 TABLET, FILM COATED ORAL at 22:12

## 2021-12-13 RX ADMIN — PREGABALIN 150 MG: 75 CAPSULE ORAL at 22:12

## 2021-12-13 RX ADMIN — BUDESONIDE AND FORMOTEROL FUMARATE DIHYDRATE 2 PUFF: 80; 4.5 AEROSOL RESPIRATORY (INHALATION) at 08:17

## 2021-12-13 RX ADMIN — PHENYTOIN 100 MG: 50 TABLET, CHEWABLE ORAL at 15:05

## 2021-12-13 RX ADMIN — TROSPIUM CHLORIDE 20 MG: 20 TABLET, FILM COATED ORAL at 15:05

## 2021-12-13 RX ADMIN — NITROGLYCERIN 0.4 MG: 0.4 TABLET, ORALLY DISINTEGRATING SUBLINGUAL at 10:51

## 2021-12-13 RX ADMIN — PHENYTOIN 100 MG: 50 TABLET, CHEWABLE ORAL at 08:54

## 2021-12-13 RX ADMIN — CEFTRIAXONE 1000 MG: 1 INJECTION, POWDER, FOR SOLUTION INTRAMUSCULAR; INTRAVENOUS at 00:04

## 2021-12-13 RX ADMIN — HYDRALAZINE HYDROCHLORIDE 25 MG: 25 TABLET, FILM COATED ORAL at 08:54

## 2021-12-13 ASSESSMENT — PAIN SCALES - GENERAL
PAINLEVEL_OUTOF10: 10
PAINLEVEL_OUTOF10: 6
PAINLEVEL_OUTOF10: 2
PAINLEVEL_OUTOF10: 2
PAINLEVEL_OUTOF10: 0

## 2021-12-13 ASSESSMENT — PAIN DESCRIPTION - DESCRIPTORS: DESCRIPTORS: ACHING

## 2021-12-13 ASSESSMENT — PAIN SCALES - WONG BAKER
WONGBAKER_NUMERICALRESPONSE: 6;8
WONGBAKER_NUMERICALRESPONSE: 2

## 2021-12-13 NOTE — PLAN OF CARE
Problem: Falls - Risk of:  Goal: Will remain free from falls  Description: Will remain free from falls  12/12/2021 2300 by Kemi Nj RN  Outcome: Ongoing  12/12/2021 1534 by Marizol Walsh RN  Outcome: Ongoing  Note: Pt free from falls this shift. Non skid socks provided. Pt educated on use of call light as needed for assistance. Bed alarm on. Call light always within reach. Pt able and agreeable to contact for safety appropriately. 12/12/2021 1534 by Marizol Walsh RN  Outcome: Ongoing  Goal: Absence of physical injury  Description: Absence of physical injury  12/12/2021 2300 by Kemi Nj RN  Outcome: Ongoing  12/12/2021 1534 by Marizol Walsh RN  Outcome: Ongoing  12/12/2021 1534 by Marizol Walsh RN  Outcome: Ongoing     Problem: Skin Integrity:  Goal: Will show no infection signs and symptoms  Description: Will show no infection signs and symptoms  12/12/2021 2300 by Kemi Nj RN  Outcome: Ongoing  12/12/2021 1534 by Marizol Walsh RN  Outcome: Ongoing  Note: Skin assessment performed each shift per protocol. Pt encouraged to reposition often. Will continue to monitor and assess for skin breakdown. Goal: Absence of new skin breakdown  Description: Absence of new skin breakdown  Outcome: Ongoing     Problem: Pain:  Goal: Pain level will decrease  Description: Pain level will decrease  12/12/2021 2300 by Kemi Nj RN  Outcome: Ongoing  12/12/2021 1534 by Marizol Walsh RN  Outcome: Ongoing  Note: Pt able to express presence and absence of pain using numerical pain scale. Pt pain is managed by PRN analgesics as ordered by MD. Pain reassess after each interventions. Will continue to monitor as needed.      Goal: Control of acute pain  Description: Control of acute pain  Outcome: Ongoing  Goal: Control of chronic pain  Description: Control of chronic pain  Outcome: Ongoing

## 2021-12-13 NOTE — PROGRESS NOTES
Hospitalist Progress Note      PCP: Sadia Aguilera    Chief Complaint. Patient is a 57-year-old female who presented to the hospital for chest pain, substernal, nonradiating. According to patient her chest pain is 5/10 intensity, substernal, nonradiating, not associate with nausea vomiting, started while she was sitting in chair. Otherwise denied fevers chills nausea vomiting diarrhea constipation dysuria. Patient was admitted for further cardiac evaluation.     Date of Admission: 12/11/2021    Subjective:  Lethargic, not holding conversations, no acute events overnight  Medications:  Reviewed    Infusion Medications    sodium chloride      dextrose       Scheduled Medications    tiotropium  2 puff Inhalation Daily    ipratropium-albuterol  1 ampule Inhalation 4x daily    furosemide  20 mg IntraVENous Once    lidocaine 1 % injection  5 mL IntraDERmal Once    sodium chloride flush  10 mL IntraVENous 2 times per day    enoxaparin  40 mg SubCUTAneous Nightly    allopurinol  100 mg Oral Nightly    aspirin  81 mg Oral Daily    ferrous sulfate  324 mg Oral Daily with breakfast    budesonide-formoterol  2 puff Inhalation BID    furosemide  20 mg Oral Daily    [Held by provider] gabapentin  100 mg Oral Nightly    hydrALAZINE  25 mg Oral 3 times per day    hydroxychloroquine  300 mg Oral Daily    lacosamide  100 mg Oral 2 times per day    levETIRAcetam  500 mg Oral 2 times per day    [Held by provider] melatonin  6 mg Oral Nightly    phenytoin  100 mg Oral TID    [Held by provider] pregabalin  150 mg Oral 2 times per day    atorvastatin  20 mg Oral Nightly    trospium  20 mg Oral BID AC    insulin lispro  0-12 Units SubCUTAneous TID WC    insulin lispro  0-6 Units SubCUTAneous Nightly     PRN Meds: albuterol, nitroGLYCERIN, sodium chloride flush, sodium chloride, potassium chloride **OR** potassium alternative oral replacement **OR** potassium chloride, potassium chloride, magnesium sulfate, promethazine **OR** ondansetron, magnesium hydroxide, acetaminophen **OR** acetaminophen, perflutren lipid microspheres, glucose, dextrose, glucagon (rDNA), dextrose      Intake/Output Summary (Last 24 hours) at 12/12/2021 1905  Last data filed at 12/12/2021 1414  Gross per 24 hour   Intake 430 ml   Output 800 ml   Net -370 ml       Physical Exam Performed:    /64   Pulse 81   Temp 99.9 °F (37.7 °C) (Oral)   Resp 20   Wt 247 lb 5.7 oz (112.2 kg)   SpO2 96%   BMI 42.46 kg/m²     General appearance: NAD  HEENT:  Conjunctivae/corneas clear. Neck: Supple, with full range of motion. Respiratory:  Normal respiratory effort. Clear to auscultation, bilaterally without Rales/Wheezes/Rhonchi. Cardiovascular: Regular rate and rhythm with normal S1/S2 without murmurs or rubs  Abdomen: Soft, non-tender, non-distended, normal bowel sounds. Musculoskeletal: No cyanosis or edema bilaterally  Neurologic:  Lethargic, not following commands  Psychiatric: Alert and oriented, Normal mood  Peripheral Pulses: +2 palpable, equal bilaterally       Labs:   Recent Labs     12/11/21  1444 12/12/21  0540   WBC 5.9 6.8   HGB 11.5* 10.3*   HCT 37.7 33.7*    191     Recent Labs     12/11/21  1444 12/12/21  0540    143   K 5.2* 4.9    108   CO2 22 27   BUN 23* 25*   CREATININE 1.1 1.1   CALCIUM 9.1 9.0     Recent Labs     12/11/21  1444   AST 20   ALT 15   BILITOT <0.2   ALKPHOS 184*     No results for input(s): INR in the last 72 hours. Recent Labs     12/11/21  1444   TROPONINI 0.03*       Urinalysis:      Lab Results   Component Value Date    NITRU Negative 04/14/2020    WBCUA 52 04/14/2020    RBCUA 25 04/14/2020    BLOODU MODERATE 04/14/2020    SPECGRAV 1.025 04/14/2020    GLUCOSEU Negative 04/14/2020       Radiology:  XR CHEST PORTABLE   Final Result   Pulmonary vascular congestion/interstitial edema. Cardiomegaly.          XR CHEST 1 VIEW    (Results Pending)         Assessment/Plan:    Active Hospital Problems    Diagnosis     Chest pain [R07.9]      Patient is a 64-year-old female who presented to the hospital for chest pain, substernal, nonradiating. According to patient her chest pain is 5/10 intensity, substernal, nonradiating, not associate with nausea vomiting, started while she was sitting in chair. Otherwise denied fevers chills nausea vomiting diarrhea constipation dysuria. Patient was admitted for further cardiac evaluation.     Assessment  Chest pain rule out ACS  Diabetes mellitus  COPD  CKD stage III  Hypertension  Seizure disorder     Plan  Patient seems very lethargic today, ordered UA, CXR, speech therapy, Lactic acid, blood cultures, VBG, ProBNP, monitor on cardiac telemetry, monitor troponin, consult cardiology - no plans for further testing. ordered echocardiogram  Insulin sliding scale  Resume home medications  DVT prophylaxis-Lovenox  Diet: ADULT DIET;  Regular; 4 carb choices (60 gm/meal)  Code Status: Full Code    PT/OT Eval Status: ordered    Dispo - lethargic today, follow up on above workup    Clay Delgado MD

## 2021-12-13 NOTE — PLAN OF CARE
Problem: Falls - Risk of:  Goal: Will remain free from falls  Description: Will remain free from falls  12/13/2021 1213 by Mario Alberto Kovacs RN  Outcome: Ongoing  12/12/2021 2300 by Surya Connolly RN  Outcome: Ongoing  Goal: Absence of physical injury  Description: Absence of physical injury  12/13/2021 1213 by Mario Alberto Kovacs RN  Outcome: Ongoing  12/12/2021 2300 by Surya Connolly RN  Outcome: Ongoing     Problem: Skin Integrity:  Goal: Will show no infection signs and symptoms  Description: Will show no infection signs and symptoms  12/13/2021 1213 by Mario Alberto Kovacs RN  Outcome: Ongoing  12/12/2021 2300 by Surya Connolly RN  Outcome: Ongoing  Goal: Absence of new skin breakdown  Description: Absence of new skin breakdown  12/13/2021 1213 by Mario Alberto Kovacs RN  Outcome: Ongoing  12/12/2021 2300 by Surya Connolly RN  Outcome: Ongoing     Problem: Pain:  Description: Pain management should include both nonpharmacologic and pharmacologic interventions.   Goal: Pain level will decrease  Description: Pain level will decrease  12/13/2021 1213 by Mario Alberto Kovacs RN  Outcome: Ongoing  12/12/2021 2300 by Surya Connolly RN  Outcome: Ongoing  Goal: Control of acute pain  Description: Control of acute pain  12/13/2021 1213 by Mario Alberto Kovacs RN  Outcome: Ongoing  12/12/2021 2300 by Surya Connolly RN  Outcome: Ongoing  Goal: Control of chronic pain  Description: Control of chronic pain  12/13/2021 1213 by Mario Alberto Kovacs RN  Outcome: Ongoing  12/12/2021 2300 by Surya Connolly RN  Outcome: Ongoing

## 2021-12-13 NOTE — PROGRESS NOTES
Pt's mental status appears back to baseline, more alert today and yelling out for help r/t dementia. Reports pain in her legs, which she does have chronic pain. Notified Dr Jw Johnson that her gabapentin, melatonin and lyrica are still on hold, see new orders to unhold melatonin and lyrica now.

## 2021-12-13 NOTE — PROGRESS NOTES
Pt now resting quietly in bed, able to swallow 2 pills at a time whole with her diet pepsi, declines meal at this time. Denies nausea or chest pain at the moment. Echo sending transport to reattempt echo at this time.

## 2021-12-13 NOTE — CARE COORDINATION
SALLY placed phone call to [facility]  in admissions at [place] [id number] today to confirm that she's from there and find out if they need anything prior to return. SALLY left a message today at [phone]. SALLY will follow up with family momentarily regarding needs and return. There is a social work consult in today for palliative care and/or goals of care review. Respectfully submitted,    LULÚ Helm  [facility]   [phone]    Electronically signed by LULÚ Sung on 12/13/2021 at 9:36 AM

## 2021-12-13 NOTE — PROGRESS NOTES
Pt vomiting in echo, reporting chest pain and yelling. Sent back from test, notified dr Nimisha Barrera, see new order for troponin x 2, one at 10am and one at [address] [facility],  [address] obtained when back in room, pt 82% on room air, applied 2L up to 94%. Will monitor.

## 2021-12-13 NOTE — PROGRESS NOTES
Speech Language Pathology  Facility/Department: [facility]   CLINICAL BEDSIDE SWALLOW EVALUATION    NAME: Tammy Benedict  : 1950  MRN: [id number]    ADMISSION DATE: 2021  ADMITTING DIAGNOSIS: has Acute respiratory failure with hypoxia ([address] COVID-19 virus infection; Pneumonia; Sepsis ([address] Fever; Tachycardia; Exposure to COVID-19 virus; Hyperglycemia; Dementia ([facility] Renal failure; Asthma; Essential hypertension; Systemic lupus erythematosus ([address] Acute renal failure superimposed on stage 3 chronic kidney disease ([address] Septic shock ([facility] [address] [facility] Pneumonia due to COVID-19 virus; Acute metabolic encephalopathy; Metabolic acidosis; Tachypnea; and Chest pain on their problem list.  ONSET DATE: 2021    Recent Chest Xray/CT of Chest: 2021  Impression   Stable cardiomegaly with slowly resolving central pulmonary congestion.       Hazy opacity throughout the left lung and right lung base which could be   early infiltrates vs atelectasis and/or associated small pleural effusions   layering posteriorly which is more prominent on the left. 2021 Chief Complaint. Patient is a 49-year-old female who presented to the hospital for chest pain, substernal, nonradiating.  According to patient her chest pain is 5/10 intensity, substernal, nonradiating, not associate with nausea vomiting, started while she was sitting in chair.  Otherwise denied fevers chills nausea vomiting diarrhea constipation dysuria.  Patient was admitted for further cardiac evaluation. Date of Eval: 2021  Evaluating Therapist: Jeri Givens MS CCC/SLP [id number]  Speech Language Pathologist      Current Diet level:  Current Diet : Regular  Current Liquid Diet : Thin     Regular with Thin at ECF ([place]) per chart    2021: Pt with vomiting this a.m with crushed meds, telling nsg \"they taste bad\" and then in ECHO.     Primary Complaint  Patient Complaint: None related to PO    Pain:  Pain Assessment  Pain Assessment: pt reports chest pain; nsg is addressing via meds    Reason for Referral  Sophia Cuellar was referred for a bedside swallow evaluation to assess the efficiency of her swallow function, identify signs and symptoms of aspiration and make recommendations regarding safe dietary consistencies, effective compensatory strategies, and safe eating environment. Impression  Dysphagia Diagnosis: Moderate oral stage dysphagia    Dysphagia Impression :   Pt with no clinical signs of aspiration or penetration. Oral phase deficits are noted with soft solid and likely related to pt being edentulous with no dentures present. Pt is slow with solids with increased time for oral clearance. Adequate intake and safety may both be impacted by pt impaired oral skills. Recommend downgrade to minced and moist solids and continue thin liquids. SLP to follow up for diet tolerance. Pt accepts limited trials this date due to c/o not feeling well, further trials indicated to confirm findings and make additional recommendations. Dysphagia Outcome Severity Scale: Level 4: Mild moderate dysphagia- Intermittent supervision/cueing. One - two diet consistencies restricted     Treatment Plan  Requires SLP Intervention: Yes  Duration/Frequency of Treatment: 3-5x/week  D/C Recommendations: 24 hour supervision/assistance       Recommended Diet and Intervention  Diet Solids Recommendation: Dysphagia Minced and Moist (Dysphagia II)  Liquid Consistency Recommendation: Thin  Recommended Form of Meds: Whole with puree  Recommendations: Dysphagia treatment  Therapeutic Interventions: Patient/Family education; Therapeutic PO trials with SLP; Diet tolerance monitoring    Compensatory Swallowing Strategies  Compensatory Swallowing Strategies: Upright as possible for all oral intake; Assist feed; Remain upright for 30-45 minutes after meals    Treatment/Goals  Short-term Goals  Timeframe for Short-term Goals: 10-14 days  Dysphagia Goals:  The patient will tolerate recommended diet without observed clinical signs of aspiration; The patient/caregiver will demonstrate understanding of compensatory strategies for improved swallowing safety.; Pt will tolerate Dysphagia II diet (minced and moist) diet    General  Chart Reviewed: Yes  Behavior/Cognition: Alert; Cooperative  Respiratory Status: Room air  Communication Observation: Functional  Follows Directions: Simple  Dentition: Edentulous  Patient Positioning: Upright in bed  Baseline Vocal Quality: Normal  Volitional Cough:  (unablet to assess)  Volitional Swallow:  (unable to assess)  Prior Dysphagia History: ECF transfer paperwork lists oropharyngeal dysphagia as a dx:  no notes of pt seeing SLP at this facility or any other hospital are found  Consistencies Administered: Dysphagia Soft and Bite-Sized (Dysphagia III); Dysphagia Pureed (Dysphagia I); Thin - straw       Vision/Hearing  Vision  Vision: Within Functional Limits (for this eval)  Hearing  Hearing: Within functional limits (for this eval)    Oral Motor Deficits  Oral/Motor  Oral Motor: Exceptions to [facility]  Lingual Strength: Reduced  Lingual Coordination: Reduced  Gag: Unable to assess    Oral Phase Dysfunction  Oral Phase  Oral Phase: Exceptions  Oral Phase Dysfunction  Impaired Mastication: Soft Solid  Decreased Anterior to Posterior Transit: Soft solid  Lingual/Palatal Residue: Soft solid  Oral Phase  Oral Phase - Comment: Clears oral residue given time and thin liquid to assist.     Indicators of Pharyngeal Phase Dysfunction   Pharyngeal Phase  Pharyngeal Phase: WFL  Pharyngeal Phase   Pharyngeal: No cough, choke or vocal change noted. Swallow appears timely.       Prognosis  Prognosis  Prognosis for safe diet advancement: fair  Individuals consulted  Consulted and agree with results and recommendations: Patient; RN    Education  Patient Education: Review of findings and POC  Patient Education Response: No evidence of learning  Safety Devices in place: Yes  Type of devices: Call light within reach; Left in bed; Nurse notified;  Bed alarm in place       Therapy Time  SLP Individual Minutes  Time In: 1140  Time Out: 1210  Minutes: Sha [address]., MS CCC/SLP [id number]  Speech Language Pathologist  12/13/2021 12:10 PM

## 2021-12-13 NOTE — PROGRESS NOTES
Hospitalist Progress Note      PCP: Karissa Hagen    Chief Complaint. Patient is a 75-year-old female who presented to the hospital for chest pain, substernal, nonradiating. According to patient her chest pain is 5/10 intensity, substernal, nonradiating, not associate with nausea vomiting, started while she was sitting in chair. Otherwise denied fevers chills nausea vomiting diarrhea constipation dysuria. Patient was admitted for further cardiac evaluation.     Date of Admission: 12/11/2021    Subjective: more alert and awake today, holding conversations today, on 2L NC, reports chest pain in chest wall    Medications:  Reviewed    Infusion Medications    sodium chloride      dextrose       Scheduled Medications    furosemide  20 mg IntraVENous BID    tiotropium  2 puff Inhalation Daily    ipratropium-albuterol  1 ampule Inhalation 4x daily    cefTRIAXone (ROCEPHIN) IV  1,000 mg IntraVENous Q24H    lidocaine 1 % injection  5 mL IntraDERmal Once    sodium chloride flush  10 mL IntraVENous 2 times per day    enoxaparin  40 mg SubCUTAneous Nightly    allopurinol  100 mg Oral Nightly    aspirin  81 mg Oral Daily    ferrous sulfate  324 mg Oral Daily with breakfast    budesonide-formoterol  2 puff Inhalation BID    furosemide  20 mg Oral Daily    [Held by provider] gabapentin  100 mg Oral Nightly    hydrALAZINE  25 mg Oral 3 times per day    hydroxychloroquine  300 mg Oral Daily    lacosamide  100 mg Oral 2 times per day    levETIRAcetam  500 mg Oral 2 times per day    [Held by provider] melatonin  6 mg Oral Nightly    phenytoin  100 mg Oral TID    [Held by provider] pregabalin  150 mg Oral 2 times per day    atorvastatin  20 mg Oral Nightly    trospium  20 mg Oral BID AC    insulin lispro  0-12 Units SubCUTAneous TID WC    insulin lispro  0-6 Units SubCUTAneous Nightly     PRN Meds: albuterol, nitroGLYCERIN, sodium chloride flush, sodium chloride, potassium chloride **OR** potassium alternative oral replacement **OR** potassium chloride, potassium chloride, magnesium sulfate, promethazine **OR** ondansetron, magnesium hydroxide, acetaminophen **OR** acetaminophen, perflutren lipid microspheres, glucose, dextrose, glucagon (rDNA), dextrose      Intake/Output Summary (Last 24 hours) at 12/13/2021 1717  Last data filed at 12/13/2021 1047  Gross per 24 hour   Intake 360 ml   Output 800 ml   Net -440 ml       Physical Exam Performed:    BP (!) 157/76   Pulse 78   Temp 98.6 °F (37 °C) (Oral)   Resp 18   Wt 245 lb 6 oz (111.3 kg)   SpO2 98%   BMI 42.12 kg/m²     General appearance: NAD, lying in bed  HEENT:  Conjunctivae/corneas clear. Neck: Supple, with full range of motion. Respiratory:  Normal respiratory effort. Clear to auscultation, bilaterally without Rales/Wheezes/Rhonchi. Cardiovascular: Regular rate and rhythm with normal S1/S2 without murmurs or rubs  Abdomen: Soft, non-tender, non-distended, normal bowel sounds. Musculoskeletal: No cyanosis or edema bilaterally  Neurologic:  Lethargic, not following commands  Psychiatric: Alert and oriented, Normal mood  Peripheral Pulses: +2 palpable, equal bilaterally    Labs:   Recent Labs     12/11/21  1444 12/12/21  0540 12/13/21  0400   WBC 5.9 6.8 7.9   HGB 11.5* 10.3* 11.0*   HCT 37.7 33.7* 35.3*    191 206     Recent Labs     12/11/21  1444 12/12/21  0540 12/13/21  0400    143 141   K 5.2* 4.9 4.7    108 102   CO2 22 27 25   BUN 23* 25* 23*   CREATININE 1.1 1.1 1.0   CALCIUM 9.1 9.0 9.7     Recent Labs     12/11/21  1444   AST 20   ALT 15   BILITOT <0.2   ALKPHOS 184*     No results for input(s): INR in the last 72 hours.   Recent Labs     12/11/21  1444 12/13/21  1034   TROPONINI 0.03* 0.03*       Urinalysis:      Lab Results   Component Value Date    NITRU POSITIVE 12/12/2021    WBCUA 40 12/12/2021    BACTERIA 4+ 12/12/2021    RBCUA 1 12/12/2021    BLOODU Negative 12/12/2021    SPECGRAV 1.013 12/12/2021 GLUCOSEU Negative 12/12/2021       Radiology:  XR CHEST 1 VIEW   Final Result   Stable cardiomegaly with slowly resolving central pulmonary congestion. Hazy opacity throughout the left lung and right lung base which could be   early infiltrates vs atelectasis and/or associated small pleural effusions   layering posteriorly which is more prominent on the left. XR CHEST PORTABLE   Final Result   Pulmonary vascular congestion/interstitial edema. Cardiomegaly. Assessment/Plan:    Active Hospital Problems    Diagnosis     Chest pain [R07.9]      Patient is a 58-year-old female who presented to the hospital for chest pain, substernal, nonradiating. According to patient her chest pain is 5/10 intensity, substernal, nonradiating, not associate with nausea vomiting, started while she was sitting in chair. Otherwise denied fevers chills nausea vomiting diarrhea constipation dysuria. Patient was admitted for further cardiac evaluation.     Assessment  Chest pain rule out ACS, it is likely MSK  Diabetes mellitus  COPD  CKD stage III  Hypertension  Seizure disorder     Plan  Troponin elevated but stable, will start on ASA, statin, monitor on cardiac telemetry, monitor troponin, consult cardiology - no plans for further testing. ordered echocardiogram - EF 55-60%, no RWMA, seems congested with elevated proBNP will start on IV lasix  Insulin sliding scale  Resume home medications  DVT prophylaxis-Lovenox  Diet: ADULT DIET;  Dysphagia - Minced and Moist; 4 carb choices (60 gm/meal)  Code Status: Full Code    PT/OT Eval Status: ordered    Dispo - started ASA, statin, IV lasix    Rod Chandler MD

## 2021-12-13 NOTE — PROGRESS NOTES
Pt yelling out, tearful saying she is scared. Only oriented to self. Given meds crushed with pudding, pt spit some of them out. Was able to take her keppra whole. Pt taken to echo at this time.

## 2021-12-13 NOTE — PROGRESS NOTES
Occupational Therapy  No Eval/Discharge    [facility]  12/13/2021    OT order noted. Patient last seen by [facility] therapy department in 2020 - reporting she was likely dependent with care. PT placed phone call to [facility] to double check this - they report she was dependent for mobility, using brenda lift to get to electric wheelchair, total care for ADLs. OT will sign off due to baseline functional status at this time. Anticipate return to [facility] without OT.     Gino Balbuena, OTR/L [id number]

## 2021-12-14 LAB
ANION GAP SERPL CALCULATED.3IONS-SCNC: 12 MMOL/L (ref 3–16)
BUN BLDV-MCNC: 22 MG/DL (ref 7–20)
CALCIUM SERPL-MCNC: 9.3 MG/DL (ref 8.3–10.6)
CHLORIDE BLD-SCNC: 100 MMOL/L (ref 99–110)
CO2: 27 MMOL/L (ref 21–32)
CREAT SERPL-MCNC: 0.9 MG/DL (ref 0.6–1.2)
GFR AFRICAN AMERICAN: >60
GFR NON-AFRICAN AMERICAN: >60
GLUCOSE BLD-MCNC: 80 MG/DL (ref 70–99)
GLUCOSE BLD-MCNC: 86 MG/DL (ref 70–99)
GLUCOSE BLD-MCNC: 87 MG/DL (ref 70–99)
GLUCOSE BLD-MCNC: 94 MG/DL (ref 70–99)
GLUCOSE BLD-MCNC: 96 MG/DL (ref 70–99)
HCT VFR BLD CALC: 32.9 % (ref 36–48)
HEMOGLOBIN: 10.3 G/DL (ref 12–16)
MCH RBC QN AUTO: 26 PG (ref 26–34)
MCHC RBC AUTO-ENTMCNC: 31.4 G/DL (ref 31–36)
MCV RBC AUTO: 82.9 FL (ref 80–100)
PDW BLD-RTO: 14.1 % (ref 12.4–15.4)
PERFORMED ON: NORMAL
PLATELET # BLD: 191 K/UL (ref 135–450)
PMV BLD AUTO: 8.9 FL (ref 5–10.5)
POTASSIUM REFLEX MAGNESIUM: 4.6 MMOL/L (ref 3.5–5.1)
RBC # BLD: 3.97 M/UL (ref 4–5.2)
SODIUM BLD-SCNC: 139 MMOL/L (ref 136–145)
WBC # BLD: 6.5 K/UL (ref 4–11)

## 2021-12-14 PROCEDURE — 6370000000 HC RX 637 (ALT 250 FOR IP): Performed by: INTERNAL MEDICINE

## 2021-12-14 PROCEDURE — 2580000003 HC RX 258: Performed by: NURSE PRACTITIONER

## 2021-12-14 PROCEDURE — 80048 BASIC METABOLIC PNL TOTAL CA: CPT

## 2021-12-14 PROCEDURE — 1200000000 HC SEMI PRIVATE

## 2021-12-14 PROCEDURE — 6360000002 HC RX W HCPCS: Performed by: INTERNAL MEDICINE

## 2021-12-14 PROCEDURE — 2700000000 HC OXYGEN THERAPY PER DAY

## 2021-12-14 PROCEDURE — 94640 AIRWAY INHALATION TREATMENT: CPT

## 2021-12-14 PROCEDURE — 2580000003 HC RX 258: Performed by: INTERNAL MEDICINE

## 2021-12-14 PROCEDURE — 6360000002 HC RX W HCPCS: Performed by: NURSE PRACTITIONER

## 2021-12-14 PROCEDURE — 94760 N-INVAS EAR/PLS OXIMETRY 1: CPT

## 2021-12-14 PROCEDURE — 85027 COMPLETE CBC AUTOMATED: CPT

## 2021-12-14 RX ADMIN — Medication 6 MG: at 20:54

## 2021-12-14 RX ADMIN — ATORVASTATIN CALCIUM 20 MG: 20 TABLET, FILM COATED ORAL at 20:54

## 2021-12-14 RX ADMIN — TIOTROPIUM BROMIDE INHALATION SPRAY 2 PUFF: 3.12 SPRAY, METERED RESPIRATORY (INHALATION) at 08:30

## 2021-12-14 RX ADMIN — IPRATROPIUM BROMIDE AND ALBUTEROL SULFATE 1 AMPULE: .5; 2.5 SOLUTION RESPIRATORY (INHALATION) at 08:29

## 2021-12-14 RX ADMIN — IPRATROPIUM BROMIDE AND ALBUTEROL SULFATE 1 AMPULE: .5; 2.5 SOLUTION RESPIRATORY (INHALATION) at 16:29

## 2021-12-14 RX ADMIN — CEFTRIAXONE 1000 MG: 1 INJECTION, POWDER, FOR SOLUTION INTRAMUSCULAR; INTRAVENOUS at 00:42

## 2021-12-14 RX ADMIN — ALLOPURINOL 100 MG: 100 TABLET ORAL at 20:54

## 2021-12-14 RX ADMIN — SODIUM CHLORIDE, PRESERVATIVE FREE 10 ML: 5 INJECTION INTRAVENOUS at 09:49

## 2021-12-14 RX ADMIN — SODIUM CHLORIDE, PRESERVATIVE FREE 10 ML: 5 INJECTION INTRAVENOUS at 21:09

## 2021-12-14 RX ADMIN — IPRATROPIUM BROMIDE AND ALBUTEROL SULFATE 1 AMPULE: .5; 2.5 SOLUTION RESPIRATORY (INHALATION) at 12:36

## 2021-12-14 RX ADMIN — FUROSEMIDE 20 MG: 10 INJECTION, SOLUTION INTRAMUSCULAR; INTRAVENOUS at 09:49

## 2021-12-14 RX ADMIN — PHENYTOIN 100 MG: 50 TABLET, CHEWABLE ORAL at 21:08

## 2021-12-14 RX ADMIN — HYDRALAZINE HYDROCHLORIDE 25 MG: 25 TABLET, FILM COATED ORAL at 20:54

## 2021-12-14 RX ADMIN — LEVETIRACETAM 500 MG: 500 TABLET, FILM COATED ORAL at 20:53

## 2021-12-14 RX ADMIN — LACOSAMIDE 100 MG: 100 TABLET, FILM COATED ORAL at 20:53

## 2021-12-14 RX ADMIN — PREGABALIN 150 MG: 75 CAPSULE ORAL at 20:53

## 2021-12-14 RX ADMIN — ENOXAPARIN SODIUM 40 MG: 100 INJECTION SUBCUTANEOUS at 21:01

## 2021-12-14 RX ADMIN — FUROSEMIDE 20 MG: 10 INJECTION, SOLUTION INTRAMUSCULAR; INTRAVENOUS at 17:09

## 2021-12-14 RX ADMIN — ACETAMINOPHEN 650 MG: 325 TABLET ORAL at 20:54

## 2021-12-14 RX ADMIN — BUDESONIDE AND FORMOTEROL FUMARATE DIHYDRATE 2 PUFF: 80; 4.5 AEROSOL RESPIRATORY (INHALATION) at 08:30

## 2021-12-14 ASSESSMENT — PAIN SCALES - PAIN ASSESSMENT IN ADVANCED DEMENTIA (PAINAD)
CONSOLABILITY: 1
NEGVOCALIZATION: 1
BODYLANGUAGE: 0
NEGVOCALIZATION: 2
BREATHING: 1
FACIALEXPRESSION: 1
BREATHING: 0
TOTALSCORE: 3
BODYLANGUAGE: 0
CONSOLABILITY: 1
TOTALSCORE: 5
FACIALEXPRESSION: 1

## 2021-12-14 ASSESSMENT — PAIN SCALES - GENERAL: PAINLEVEL_OUTOF10: 8

## 2021-12-14 NOTE — PROGRESS NOTES
Speech Language Pathology    Attempted to see patient for dysphagia tx. Per RN patient refused meds and breakfast this AM. Upon therapist entering room patient sleeping with increased fatigue and difficulty waking up. Refused intake of lunch. Will re-attempt to see patient 12/15/21 unless otherwise notified.     Brent Ordonez [place], [address] [place] [address]  Speech-Language Pathologist  On 12/14/21 at 12:38 PM

## 2021-12-14 NOTE — PROGRESS NOTES
Pt still refusing lunch and 2pm medications. Pt did agree to take 2 tylenol for  Her leg pain, but refused all other medication. Swallowed the medication whole and tolerated well. Pt tearful and asking to go  Home. Urine culture just came back for klebsiella pneumonia, notified Dr Saundra Dominguez.

## 2021-12-14 NOTE — PROGRESS NOTES
Hospitalist Progress Note      PCP: Aubrie Gallegos    Chief Complaint. Patient is a 75-year-old female who presented to the hospital for chest pain, substernal, nonradiating. According to patient her chest pain is 5/10 intensity, substernal, nonradiating, not associate with nausea vomiting, started while she was sitting in chair. Otherwise denied fevers chills nausea vomiting diarrhea constipation dysuria. Patient was admitted for further cardiac evaluation. Date of Admission: 12/11/2021    Subjective: more alert and awake today, reportedly this is patient baseline mental status.   Not holding conversations, on 2L NC    Medications:  Reviewed    Infusion Medications    sodium chloride      dextrose       Scheduled Medications    furosemide  20 mg IntraVENous BID    tiotropium  2 puff Inhalation Daily    ipratropium-albuterol  1 ampule Inhalation 4x daily    cefTRIAXone (ROCEPHIN) IV  1,000 mg IntraVENous Q24H    lidocaine 1 % injection  5 mL IntraDERmal Once    sodium chloride flush  10 mL IntraVENous 2 times per day    enoxaparin  40 mg SubCUTAneous Nightly    allopurinol  100 mg Oral Nightly    aspirin  81 mg Oral Daily    ferrous sulfate  324 mg Oral Daily with breakfast    budesonide-formoterol  2 puff Inhalation BID    [Held by provider] gabapentin  100 mg Oral Nightly    hydrALAZINE  25 mg Oral 3 times per day    hydroxychloroquine  300 mg Oral Daily    lacosamide  100 mg Oral 2 times per day    levETIRAcetam  500 mg Oral 2 times per day    melatonin  6 mg Oral Nightly    phenytoin  100 mg Oral TID    pregabalin  150 mg Oral 2 times per day    atorvastatin  20 mg Oral Nightly    trospium  20 mg Oral BID AC    insulin lispro  0-12 Units SubCUTAneous TID WC    insulin lispro  0-6 Units SubCUTAneous Nightly     PRN Meds: albuterol, nitroGLYCERIN, sodium chloride flush, sodium chloride, potassium chloride **OR** potassium alternative oral replacement **OR** potassium chloride, potassium chloride, magnesium sulfate, promethazine **OR** ondansetron, magnesium hydroxide, acetaminophen **OR** acetaminophen, perflutren lipid microspheres, glucose, dextrose, glucagon (rDNA), dextrose      Intake/Output Summary (Last 24 hours) at 12/14/2021 1757  Last data filed at 12/14/2021 1338  Gross per 24 hour   Intake 240 ml   Output 1200 ml   Net -960 ml       Physical Exam Performed:    /76   Pulse 77   Temp 98.4 °F (36.9 °C) (Oral)   Resp 20   Wt 245 lb 6 oz (111.3 kg)   SpO2 94%   BMI 42.12 kg/m²     General appearance: NAD, lying in bed in bed comfortably  HEENT:  Conjunctivae/corneas clear. Neck: Supple, with full range of motion. Respiratory:  Normal respiratory effort. Clear to auscultation, bilaterally without Rales/Wheezes/Rhonchi. Cardiovascular: Regular rate and rhythm with normal S1/S2 without murmurs or rubs  Abdomen: Soft, non-tender, non-distended, normal bowel sounds. Musculoskeletal: No cyanosis or edema bilaterally  Neurologic:  Lethargic, not following commands  Psychiatric: Alert and oriented x1, Normal mood  Peripheral Pulses: +2 palpable, equal bilaterally    Labs:   Recent Labs     12/12/21  0540 12/13/21  0400 12/14/21  0615   WBC 6.8 7.9 6.5   HGB 10.3* 11.0* 10.3*   HCT 33.7* 35.3* 32.9*    206 191     Recent Labs     12/12/21  0540 12/13/21  0400 12/14/21  0615    141 139   K 4.9 4.7 4.6    102 100   CO2 27 25 27   BUN 25* 23* 22*   CREATININE 1.1 1.0 0.9   CALCIUM 9.0 9.7 9.3     No results for input(s): AST, ALT, BILIDIR, BILITOT, ALKPHOS in the last 72 hours. No results for input(s): INR in the last 72 hours.   Recent Labs     12/13/21  1034 12/13/21  1745   TROPONINI 0.03* 0.01       Urinalysis:      Lab Results   Component Value Date    NITRU POSITIVE 12/12/2021    WBCUA 40 12/12/2021    BACTERIA 4+ 12/12/2021    RBCUA 1 12/12/2021    BLOODU Negative 12/12/2021    SPECGRAV 1.013 12/12/2021    GLUCOSEU Negative 12/12/2021

## 2021-12-15 VITALS
BODY MASS INDEX: 41.17 KG/M2 | TEMPERATURE: 97.7 F | HEART RATE: 91 BPM | RESPIRATION RATE: 18 BRPM | SYSTOLIC BLOOD PRESSURE: 143 MMHG | WEIGHT: 239.86 LBS | DIASTOLIC BLOOD PRESSURE: 76 MMHG | OXYGEN SATURATION: 92 %

## 2021-12-15 LAB
ANION GAP SERPL CALCULATED.3IONS-SCNC: 14 MMOL/L (ref 3–16)
BUN BLDV-MCNC: 25 MG/DL (ref 7–20)
CALCIUM SERPL-MCNC: 9.3 MG/DL (ref 8.3–10.6)
CHLORIDE BLD-SCNC: 98 MMOL/L (ref 99–110)
CO2: 24 MMOL/L (ref 21–32)
CREAT SERPL-MCNC: 0.9 MG/DL (ref 0.6–1.2)
GFR AFRICAN AMERICAN: >60
GFR NON-AFRICAN AMERICAN: >60
GLUCOSE BLD-MCNC: 80 MG/DL (ref 70–99)
GLUCOSE BLD-MCNC: 86 MG/DL (ref 70–99)
GLUCOSE BLD-MCNC: 88 MG/DL (ref 70–99)
GLUCOSE BLD-MCNC: 88 MG/DL (ref 70–99)
HCT VFR BLD CALC: 36.7 % (ref 36–48)
HEMOGLOBIN: 11.5 G/DL (ref 12–16)
MCH RBC QN AUTO: 25.9 PG (ref 26–34)
MCHC RBC AUTO-ENTMCNC: 31.2 G/DL (ref 31–36)
MCV RBC AUTO: 82.8 FL (ref 80–100)
PDW BLD-RTO: 13.8 % (ref 12.4–15.4)
PERFORMED ON: NORMAL
PLATELET # BLD: 192 K/UL (ref 135–450)
PMV BLD AUTO: 9 FL (ref 5–10.5)
POTASSIUM REFLEX MAGNESIUM: 4.1 MMOL/L (ref 3.5–5.1)
RBC # BLD: 4.43 M/UL (ref 4–5.2)
SODIUM BLD-SCNC: 136 MMOL/L (ref 136–145)
WBC # BLD: 6 K/UL (ref 4–11)

## 2021-12-15 PROCEDURE — 2580000003 HC RX 258: Performed by: INTERNAL MEDICINE

## 2021-12-15 PROCEDURE — 80048 BASIC METABOLIC PNL TOTAL CA: CPT

## 2021-12-15 PROCEDURE — 97129 THER IVNTJ 1ST 15 MIN: CPT

## 2021-12-15 PROCEDURE — 6370000000 HC RX 637 (ALT 250 FOR IP): Performed by: INTERNAL MEDICINE

## 2021-12-15 PROCEDURE — 6360000002 HC RX W HCPCS: Performed by: INTERNAL MEDICINE

## 2021-12-15 PROCEDURE — 92526 ORAL FUNCTION THERAPY: CPT

## 2021-12-15 PROCEDURE — 85027 COMPLETE CBC AUTOMATED: CPT

## 2021-12-15 PROCEDURE — 94640 AIRWAY INHALATION TREATMENT: CPT

## 2021-12-15 PROCEDURE — 94761 N-INVAS EAR/PLS OXIMETRY MLT: CPT

## 2021-12-15 PROCEDURE — 6360000002 HC RX W HCPCS: Performed by: NURSE PRACTITIONER

## 2021-12-15 PROCEDURE — 2580000003 HC RX 258: Performed by: NURSE PRACTITIONER

## 2021-12-15 RX ORDER — OXYCODONE HYDROCHLORIDE AND ACETAMINOPHEN 5; 325 MG/1; MG/1
1 TABLET ORAL EVERY 6 HOURS PRN
Qty: 9 TABLET | Refills: 0 | Status: SHIPPED | OUTPATIENT
Start: 2021-12-15 | End: 2021-12-18

## 2021-12-15 RX ORDER — CIPROFLOXACIN 250 MG/1
250 TABLET, FILM COATED ORAL 2 TIMES DAILY
Qty: 14 TABLET | Refills: 0 | Status: SHIPPED | OUTPATIENT
Start: 2021-12-15 | End: 2021-12-22

## 2021-12-15 RX ADMIN — BUDESONIDE AND FORMOTEROL FUMARATE DIHYDRATE 2 PUFF: 80; 4.5 AEROSOL RESPIRATORY (INHALATION) at 07:51

## 2021-12-15 RX ADMIN — FERROUS SULFATE TAB EC 324 MG (65 MG FE EQUIVALENT) 324 MG: 324 (65 FE) TABLET DELAYED RESPONSE at 11:18

## 2021-12-15 RX ADMIN — LACOSAMIDE 100 MG: 100 TABLET, FILM COATED ORAL at 11:18

## 2021-12-15 RX ADMIN — HYDROXYCHLOROQUINE SULFATE 300 MG: 200 TABLET ORAL at 11:18

## 2021-12-15 RX ADMIN — IPRATROPIUM BROMIDE AND ALBUTEROL SULFATE 1 AMPULE: .5; 2.5 SOLUTION RESPIRATORY (INHALATION) at 11:46

## 2021-12-15 RX ADMIN — PHENYTOIN 100 MG: 50 TABLET, CHEWABLE ORAL at 11:19

## 2021-12-15 RX ADMIN — LEVETIRACETAM 500 MG: 500 TABLET, FILM COATED ORAL at 11:18

## 2021-12-15 RX ADMIN — HYDRALAZINE HYDROCHLORIDE 25 MG: 25 TABLET, FILM COATED ORAL at 06:37

## 2021-12-15 RX ADMIN — FUROSEMIDE 20 MG: 10 INJECTION, SOLUTION INTRAMUSCULAR; INTRAVENOUS at 11:18

## 2021-12-15 RX ADMIN — CEFTRIAXONE 1000 MG: 1 INJECTION, POWDER, FOR SOLUTION INTRAMUSCULAR; INTRAVENOUS at 00:58

## 2021-12-15 RX ADMIN — ASPIRIN 81 MG: 81 TABLET, CHEWABLE ORAL at 11:18

## 2021-12-15 RX ADMIN — TROSPIUM CHLORIDE 20 MG: 20 TABLET, FILM COATED ORAL at 06:37

## 2021-12-15 RX ADMIN — TIOTROPIUM BROMIDE INHALATION SPRAY 2 PUFF: 3.12 SPRAY, METERED RESPIRATORY (INHALATION) at 07:49

## 2021-12-15 RX ADMIN — ACETAMINOPHEN 650 MG: 325 TABLET ORAL at 12:36

## 2021-12-15 RX ADMIN — IPRATROPIUM BROMIDE AND ALBUTEROL SULFATE 1 AMPULE: .5; 2.5 SOLUTION RESPIRATORY (INHALATION) at 07:39

## 2021-12-15 RX ADMIN — SODIUM CHLORIDE, PRESERVATIVE FREE 10 ML: 5 INJECTION INTRAVENOUS at 11:21

## 2021-12-15 RX ADMIN — PHENYTOIN 100 MG: 50 TABLET, CHEWABLE ORAL at 16:07

## 2021-12-15 RX ADMIN — PREGABALIN 150 MG: 75 CAPSULE ORAL at 11:17

## 2021-12-15 RX ADMIN — HYDRALAZINE HYDROCHLORIDE 25 MG: 25 TABLET, FILM COATED ORAL at 16:07

## 2021-12-15 RX ADMIN — TROSPIUM CHLORIDE 20 MG: 20 TABLET, FILM COATED ORAL at 16:07

## 2021-12-15 RX ADMIN — IPRATROPIUM BROMIDE AND ALBUTEROL SULFATE 1 AMPULE: .5; 2.5 SOLUTION RESPIRATORY (INHALATION) at 16:12

## 2021-12-15 ASSESSMENT — PAIN SCALES - GENERAL: PAINLEVEL_OUTOF10: 6

## 2021-12-15 NOTE — PROGRESS NOTES
Hospitalist Progress Note      PCP: Millie Smith    Chief Complaint. Patient is a 73-year-old female who presented to the hospital for chest pain, substernal, nonradiating. According to patient her chest pain is 5/10 intensity, substernal, nonradiating, not associate with nausea vomiting, started while she was sitting in chair. Otherwise denied fevers chills nausea vomiting diarrhea constipation dysuria. Patient was admitted for further cardiac evaluation. Date of Admission: 12/11/2021    Subjective: more alert and awake today, talking at baseline,  reportedly this is patient baseline mental status.   Medications:  Reviewed    Infusion Medications    sodium chloride      dextrose       Scheduled Medications    furosemide  20 mg IntraVENous BID    tiotropium  2 puff Inhalation Daily    ipratropium-albuterol  1 ampule Inhalation 4x daily    cefTRIAXone (ROCEPHIN) IV  1,000 mg IntraVENous Q24H    lidocaine 1 % injection  5 mL IntraDERmal Once    sodium chloride flush  10 mL IntraVENous 2 times per day    enoxaparin  40 mg SubCUTAneous Nightly    allopurinol  100 mg Oral Nightly    aspirin  81 mg Oral Daily    ferrous sulfate  324 mg Oral Daily with breakfast    budesonide-formoterol  2 puff Inhalation BID    gabapentin  100 mg Oral Nightly    hydrALAZINE  25 mg Oral 3 times per day    hydroxychloroquine  300 mg Oral Daily    lacosamide  100 mg Oral 2 times per day    levETIRAcetam  500 mg Oral 2 times per day    melatonin  6 mg Oral Nightly    phenytoin  100 mg Oral TID    pregabalin  150 mg Oral 2 times per day    atorvastatin  20 mg Oral Nightly    trospium  20 mg Oral BID AC    insulin lispro  0-12 Units SubCUTAneous TID WC    insulin lispro  0-6 Units SubCUTAneous Nightly     PRN Meds: albuterol, nitroGLYCERIN, sodium chloride flush, sodium chloride, potassium chloride **OR** potassium alternative oral replacement **OR** potassium chloride, potassium chloride, magnesium sulfate, promethazine **OR** ondansetron, magnesium hydroxide, acetaminophen **OR** acetaminophen, perflutren lipid microspheres, glucose, dextrose, glucagon (rDNA), dextrose      Intake/Output Summary (Last 24 hours) at 12/15/2021 1146  Last data filed at 12/15/2021 1121  Gross per 24 hour   Intake 366 ml   Output 2100 ml   Net -1734 ml       Physical Exam Performed:    BP (!) 165/71   Pulse 89   Temp 97.8 °F (36.6 °C) (Oral)   Resp 18   Wt 239 lb 13.8 oz (108.8 kg)   SpO2 91%   BMI 41.17 kg/m²     General appearance: NAD, lying in bed in bed comfortably, on RA  HEENT:  Conjunctivae/corneas clear. Neck: Supple, with full range of motion. Respiratory:  Normal respiratory effort. Clear to auscultation, bilaterally without Rales/Wheezes/Rhonchi. Cardiovascular: Regular rate and rhythm with normal S1/S2 without murmurs or rubs  Abdomen: Soft, non-tender, non-distended, normal bowel sounds. Musculoskeletal: No cyanosis or edema bilaterally  Neurologic:  Lethargic, not following commands  Psychiatric: Alert and oriented x1, Normal mood  Peripheral Pulses: +2 palpable, equal bilaterally    Labs:   Recent Labs     12/13/21  0400 12/14/21  0615 12/15/21  0649   WBC 7.9 6.5 6.0   HGB 11.0* 10.3* 11.5*   HCT 35.3* 32.9* 36.7    191 192     Recent Labs     12/13/21  0400 12/14/21  0615 12/15/21  0649    139 136   K 4.7 4.6 4.1    100 98*   CO2 25 27 24   BUN 23* 22* 25*   CREATININE 1.0 0.9 0.9   CALCIUM 9.7 9.3 9.3     No results for input(s): AST, ALT, BILIDIR, BILITOT, ALKPHOS in the last 72 hours. No results for input(s): INR in the last 72 hours.   Recent Labs     12/13/21  1034 12/13/21  1745   TROPONINI 0.03* 0.01       Urinalysis:      Lab Results   Component Value Date    NITRU POSITIVE 12/12/2021    WBCUA 40 12/12/2021    BACTERIA 4+ 12/12/2021    RBCUA 1 12/12/2021    BLOODU Negative 12/12/2021    SPECGRAV 1.013 12/12/2021    GLUCOSEU Negative 12/12/2021       Radiology:  XR CHEST 1 VIEW   Final Result   Stable cardiomegaly with slowly resolving central pulmonary congestion. Hazy opacity throughout the left lung and right lung base which could be   early infiltrates vs atelectasis and/or associated small pleural effusions   layering posteriorly which is more prominent on the left. XR CHEST PORTABLE   Final Result   Pulmonary vascular congestion/interstitial edema. Cardiomegaly. Assessment/Plan:    Active Hospital Problems    Diagnosis     Chest pain [R07.9]      Patient is a 10-year-old female who presented to the hospital for chest pain, substernal, nonradiating. According to patient her chest pain is 5/10 intensity, substernal, nonradiating, not associate with nausea vomiting, started while she was sitting in chair. Otherwise denied fevers chills nausea vomiting diarrhea constipation dysuria. Patient was admitted for further cardiac evaluation.     Assessment  Chest pain rule out ACS, it is likely MSK  Diabetes mellitus  UTI with Klebsiella pneumonia  COPD  CKD stage III  Hypertension  Seizure disorder     Plan  Troponin stable, started on ASA, statin, monitor on cardiac telemetry, monitor troponin, consult cardiology - no plans for further testing. ordered echocardiogram - EF 55-60%, no RWMA, seems congested with elevated proBNP- on IV lasix-we will continue today  On IV Rocephin, urine culture positive for Klebsiella pneumonia, wait for sensitivities  Insulin sliding scale  Resume home medications  DVT prophylaxis-Lovenox  Diet: ADULT DIET;  Dysphagia - Minced and Moist; 4 carb choices (60 gm/meal)  Code Status: Full Code    PT/OT Eval Status: ordered    Dispo - started ASA, statin, IV lasix, urine culture positive for Klebsiella pneumonia - dc on ciprfloxacin    Arturo English MD

## 2021-12-15 NOTE — PROGRESS NOTES
[facility]   Speech Therapy  Daily Dysphagia Treatment Note        Evita Huggins  AGE: 66 y.o. GENDER: female  : 1950  [id number]  EPISODE DATE:  2021  Patient Active Problem List   Diagnosis    Acute respiratory failure with hypoxia (HCC)    COVID-19 virus infection    Pneumonia    Sepsis ([facility] [address]    Fever    Tachycardia    Exposure to COVID-19 virus    Hyperglycemia    Dementia ([facility] [address]    Renal failure    Asthma    Essential hypertension    Systemic lupus erythematosus (HCC)    Acute renal failure superimposed on stage 3 chronic kidney disease (HCC)    Septic shock (HCC)    Pneumonia due to COVID-19 virus    Acute metabolic encephalopathy    Metabolic acidosis    Tachypnea    Chest pain     No Known Allergies  Treatment Diagnosis: Dysphagia       Chart review:        Recent Chest Xray/CT of Chest: 2021  Impression   Stable cardiomegaly with slowly resolving central pulmonary congestion.       Hazy opacity throughout the left lung and right lung base which could be   early infiltrates vs atelectasis and/or associated small pleural effusions   layering posteriorly which is more prominent on the left.         2021 Chief Complaint.  Patient is a 67-year-old female who presented to the hospital for chest pain, substernal, nonradiating.  According to patient her chest pain is 5/10 intensity, substernal, nonradiating, not associate with nausea vomiting, started while she was sitting in chair.  Otherwise denied fevers chills nausea vomiting diarrhea constipation dysuria.  Patient was admitted for further cardiac evaluation.       Subjective:     Current diet     Minced and Moist; 4 carb choices  Thin liquids     Comments regarding tolerating Current Diet:      eval:  Limited intake per pt refusal    SLP f/u with pt refusal of any PO    Nsg reports no PO or meds this a.m. Pt was on regular with thin at [facility].   Pt reports she has dentures at home and clarifies that home is ECF. Objective:     Pain   Patient Currently in Pain: Denies    Cognitive/Behavior   Behavior/Cognition: Alert, Cooperative   Pt aware of location and asking to go home; pt reports MD said she is being d/c'd today but this is not noted as of this visit. Pt on room air    Positioning    upright as able in bed    PO Trials:  · Thin Liquids:  By straw with No cough, choke or vocal change noted. Pt accepts 3 serials swallows x1 then refuses further liquid with multiple items offered. Suspect swallow delay; decreased laryngeal elevation. · Nectar thick liquids:  NT  · Honey Thick liquids: NT  · Puree:  Multiple trials with slow A-p propulsion. No cough, choke or vocal change noted. Trace oral residue on tongue which pt clears independently. Suspect swallow delay; decreased laryngeal elevation. · Soft food: mckenzie cracker:  Similar to at evaluation with some improvement with mastication and propulsion. Remains slow. Not functional for adequate intake. Suspect swallow delay; decreased laryngeal elevation. · Regular food:  Nt    Dysphagia Tx:   Direct Dysphagia tx:  PO trials as above. Pt accepts approximately 50% of grits and 3 serial swallows of thin liquid. States she dislikes diet pop and refuses juice and water. Dysphagia ex: NA  Training in compensatory strategies: review of strategies  Pt response to ex/training: pt pleasant and agreeable; no evidence of true learning. Goals:   Dysphagia Goals: The patient will tolerate recommended diet without observed clinical signs of aspiration, met this date; ongoing with additional trials to confirm  The patient/caregiver will demonstrate understanding of compensatory strategies for improved swallowing safety. , ongoing  Pt will tolerate Dysphagia II diet (minced and moist) diet:  Appears met and ongoing    Assessment:   Impressions:   Dysphagia Diagnosis: Moderate oral stage dysphagia Mild pharyngeal dysphagia    · Pt with no clinical signs of aspiration or penetration. Pt accepts PO this a.m. but trials are limited with pt taking minimal liquid trials and refusing further options presented. · Pt continues with decreased mastication and A-P propulsion of soft solids. Pt reports she has dentures at [facility]. With trials this date pt does not appear functional for solid upgrade. Recommend continue Dysphagia II diet (minced and moist and thin liquids. · Recommend f/u at [facility] for possible solid upgrade when pt does have her dentures. · SLP to f/u while inpatient due to limited observations each session due to pt's pleasant refusal.      Diet Recommendations:  Dysphagia II diet (minced and moist)  Thin liquids    Recommended Form of Meds: Whole with puree    Strategies:   Compensatory Swallowing Strategies: Upright as possible for all oral intake, Assist feed, Remain upright for 30-45 minutes after meals    Education:  Consulted and agree with results and recommendations: Patient, RN  Patient Education: Review of findings and POC  Patient Education Response: No evidence of learning    Prognosis:   good    Plan:     Continue Dysphagia Therapy:   Interventions: Therapeutic Interventions: Patient/Family education, Therapeutic PO trials with SLP, Diet tolerance monitoring  Duration/Frequency of therapy while on unit: Duration/Frequency of Treatment  Duration/Frequency of Treatment: 3-5x/week  Discharge Instructions:   Anticipate NEED for further skilled Speech Therapy for Dysphagia at discharge    This note serves as a D/C Summary in the event that this patient is discharged prior to the next therapy session.     Coded treatment time:10  Total treatment time: MS ROSITA Black/SLP 8579  Speech Language Pathologist  12/15/21  10:55 AM

## 2021-12-15 NOTE — DISCHARGE INSTR - COC
Continuity of Care Form    Patient Name: Tana Acevedo   :  1950  MRN:  [id number]    Admit date:  2021  Discharge date:  12/15/2021    Code Status Order: Full Code   Advance Directives:      Admitting Physician:  Nicky Cespedes MD  PCP: Osvaldo Mckoy    Discharging Nurse: Cara Tom. [address] Unit/Room#: Z6B-2713/6586-32  Discharging Unit Phone Number: [phone]    Emergency Contact:   Extended Emergency Contact Information  Primary Emergency Contact: Beth Bain Phone: [phone]  Relation: Child    Past Surgical History:  History reviewed. No pertinent surgical history. Immunization History: There is no immunization history on file for this patient.     Active Problems:  Patient Active Problem List   Diagnosis Code    Acute respiratory failure with hypoxia (HCC) J96.01    COVID-19 virus infection U07.1    Pneumonia J18.9    Sepsis ([facility] [address] A41.9    Fever R50.9    Tachycardia R00.0    Exposure to COVID-19 virus Z20.822    Hyperglycemia R73.9    Dementia ([facility] [address] F03.90    Renal failure N19    Asthma J45.909    Essential hypertension I10    Systemic lupus erythematosus ([facility] [address] M32.9    Acute renal failure superimposed on stage 3 chronic kidney disease (HCC) N17.9, N18.30    Septic shock (HCC) A41.9, R65.21    Pneumonia due to COVID-19 virus U07.1, R44.25    Acute metabolic encephalopathy H19.03    Metabolic acidosis F95.2    Tachypnea R06.82    Chest pain R07.9       Isolation/Infection:   Isolation            No Isolation          Patient Infection Status       Infection Onset Added Last Indicated Last Indicated By Review Planned Expiration Resolved Resolved By    None active    Resolved    COVID-19 (Rule Out) 21 COVID-19, Rapid (Ordered)   21 Rule-Out Test Resulted    COVID-19 20 COVID-19   20     COVID-19 (Rule Out) 20 COVID-19 (Ordered)   20 Rule-Out Test Resulted Nurse Assessment:  Last Vital Signs: BP (!) 143/76   Pulse 91   Temp 97.7 °F (36.5 °C) (Oral)   Resp 18   Wt 239 lb 13.8 oz (108.8 kg)   SpO2 91%   BMI 41.17 kg/m²     Last documented pain score (0-10 scale): Pain Level: 6  Last Weight:   Wt Readings from Last 1 Encounters:   12/15/21 239 lb 13.8 oz (108.8 kg)     Mental Status:  disoriented and alert    IV Access:  N/A    Nursing Mobility/ADLs:  Walking   Dependent  Transfer  Dependent  Bathing  Dependent  Dressing  Dependent  Toileting  Dependent  Feeding  Assisted  Med Admin  Dependent  Med Delivery   whole    Wound Care Documentation and Therapy:        Elimination:  Continence: Bowel: No  Bladder: No  Urinary Catheter: external female catheter (purewick) Removal Date 12/15/2021    Colostomy/Ileostomy/Ileal Conduit: No       Date of Last BM: 12/15/2021    Intake/Output Summary (Last 24 hours) at 12/15/2021 1314  Last data filed at 12/15/2021 1246  Gross per 24 hour   Intake 606 ml   Output 2100 ml   Net -1494 ml     I/O last 3 completed shifts: In: 65 [P.O.:356]  Out: 1300 [Urine:1300]    Safety Concerns: At Risk for Falls    Impairments/Disabilities:      Speech    Nutrition Therapy:  Current Nutrition Therapy:   - Oral Diet:  Carb Control 4 carbs/meal (1800kcals/day) and Dysphagia 2 mechanically altered    Routes of Feeding: Oral  Liquids: No Restrictions  Daily Fluid Restriction: no  Last Modified Barium Swallow with Video (Video Swallowing Test): not done    Treatments at the Time of Hospital Discharge:   Respiratory Treatments: symbicort breathing treatment  Oxygen Therapy:  is not on home oxygen therapy.   Ventilator:    - No ventilator support    Rehab Therapies: Physical Therapy, Occupational Therapy, and Speech/Language Therapy  Weight Bearing Status/Restrictions: No weight bearing restirctions  Other Medical Equipment (for information only, NOT a DME order):  hospital bed  Other Treatments: N/A    Patient's personal belongings (please select all that are sent with patient):  boris    RN SIGNATURE:  Electronically signed by Stone Torres RN on 12/15/21 at 3:53 PM EST    CASE MANAGEMENT/SOCIAL WORK SECTION    Inpatient Status Date: 12/15/21    Readmission Risk Assessment Score:  Readmission Risk              Risk of Unplanned Readmission:  26           Discharging to Facility/ Agency   Name: Discharging to Facility/ Agency   [facility]  [facility]  [address] [place] [address]   Phone:  [phone]   Fax: [phone]    Dialysis Facility (if applicable)   Name:  Address:  Dialysis Schedule:  Phone:  Fax:    / signature: Electronically signed by Yordan Cruz RN on 12/15/21 at 1:15 PM EST    PHYSICIAN SECTION    Prognosis: {Prognosis:8772782088}    Condition at Discharge: Iona Pryor Patient Condition:680941153}    Rehab Potential (if transferring to Rehab): {Prognosis:1878320768}    Recommended Labs or Other Treatments After Discharge: ***    Physician Certification: I certify the above information and transfer of Gennaro Feliciano  is necessary for the continuing treatment of the diagnosis listed and that she requires {Admit to Appropriate Level of Care:57825} for {GREATER/LESS:504814934} 30 days.      Update Admission H&P: {CHP DME Changes in HNFWS:433725809}    PHYSICIAN SIGNATURE:  {Esignature:686138566}

## 2021-12-15 NOTE — CARE COORDINATION
DISCHARGE SUMMARY     DATE OF DISCHARGE: 12/15/21    DISCHARGE DESTINATION: [facility]    FACILITY:   PHONE NUMBER: [phone]    FAX NUMBER: [phone]    INSURANCE PRECERT OBTAINED: not needed    HENS/PASAAR COMPLETED: return to facility    COVID RESULT: negative    TRANSPORTATION:     Company Name:  [employer]     Time: 4:00    Phone Number: [phone]    COMMENTS: spoke with Manuel Alfonso, who reports pt able to return today. RN aware. LM for dtr.       Electronically signed by THEODORA Mustafa on 12/15/2021 at 1:16 PM

## 2021-12-15 NOTE — ACP (ADVANCE CARE PLANNING)
Advance Care Planning     Advance Care Planning Activator (Inpatient)  Conversation Note      Date of ACP Conversation: 12/15/2021     Conversation Conducted with: Patient with [address]: Named in Advance Directive or Healthcare Power of  (name) Luis Titus    ACP Activator: Steve Kathleen RN    Health Care Decision Maker:     Current Designated Health Care Decision Maker:     Health Care Agent: Katalina Mcnally Child - [phone]    Care Preferences    Ventilation: \"If you were in your present state of health and suddenly became very ill and were unable to breathe on your own, what would your preference be about the use of a ventilator (breathing machine) if it were available to you? \"      Would the patient desire the use of ventilator (breathing machine)?: yes    \"If your health worsens and it becomes clear that your chance of recovery is unlikely, what would your preference be about the use of a ventilator (breathing machine) if it were available to you? \"     Would the patient desire the use of ventilator (breathing machine)?: Yes      Resuscitation  \"CPR works best to restart the heart when there is a sudden event, like a heart attack, in someone who is otherwise healthy. Unfortunately, CPR does not typically restart the heart for people who have serious health conditions or who are very sick. \"    \"In the event your heart stopped as a result of an underlying serious health condition, would you want attempts to be made to restart your heart (answer \"yes\" for attempt to resuscitate) or would you prefer a natural death (answer \"no\" for do not attempt to resuscitate)? \" yes       [] Yes   [] No   Educated Patient / Marty Lazcano regarding differences between Advance Directives and portable DNR orders.     Length of ACP Conversation in minutes:   10    Conversation Outcomes:  [x] ACP discussion completed  [] Existing advance directive reviewed with patient; no changes to patient's previously recorded wishes  [] New Advance Directive completed  [] Portable Do Not Rescitate prepared for Provider review and signature  [] POLST/POST/MOLST/MOST prepared for Provider review and signature      Follow-up plan:    [] Schedule follow-up conversation to continue planning  [] Referred individual to Provider for additional questions/concerns   [] Advised patient/agent/surrogate to review completed ACP document and update if needed with changes in condition, patient preferences or care setting    [] This note routed to one or more involved healthcare providers

## 2021-12-15 NOTE — PROGRESS NOTES
This RN prepared pt for discharge to home at 1515. This RN provided discharge education regarding medication regimen, and home care. Pt. Verbalized understanding. Denies questions. No LDAs present at discharge. Discontinued IV without complications. Pt. tolerated well. Pt is being transported by [facility].  Report given to [facility] nurse

## 2021-12-15 NOTE — PROGRESS NOTES
Pt midline flushed well, but no blood return noted. Morning lab draw relayed to lab tach. Will continue to monitor.

## 2021-12-16 LAB
BLOOD CULTURE, ROUTINE: NORMAL
ORGANISM: ABNORMAL
URINE CULTURE, ROUTINE: ABNORMAL

## 2021-12-17 LAB — CULTURE, BLOOD 2: NORMAL

## 2021-12-21 NOTE — PROGRESS NOTES
Physician Progress Note      PATIENT:               Edna Huber  CSN #:                  [id number]  :                       1950  ADMIT DATE:       2021 1:14 PM  [address] [place] DATE:        12/15/2021 6:04 PM  RESPONDING  PROVIDER #:        Blanka Stoddard MD          QUERY TEXT:    Pt admitted with chest pain . Pt noted to have UTI. If possible, please   document in progress notes and discharge summary the present on admission   status of UTI:    The medical record reflects the following:  Risk Factors: female, dementia,  Clinical Indicators: U/A on  + nitrite, mod leuks, wbc 40, 4+bacteria, ur   cx- Klebsiella pneumoniae  Treatment: iv rocephin started on     Thank You, Lord Marta Darnell RN CDS CRCR  [email or web address]  Options provided:  -- Yes, UTI was present at the time of the order to admit to the hospital  -- No, UTI was not present on admission and developed during the inpatient   stay  -- Other - I will add my own diagnosis  -- Disagree - Not applicable / Not valid  -- Disagree - Clinically unable to determine / Unknown  -- Refer to Clinical Documentation Reviewer    PROVIDER RESPONSE TEXT:    Yes, UTI was present at the time of the order to admit to the hospital.    Query created by:  Celso Darnell on 12/15/2021 7:54 AM      Electronically signed by:  Blanka Stoddard MD 2021 10:34 AM

## 2022-01-11 NOTE — DISCHARGE SUMMARY
Hospital Medicine Discharge Summary    Patient ID: Tejas Alonso      Patient's PCP: Franklin Dalal    Admit Date: 12/11/2021     Discharge Date: 12/15/2021    Admitting Physician: Weston Wyatt MD     Discharge Physician: Weston Wyatt MD     Discharge Diagnoses: Active Hospital Problems    Diagnosis Date Noted    Chest pain [R07.9] 12/11/2021       The patient was seen and examined on day of discharge and this discharge summary is in conjunction with any daily progress note from day of discharge. Condition at discharge - stable    Hospital Course: patient seen and evaluated on the day of discharge. Patient informed about following up with appointments. Patient verbalized understanding for follow-up appointments. The patient and / or the family were informed of the results of tests, a time was given to answer questions, a plan was proposed and they agreed with plan. Medical reconciliation performed. Patient discharged stable condition. Patient is a 75-year-old female who presented to the hospital for chest pain, substernal, nonradiating.  According to patient her chest pain is 5/10 intensity, substernal, nonradiating, not associate with nausea vomiting, started while she was sitting in chair.  Otherwise denied fevers chills nausea vomiting diarrhea constipation dysuria.  Patient was admitted for further cardiac evaluation.     Assessment  Chest pain rule out ACS, it is likely MSK  Diabetes mellitus  UTI with Klebsiella pneumonia  COPD  CKD stage III  Hypertension  Seizure disorder     Plan  Troponin stable, started on ASA, statin, monitor on cardiac telemetry, monitor troponin, consult cardiology - no plans for further testing.  ordered echocardiogram - EF 55-60%, no RWMA, seems congested with elevated proBNP- on IV lasix-we will continue today  On IV Rocephin, urine culture positive for Klebsiella pneumonia, wait for sensitivities  Insulin sliding scale  Resume home for 7 days, Disp-14 tablet, R-0Normal              Details   oxyCODONE-acetaminophen (PERCOCET) 5-325 MG per tablet Take 1 tablet by mouth every 6 hours as needed for Pain for up to 3 days. , Disp-9 tablet, R-0Print              Details   memantine (NAMENDA) 10 MG tablet Take 10 mg by mouth 2 times daily Indications: Dementia due to Vascular DiseaseHistorical Med      albuterol (PROVENTIL) (2.5 MG/3ML) 0.083% nebulizer solution Take 2.5 mg by nebulization every 8 hours as needed for WheezingHistorical Med      polyvinyl alcohol-povidone 5-6 MG/ML SOLN opthalmic solution Place 2 drops into both eyes 2 times dailyHistorical Med      docusate sodium (COLACE) 100 MG capsule Take 100 mg by mouth dailyHistorical Med      dextromethorphan (DELSYM) 30 MG/5ML extended release liquid Take 60 mg by mouth 2 times daily as needed for CoughHistorical Med      DULoxetine (CYMBALTA) 20 MG extended release capsule Take 20 mg by mouth nightlyHistorical Med      famotidine (PEPCID) 20 MG tablet Take 20 mg by mouth nightlyHistorical Med      Umeclidinium Bromide (INCRUSE ELLIPTA) 62.5 MCG/INH AEPB Inhale 1 puff into the lungs dailyHistorical Med      loperamide (IMODIUM) 2 MG capsule Take 2 mg by mouth every 6 hours as needed for DiarrheaHistorical Med      acetaminophen (TYLENOL) 325 MG tablet Take 650 mg by mouth every 4 hours as needed for PainHistorical Med      albuterol sulfate HFA (VENTOLIN HFA) 108 (90 Base) MCG/ACT inhaler Inhale 2 puffs into the lungs every 6 hours as needed for Wheezing or Shortness of BreathHistorical Med      hydroxychloroquine (PLAQUENIL) 200 MG tablet Take 1.5 tablets by mouth daily, Disp-30 tablet, R-0NO PRINT      allopurinol (ZYLOPRIM) 100 MG tablet Take 100 mg by mouth nightlyHistorical Med      aspirin 81 MG chewable tablet Take 81 mg by mouth dailyHistorical Med      fluticasone-vilanterol (BREO ELLIPTA) 100-25 MCG/INH AEPB inhaler Inhale 1 puff into the lungs dailyHistorical Med      tolterodine (DETROL LA) 2 MG extended release capsule Take 2 mg by mouth dailyHistorical Med      ferrous sulfate (IRON 325) 325 (65 Fe) MG tablet Take 325 mg by mouth daily (with breakfast)Historical Med      furosemide (LASIX) 20 MG tablet Take 20 mg by mouth dailyHistorical Med      gabapentin (NEURONTIN) 100 MG capsule Take 100 mg by mouth nightly. Historical Med      hydrALAZINE (APRESOLINE) 25 MG tablet Take 25 mg by mouth 3 times daily Historical Med      levETIRAcetam (KEPPRA) 500 MG tablet Take 500 mg by mouth every 12 hoursHistorical Med      pregabalin (LYRICA) 150 MG capsule Take 150 mg by mouth every 12 hours. Historical Med      melatonin 3 MG TABS tablet Take 6 mg by mouth nightlyHistorical Med      magnesium hydroxide (MILK OF MAGNESIA) 400 MG/5ML suspension Take by mouth daily as needed for ConstipationHistorical Med      phenytoin (DILANTIN) 50 MG tablet Take 100 mg by mouth 3 times daily Historical Med      simvastatin (ZOCOR) 10 MG tablet Take 10 mg by mouth nightlyHistorical Med      lacosamide (VIMPAT) 50 MG TABS tablet Take 100 mg by mouth every 12 hours. Historical Med      vitamin D (CHOLECALCIFEROL) 25 MCG (1000 UT) TABS tablet Take 1,000 Units by mouth daily Historical Med      Vitamin E 100 units TABS Take 100 Units by mouth dailyHistorical Med             Time Spent on discharge is more than 30 mints in the examination, evaluation, counseling and review of medications and discharge plan. Signed:    Nick Ramirez MD   1/11/2022      Thank you Sadia Aguilera for the opportunity to be involved in this patient's care. If you have any questions or concerns please feel free to contact me at [phone].

## 2022-02-11 ENCOUNTER — APPOINTMENT (OUTPATIENT)
Dept: GENERAL RADIOLOGY | Age: 72
DRG: 291 | End: 2022-02-11
Payer: MEDICARE

## 2022-02-11 ENCOUNTER — APPOINTMENT (OUTPATIENT)
Dept: CT IMAGING | Age: 72
DRG: 291 | End: 2022-02-11
Payer: MEDICARE

## 2022-02-11 ENCOUNTER — HOSPITAL ENCOUNTER (INPATIENT)
Age: 72
LOS: 4 days | Discharge: NURSING, REHAB OR LONG-TERM CARE | DRG: 291 | End: 2022-02-15
Attending: EMERGENCY MEDICINE | Admitting: INTERNAL MEDICINE
Payer: MEDICARE

## 2022-02-11 DIAGNOSIS — N39.0 ACUTE UTI: ICD-10-CM

## 2022-02-11 DIAGNOSIS — I50.9 ACUTE ON CHRONIC CONGESTIVE HEART FAILURE, UNSPECIFIED HEART FAILURE TYPE (HCC): ICD-10-CM

## 2022-02-11 DIAGNOSIS — J96.22 ACUTE ON CHRONIC RESPIRATORY FAILURE WITH HYPOXIA AND HYPERCAPNIA (HCC): Primary | ICD-10-CM

## 2022-02-11 DIAGNOSIS — R07.9 CHEST PAIN, UNSPECIFIED TYPE: ICD-10-CM

## 2022-02-11 DIAGNOSIS — J44.1 COPD WITH ACUTE EXACERBATION (HCC): ICD-10-CM

## 2022-02-11 DIAGNOSIS — J96.21 ACUTE ON CHRONIC RESPIRATORY FAILURE WITH HYPOXIA AND HYPERCAPNIA (HCC): Primary | ICD-10-CM

## 2022-02-11 DIAGNOSIS — J44.1 COPD EXACERBATION (HCC): ICD-10-CM

## 2022-02-11 DIAGNOSIS — M32.9 SYSTEMIC LUPUS ERYTHEMATOSUS, UNSPECIFIED SLE TYPE, UNSPECIFIED ORGAN INVOLVEMENT STATUS (HCC): ICD-10-CM

## 2022-02-11 LAB
A/G RATIO: 1.1 (ref 1.1–2.2)
ALBUMIN SERPL-MCNC: 3.7 G/DL (ref 3.4–5)
ALP BLD-CCNC: 155 U/L (ref 40–129)
ALT SERPL-CCNC: 14 U/L (ref 10–40)
AMMONIA: 34 UMOL/L (ref 11–51)
AMPHETAMINE SCREEN, URINE: ABNORMAL
ANION GAP SERPL CALCULATED.3IONS-SCNC: 9 MMOL/L (ref 3–16)
AST SERPL-CCNC: 22 U/L (ref 15–37)
BACTERIA: ABNORMAL /HPF
BARBITURATE SCREEN URINE: ABNORMAL
BASE EXCESS VENOUS: 0.7 MMOL/L
BASOPHILS ABSOLUTE: 0 K/UL (ref 0–0.2)
BASOPHILS RELATIVE PERCENT: 0.2 %
BENZODIAZEPINE SCREEN, URINE: ABNORMAL
BILIRUB SERPL-MCNC: <0.2 MG/DL (ref 0–1)
BILIRUBIN URINE: NEGATIVE
BLOOD, URINE: ABNORMAL
BUN BLDV-MCNC: 19 MG/DL (ref 7–20)
CALCIUM SERPL-MCNC: 9.1 MG/DL (ref 8.3–10.6)
CANNABINOID SCREEN URINE: ABNORMAL
CARBOXYHEMOGLOBIN: 1.5 %
CHLORIDE BLD-SCNC: 106 MMOL/L (ref 99–110)
CLARITY: ABNORMAL
CO2: 27 MMOL/L (ref 21–32)
COCAINE METABOLITE SCREEN URINE: ABNORMAL
COLOR: YELLOW
COMMENT UA: ABNORMAL
CREAT SERPL-MCNC: 1.1 MG/DL (ref 0.6–1.2)
EKG ATRIAL RATE: 73 BPM
EKG DIAGNOSIS: NORMAL
EKG P AXIS: 32 DEGREES
EKG P-R INTERVAL: 174 MS
EKG Q-T INTERVAL: 436 MS
EKG QRS DURATION: 174 MS
EKG QTC CALCULATION (BAZETT): 480 MS
EKG R AXIS: -49 DEGREES
EKG T AXIS: 42 DEGREES
EKG VENTRICULAR RATE: 73 BPM
EOSINOPHILS ABSOLUTE: 0.4 K/UL (ref 0–0.6)
EOSINOPHILS RELATIVE PERCENT: 5.7 %
EPITHELIAL CELLS, UA: 12 /HPF (ref 0–5)
ETHANOL: NORMAL MG/DL (ref 0–0.08)
GFR AFRICAN AMERICAN: 59
GFR NON-AFRICAN AMERICAN: 49
GLUCOSE BLD-MCNC: 105 MG/DL (ref 70–99)
GLUCOSE BLD-MCNC: 178 MG/DL (ref 70–99)
GLUCOSE URINE: NEGATIVE MG/DL
HCO3 VENOUS: 30 MMOL/L (ref 23–29)
HCT VFR BLD CALC: 36.8 % (ref 36–48)
HEMOGLOBIN: 11.4 G/DL (ref 12–16)
KETONES, URINE: NEGATIVE MG/DL
LEUKOCYTE ESTERASE, URINE: ABNORMAL
LYMPHOCYTES ABSOLUTE: 2.3 K/UL (ref 1–5.1)
LYMPHOCYTES RELATIVE PERCENT: 35.3 %
Lab: ABNORMAL
MCH RBC QN AUTO: 25.9 PG (ref 26–34)
MCHC RBC AUTO-ENTMCNC: 30.9 G/DL (ref 31–36)
MCV RBC AUTO: 84 FL (ref 80–100)
METHADONE SCREEN, URINE: ABNORMAL
METHEMOGLOBIN VENOUS: 0.1 %
MICROSCOPIC EXAMINATION: YES
MONOCYTES ABSOLUTE: 0.6 K/UL (ref 0–1.3)
MONOCYTES RELATIVE PERCENT: 9.6 %
NEUTROPHILS ABSOLUTE: 3.2 K/UL (ref 1.7–7.7)
NEUTROPHILS RELATIVE PERCENT: 49.2 %
NITRITE, URINE: POSITIVE
O2 SAT, VEN: 85 %
O2 THERAPY: ABNORMAL
OPIATE SCREEN URINE: ABNORMAL
OXYCODONE URINE: POSITIVE
PCO2, VEN: 73 MMHG (ref 40–50)
PDW BLD-RTO: 15.4 % (ref 12.4–15.4)
PERFORMED ON: ABNORMAL
PH UA: 5
PH UA: 5.5 (ref 5–8)
PH VENOUS: 7.22 (ref 7.35–7.45)
PHENCYCLIDINE SCREEN URINE: ABNORMAL
PLATELET # BLD: 188 K/UL (ref 135–450)
PMV BLD AUTO: 8.9 FL (ref 5–10.5)
PO2, VEN: 50 MMHG
POTASSIUM REFLEX MAGNESIUM: 4.9 MMOL/L (ref 3.5–5.1)
PRO-BNP: 1283 PG/ML (ref 0–124)
PROPOXYPHENE SCREEN: ABNORMAL
PROTEIN UA: ABNORMAL MG/DL
RBC # BLD: 4.38 M/UL (ref 4–5.2)
RBC UA: 37 /HPF (ref 0–4)
SARS-COV-2, NAAT: NOT DETECTED
SODIUM BLD-SCNC: 142 MMOL/L (ref 136–145)
SPECIFIC GRAVITY UA: 1.01 (ref 1–1.03)
TCO2 CALC VENOUS: 32 MMOL/L
TOTAL PROTEIN: 7 G/DL (ref 6.4–8.2)
TROPONIN: <0.01 NG/ML
TSH REFLEX FT4: 1.65 UIU/ML (ref 0.27–4.2)
URINE REFLEX TO CULTURE: YES
URINE TYPE: ABNORMAL
UROBILINOGEN, URINE: 0.2 E.U./DL
WBC # BLD: 6.5 K/UL (ref 4–11)
WBC UA: >900 /HPF (ref 0–5)

## 2022-02-11 PROCEDURE — 70450 CT HEAD/BRAIN W/O DYE: CPT

## 2022-02-11 PROCEDURE — 6360000002 HC RX W HCPCS: Performed by: INTERNAL MEDICINE

## 2022-02-11 PROCEDURE — 6370000000 HC RX 637 (ALT 250 FOR IP): Performed by: INTERNAL MEDICINE

## 2022-02-11 PROCEDURE — 6370000000 HC RX 637 (ALT 250 FOR IP): Performed by: EMERGENCY MEDICINE

## 2022-02-11 PROCEDURE — 93010 ELECTROCARDIOGRAM REPORT: CPT | Performed by: INTERNAL MEDICINE

## 2022-02-11 PROCEDURE — 80307 DRUG TEST PRSMV CHEM ANLYZR: CPT

## 2022-02-11 PROCEDURE — 93005 ELECTROCARDIOGRAM TRACING: CPT | Performed by: EMERGENCY MEDICINE

## 2022-02-11 PROCEDURE — 87635 SARS-COV-2 COVID-19 AMP PRB: CPT

## 2022-02-11 PROCEDURE — 94640 AIRWAY INHALATION TREATMENT: CPT

## 2022-02-11 PROCEDURE — 87077 CULTURE AEROBIC IDENTIFY: CPT

## 2022-02-11 PROCEDURE — 82803 BLOOD GASES ANY COMBINATION: CPT

## 2022-02-11 PROCEDURE — 71045 X-RAY EXAM CHEST 1 VIEW: CPT

## 2022-02-11 PROCEDURE — 80053 COMPREHEN METABOLIC PANEL: CPT

## 2022-02-11 PROCEDURE — 99285 EMERGENCY DEPT VISIT HI MDM: CPT

## 2022-02-11 PROCEDURE — 84443 ASSAY THYROID STIM HORMONE: CPT

## 2022-02-11 PROCEDURE — 2580000003 HC RX 258: Performed by: INTERNAL MEDICINE

## 2022-02-11 PROCEDURE — 87186 SC STD MICRODIL/AGAR DIL: CPT

## 2022-02-11 PROCEDURE — 05HM33Z INSERTION OF INFUSION DEVICE INTO RIGHT INTERNAL JUGULAR VEIN, PERCUTANEOUS APPROACH: ICD-10-PCS | Performed by: INTERNAL MEDICINE

## 2022-02-11 PROCEDURE — 2700000000 HC OXYGEN THERAPY PER DAY

## 2022-02-11 PROCEDURE — 84484 ASSAY OF TROPONIN QUANT: CPT

## 2022-02-11 PROCEDURE — 2060000000 HC ICU INTERMEDIATE R&B

## 2022-02-11 PROCEDURE — 6360000002 HC RX W HCPCS: Performed by: EMERGENCY MEDICINE

## 2022-02-11 PROCEDURE — 2580000003 HC RX 258: Performed by: EMERGENCY MEDICINE

## 2022-02-11 PROCEDURE — 85025 COMPLETE CBC W/AUTO DIFF WBC: CPT

## 2022-02-11 PROCEDURE — 82140 ASSAY OF AMMONIA: CPT

## 2022-02-11 PROCEDURE — 87086 URINE CULTURE/COLONY COUNT: CPT

## 2022-02-11 PROCEDURE — 82077 ASSAY SPEC XCP UR&BREATH IA: CPT

## 2022-02-11 PROCEDURE — 83880 ASSAY OF NATRIURETIC PEPTIDE: CPT

## 2022-02-11 PROCEDURE — 81001 URINALYSIS AUTO W/SCOPE: CPT

## 2022-02-11 RX ORDER — SIMVASTATIN 10 MG
10 TABLET ORAL NIGHTLY
Status: DISCONTINUED | OUTPATIENT
Start: 2022-02-11 | End: 2022-02-12

## 2022-02-11 RX ORDER — DOCUSATE SODIUM 100 MG/1
100 CAPSULE, LIQUID FILLED ORAL DAILY
Status: DISCONTINUED | OUTPATIENT
Start: 2022-02-11 | End: 2022-02-15 | Stop reason: HOSPADM

## 2022-02-11 RX ORDER — FUROSEMIDE 20 MG/1
20 TABLET ORAL DAILY
Status: DISCONTINUED | OUTPATIENT
Start: 2022-02-11 | End: 2022-02-12

## 2022-02-11 RX ORDER — LANOLIN ALCOHOL/MO/W.PET/CERES
6 CREAM (GRAM) TOPICAL NIGHTLY
Status: DISCONTINUED | OUTPATIENT
Start: 2022-02-11 | End: 2022-02-15 | Stop reason: HOSPADM

## 2022-02-11 RX ORDER — ACETAMINOPHEN 650 MG/1
650 SUPPOSITORY RECTAL EVERY 6 HOURS PRN
Status: DISCONTINUED | OUTPATIENT
Start: 2022-02-11 | End: 2022-02-15 | Stop reason: HOSPADM

## 2022-02-11 RX ORDER — MEMANTINE HYDROCHLORIDE 5 MG/1
10 TABLET ORAL 2 TIMES DAILY
Status: DISCONTINUED | OUTPATIENT
Start: 2022-02-11 | End: 2022-02-15 | Stop reason: HOSPADM

## 2022-02-11 RX ORDER — PREGABALIN 75 MG/1
150 CAPSULE ORAL EVERY 12 HOURS SCHEDULED
Status: DISCONTINUED | OUTPATIENT
Start: 2022-02-11 | End: 2022-02-15 | Stop reason: HOSPADM

## 2022-02-11 RX ORDER — ASCORBATE CALCIUM 500 MG
100 TABLET ORAL DAILY
Status: DISCONTINUED | OUTPATIENT
Start: 2022-02-11 | End: 2022-02-11

## 2022-02-11 RX ORDER — ALLOPURINOL 100 MG/1
100 TABLET ORAL NIGHTLY
Status: DISCONTINUED | OUTPATIENT
Start: 2022-02-11 | End: 2022-02-15 | Stop reason: HOSPADM

## 2022-02-11 RX ORDER — IPRATROPIUM BROMIDE AND ALBUTEROL SULFATE 2.5; .5 MG/3ML; MG/3ML
SOLUTION RESPIRATORY (INHALATION)
Status: DISPENSED
Start: 2022-02-11 | End: 2022-02-12

## 2022-02-11 RX ORDER — LACOSAMIDE 100 MG/1
100 TABLET ORAL EVERY 12 HOURS SCHEDULED
Status: DISCONTINUED | OUTPATIENT
Start: 2022-02-11 | End: 2022-02-15 | Stop reason: HOSPADM

## 2022-02-11 RX ORDER — FAMOTIDINE 20 MG/1
20 TABLET, FILM COATED ORAL NIGHTLY
Status: DISCONTINUED | OUTPATIENT
Start: 2022-02-11 | End: 2022-02-15 | Stop reason: HOSPADM

## 2022-02-11 RX ORDER — METHYLPREDNISOLONE SODIUM SUCCINATE 125 MG/2ML
125 INJECTION, POWDER, LYOPHILIZED, FOR SOLUTION INTRAMUSCULAR; INTRAVENOUS ONCE
Status: COMPLETED | OUTPATIENT
Start: 2022-02-11 | End: 2022-02-11

## 2022-02-11 RX ORDER — DEXTROMETHORPHAN POLISTIREX 30 MG/5ML
60 SUSPENSION ORAL 2 TIMES DAILY PRN
Status: DISCONTINUED | OUTPATIENT
Start: 2022-02-11 | End: 2022-02-11

## 2022-02-11 RX ORDER — POLYETHYLENE GLYCOL 3350 17 G/17G
17 POWDER, FOR SOLUTION ORAL DAILY PRN
Status: DISCONTINUED | OUTPATIENT
Start: 2022-02-11 | End: 2022-02-15 | Stop reason: HOSPADM

## 2022-02-11 RX ORDER — FUROSEMIDE 10 MG/ML
20 INJECTION INTRAMUSCULAR; INTRAVENOUS ONCE
Status: COMPLETED | OUTPATIENT
Start: 2022-02-11 | End: 2022-02-11

## 2022-02-11 RX ORDER — PREDNISONE 20 MG/1
40 TABLET ORAL DAILY
Status: DISCONTINUED | OUTPATIENT
Start: 2022-02-13 | End: 2022-02-13 | Stop reason: SDUPTHER

## 2022-02-11 RX ORDER — PHENYTOIN 50 MG/1
100 TABLET, CHEWABLE ORAL 3 TIMES DAILY
Status: DISCONTINUED | OUTPATIENT
Start: 2022-02-11 | End: 2022-02-15 | Stop reason: HOSPADM

## 2022-02-11 RX ORDER — SODIUM CHLORIDE 9 MG/ML
25 INJECTION, SOLUTION INTRAVENOUS PRN
Status: DISCONTINUED | OUTPATIENT
Start: 2022-02-11 | End: 2022-02-15 | Stop reason: HOSPADM

## 2022-02-11 RX ORDER — SODIUM CHLORIDE 0.9 % (FLUSH) 0.9 %
5-40 SYRINGE (ML) INJECTION PRN
Status: DISCONTINUED | OUTPATIENT
Start: 2022-02-11 | End: 2022-02-15 | Stop reason: HOSPADM

## 2022-02-11 RX ORDER — VITAMIN B COMPLEX
3000 TABLET ORAL DAILY
Status: DISCONTINUED | OUTPATIENT
Start: 2022-02-12 | End: 2022-02-15 | Stop reason: HOSPADM

## 2022-02-11 RX ORDER — SODIUM CHLORIDE 0.9 % (FLUSH) 0.9 %
5-40 SYRINGE (ML) INJECTION EVERY 12 HOURS SCHEDULED
Status: DISCONTINUED | OUTPATIENT
Start: 2022-02-11 | End: 2022-02-15 | Stop reason: HOSPADM

## 2022-02-11 RX ORDER — FLUTICASONE FUROATE AND VILANTEROL 100; 25 UG/1; UG/1
1 POWDER RESPIRATORY (INHALATION) DAILY
Status: DISCONTINUED | OUTPATIENT
Start: 2022-02-11 | End: 2022-02-11

## 2022-02-11 RX ORDER — FERROUS SULFATE TAB EC 324 MG (65 MG FE EQUIVALENT) 324 (65 FE) MG
325 TABLET DELAYED RESPONSE ORAL
Status: DISCONTINUED | OUTPATIENT
Start: 2022-02-12 | End: 2022-02-15 | Stop reason: HOSPADM

## 2022-02-11 RX ORDER — IPRATROPIUM BROMIDE AND ALBUTEROL SULFATE 2.5; .5 MG/3ML; MG/3ML
1 SOLUTION RESPIRATORY (INHALATION)
Status: DISCONTINUED | OUTPATIENT
Start: 2022-02-11 | End: 2022-02-15 | Stop reason: HOSPADM

## 2022-02-11 RX ORDER — BUDESONIDE AND FORMOTEROL FUMARATE DIHYDRATE 160; 4.5 UG/1; UG/1
2 AEROSOL RESPIRATORY (INHALATION) 2 TIMES DAILY
Status: DISCONTINUED | OUTPATIENT
Start: 2022-02-11 | End: 2022-02-15 | Stop reason: HOSPADM

## 2022-02-11 RX ORDER — ACETAMINOPHEN 325 MG/1
650 TABLET ORAL ONCE
Status: COMPLETED | OUTPATIENT
Start: 2022-02-11 | End: 2022-02-11

## 2022-02-11 RX ORDER — HYDRALAZINE HYDROCHLORIDE 25 MG/1
25 TABLET, FILM COATED ORAL 3 TIMES DAILY
Status: DISCONTINUED | OUTPATIENT
Start: 2022-02-11 | End: 2022-02-15 | Stop reason: HOSPADM

## 2022-02-11 RX ORDER — GABAPENTIN 100 MG/1
100 CAPSULE ORAL NIGHTLY
Status: DISCONTINUED | OUTPATIENT
Start: 2022-02-11 | End: 2022-02-15 | Stop reason: HOSPADM

## 2022-02-11 RX ORDER — METHYLPREDNISOLONE SODIUM SUCCINATE 40 MG/ML
40 INJECTION, POWDER, LYOPHILIZED, FOR SOLUTION INTRAMUSCULAR; INTRAVENOUS EVERY 12 HOURS
Status: COMPLETED | OUTPATIENT
Start: 2022-02-11 | End: 2022-02-13

## 2022-02-11 RX ORDER — ONDANSETRON 4 MG/1
4 TABLET, FILM COATED ORAL EVERY 6 HOURS PRN
Status: DISCONTINUED | OUTPATIENT
Start: 2022-02-11 | End: 2022-02-15 | Stop reason: HOSPADM

## 2022-02-11 RX ORDER — ALBUTEROL SULFATE 2.5 MG/3ML
2.5 SOLUTION RESPIRATORY (INHALATION) EVERY 8 HOURS PRN
Status: DISCONTINUED | OUTPATIENT
Start: 2022-02-11 | End: 2022-02-15 | Stop reason: HOSPADM

## 2022-02-11 RX ORDER — LOPERAMIDE HYDROCHLORIDE 2 MG/1
2 CAPSULE ORAL EVERY 6 HOURS PRN
Status: DISCONTINUED | OUTPATIENT
Start: 2022-02-11 | End: 2022-02-15 | Stop reason: HOSPADM

## 2022-02-11 RX ORDER — TROSPIUM CHLORIDE 20 MG/1
20 TABLET, FILM COATED ORAL NIGHTLY
Status: DISCONTINUED | OUTPATIENT
Start: 2022-02-11 | End: 2022-02-15 | Stop reason: HOSPADM

## 2022-02-11 RX ORDER — ATORVASTATIN CALCIUM 10 MG/1
10 TABLET, FILM COATED ORAL DAILY
Status: DISCONTINUED | OUTPATIENT
Start: 2022-02-11 | End: 2022-02-15 | Stop reason: HOSPADM

## 2022-02-11 RX ORDER — LIDOCAINE HYDROCHLORIDE 10 MG/ML
5 INJECTION, SOLUTION EPIDURAL; INFILTRATION; INTRACAUDAL; PERINEURAL ONCE
Status: DISCONTINUED | OUTPATIENT
Start: 2022-02-11 | End: 2022-02-15 | Stop reason: HOSPADM

## 2022-02-11 RX ORDER — ACETAMINOPHEN 325 MG/1
650 TABLET ORAL EVERY 6 HOURS PRN
Status: DISCONTINUED | OUTPATIENT
Start: 2022-02-11 | End: 2022-02-15 | Stop reason: HOSPADM

## 2022-02-11 RX ORDER — OXYCODONE AND ACETAMINOPHEN 2.5; 325 MG/1; MG/1
1 TABLET ORAL EVERY 6 HOURS PRN
COMMUNITY
End: 2022-04-06

## 2022-02-11 RX ORDER — ONDANSETRON 4 MG/1
4 TABLET, ORALLY DISINTEGRATING ORAL EVERY 8 HOURS PRN
Status: DISCONTINUED | OUTPATIENT
Start: 2022-02-11 | End: 2022-02-15 | Stop reason: HOSPADM

## 2022-02-11 RX ORDER — ONDANSETRON 4 MG/1
4 TABLET, FILM COATED ORAL EVERY 6 HOURS PRN
COMMUNITY

## 2022-02-11 RX ORDER — ASPIRIN 81 MG/1
81 TABLET, CHEWABLE ORAL DAILY
Status: DISCONTINUED | OUTPATIENT
Start: 2022-02-12 | End: 2022-02-15 | Stop reason: HOSPADM

## 2022-02-11 RX ORDER — ASPIRIN 81 MG/1
324 TABLET, CHEWABLE ORAL ONCE
Status: COMPLETED | OUTPATIENT
Start: 2022-02-11 | End: 2022-02-11

## 2022-02-11 RX ORDER — HYDROXYCHLOROQUINE SULFATE 200 MG/1
300 TABLET, FILM COATED ORAL DAILY
Status: DISCONTINUED | OUTPATIENT
Start: 2022-02-11 | End: 2022-02-15 | Stop reason: HOSPADM

## 2022-02-11 RX ORDER — ONDANSETRON 2 MG/ML
4 INJECTION INTRAMUSCULAR; INTRAVENOUS EVERY 6 HOURS PRN
Status: DISCONTINUED | OUTPATIENT
Start: 2022-02-11 | End: 2022-02-15 | Stop reason: HOSPADM

## 2022-02-11 RX ORDER — TOLTERODINE 2 MG/1
2 CAPSULE, EXTENDED RELEASE ORAL DAILY
Status: DISCONTINUED | OUTPATIENT
Start: 2022-02-11 | End: 2022-02-11

## 2022-02-11 RX ORDER — DULOXETIN HYDROCHLORIDE 20 MG/1
20 CAPSULE, DELAYED RELEASE ORAL NIGHTLY
Status: DISCONTINUED | OUTPATIENT
Start: 2022-02-11 | End: 2022-02-15 | Stop reason: HOSPADM

## 2022-02-11 RX ORDER — ALBUTEROL SULFATE 90 UG/1
2 AEROSOL, METERED RESPIRATORY (INHALATION) EVERY 6 HOURS PRN
Status: DISCONTINUED | OUTPATIENT
Start: 2022-02-11 | End: 2022-02-15 | Stop reason: HOSPADM

## 2022-02-11 RX ORDER — LEVETIRACETAM 500 MG/1
500 TABLET ORAL 2 TIMES DAILY
Status: DISCONTINUED | OUTPATIENT
Start: 2022-02-11 | End: 2022-02-15 | Stop reason: HOSPADM

## 2022-02-11 RX ADMIN — DOCUSATE SODIUM 100 MG: 100 CAPSULE, LIQUID FILLED ORAL at 17:37

## 2022-02-11 RX ADMIN — IPRATROPIUM BROMIDE AND ALBUTEROL SULFATE 1 AMPULE: .5; 2.5 SOLUTION RESPIRATORY (INHALATION) at 20:20

## 2022-02-11 RX ADMIN — BUDESONIDE AND FORMOTEROL FUMARATE DIHYDRATE 2 PUFF: 160; 4.5 AEROSOL RESPIRATORY (INHALATION) at 20:21

## 2022-02-11 RX ADMIN — POLYVINYL ALCOHOL, POVIDONE 2 DROP: .5; .6 LIQUID OPHTHALMIC at 20:52

## 2022-02-11 RX ADMIN — TROSPIUM CHLORIDE 20 MG: 20 TABLET, FILM COATED ORAL at 20:57

## 2022-02-11 RX ADMIN — ASPIRIN 81 MG 324 MG: 81 TABLET ORAL at 09:31

## 2022-02-11 RX ADMIN — HYDRALAZINE HYDROCHLORIDE 25 MG: 25 TABLET, FILM COATED ORAL at 20:50

## 2022-02-11 RX ADMIN — ALLOPURINOL 100 MG: 100 TABLET ORAL at 20:50

## 2022-02-11 RX ADMIN — DULOXETINE HYDROCHLORIDE 20 MG: 20 CAPSULE, DELAYED RELEASE ORAL at 20:50

## 2022-02-11 RX ADMIN — METHYLPREDNISOLONE SODIUM SUCCINATE 40 MG: 40 INJECTION, POWDER, FOR SOLUTION INTRAMUSCULAR; INTRAVENOUS at 20:50

## 2022-02-11 RX ADMIN — PREGABALIN 150 MG: 75 CAPSULE ORAL at 20:50

## 2022-02-11 RX ADMIN — ATORVASTATIN CALCIUM 10 MG: 10 TABLET, FILM COATED ORAL at 17:41

## 2022-02-11 RX ADMIN — PHENYTOIN 100 MG: 50 TABLET, CHEWABLE ORAL at 18:50

## 2022-02-11 RX ADMIN — LEVETIRACETAM 500 MG: 500 TABLET, FILM COATED ORAL at 20:50

## 2022-02-11 RX ADMIN — FUROSEMIDE 20 MG: 10 INJECTION, SOLUTION INTRAMUSCULAR; INTRAVENOUS at 13:03

## 2022-02-11 RX ADMIN — FUROSEMIDE 20 MG: 20 TABLET ORAL at 17:37

## 2022-02-11 RX ADMIN — SODIUM CHLORIDE 25 ML: 9 INJECTION, SOLUTION INTRAVENOUS at 18:48

## 2022-02-11 RX ADMIN — ALBUTEROL SULFATE 2.5 MG: 2.5 SOLUTION RESPIRATORY (INHALATION) at 10:59

## 2022-02-11 RX ADMIN — SODIUM CHLORIDE, PRESERVATIVE FREE 10 ML: 5 INJECTION INTRAVENOUS at 20:53

## 2022-02-11 RX ADMIN — ACETAMINOPHEN 650 MG: 325 TABLET ORAL at 12:53

## 2022-02-11 RX ADMIN — LACOSAMIDE 100 MG: 100 TABLET, FILM COATED ORAL at 20:50

## 2022-02-11 RX ADMIN — SODIUM CHLORIDE, PRESERVATIVE FREE 10 ML: 5 INJECTION INTRAVENOUS at 20:52

## 2022-02-11 RX ADMIN — ACETAMINOPHEN 650 MG: 325 TABLET ORAL at 17:37

## 2022-02-11 RX ADMIN — MEMANTINE 10 MG: 5 TABLET ORAL at 20:50

## 2022-02-11 RX ADMIN — HYDRALAZINE HYDROCHLORIDE 25 MG: 25 TABLET, FILM COATED ORAL at 17:37

## 2022-02-11 RX ADMIN — SIMVASTATIN 10 MG: 10 TABLET, FILM COATED ORAL at 20:56

## 2022-02-11 RX ADMIN — HYDROXYCHLOROQUINE SULFATE 300 MG: 200 TABLET ORAL at 18:50

## 2022-02-11 RX ADMIN — PHENYTOIN 100 MG: 50 TABLET, CHEWABLE ORAL at 20:51

## 2022-02-11 RX ADMIN — GABAPENTIN 100 MG: 100 CAPSULE ORAL at 20:50

## 2022-02-11 RX ADMIN — Medication 6 MG: at 20:50

## 2022-02-11 RX ADMIN — FAMOTIDINE 20 MG: 20 TABLET ORAL at 20:50

## 2022-02-11 RX ADMIN — CEFTRIAXONE 1000 MG: 1 INJECTION, POWDER, FOR SOLUTION INTRAMUSCULAR; INTRAVENOUS at 18:49

## 2022-02-11 RX ADMIN — METHYLPREDNISOLONE SODIUM SUCCINATE 125 MG: 125 INJECTION, POWDER, FOR SOLUTION INTRAMUSCULAR; INTRAVENOUS at 13:04

## 2022-02-11 ASSESSMENT — PAIN - FUNCTIONAL ASSESSMENT
PAIN_FUNCTIONAL_ASSESSMENT: PREVENTS OR INTERFERES SOME ACTIVE ACTIVITIES AND ADLS

## 2022-02-11 ASSESSMENT — PAIN DESCRIPTION - ORIENTATION
ORIENTATION: OTHER (COMMENT)
ORIENTATION: ANTERIOR
ORIENTATION: ANTERIOR

## 2022-02-11 ASSESSMENT — PAIN SCALES - GENERAL
PAINLEVEL_OUTOF10: 0
PAINLEVEL_OUTOF10: 9
PAINLEVEL_OUTOF10: 8
PAINLEVEL_OUTOF10: 9

## 2022-02-11 ASSESSMENT — PAIN DESCRIPTION - ONSET
ONSET: ON-GOING
ONSET: UNABLE TO ASSESS
ONSET: ON-GOING

## 2022-02-11 ASSESSMENT — PAIN DESCRIPTION - PROGRESSION
CLINICAL_PROGRESSION: NOT CHANGED

## 2022-02-11 ASSESSMENT — PAIN DESCRIPTION - DESCRIPTORS
DESCRIPTORS: ACHING
DESCRIPTORS: HEADACHE
DESCRIPTORS: ACHING
DESCRIPTORS: ACHING

## 2022-02-11 ASSESSMENT — PAIN DESCRIPTION - LOCATION
LOCATION: HEAD

## 2022-02-11 ASSESSMENT — PAIN DESCRIPTION - FREQUENCY
FREQUENCY: CONTINUOUS

## 2022-02-11 ASSESSMENT — PAIN DESCRIPTION - PAIN TYPE
TYPE: ACUTE PAIN

## 2022-02-11 NOTE — ED PROVIDER NOTES
[address] COMPLAINT  Altered Mental Status (sent from [address] [place] [address])       [facility]  Tanya Ocampo is a 66 y.o. female who presents to the ED initially with report for concern for altered mental status, however on further history from the nursing facility, the concern seems to be for chest pain. The patient had complained of chest pain that started yesterday. EMS was summoned at that time however patient refused transport to the hospital.  Today, the chest pain persisted. The patient describes a burning chest pain, midsternal, no radiation, moderate, no exacerbating or ameliorating factors, no associated symptoms. She denies fever, cough, congestion, nausea, vomiting, diarrhea, abdominal pain, dysuria. BMI 52. History of DM, HTN. Of note, patient was discharged from this hospital 12/15/2021 after admission for chest pain rule out. Troponins were negative at that time. Echo at that time showed no regional wall motion abnormality, EF 55 to 60%. Nurse from facility: patient complained of chest pain yesterday, but refused transfer    No other complaints, modifying factors or associated symptoms. I have reviewed the following from the nursing documentation:    Past Medical History:   Diagnosis Date    Anemia     Asthma     CKD stage 3 due to type 2 diabetes mellitus ([address]     COPD (chronic obstructive pulmonary disease) (HCC)     Dementia (HCC)     DM (diabetes mellitus) ([address]     GERD (gastroesophageal reflux disease)     Gout     HTN (hypertension)     Seizure ([facility]      History reviewed. No pertinent surgical history. History reviewed. No pertinent family history.   Social History     Socioeconomic History    Marital status: Single     Spouse name: Not on file    Number of children: Not on file    Years of education: Not on file    Highest education level: Not on file   Occupational History    Not on file   Tobacco Use  Smoking status: Never Smoker    Smokeless tobacco: Never Used   Vaping Use    Vaping Use: Never used   Substance and Sexual Activity    Alcohol use: Never    Drug use: Never    Sexual activity: Not Currently   Other Topics Concern    Not on file   Social History Narrative    Not on file     Social Determinants of Health     Financial Resource Strain:     Difficulty of Paying Living Expenses: Not on file   Food Insecurity:     Worried About Running Out of Food in the Last Year: Not on file    [place] of Food in the Last Year: Not on file   Transportation Needs:     Lack of Transportation (Medical): Not on file    Lack of Transportation (Non-Medical):  Not on file   Physical Activity:     Days of Exercise per Week: Not on file    Minutes of Exercise per Session: Not on file   Stress:     Feeling of Stress : Not on file   Social Connections:     Frequency of Communication with Friends and Family: Not on file    Frequency of Social Gatherings with Friends and Family: Not on file    Attends Holiness Services: Not on file    Active Member of [address] [place] [address] or Organizations: Not on file    Attends Club or Organization Meetings: Not on file    Marital Status: Not on file   Intimate Partner Violence:     Fear of Current or Ex-Partner: Not on file    Emotionally Abused: Not on file    Physically Abused: Not on file    Sexually Abused: Not on file   Housing Stability:     Unable to Pay for Housing in the Last Year: Not on file    Number of [place] in the Last Year: Not on file    Unstable Housing in the Last Year: Not on file     Current Facility-Administered Medications   Medication Dose Route Frequency Provider Last Rate Last Admin    ipratropium-albuterol (DUONEB) nebulizer solution 1 ampule  1 ampule Inhalation Q4H Severiano Lowe MD        cefTRIAXone (ROCEPHIN) 1000 mg IVPB in 50 mL D5W minibag  1,000 mg IntraVENous Once Jessenia Chisholm MD        lidocaine PF 1 % injection 5 mL  5 mL IntraDERmal Once Efren Walker MD        sodium chloride flush 0.9 % injection 5-40 mL  5-40 mL IntraVENous 2 times per day Efren Walker MD        sodium chloride flush 0.9 % injection 5-40 mL  5-40 mL IntraVENous PRN Efren Walker MD        0.9 % sodium chloride infusion  25 mL IntraVENous PRN Efren Walker MD         Current Outpatient Medications   Medication Sig Dispense Refill    oxyCODONE-acetaminophen (PERCOCET) 2.5-325 MG per tablet Take 1 tablet by mouth every 6 hours as needed for Pain.       tiotropium (SPIRIVA RESPIMAT) 2.5 MCG/ACT AERS inhaler Inhale 2 puffs into the lungs daily      ondansetron (ZOFRAN) 4 MG tablet Take 4 mg by mouth every 6 hours as needed for Nausea or Vomiting      memantine (NAMENDA) 10 MG tablet Take 10 mg by mouth 2 times daily Indications: Dementia due to Vascular Disease      albuterol (PROVENTIL) (2.5 MG/3ML) 0.083% nebulizer solution Take 2.5 mg by nebulization every 8 hours as needed for Shortness of Breath       polyvinyl alcohol-povidone 5-6 MG/ML SOLN opthalmic solution Place 2 drops into both eyes 2 times daily      docusate sodium (COLACE) 100 MG capsule Take 100 mg by mouth daily      dextromethorphan (DELSYM) 30 MG/5ML extended release liquid Take 60 mg by mouth 2 times daily as needed for Cough      DULoxetine (CYMBALTA) 20 MG extended release capsule Take 20 mg by mouth nightly      famotidine (PEPCID) 20 MG tablet Take 20 mg by mouth nightly      Umeclidinium Bromide (INCRUSE ELLIPTA) 62.5 MCG/INH AEPB Inhale 1 puff into the lungs daily      loperamide (IMODIUM) 2 MG capsule Take 2 mg by mouth every 6 hours as needed for Diarrhea      acetaminophen (TYLENOL) 325 MG tablet Take 650 mg by mouth every 4 hours as needed for Pain      albuterol sulfate HFA (VENTOLIN HFA) 108 (90 Base) MCG/ACT inhaler Inhale 2 puffs into the lungs every 6 hours as needed for Wheezing or Shortness of Breath      hydroxychloroquine (PLAQUENIL) 200 MG tablet Take 1.5 tablets by mouth daily 30 tablet 0    allopurinol (ZYLOPRIM) 100 MG tablet Take 100 mg by mouth nightly      aspirin 81 MG chewable tablet Take 81 mg by mouth daily      fluticasone-vilanterol (BREO ELLIPTA) 100-25 MCG/INH AEPB inhaler Inhale 1 puff into the lungs daily      tolterodine (DETROL LA) 2 MG extended release capsule Take 2 mg by mouth daily      ferrous sulfate (IRON 325) 325 (65 Fe) MG tablet Take 325 mg by mouth daily (with breakfast)      furosemide (LASIX) 20 MG tablet Take 20 mg by mouth daily      gabapentin (NEURONTIN) 100 MG capsule Take 100 mg by mouth nightly.  hydrALAZINE (APRESOLINE) 25 MG tablet Take 25 mg by mouth 3 times daily       levETIRAcetam (KEPPRA) 500 MG tablet Take 500 mg by mouth 2 times daily       pregabalin (LYRICA) 150 MG capsule Take 150 mg by mouth every 12 hours.  melatonin 3 MG TABS tablet Take 6 mg by mouth nightly      magnesium hydroxide (MILK OF MAGNESIA) 400 MG/5ML suspension Take 30 mLs by mouth daily as needed for Constipation       phenytoin (DILANTIN) 50 MG tablet Take 100 mg by mouth 3 times daily       simvastatin (ZOCOR) 10 MG tablet Take 10 mg by mouth nightly      lacosamide (VIMPAT) 50 MG TABS tablet Take 100 mg by mouth every 12 hours.  vitamin D (CHOLECALCIFEROL) 25 MCG (1000 UT) TABS tablet Take 3,000 Units by mouth daily       Vitamin E 100 units TABS Take 100 Units by mouth daily       No Known Allergies    REVIEW OF SYSTEMS  10 systems reviewed, pertinent positives and negatives per HPI, otherwise noted to be negative. PHYSICAL EXAM  ED Triage Vitals [02/11/22 0857]   Enc Vitals Group      BP (!) 157/57      Pulse 71      Resp 15      Temp 98.4 °F (36.9 °C)      Temp Source Oral      SpO2 93 %      Weight 255 lb 15.3 oz (116.1 kg)      Height 4' 11\" (1.499 m)      Head Circumference       Peak Flow       Pain Score       Pain Loc       Pain Edu? Excl. in [address]? General appearance: Awake and alert. Cooperative. No acute distress. HENT: Normocephalic. Atraumatic. Mucous membranes are moist.  Neck: Supple. No meningismus. Eyes: PERRL. EOMI. Heart/Chest: RRR. No murmurs. Lungs: Respirations unlabored. Globally diminished. No wheezes/rales/rhonchi. Abdomen: Soft. Non-tender. Non-distended. No rebound or guarding. Musculoskeletal: No deformity. No tenderness in the extremities. All extremities neurovascularly intact. Skin: Warm and dry. No acute rashes. Neurological: Alert and oriented. CN II-XII intact. Strength 5/5 bilateral upper and lower extremities. Sensation intact to light touch. Psychiatric: Mood/affect: normal      LABS  I have reviewed all labs for this visit.    Results for orders placed or performed during the hospital encounter of 02/11/22   COVID-19, Rapid    Specimen: Nasopharyngeal Swab   Result Value Ref Range    SARS-CoV-2, NAAT Not Detected Not Detected   CBC Auto Differential   Result Value Ref Range    WBC 6.5 4.0 - 11.0 K/uL    RBC 4.38 4.00 - 5.20 M/uL    Hemoglobin 11.4 (L) 12.0 - 16.0 g/dL    Hematocrit 36.8 36.0 - 48.0 %    MCV 84.0 80.0 - 100.0 fL    MCH 25.9 (L) 26.0 - 34.0 pg    MCHC 30.9 (L) 31.0 - 36.0 g/dL    RDW 15.4 12.4 - 15.4 %    Platelets 162 952 - 296 K/uL    MPV 8.9 5.0 - 10.5 fL    Neutrophils % 49.2 %    Lymphocytes % 35.3 %    Monocytes % 9.6 %    Eosinophils % 5.7 %    Basophils % 0.2 %    Neutrophils Absolute 3.2 1.7 - 7.7 K/uL    Lymphocytes Absolute 2.3 1.0 - 5.1 K/uL    Monocytes Absolute 0.6 0.0 - 1.3 K/uL    Eosinophils Absolute 0.4 0.0 - 0.6 K/uL    Basophils Absolute 0.0 0.0 - 0.2 K/uL   Comprehensive Metabolic Panel w/ Reflex to MG   Result Value Ref Range    Sodium 142 136 - 145 mmol/L    Potassium reflex Magnesium 4.9 3.5 - 5.1 mmol/L    Chloride 106 99 - 110 mmol/L    CO2 27 21 - 32 mmol/L    Anion Gap 9 3 - 16    Glucose 105 (H) 70 - 99 mg/dL    BUN 19 7 - 20 mg/dL    CREATININE 1.1 0.6 - 1.2 mg/dL    GFR Non- 49 (A) >60 GFR  59 (A) >60    Calcium 9.1 8.3 - 10.6 mg/dL    Total Protein 7.0 6.4 - 8.2 g/dL    Albumin 3.7 3.4 - 5.0 g/dL    Albumin/Globulin Ratio 1.1 1.1 - 2.2    Total Bilirubin <0.2 0.0 - 1.0 mg/dL    Alkaline Phosphatase 155 (H) 40 - 129 U/L    ALT 14 10 - 40 U/L    AST 22 15 - 37 U/L   Ethanol   Result Value Ref Range    Ethanol Lvl None Detected mg/dL   Urine Drug Screen   Result Value Ref Range    Amphetamine Screen, Urine Neg Negative <1000ng/mL    Barbiturate Screen, Ur Neg Negative <200 ng/mL    Benzodiazepine Screen, Urine Neg Negative <200 ng/mL    Cannabinoid Scrn, Ur Neg Negative <50 ng/mL    Cocaine Metabolite Screen, Urine Neg Negative <300 ng/mL    Opiate Scrn, Ur Neg Negative <300 ng/mL    PCP Screen, Urine Neg Negative <25 ng/mL    Methadone Screen, Urine Neg Negative <300 ng/mL    Propoxyphene Scrn, Ur Neg Negative <300 ng/mL    Oxycodone Urine POSITIVE (A) Negative <100 ng/ml    pH, UA 5.0     Drug Screen Comment: see below    Urinalysis Reflex to Culture    Specimen: Urine, clean catch   Result Value Ref Range    Color, UA YELLOW Straw/Yellow    Clarity, UA TURBID (A) Clear    Glucose, Ur Negative Negative mg/dL    Bilirubin Urine Negative Negative    Ketones, Urine Negative Negative mg/dL    Specific Gravity, UA 1.015 1.005 - 1.030    Blood, Urine SMALL (A) Negative    pH, UA 5.5 5.0 - 8.0    Protein, UA TRACE (A) Negative mg/dL    Urobilinogen, Urine 0.2 <2.0 E.U./dL    Nitrite, Urine POSITIVE (A) Negative    Leukocyte Esterase, Urine LARGE (A) Negative    Microscopic Examination YES     Urine Type NotGiven     Urine Reflex to Culture Yes    TSH with Reflex to FT4   Result Value Ref Range    TSH Reflex FT4 1.65 0.27 - 4.20 uIU/mL   Ammonia   Result Value Ref Range    Ammonia 34 11 - 51 umol/L   Blood Gas, Venous   Result Value Ref Range    pH, Marcello 7.221 (L) 7.350 - 7.450    pCO2, Marcello 73.0 (H) 40.0 - 50.0 mmHg    pO2, Marcello 50 Not Established mmHg    HCO3, Venous 30 (H) 23 - 29 mmol/L Base Excess, Marcello 0.7 Not Established mmol/L    O2 Sat, Marcello 85 Not Established %    Carboxyhemoglobin 1.5 %    MetHgb, Marcello 0.1 <1.5 %    TC02 (Calc), Marcello 32 Not Established mmol/L    O2 Therapy Unknown    Troponin   Result Value Ref Range    Troponin <0.01 <0.01 ng/mL   Brain Natriuretic Peptide   Result Value Ref Range    Pro-BNP 1,283 (H) 0 - 124 pg/mL   Microscopic Urinalysis   Result Value Ref Range    Bacteria, UA 4+ (A) None Seen /HPF    Urinalysis Comments see below     WBC, UA >900 (H) 0 - 5 /HPF    RBC, UA 37 (H) 0 - 4 /HPF    Epithelial Cells, UA 12 (H) 0 - 5 /HPF   EKG 12 Lead   Result Value Ref Range    Ventricular Rate 73 BPM    Atrial Rate 73 BPM    P-R Interval 174 ms    QRS Duration 174 ms    Q-T Interval 436 ms    QTc Calculation (Bazett) 480 ms    P Axis 32 degrees    R Axis -49 degrees    T Axis 42 degrees    Diagnosis       Normal sinus rhythmRight bundle branch blockLeft anterior fascicular block Bifascicular block Moderate voltage criteria for LVH, may be normal variantCannot rule out Septal infarct , age undeterminedAbnormal ECGWhen compared with ECG of 13-DEC-2021 11:12,No significant change was found       RADIOLOGY  I have reviewed all radiographic studies for this visit. CT HEAD WO CONTRAST   Final Result   No acute intracranial abnormality. RECOMMENDATIONS:   Unavailable         XR CHEST PORTABLE   Final Result   Mild pulmonary edema         XR CHEST PORTABLE    (Results Pending)        ECG  EKG interpreted by myself. Rate: 73  Rhythm: NSR  Axis: -49  Intervals:   QTc -49  ST Segments: no acute abnormality  T waves: no acute abnormality  Comparison: Compared to 12/13/2021, there is no significant change  Impression: NSR with right bundle branch block, left anterior fascicular block, voltage criteria for LVH       ED COURSE/MDM  Patient seen and evaluated. Old records reviewed. Labs and imaging reviewed and results discussed with patient/family to extent possible. This is a 75-year-old female presents with complaint of chest pain and found to have acute on chronic hypoxic and hypercapnic respiratory failure and urinary tract infection. On arrival the patient is hypoxic despite 2 L nasal cannula. She was escalated to 4 L nasal cannula with improvement in her oxygenation. Her chest x-ray shows evidence of pulmonary edema. BNP is elevated at 1300. Troponin is within normal limits and EKG shows no acute ischemic abnormality. Renal panel no significant electrolyte abnormality or creatinine elevation. CBC no leukocytosis. Mild anemia, clinically insignificant. Covid negative. Patient's hypoxia and hypercapnia is likely related to acute exacerbation of COPD and CHF exacerbation. Patient was administered ceftriaxone for urinary tract infection. Was administered DuoNeb and albuterol. Administered Solu-Medrol. Vascular access for the patient proved difficult and despite attempted ultrasound-guided IV and even attempted placement of a midline by the PICC team, vascular access proved unsuccessful. As such I placed a central line for the patient as below with verbal consent from the patient's daughter. Pt to be placed on Vapotherm. Admit to hospital medicine for further evaluation and treatment.       During the patient's ED course, the patient was given:  Medications   ipratropium-albuterol (DUONEB) nebulizer solution 1 ampule ( Inhalation Canceled Entry 2/11/22 9207)   cefTRIAXone (ROCEPHIN) 1000 mg IVPB in 50 mL D5W minibag (has no administration in time range)   lidocaine PF 1 % injection 5 mL (has no administration in time range)   sodium chloride flush 0.9 % injection 5-40 mL (has no administration in time range)   sodium chloride flush 0.9 % injection 5-40 mL (has no administration in time range)   0.9 % sodium chloride infusion (has no administration in time range)   aspirin chewable tablet 324 mg (324 mg Oral Given 2/11/22 0435)   methylPREDNISolone sodium (SOLU-MEDROL) injection 125 mg (125 mg IntraVENous Given 2/11/22 1304)   albuterol (PROVENTIL) nebulizer solution 2.5 mg (2.5 mg Nebulization Given 2/11/22 1059)   furosemide (LASIX) injection 20 mg (20 mg IntraVENous Given 2/11/22 1303)   acetaminophen (TYLENOL) tablet 650 mg (650 mg Oral Given 2/11/22 1253)        All questions were answered and the patient/family expressed understanding and agreement with the plan. PROCEDURES  Ultrasound Guided Peripheral IV    Indication: difficult vascular access, multiple failed attempts at peripheral IV placement by nursing  Consent: verbal  Risks discussed: pain, infection, damage to surrounding structures (including nerve/vascular), need for additional procedures, failure to achieved the desired result    Procedure: Under ultrasound guidance, potential access sites were evaluated. A suitable vessel located at the right [facility] was identified. The vessel was determined to be patent and compressible. The skin overlying the vessel was cleaned with chlorhexidine. The chlorhexidine was allowed sufficient time to completely dry. Using real-time ultrasound guidance, 20 gauge angiocatheter was advanced and visualized to enter the lumen of the target vessel. The IV was noted to draw blood but could not be advanced or flushed. Removed. Direct pressure held until hemostasis. Dressing applied. The patient tolerated the procedure well, with no immediate complications. Central Line    Date/Time: 2/11/2022 4:31 PM  Performed by: Claudette Romo MD  Authorized by: Claudette Romo MD     Consent:     Consent obtained:  Verbal    Consent given by: daughter. Risks discussed:  Incorrect placement, arterial puncture, bleeding, infection, nerve damage and pneumothorax    Alternatives discussed:  No treatment, delayed treatment and alternative treatment  Pre-procedure details:     Hand hygiene: Hand hygiene performed prior to insertion      Sterile barrier technique:  All elements of maximal sterile technique followed      Skin preparation:  2% chlorhexidine    Skin preparation agent: Skin preparation agent completely dried prior to procedure    Anesthesia (see MAR for exact dosages): Anesthesia method:  Local infiltration    Local anesthetic:  Lidocaine 1% w/o epi  Procedure details:     Location:  R internal jugular    Patient position:  Trendelenburg    Procedural supplies:  Triple lumen    Catheter size:  7.5 Fr    Landmarks identified: yes      Ultrasound guidance: yes      Sterile ultrasound techniques: Sterile gel and sterile probe covers were used      Number of attempts:  1    Successful placement: yes    Post-procedure details:     Post-procedure:  Dressing applied and line sutured    Assessment:  Blood return through all ports, no pneumothorax on x-ray, placement verified by x-ray and free fluid flow    Patient tolerance of procedure: Tolerated well, no immediate complications              CRITICAL CARE  Total critical care time provided today thus far was 31 minutes. This excludes seperately billable procedures. Critical care time was provided for acute on chronic respiratory failure that required close evaluation and/or intervention with concern for potential patient decompensation. CLINICAL IMPRESSION  1. Acute on chronic respiratory failure with hypoxia and hypercapnia (HCC)    2. Acute UTI    3. Chest pain, unspecified type    4. Acute on chronic congestive heart failure, unspecified heart failure type ([address]    5. COPD exacerbation ([address]        DISPOSITION   admit    Lilliana Foster MD    Note: This chart was created using voice recognition dictation software. Efforts were made by me to ensure accuracy, however some errors may be present due to limitations of this technology and occasionally words are not transcribed correctly.         Lilliana Foster MD  02/11/22 7617

## 2022-02-11 NOTE — PROGRESS NOTES
PICC ATTEMPT:    Upon arrival to place PICC line assessed chart for issues related to picc placement, check for consent, and did time out with MADHU RAINEY. Pt. Did no really tolerate attempt despite lidocaine usage. Cephalic an Basilic veins atemped without any success of threading wire. pt vein are extremly deep. Reported to RN IVY and spoke to MD about getting central line placed. Pt daughter said she has had them previously.

## 2022-02-11 NOTE — ACP (ADVANCE CARE PLANNING)
Advance Care Planning     Advance Care Planning Activator (Inpatient)  Conversation Note      Date of ACP Conversation: 2/11/2022     Conversation Conducted with:  Healthcare Decision Maker: Next of Kin by law (only applies in absence of above) (name) daughter-Melissa Grimm    ACP Activator: Lee Joshi, [address] [facility] Decision Maker:     Current Designated Health Care Decision Maker:     Primary Decision Maker: Chuckie Mckeon [facility] - [phone]    Today we documented Decision Maker(s) consistent with Legal Next of Kin hierarchy. Care Preferences    Ventilation: \"If you were in your present state of health and suddenly became very ill and were unable to breathe on your own, what would your preference be about the use of a ventilator (breathing machine) if it were available to you? \"      Would the patient desire the use of ventilator (breathing machine)?: yes    \"If your health worsens and it becomes clear that your chance of recovery is unlikely, what would your preference be about the use of a ventilator (breathing machine) if it were available to you? \"     Would the patient desire the use of ventilator (breathing machine)?: Yes      Resuscitation  \"CPR works best to restart the heart when there is a sudden event, like a heart attack, in someone who is otherwise healthy. Unfortunately, CPR does not typically restart the heart for people who have serious health conditions or who are very sick. \"    \"In the event your heart stopped as a result of an underlying serious health condition, would you want attempts to be made to restart your heart (answer \"yes\" for attempt to resuscitate) or would you prefer a natural death (answer \"no\" for do not attempt to resuscitate)? \" yes       [x] Yes   [] No   Educated Patient / Silverio Trinh regarding differences between Advance Directives and portable DNR orders.     Length of ACP Conversation in minutes:  10    Conversation Outcomes:  [x] ACP discussion completed  [] Existing advance directive reviewed with patient; no changes to patient's previously recorded wishes  [] New Advance Directive completed  [] Portable Do Not Rescitate prepared for Provider review and signature  [] POLST/POST/MOLST/MOST prepared for Provider review and signature      Follow-up plan:    [] Schedule follow-up conversation to continue planning  [] Referred individual to Provider for additional questions/concerns   [] Advised patient/agent/surrogate to review completed ACP document and update if needed with changes in condition, patient preferences or care setting    [x] This note routed to one or more involved healthcare providers

## 2022-02-11 NOTE — PROGRESS NOTES
Pharmacy Medication Reconciliation Note     List of medications patient is currently taking is complete. Source of information:   1. Patient's medication list from [facility]  2. EMR    Notes regarding home medications:   1. Per [facility] staff, patient did not receive any of her home medication doses today before presenting to the ER.       [address] [facility] Lisa Pharmacy Intern  2/11/2022 12:07 PM

## 2022-02-11 NOTE — H&P
Hospital Medicine History & Physical      PCP: David Colón    Date of Admission: 2/11/2022    Date of Service: Pt seen/examined on 2/11/22 and Admitted to Inpatient     Chief Complaint: AMS    History Of Present Illness: The patient is a 66 y.o. female who presents to [facility] with AMS from [facility]. Pt has apparent h/o chr resp failure, was found to be confused and with worsening SOB and hence sent to the ER. COVID neg, CXR showed pulm edema. Has h/o COPD      Past Medical History:        Diagnosis Date    Anemia     Asthma     CKD stage 3 due to type 2 diabetes mellitus (HCC)     COPD (chronic obstructive pulmonary disease) (HCC)     Dementia (HCC)     DM (diabetes mellitus) ([facility] [address]     GERD (gastroesophageal reflux disease)     Gout     HTN (hypertension)     Seizure ([facility] [address]        Past Surgical History:    History reviewed. No pertinent surgical history. Medications Prior to Admission:    Prior to Admission medications    Medication Sig Start Date End Date Taking? Authorizing Provider   oxyCODONE-acetaminophen (PERCOCET) 2.5-325 MG per tablet Take 1 tablet by mouth every 6 hours as needed for Pain.    Yes Historical Provider, MD   tiotropium (SPIRIVA RESPIMAT) 2.5 MCG/ACT AERS inhaler Inhale 2 puffs into the lungs daily   Yes Historical Provider, MD   ondansetron (ZOFRAN) 4 MG tablet Take 4 mg by mouth every 6 hours as needed for Nausea or Vomiting   Yes Historical Provider, MD   memantine (NAMENDA) 10 MG tablet Take 10 mg by mouth 2 times daily Indications: Dementia due to Vascular Disease   Yes Historical Provider, MD   albuterol (PROVENTIL) (2.5 MG/3ML) 0.083% nebulizer solution Take 2.5 mg by nebulization every 8 hours as needed for Shortness of Breath    Yes Historical Provider, MD   polyvinyl alcohol-povidone 5-6 MG/ML SOLN opthalmic solution Place 2 drops into both eyes 2 times daily   Yes Historical Provider, MD   docusate sodium (COLACE) 100 MG capsule Take 100 mg by mouth daily   Yes Historical Provider, MD   dextromethorphan (DELSYM) 30 MG/5ML extended release liquid Take 60 mg by mouth 2 times daily as needed for Cough   Yes Historical Provider, MD   DULoxetine (CYMBALTA) 20 MG extended release capsule Take 20 mg by mouth nightly   Yes Historical Provider, MD   famotidine (PEPCID) 20 MG tablet Take 20 mg by mouth nightly   Yes Historical Provider, MD   Umeclidinium Bromide (INCRUSE ELLIPTA) 62.5 MCG/INH AEPB Inhale 1 puff into the lungs daily   Yes Historical Provider, MD   loperamide (IMODIUM) 2 MG capsule Take 2 mg by mouth every 6 hours as needed for Diarrhea   Yes Historical Provider, MD   acetaminophen (TYLENOL) 325 MG tablet Take 650 mg by mouth every 4 hours as needed for Pain   Yes Historical Provider, MD   albuterol sulfate HFA (VENTOLIN HFA) 108 (90 Base) MCG/ACT inhaler Inhale 2 puffs into the lungs every 6 hours as needed for Wheezing or Shortness of Breath   Yes Historical Provider, MD   hydroxychloroquine (PLAQUENIL) 200 MG tablet Take 1.5 tablets by mouth daily 5/5/20  Yes Edilia Magaña MD   allopurinol (ZYLOPRIM) 100 MG tablet Take 100 mg by mouth nightly   Yes Historical Provider, MD   aspirin 81 MG chewable tablet Take 81 mg by mouth daily   Yes Historical Provider, MD   fluticasone-vilanterol (BREO ELLIPTA) 100-25 MCG/INH AEPB inhaler Inhale 1 puff into the lungs daily   Yes Historical Provider, MD   tolterodine (DETROL LA) 2 MG extended release capsule Take 2 mg by mouth daily   Yes Historical Provider, MD   ferrous sulfate (IRON 325) 325 (65 Fe) MG tablet Take 325 mg by mouth daily (with breakfast)   Yes Historical Provider, MD   furosemide (LASIX) 20 MG tablet Take 20 mg by mouth daily   Yes Historical Provider, MD   gabapentin (NEURONTIN) 100 MG capsule Take 100 mg by mouth nightly.    Yes Historical Provider, MD   hydrALAZINE (APRESOLINE) 25 MG tablet Take 25 mg by mouth 3 times daily    Yes Historical Provider, MD   levETIRAcetam (KEPPRA) 500 MG tablet Take 500 mg by mouth 2 times daily    Yes Historical Provider, MD   pregabalin (LYRICA) 150 MG capsule Take 150 mg by mouth every 12 hours. Yes Historical Provider, MD   melatonin 3 MG TABS tablet Take 6 mg by mouth nightly   Yes Historical Provider, MD   magnesium hydroxide (MILK OF MAGNESIA) 400 MG/5ML suspension Take 30 mLs by mouth daily as needed for Constipation    Yes Historical Provider, MD   phenytoin (DILANTIN) 50 MG tablet Take 100 mg by mouth 3 times daily    Yes Historical Provider, MD   simvastatin (ZOCOR) 10 MG tablet Take 10 mg by mouth nightly   Yes Historical Provider, MD   lacosamide (VIMPAT) 50 MG TABS tablet Take 100 mg by mouth every 12 hours. Yes Historical Provider, MD   vitamin D (CHOLECALCIFEROL) 25 MCG (1000 UT) TABS tablet Take 3,000 Units by mouth daily    Yes Historical Provider, MD   Vitamin E 100 units TABS Take 100 Units by mouth daily   Yes Historical Provider, MD       Allergies:  Patient has no known allergies. Social History:  The patient currently lives at home    TOBACCO:   reports that she has never smoked. She has never used smokeless tobacco.  ETOH:   reports no history of alcohol use. Family History:  Reviewed in detail and negative for DM, Early CAD, Cancer, CVA. Positive as follows:    History reviewed. No pertinent family history. REVIEW OF SYSTEMS:   Positive for SOB and as noted in the HPI. All other systems reviewed and negative. PHYSICAL EXAM:    BP (!) 159/65   Pulse 67   Temp 97.9 °F (36.6 °C) (Oral)   Resp 25   Ht 4' 11\" (1.499 m)   Wt 255 lb 15.3 oz (116.1 kg)   SpO2 96%   BMI 51.70 kg/m²     General appearance: No apparent distress appears stated age and cooperative. HEENT Normal cephalic, atraumatic without obvious deformity. Pupils equal, round, and reactive to light. Extra ocular muscles intact. Conjunctivae/corneas clear. Neck: Supple, No jugular venous distention/bruits. Trachea midline without thyromegaly or adenopathy with full range of motion. Lungs: diminished  Heart: Regular rate and rhythm with Normal S1/S2   Abdomen: Soft, non-tender or non-distended without rigidity or guarding and positive bowel sounds   Extremities: No clubbing, cyanosis, or edema bilaterally. Skin: Skin color, texture, turgor normal.  No rashes or lesions. Neurologic: Awake, neurovascularly intact with sensory/motor intact upper extremities/lower extremities, bilaterally. Cranial nerves: II-XII intact, grossly non-focal.  Mental status: Awake  Capillary Refill: Acceptable  < 3 seconds  Peripheral Pulses: +3 Easily felt, not easily obliterated with pressure      CBC   Recent Labs     02/11/22 0930   WBC 6.5   HGB 11.4*   HCT 36.8         RENAL  Recent Labs     02/11/22 0930      K 4.9      CO2 27   BUN 19   CREATININE 1.1     LFT'S  Recent Labs     02/11/22 0930   AST 22   ALT 14   BILITOT <0.2   ALKPHOS 155*     COAG  No results for input(s): INR in the last 72 hours.   CARDIAC ENZYMES  Recent Labs     02/11/22 0930   TROPONINI <0.01       U/A:    Lab Results   Component Value Date    COLORU YELLOW 02/11/2022    WBCUA >900 02/11/2022    RBCUA 37 02/11/2022    BACTERIA 4+ 02/11/2022    CLARITYU TURBID 02/11/2022    SPECGRAV 1.015 02/11/2022    LEUKOCYTESUR LARGE 02/11/2022    BLOODU SMALL 02/11/2022    GLUCOSEU Negative 02/11/2022       ABG    Lab Results   Component Value Date    JOM9COW 27.8 04/22/2020    BEART 2.3 04/22/2020    T0MUTGIW 96.3 04/22/2020    PHART 7.381 04/22/2020    SJK4JZT 46.9 04/22/2020    PO2ART 79.1 04/22/2020    KAO1DHU 29.2 04/22/2020           Active Hospital Problems    Diagnosis Date Noted    COPD with acute exacerbation ([facility] [J44.1] 02/11/2022         PHYSICIANS CERTIFICATION:    I certify that Masha Rosales is expected to be hospitalized for > than 2 midnights based on the following assessment and plan:      ASSESSMENT/PLAN:    Acute on chr hypoxic/hypercapnic resp failure - pt refused BIPAP, placed on O2    Acute COPD exacerbation - started on solumedrol, cont home neb/inhalers    Acute diastolic CHF - received lasix in the ER, monitor I/O, elevated BNP    UTI - UA abnormal, cont IV abx, await urine cx      DVT Prophylaxis: lovenox  Diet: No diet orders on file  Code Status: Prior  PT/OT Eval Status: ordered    Dispo - cont care       Kd Felipe MD    Thank you Falguni Barnett for the opportunity to be involved in this patient's care. If you have any questions or concerns please feel free to contact me at [phone].

## 2022-02-11 NOTE — PROGRESS NOTES
4 Eyes Skin Assessment     NAME:  Natasha Mccormack  YOB: 1950  MEDICAL RECORD NUMBER:  [id number]    The patient is being assess for  Admission    I agree that 2 RN's have performed a thorough Head to Toe Skin Assessment on the patient. ALL assessment sites listed below have been assessed. Areas assessed by both nurses:    Head, Face, Ears, Shoulders, Back, Chest, Arms, Elbows, Hands, Sacrum. Buttock, Coccyx, Ischium and Legs. Feet and Heels        Does the Patient have a Wound? No noted wound(s) fragile areas on buttock and back of thighs.        Shekhar Prevention initiated:  Yes   Wound Care Orders initiated:  NA    Pressure Injury (Stage 3,4, Unstageable, DTI, NWPT, and Complex wounds) if present place consult order under [de-identified] NA    New and Established Ostomies if present place consult order under : NA      Nurse 1 eSignature: Electronically signed by Gianfranco Hdez RN on 2/11/22 at 6:53 PM EST    **SHARE this note so that the co-signing nurse is able to place an eSignature**    Nurse 2 eSignature: Electronically signed by Kathleen Guan RN on 2/12/22 at 4:58 AM EST

## 2022-02-11 NOTE — PROGRESS NOTES
Patient arrived to room 5121 via stretcher at [phone]. Patient oriented to self. Attempted to orient to room and call light. Telemetry box 43 applied and verified normal sinus rhythm with CMU. Call light and bed side table in reach. Blood pressure elevated but other VSS.   Electronically signed by Katty Fuchs RN on 2/11/2022 at 5:20 PM

## 2022-02-11 NOTE — CARE COORDINATION
INITIAL CASE MANAGEMENT ASSESSMENT    Reviewed chart, met with patient to assess possible discharge needs. Explained Case Management role/services. Living Situation: Long term resident at [facility] since 10/9/2019    Transportation: Medical Transport at discharge. PLAN/COMMENTS: D/C Plan - return to [facility]. Discussed with marianela Gallagher, emailed [facility] SYS CF care and SNF list. Tessa [email or web address] [email or web address] [facility] with Darwin Moon [facility], patient able to return and shouldn't need precert. The Plan for Transition of Care is related to the following treatment goals: return to SNF    The Patient and/or patient representative - marianela Gallagher was provided with a choice of provider and agrees   with the discharge plan. [x] Yes [] No    Freedom of choice list was provided with basic dialogue that supports the patient's individualized plan of care/goals, treatment preferences and shares the quality data associated with the providers. [x] Yes [] No    SW/CM provided contact information for patient or family to call with any questions. SW/CM will follow and assist as needed. Advanced Care Planning completed.

## 2022-02-12 LAB
ANION GAP SERPL CALCULATED.3IONS-SCNC: 10 MMOL/L (ref 3–16)
ANION GAP SERPL CALCULATED.3IONS-SCNC: 10 MMOL/L (ref 3–16)
BASOPHILS ABSOLUTE: 0 K/UL (ref 0–0.2)
BASOPHILS RELATIVE PERCENT: 0.5 %
BUN BLDV-MCNC: 22 MG/DL (ref 7–20)
BUN BLDV-MCNC: 24 MG/DL (ref 7–20)
CALCIUM SERPL-MCNC: 9 MG/DL (ref 8.3–10.6)
CALCIUM SERPL-MCNC: 9 MG/DL (ref 8.3–10.6)
CHLORIDE BLD-SCNC: 105 MMOL/L (ref 99–110)
CHLORIDE BLD-SCNC: 108 MMOL/L (ref 99–110)
CO2: 28 MMOL/L (ref 21–32)
CO2: 29 MMOL/L (ref 21–32)
CREAT SERPL-MCNC: 1 MG/DL (ref 0.6–1.2)
CREAT SERPL-MCNC: 1.1 MG/DL (ref 0.6–1.2)
EOSINOPHILS ABSOLUTE: 0 K/UL (ref 0–0.6)
EOSINOPHILS RELATIVE PERCENT: 0 %
GFR AFRICAN AMERICAN: 59
GFR AFRICAN AMERICAN: >60
GFR NON-AFRICAN AMERICAN: 49
GFR NON-AFRICAN AMERICAN: 54
GLUCOSE BLD-MCNC: 121 MG/DL (ref 70–99)
GLUCOSE BLD-MCNC: 134 MG/DL (ref 70–99)
GLUCOSE BLD-MCNC: 151 MG/DL (ref 70–99)
GLUCOSE BLD-MCNC: 152 MG/DL (ref 70–99)
GLUCOSE BLD-MCNC: 163 MG/DL (ref 70–99)
GLUCOSE BLD-MCNC: 165 MG/DL (ref 70–99)
GLUCOSE BLD-MCNC: 97 MG/DL (ref 70–99)
HCT VFR BLD CALC: 33.7 % (ref 36–48)
HEMOGLOBIN: 10.5 G/DL (ref 12–16)
LYMPHOCYTES ABSOLUTE: 1.4 K/UL (ref 1–5.1)
LYMPHOCYTES RELATIVE PERCENT: 25.3 %
MCH RBC QN AUTO: 25.9 PG (ref 26–34)
MCHC RBC AUTO-ENTMCNC: 31.1 G/DL (ref 31–36)
MCV RBC AUTO: 83.2 FL (ref 80–100)
MONOCYTES ABSOLUTE: 0.4 K/UL (ref 0–1.3)
MONOCYTES RELATIVE PERCENT: 6.8 %
NEUTROPHILS ABSOLUTE: 3.8 K/UL (ref 1.7–7.7)
NEUTROPHILS RELATIVE PERCENT: 67.4 %
PDW BLD-RTO: 15 % (ref 12.4–15.4)
PERFORMED ON: ABNORMAL
PERFORMED ON: NORMAL
PLATELET # BLD: 187 K/UL (ref 135–450)
PMV BLD AUTO: 9.1 FL (ref 5–10.5)
POTASSIUM REFLEX MAGNESIUM: 4.9 MMOL/L (ref 3.5–5.1)
POTASSIUM SERPL-SCNC: 4.4 MMOL/L (ref 3.5–5.1)
PROCALCITONIN: 0.07 NG/ML (ref 0–0.15)
RBC # BLD: 4.05 M/UL (ref 4–5.2)
SODIUM BLD-SCNC: 144 MMOL/L (ref 136–145)
SODIUM BLD-SCNC: 146 MMOL/L (ref 136–145)
WBC # BLD: 5.7 K/UL (ref 4–11)

## 2022-02-12 PROCEDURE — 2580000003 HC RX 258: Performed by: INTERNAL MEDICINE

## 2022-02-12 PROCEDURE — 94761 N-INVAS EAR/PLS OXIMETRY MLT: CPT

## 2022-02-12 PROCEDURE — 36415 COLL VENOUS BLD VENIPUNCTURE: CPT

## 2022-02-12 PROCEDURE — 2060000000 HC ICU INTERMEDIATE R&B

## 2022-02-12 PROCEDURE — 6370000000 HC RX 637 (ALT 250 FOR IP): Performed by: INTERNAL MEDICINE

## 2022-02-12 PROCEDURE — 2580000003 HC RX 258: Performed by: EMERGENCY MEDICINE

## 2022-02-12 PROCEDURE — 80048 BASIC METABOLIC PNL TOTAL CA: CPT

## 2022-02-12 PROCEDURE — 84145 PROCALCITONIN (PCT): CPT

## 2022-02-12 PROCEDURE — 94640 AIRWAY INHALATION TREATMENT: CPT

## 2022-02-12 PROCEDURE — 2700000000 HC OXYGEN THERAPY PER DAY

## 2022-02-12 PROCEDURE — 36592 COLLECT BLOOD FROM PICC: CPT

## 2022-02-12 PROCEDURE — 85025 COMPLETE CBC W/AUTO DIFF WBC: CPT

## 2022-02-12 PROCEDURE — 6360000002 HC RX W HCPCS: Performed by: INTERNAL MEDICINE

## 2022-02-12 PROCEDURE — 99223 1ST HOSP IP/OBS HIGH 75: CPT | Performed by: INTERNAL MEDICINE

## 2022-02-12 RX ORDER — DEXTROSE MONOHYDRATE 50 MG/ML
100 INJECTION, SOLUTION INTRAVENOUS PRN
Status: DISCONTINUED | OUTPATIENT
Start: 2022-02-12 | End: 2022-02-15 | Stop reason: HOSPADM

## 2022-02-12 RX ORDER — DEXTROSE MONOHYDRATE 25 G/50ML
12.5 INJECTION, SOLUTION INTRAVENOUS PRN
Status: DISCONTINUED | OUTPATIENT
Start: 2022-02-12 | End: 2022-02-15 | Stop reason: HOSPADM

## 2022-02-12 RX ORDER — NICOTINE POLACRILEX 4 MG
15 LOZENGE BUCCAL PRN
Status: DISCONTINUED | OUTPATIENT
Start: 2022-02-12 | End: 2022-02-15 | Stop reason: HOSPADM

## 2022-02-12 RX ORDER — FUROSEMIDE 10 MG/ML
20 INJECTION INTRAMUSCULAR; INTRAVENOUS 2 TIMES DAILY
Status: DISCONTINUED | OUTPATIENT
Start: 2022-02-12 | End: 2022-02-15 | Stop reason: HOSPADM

## 2022-02-12 RX ADMIN — CEFTRIAXONE 2000 MG: 2 INJECTION, POWDER, FOR SOLUTION INTRAMUSCULAR; INTRAVENOUS at 17:21

## 2022-02-12 RX ADMIN — FAMOTIDINE 20 MG: 20 TABLET ORAL at 21:30

## 2022-02-12 RX ADMIN — DOCUSATE SODIUM 100 MG: 100 CAPSULE, LIQUID FILLED ORAL at 09:07

## 2022-02-12 RX ADMIN — INSULIN LISPRO 1 UNITS: 100 INJECTION, SOLUTION INTRAVENOUS; SUBCUTANEOUS at 11:54

## 2022-02-12 RX ADMIN — TIOTROPIUM BROMIDE INHALATION SPRAY 2 PUFF: 3.12 SPRAY, METERED RESPIRATORY (INHALATION) at 08:00

## 2022-02-12 RX ADMIN — FERROUS SULFATE TAB EC 324 MG (65 MG FE EQUIVALENT) 325 MG: 324 (65 FE) TABLET DELAYED RESPONSE at 09:08

## 2022-02-12 RX ADMIN — SODIUM CHLORIDE, PRESERVATIVE FREE 10 ML: 5 INJECTION INTRAVENOUS at 21:30

## 2022-02-12 RX ADMIN — ALLOPURINOL 100 MG: 100 TABLET ORAL at 21:30

## 2022-02-12 RX ADMIN — ENOXAPARIN SODIUM 40 MG: 100 INJECTION SUBCUTANEOUS at 21:28

## 2022-02-12 RX ADMIN — HYDROXYCHLOROQUINE SULFATE 300 MG: 200 TABLET ORAL at 09:07

## 2022-02-12 RX ADMIN — INSULIN LISPRO 1 UNITS: 100 INJECTION, SOLUTION INTRAVENOUS; SUBCUTANEOUS at 17:23

## 2022-02-12 RX ADMIN — SODIUM CHLORIDE, PRESERVATIVE FREE 10 ML: 5 INJECTION INTRAVENOUS at 20:10

## 2022-02-12 RX ADMIN — DULOXETINE HYDROCHLORIDE 20 MG: 20 CAPSULE, DELAYED RELEASE ORAL at 21:28

## 2022-02-12 RX ADMIN — METHYLPREDNISOLONE SODIUM SUCCINATE 40 MG: 40 INJECTION, POWDER, FOR SOLUTION INTRAMUSCULAR; INTRAVENOUS at 09:07

## 2022-02-12 RX ADMIN — LACOSAMIDE 100 MG: 100 TABLET, FILM COATED ORAL at 09:08

## 2022-02-12 RX ADMIN — GABAPENTIN 100 MG: 100 CAPSULE ORAL at 21:30

## 2022-02-12 RX ADMIN — METHYLPREDNISOLONE SODIUM SUCCINATE 40 MG: 40 INJECTION, POWDER, FOR SOLUTION INTRAMUSCULAR; INTRAVENOUS at 20:10

## 2022-02-12 RX ADMIN — IPRATROPIUM BROMIDE AND ALBUTEROL SULFATE 1 AMPULE: .5; 2.5 SOLUTION RESPIRATORY (INHALATION) at 11:58

## 2022-02-12 RX ADMIN — INSULIN LISPRO 1 UNITS: 100 INJECTION, SOLUTION INTRAVENOUS; SUBCUTANEOUS at 01:27

## 2022-02-12 RX ADMIN — ENOXAPARIN SODIUM 40 MG: 100 INJECTION SUBCUTANEOUS at 09:06

## 2022-02-12 RX ADMIN — FUROSEMIDE 20 MG: 10 INJECTION, SOLUTION INTRAMUSCULAR; INTRAVENOUS at 09:07

## 2022-02-12 RX ADMIN — BUDESONIDE AND FORMOTEROL FUMARATE DIHYDRATE 2 PUFF: 160; 4.5 AEROSOL RESPIRATORY (INHALATION) at 08:00

## 2022-02-12 RX ADMIN — SODIUM CHLORIDE, PRESERVATIVE FREE 10 ML: 5 INJECTION INTRAVENOUS at 09:08

## 2022-02-12 RX ADMIN — MEMANTINE 10 MG: 5 TABLET ORAL at 21:29

## 2022-02-12 RX ADMIN — PREGABALIN 150 MG: 75 CAPSULE ORAL at 09:07

## 2022-02-12 RX ADMIN — LEVETIRACETAM 500 MG: 500 TABLET, FILM COATED ORAL at 21:29

## 2022-02-12 RX ADMIN — TROSPIUM CHLORIDE 20 MG: 20 TABLET, FILM COATED ORAL at 21:29

## 2022-02-12 RX ADMIN — IPRATROPIUM BROMIDE AND ALBUTEROL SULFATE 1 AMPULE: .5; 2.5 SOLUTION RESPIRATORY (INHALATION) at 08:00

## 2022-02-12 RX ADMIN — MEMANTINE 10 MG: 5 TABLET ORAL at 09:07

## 2022-02-12 RX ADMIN — HYDRALAZINE HYDROCHLORIDE 25 MG: 25 TABLET, FILM COATED ORAL at 09:08

## 2022-02-12 RX ADMIN — ATORVASTATIN CALCIUM 10 MG: 10 TABLET, FILM COATED ORAL at 09:07

## 2022-02-12 RX ADMIN — Medication 6 MG: at 21:28

## 2022-02-12 RX ADMIN — PREGABALIN 150 MG: 75 CAPSULE ORAL at 21:30

## 2022-02-12 RX ADMIN — HYDRALAZINE HYDROCHLORIDE 25 MG: 25 TABLET, FILM COATED ORAL at 21:30

## 2022-02-12 RX ADMIN — Medication 3000 UNITS: at 09:07

## 2022-02-12 RX ADMIN — SODIUM CHLORIDE, PRESERVATIVE FREE 10 ML: 5 INJECTION INTRAVENOUS at 09:14

## 2022-02-12 RX ADMIN — LEVETIRACETAM 500 MG: 500 TABLET, FILM COATED ORAL at 09:07

## 2022-02-12 RX ADMIN — LACOSAMIDE 100 MG: 100 TABLET, FILM COATED ORAL at 21:30

## 2022-02-12 RX ADMIN — IPRATROPIUM BROMIDE AND ALBUTEROL SULFATE 1 AMPULE: .5; 2.5 SOLUTION RESPIRATORY (INHALATION) at 19:23

## 2022-02-12 RX ADMIN — FUROSEMIDE 20 MG: 10 INJECTION, SOLUTION INTRAMUSCULAR; INTRAVENOUS at 17:19

## 2022-02-12 RX ADMIN — BUDESONIDE AND FORMOTEROL FUMARATE DIHYDRATE 2 PUFF: 160; 4.5 AEROSOL RESPIRATORY (INHALATION) at 19:26

## 2022-02-12 RX ADMIN — PHENYTOIN 100 MG: 50 TABLET, CHEWABLE ORAL at 14:43

## 2022-02-12 RX ADMIN — POLYVINYL ALCOHOL, POVIDONE 2 DROP: .5; .6 LIQUID OPHTHALMIC at 09:11

## 2022-02-12 RX ADMIN — ASPIRIN 81 MG 81 MG: 81 TABLET ORAL at 09:08

## 2022-02-12 RX ADMIN — IPRATROPIUM BROMIDE AND ALBUTEROL SULFATE 1 AMPULE: .5; 2.5 SOLUTION RESPIRATORY (INHALATION) at 16:23

## 2022-02-12 RX ADMIN — PHENYTOIN 100 MG: 50 TABLET, CHEWABLE ORAL at 09:13

## 2022-02-12 RX ADMIN — POLYVINYL ALCOHOL, POVIDONE 2 DROP: .5; .6 LIQUID OPHTHALMIC at 21:31

## 2022-02-12 RX ADMIN — HYDRALAZINE HYDROCHLORIDE 25 MG: 25 TABLET, FILM COATED ORAL at 13:11

## 2022-02-12 RX ADMIN — PHENYTOIN 100 MG: 50 TABLET, CHEWABLE ORAL at 21:31

## 2022-02-12 ASSESSMENT — PAIN SCALES - GENERAL
PAINLEVEL_OUTOF10: 0

## 2022-02-12 NOTE — PLAN OF CARE
Skin assessment completed every shift. Pt assessed for incontinence, appropriate barrier cream applied prn. Pt encouraged to turn/rotate every 2 hours. Assistance provided if pt unable to do so themselves. Fall risk assessment completed every shift. All precautions in place. Pt has call light within reach at all times. Room clear of clutter. Pt aware to call for assistance when getting up. Intake/Output Summary (Last 24 hours) at 2/12/2022 0806  Last data filed at 2/12/2022 0737  Gross per 24 hour   Intake 180 ml   Output 1400 ml   Net -1220 ml     Vitals:    02/12/22 0800   BP:    Pulse:    Resp:    Temp:    SpO2: 100%     Patient assessed for fall risk; fall precautions initiated. Patient and family instructed about safety devices. Environment kept free of clutter and adequate lighting provided. Bed locked and in lowest position. Call light within reach. Will continue to monitor.

## 2022-02-12 NOTE — CONSULTS
REASON FOR CONSULTATION/CC: shortness of breath       Consult at request of Mery Hernandez MD for shortness of breath     PCP: Cherylene Reins  Established Pulmonologist: None    HISTORY OF PRESENT ILLNESS: Kathleen Cheney is a 66y.o. year old female with a history of obesity  who presents with      Patient was discharged December 2021 after admission for chest pain. ACS ruled out. Represented 2/11 secondary to chest pain. This note may have been  transcribed using [id number] [employer]. Please disregard any translational errors. Assessment:        History of COVID-19  Echo demonstrating grade 1 diastolic dysfunction December 2021  Obesity, BMI 48  Acute hypoxemic respiratory failure    Plan:      Hospital Day 1     COPD with exacerbation? * hx of smoking   * hx of wheezing. Much imrpoved with Duoneb's    * Solumedrol -> pred  * plan to d/c with Duoneb's  Prn       Abnormal radiograph  *Chest x-ray consistent with pulmonary edema  *Started on diuretics currently 1.2 L negative  *Hypoxemia improving      Acute hypoxemic respiratory failure saturation less than 90% on room air with acute hypercapnic respiratory failure  *Improving with diuresis  *Initially on 4 L nasal cannula. Weaned to room air. UTI  * ceftriaxone      Obesity  * denies symptoms of ELYSIA           Nutrition  - ADULT DIET; Regular             This note was transcribed using [id number] [employer]. Please disregard any translational errors.     Thank you for the consult    Christi Hdz [facility]  [phone]             Data:     PAST MEDICAL HISTORY:  Past Medical History:   Diagnosis Date    Anemia     Asthma     CKD stage 3 due to type 2 diabetes mellitus ([facility]     COPD (chronic obstructive pulmonary disease) ([facility]     Dementia ([facility]     DM (diabetes mellitus) ([facility]     GERD (gastroesophageal reflux disease)     Gout     HTN (hypertension)     Seizure ([facility] [address] PAST SURGICAL HISTORY:  History reviewed. No pertinent surgical history. FAMILY HISTORY:  family history is not on file. SOCIAL HISTORY:   reports that she has never smoked.  She has never used smokeless tobacco.    Scheduled Meds:   furosemide  20 mg IntraVENous BID    insulin lispro  0-6 Units SubCUTAneous TID WC    insulin lispro  0-3 Units SubCUTAneous Nightly    ipratropium-albuterol  1 ampule Inhalation Q4H WA    allopurinol  100 mg Oral Nightly    aspirin  81 mg Oral Daily    docusate sodium  100 mg Oral Daily    DULoxetine  20 mg Oral Nightly    famotidine  20 mg Oral Nightly    ferrous sulfate  325 mg Oral Daily with breakfast    gabapentin  100 mg Oral Nightly    hydrALAZINE  25 mg Oral TID    hydroxychloroquine  300 mg Oral Daily    lacosamide  100 mg Oral 2 times per day    levETIRAcetam  500 mg Oral BID    melatonin  6 mg Oral Nightly    memantine  10 mg Oral BID    phenytoin  100 mg Oral TID    polyvinyl alcohol-povidone  2 drop Both Eyes BID    pregabalin  150 mg Oral 2 times per day    tiotropium  2 puff Inhalation Daily    Vitamin D  3,000 Units Oral Daily    sodium chloride flush  5-40 mL IntraVENous 2 times per day    enoxaparin  40 mg SubCUTAneous Daily    methylPREDNISolone  40 mg IntraVENous Q12H    Followed by   Stephan Ornelas ON 2/13/2022] predniSONE  40 mg Oral Daily    cefTRIAXone (ROCEPHIN) IV  1,000 mg IntraVENous Q24H    lidocaine 1 % injection  5 mL IntraDERmal Once    sodium chloride flush  5-40 mL IntraVENous 2 times per day    budesonide-formoterol  2 puff Inhalation BID    trospium  20 mg Oral Nightly    atorvastatin  10 mg Oral Daily       Continuous Infusions:   dextrose      sodium chloride 25 mL (02/11/22 1988)    sodium chloride         PRN Meds:  glucose, dextrose, glucagon (rDNA), dextrose, albuterol, albuterol sulfate HFA, loperamide, magnesium hydroxide, ondansetron, sodium chloride flush, sodium chloride, ondansetron **OR** ondansetron, polyethylene glycol, acetaminophen **OR** acetaminophen, sodium chloride flush, sodium chloride    ALLERGIES:  Patient has No Known Allergies. REVIEW OF SYSTEMS:  Constitutional: Negative for fever    HENT: Negative for sore throat  Eyes: Negative for redness   Respiratory: Negative for dyspnea, cough  Cardiovascular: Negative for chest pain  Gastrointestinal: Negative for vomiting, diarrhea    Genitourinary: Negative for hematuria   Musculoskeletal: Negative for arthralgias   Skin: Negative for rash  Neurological: Negative for syncope  Hematological: Negative for adenopathy  Psychiatric/Behavorial: Negative for anxiety    Objective:   PHYSICAL EXAM:  Blood pressure (!) 164/80, pulse 73, temperature 97.4 °F (36.3 °C), temperature source Oral, resp. rate 18, height 4' 11\" (1.499 m), weight 239 lb 3.2 oz (108.5 kg), SpO2 98 %.'    Body mass index is 48.31 kg/m². Gen: No distress. Eyes: PERRL. No sclera icterus. No conjunctival injection. ENT: No discharge. Pharynx clear. External appearance of ears and nose normal.  Neck: Trachea midline. No obvious mass. Resp: No accessory muscle use. No crackles. No wheezes. No rhonchi. CV: Regular rate. Regular rhythm. No murmur or rub. No edema. GI: Non-tender. Non-distended. No hernia. Skin: Warm, dry, normal texture and turgor. No nodule on exposed extremities. Lymph: No cervical LAD. No supraclavicular LAD. M/S: No cyanosis. No clubbing. No joint deformity. Neuro: Moves all four extremities. Psych: Oriented x 3. No anxiety. Awake. Alert. Intact judgement and insight.       Data Reviewed:   LABS:  CBC:   Recent Labs     02/11/22 0930 02/12/22  0553   WBC 6.5 5.7   HGB 11.4* 10.5*   HCT 36.8 33.7*   MCV 84.0 83.2    187     BMP:   Recent Labs     02/11/22  0930 02/12/22  0553    146*   K 4.9 4.9    108   CO2 27 28   BUN 19 22*   CREATININE 1.1 1.0     LIVER PROFILE:   Recent Labs     02/11/22  0930   AST 22   ALT 14 BILITOT <0.2   ALKPHOS 155*     PT/INR: No results for input(s): PROTIME, INR in the last 72 hours. APTT: No results for input(s): APTT in the last 72 hours. UA:  Recent Labs     02/11/22  0910   COLORU YELLOW   PHUR 5.0  5.5   WBCUA >900*   RBCUA 37*   BACTERIA 4+*   CLARITYU TURBID*   SPECGRAV 1.015   LEUKOCYTESUR LARGE*   UROBILINOGEN 0.2   BILIRUBINUR Negative   BLOODU SMALL*   GLUCOSEU Negative     No results for input(s): PHART, TNX6OYK, PO2ART in the last 72 hours. Vent Information  Skin Assessment: Clean, dry, & intact  SpO2: 98 %    Radiology Review:  Pertinent images / reports were reviewed as a part of this visit. CT Chest w/ contrast: No results found for this or any previous visit. CT Chest w/o contrast: No results found for this or any previous visit. CTPA: No results found for this or any previous visit. CXR PA/LAT: No results found for this or any previous visit. CXR portable: Results for orders placed during the hospital encounter of 02/11/22    XR CHEST PORTABLE    Narrative  EXAMINATION:  ONE XRAY VIEW OF THE CHEST    2/11/2022 4:16 pm    COMPARISON:  Chest x-ray same day and 12/12/2021. HISTORY:  ORDERING SYSTEM PROVIDED HISTORY: s/p R IJ CVC  TECHNOLOGIST PROVIDED HISTORY:  Reason for exam:->s/p R IJ CVC  Reason for Exam: s/p R IJ CVC    FINDINGS:  Right internal jugular line present with tip projecting over the superior  cavoatrial junction. Cardiomediastinal silhouette is enlarged. Perihilar  opacities. No pleural effusion on this projection. No pneumothorax  appreciated on this projection. Partially visualized left shoulder  arthroplasty. Impression  Right internal jugular line present with tip projecting over the superior  cavoatrial junction. Perihilar opacities. Pulmonary edema versus multifocal pneumonia.

## 2022-02-13 LAB
GLUCOSE BLD-MCNC: 124 MG/DL (ref 70–99)
GLUCOSE BLD-MCNC: 156 MG/DL (ref 70–99)
GLUCOSE BLD-MCNC: 195 MG/DL (ref 70–99)
GLUCOSE BLD-MCNC: 94 MG/DL (ref 70–99)
PERFORMED ON: ABNORMAL
PERFORMED ON: NORMAL

## 2022-02-13 PROCEDURE — 2580000003 HC RX 258: Performed by: EMERGENCY MEDICINE

## 2022-02-13 PROCEDURE — 2580000003 HC RX 258: Performed by: INTERNAL MEDICINE

## 2022-02-13 PROCEDURE — 6360000002 HC RX W HCPCS: Performed by: INTERNAL MEDICINE

## 2022-02-13 PROCEDURE — 99232 SBSQ HOSP IP/OBS MODERATE 35: CPT | Performed by: INTERNAL MEDICINE

## 2022-02-13 PROCEDURE — 94640 AIRWAY INHALATION TREATMENT: CPT

## 2022-02-13 PROCEDURE — 6370000000 HC RX 637 (ALT 250 FOR IP): Performed by: INTERNAL MEDICINE

## 2022-02-13 PROCEDURE — 2060000000 HC ICU INTERMEDIATE R&B

## 2022-02-13 PROCEDURE — 94761 N-INVAS EAR/PLS OXIMETRY MLT: CPT

## 2022-02-13 RX ORDER — PREDNISONE 20 MG/1
40 TABLET ORAL DAILY
Status: DISCONTINUED | OUTPATIENT
Start: 2022-02-13 | End: 2022-02-15 | Stop reason: HOSPADM

## 2022-02-13 RX ADMIN — ATORVASTATIN CALCIUM 10 MG: 10 TABLET, FILM COATED ORAL at 09:15

## 2022-02-13 RX ADMIN — SODIUM CHLORIDE, PRESERVATIVE FREE 10 ML: 5 INJECTION INTRAVENOUS at 09:16

## 2022-02-13 RX ADMIN — ENOXAPARIN SODIUM 40 MG: 100 INJECTION SUBCUTANEOUS at 20:41

## 2022-02-13 RX ADMIN — POLYVINYL ALCOHOL, POVIDONE 2 DROP: .5; .6 LIQUID OPHTHALMIC at 20:42

## 2022-02-13 RX ADMIN — LEVETIRACETAM 500 MG: 500 TABLET, FILM COATED ORAL at 09:16

## 2022-02-13 RX ADMIN — INSULIN LISPRO 1 UNITS: 100 INJECTION, SOLUTION INTRAVENOUS; SUBCUTANEOUS at 12:10

## 2022-02-13 RX ADMIN — PHENYTOIN 100 MG: 50 TABLET, CHEWABLE ORAL at 09:22

## 2022-02-13 RX ADMIN — SODIUM CHLORIDE 25 ML: 9 INJECTION, SOLUTION INTRAVENOUS at 17:43

## 2022-02-13 RX ADMIN — ASPIRIN 81 MG 81 MG: 81 TABLET ORAL at 09:14

## 2022-02-13 RX ADMIN — HYDROXYCHLOROQUINE SULFATE 300 MG: 200 TABLET ORAL at 09:14

## 2022-02-13 RX ADMIN — BUDESONIDE AND FORMOTEROL FUMARATE DIHYDRATE 2 PUFF: 160; 4.5 AEROSOL RESPIRATORY (INHALATION) at 19:50

## 2022-02-13 RX ADMIN — POLYVINYL ALCOHOL, POVIDONE 2 DROP: .5; .6 LIQUID OPHTHALMIC at 09:18

## 2022-02-13 RX ADMIN — FUROSEMIDE 20 MG: 10 INJECTION, SOLUTION INTRAMUSCULAR; INTRAVENOUS at 17:42

## 2022-02-13 RX ADMIN — TROSPIUM CHLORIDE 20 MG: 20 TABLET, FILM COATED ORAL at 20:41

## 2022-02-13 RX ADMIN — SODIUM CHLORIDE, PRESERVATIVE FREE 10 ML: 5 INJECTION INTRAVENOUS at 09:18

## 2022-02-13 RX ADMIN — ALLOPURINOL 100 MG: 100 TABLET ORAL at 20:41

## 2022-02-13 RX ADMIN — ENOXAPARIN SODIUM 40 MG: 100 INJECTION SUBCUTANEOUS at 09:15

## 2022-02-13 RX ADMIN — SODIUM CHLORIDE, PRESERVATIVE FREE 10 ML: 5 INJECTION INTRAVENOUS at 20:44

## 2022-02-13 RX ADMIN — GABAPENTIN 100 MG: 100 CAPSULE ORAL at 20:41

## 2022-02-13 RX ADMIN — PHENYTOIN 100 MG: 50 TABLET, CHEWABLE ORAL at 20:42

## 2022-02-13 RX ADMIN — IPRATROPIUM BROMIDE AND ALBUTEROL SULFATE 1 AMPULE: .5; 2.5 SOLUTION RESPIRATORY (INHALATION) at 19:50

## 2022-02-13 RX ADMIN — SODIUM CHLORIDE, PRESERVATIVE FREE 10 ML: 5 INJECTION INTRAVENOUS at 20:47

## 2022-02-13 RX ADMIN — IPRATROPIUM BROMIDE AND ALBUTEROL SULFATE 1 AMPULE: .5; 2.5 SOLUTION RESPIRATORY (INHALATION) at 15:54

## 2022-02-13 RX ADMIN — IPRATROPIUM BROMIDE AND ALBUTEROL SULFATE 1 AMPULE: .5; 2.5 SOLUTION RESPIRATORY (INHALATION) at 08:03

## 2022-02-13 RX ADMIN — Medication 6 MG: at 20:40

## 2022-02-13 RX ADMIN — HYDRALAZINE HYDROCHLORIDE 25 MG: 25 TABLET, FILM COATED ORAL at 13:51

## 2022-02-13 RX ADMIN — MEMANTINE 10 MG: 5 TABLET ORAL at 09:16

## 2022-02-13 RX ADMIN — INSULIN LISPRO 1 UNITS: 100 INJECTION, SOLUTION INTRAVENOUS; SUBCUTANEOUS at 17:46

## 2022-02-13 RX ADMIN — LEVETIRACETAM 500 MG: 500 TABLET, FILM COATED ORAL at 20:41

## 2022-02-13 RX ADMIN — HYDRALAZINE HYDROCHLORIDE 25 MG: 25 TABLET, FILM COATED ORAL at 09:16

## 2022-02-13 RX ADMIN — Medication 3000 UNITS: at 09:15

## 2022-02-13 RX ADMIN — METHYLPREDNISOLONE SODIUM SUCCINATE 40 MG: 40 INJECTION, POWDER, FOR SOLUTION INTRAMUSCULAR; INTRAVENOUS at 09:15

## 2022-02-13 RX ADMIN — PREDNISONE 40 MG: 20 TABLET ORAL at 20:58

## 2022-02-13 RX ADMIN — IPRATROPIUM BROMIDE AND ALBUTEROL SULFATE 1 AMPULE: .5; 2.5 SOLUTION RESPIRATORY (INHALATION) at 11:12

## 2022-02-13 RX ADMIN — HYDRALAZINE HYDROCHLORIDE 25 MG: 25 TABLET, FILM COATED ORAL at 20:41

## 2022-02-13 RX ADMIN — DULOXETINE HYDROCHLORIDE 20 MG: 20 CAPSULE, DELAYED RELEASE ORAL at 20:41

## 2022-02-13 RX ADMIN — TIOTROPIUM BROMIDE INHALATION SPRAY 2 PUFF: 3.12 SPRAY, METERED RESPIRATORY (INHALATION) at 08:04

## 2022-02-13 RX ADMIN — LACOSAMIDE 100 MG: 100 TABLET, FILM COATED ORAL at 20:41

## 2022-02-13 RX ADMIN — CEFTRIAXONE 2000 MG: 2 INJECTION, POWDER, FOR SOLUTION INTRAMUSCULAR; INTRAVENOUS at 17:45

## 2022-02-13 RX ADMIN — LACOSAMIDE 100 MG: 100 TABLET, FILM COATED ORAL at 09:16

## 2022-02-13 RX ADMIN — FUROSEMIDE 20 MG: 10 INJECTION, SOLUTION INTRAMUSCULAR; INTRAVENOUS at 09:15

## 2022-02-13 RX ADMIN — DOCUSATE SODIUM 100 MG: 100 CAPSULE, LIQUID FILLED ORAL at 09:14

## 2022-02-13 RX ADMIN — PREGABALIN 150 MG: 75 CAPSULE ORAL at 09:16

## 2022-02-13 RX ADMIN — FERROUS SULFATE TAB EC 324 MG (65 MG FE EQUIVALENT) 325 MG: 324 (65 FE) TABLET DELAYED RESPONSE at 09:15

## 2022-02-13 RX ADMIN — FAMOTIDINE 20 MG: 20 TABLET ORAL at 20:41

## 2022-02-13 RX ADMIN — MEMANTINE 10 MG: 5 TABLET ORAL at 20:41

## 2022-02-13 RX ADMIN — PHENYTOIN 100 MG: 50 TABLET, CHEWABLE ORAL at 14:48

## 2022-02-13 RX ADMIN — BUDESONIDE AND FORMOTEROL FUMARATE DIHYDRATE 2 PUFF: 160; 4.5 AEROSOL RESPIRATORY (INHALATION) at 08:04

## 2022-02-13 RX ADMIN — PREGABALIN 150 MG: 75 CAPSULE ORAL at 20:41

## 2022-02-13 ASSESSMENT — PAIN SCALES - GENERAL
PAINLEVEL_OUTOF10: 0

## 2022-02-13 NOTE — PLAN OF CARE
Problem: Skin Integrity:  Goal: Will show no infection signs and symptoms  Description: Will show no infection signs and symptoms  Outcome: Ongoing  Note: Skin assessment completed every shift. Pt assessed for incontinence, appropriate barrier cream applied prn. Pt encouraged to turn/rotate every 2 hours. Assistance provided if pt unable to do so themselves. Goal: Absence of new skin breakdown  Description: Absence of new skin breakdown  Outcome: Ongoing     Problem: Falls - Risk of:  Goal: Will remain free from falls  Description: Will remain free from falls  Outcome: Ongoing  Note: Fall risk assessment completed every shift. All precautions in place. Pt has call light within reach at all times. Room clear of clutter. Pt aware to call for assistance when getting up. Goal: Absence of physical injury  Description: Absence of physical injury  Outcome: Ongoing     Problem:  Activity Intolerance:  Goal: Ability to tolerate increased activity will improve  Description: Ability to tolerate increased activity will improve  Outcome: Ongoing     Problem: Airway Clearance - Ineffective:  Goal: Ability to maintain a clear airway will improve  Description: Ability to maintain a clear airway will improve  Outcome: Ongoing     Problem: Breathing Pattern - Ineffective:  Goal: Ability to achieve and maintain a regular respiratory rate will improve  Description: Ability to achieve and maintain a regular respiratory rate will improve  Outcome: Ongoing     Problem: Gas Exchange - Impaired:  Goal: Levels of oxygenation will improve  Description: Levels of oxygenation will improve  Outcome: Ongoing

## 2022-02-13 NOTE — PROGRESS NOTES
Hospitalist Progress Note      PCP: Anitra Chapin    Date of Admission: 2/11/2022    Subjective: Still with SOB, O2 requirement improved    Medications:  Reviewed    Infusion Medications    dextrose      sodium chloride Stopped (02/12/22 1935)    sodium chloride       Scheduled Medications    furosemide  20 mg IntraVENous BID    insulin lispro  0-6 Units SubCUTAneous TID WC    insulin lispro  0-3 Units SubCUTAneous Nightly    enoxaparin  40 mg SubCUTAneous BID    cefTRIAXone (ROCEPHIN) IV  2,000 mg IntraVENous Q24H    ipratropium-albuterol  1 ampule Inhalation Q4H WA    allopurinol  100 mg Oral Nightly    aspirin  81 mg Oral Daily    docusate sodium  100 mg Oral Daily    DULoxetine  20 mg Oral Nightly    famotidine  20 mg Oral Nightly    ferrous sulfate  325 mg Oral Daily with breakfast    gabapentin  100 mg Oral Nightly    hydrALAZINE  25 mg Oral TID    hydroxychloroquine  300 mg Oral Daily    lacosamide  100 mg Oral 2 times per day    levETIRAcetam  500 mg Oral BID    melatonin  6 mg Oral Nightly    memantine  10 mg Oral BID    phenytoin  100 mg Oral TID    polyvinyl alcohol-povidone  2 drop Both Eyes BID    pregabalin  150 mg Oral 2 times per day    tiotropium  2 puff Inhalation Daily    Vitamin D  3,000 Units Oral Daily    sodium chloride flush  5-40 mL IntraVENous 2 times per day    methylPREDNISolone  40 mg IntraVENous Q12H    Followed by   Stpehen West ON 2/13/2022] predniSONE  40 mg Oral Daily    lidocaine 1 % injection  5 mL IntraDERmal Once    sodium chloride flush  5-40 mL IntraVENous 2 times per day    budesonide-formoterol  2 puff Inhalation BID    trospium  20 mg Oral Nightly    atorvastatin  10 mg Oral Daily     PRN Meds: glucose, dextrose, glucagon (rDNA), dextrose, albuterol, albuterol sulfate HFA, loperamide, magnesium hydroxide, ondansetron, sodium chloride flush, sodium chloride, ondansetron **OR** ondansetron, polyethylene glycol, acetaminophen **OR** acetaminophen, sodium chloride flush, sodium chloride      Intake/Output Summary (Last 24 hours) at 2/12/2022 2334  Last data filed at 2/12/2022 2303  Gross per 24 hour   Intake 3157.3 ml   Output 2100 ml   Net 1057.3 ml       Physical Exam Performed:    BP (!) 151/67   Pulse 81   Temp 98.2 °F (36.8 °C) (Oral)   Resp 18   Ht 4' 11\" (1.499 m)   Wt 239 lb 3.2 oz (108.5 kg)   SpO2 100%   BMI 48.31 kg/m²        General appearance: No apparent distress appears stated age and cooperative. HEENT Normal cephalic, atraumatic without obvious deformity. Pupils equal, round, and reactive to light. Extra ocular muscles intact. Conjunctivae/corneas clear. Neck: Supple, No jugular venous distention/bruits. Trachea midline without thyromegaly or adenopathy with full range of motion. Lungs: diminished  Heart: Regular rate and rhythm with Normal S1/S2   Abdomen: Soft, non-tender or non-distended without rigidity or guarding and positive bowel sounds   Extremities: No clubbing, cyanosis, or edema bilaterally. Skin: Skin color, texture, turgor normal.  No rashes or lesions. Neurologic: Awake, neurovascularly intact with sensory/motor intact upper extremities/lower extremities, bilaterally. Cranial nerves: II-XII intact, grossly non-focal.  Mental status: Awake  Capillary Refill: Acceptable  < 3 seconds  Peripheral Pulses: +3 Easily felt, not easily obliterated with pressure       Labs:   Recent Labs     02/11/22 0930 02/12/22  0553   WBC 6.5 5.7   HGB 11.4* 10.5*   HCT 36.8 33.7*    187     Recent Labs     02/11/22 0930 02/12/22  0553 02/12/22 2005    146* 144   K 4.9 4.9 4.4    108 105   CO2 27 28 29   BUN 19 22* 24*   CREATININE 1.1 1.0 1.1   CALCIUM 9.1 9.0 9.0     Recent Labs     02/11/22  0930   AST 22   ALT 14   BILITOT <0.2   ALKPHOS 155*     No results for input(s): INR in the last 72 hours.   Recent Labs     02/11/22  0930   TROPONINI <0.01       Urinalysis:      Lab Results   Component Value Date    NITRU POSITIVE 02/11/2022    WBCUA >900 02/11/2022    BACTERIA 4+ 02/11/2022    RBCUA 37 02/11/2022    BLOODU SMALL 02/11/2022    SPECGRAV 1.015 02/11/2022    GLUCOSEU Negative 02/11/2022       Radiology:  XR CHEST PORTABLE   Final Result   Right internal jugular line present with tip projecting over the superior   cavoatrial junction. Perihilar opacities. Pulmonary edema versus multifocal pneumonia. CT HEAD WO CONTRAST   Final Result   No acute intracranial abnormality. RECOMMENDATIONS:   Unavailable         XR CHEST PORTABLE   Final Result   Mild pulmonary edema                 Assessment/Plan:    Active Hospital Problems    Diagnosis     COPD with acute exacerbation ([facility] [address] [J44.1]          Acute on chr hypoxic/hypercapnic resp failure - pt refused BIPAP, placed on O2, wean as tolerated     Acute COPD exacerbation - started on solumedrol, cont home neb/inhalers, pulm consulted, switch to PO pred tomorrow     Acute diastolic CHF - received lasix in the ER, monitor I/O, associated with elevated BNP. Net -2L, started on IV lasix BID     UTI - UA abnormal, cont IV abx, await urine cx        DVT Prophylaxis: lovenox  Diet: ADULT DIET;  Regular  Code Status: Full Code    PT/OT Eval Status: ordered    Ramona Link MD

## 2022-02-13 NOTE — PLAN OF CARE
Skin assessment completed every shift. Pt assessed for incontinence, appropriate barrier cream applied prn. Pt encouraged to turn/rotate every 2 hours. Assistance provided if pt unable to do so themselves. Fall risk assessment completed every shift. All precautions in place. Pt has call light within reach at all times. Room clear of clutter. Pt aware to call for assistance when getting up. Intake/Output Summary (Last 24 hours) at 2/13/2022 0745  Last data filed at 2/13/2022 3695  Gross per 24 hour   Intake 3157.3 ml   Output 1400 ml   Net 1757.3 ml     Vitals:    02/13/22 0703   BP: (!) 143/65   Pulse: 68   Resp: 18   Temp: 97.6 °F (36.4 °C)   SpO2: 90%     Patient assessed for fall risk; fall precautions initiated. Patient and family instructed about safety devices. Environment kept free of clutter and adequate lighting provided. Bed locked and in lowest position. Call light within reach. Will continue to monitor.

## 2022-02-13 NOTE — PROGRESS NOTES
DULoxetine  20 mg Oral Nightly    famotidine  20 mg Oral Nightly    ferrous sulfate  325 mg Oral Daily with breakfast    gabapentin  100 mg Oral Nightly    hydrALAZINE  25 mg Oral TID    hydroxychloroquine  300 mg Oral Daily    lacosamide  100 mg Oral 2 times per day    levETIRAcetam  500 mg Oral BID    melatonin  6 mg Oral Nightly    memantine  10 mg Oral BID    phenytoin  100 mg Oral TID    polyvinyl alcohol-povidone  2 drop Both Eyes BID    pregabalin  150 mg Oral 2 times per day    tiotropium  2 puff Inhalation Daily    Vitamin D  3,000 Units Oral Daily    sodium chloride flush  5-40 mL IntraVENous 2 times per day    predniSONE  40 mg Oral Daily    lidocaine 1 % injection  5 mL IntraDERmal Once    sodium chloride flush  5-40 mL IntraVENous 2 times per day    budesonide-formoterol  2 puff Inhalation BID    trospium  20 mg Oral Nightly    atorvastatin  10 mg Oral Daily       Continuous Infusions:   dextrose      sodium chloride Stopped (02/12/22 1935)    sodium chloride         PRN Meds:  glucose, dextrose, glucagon (rDNA), dextrose, albuterol, albuterol sulfate HFA, loperamide, magnesium hydroxide, ondansetron, sodium chloride flush, sodium chloride, ondansetron **OR** ondansetron, polyethylene glycol, acetaminophen **OR** acetaminophen, sodium chloride flush, sodium chloride    Labs reviewed:  CBC:   Recent Labs     02/11/22 0930 02/12/22  0553   WBC 6.5 5.7   HGB 11.4* 10.5*   HCT 36.8 33.7*   MCV 84.0 83.2    187     BMP:   Recent Labs     02/11/22 0930 02/12/22  0553 02/12/22 2005    146* 144   K 4.9 4.9 4.4    108 105   CO2 27 28 29   BUN 19 22* 24*   CREATININE 1.1 1.0 1.1     LIVER PROFILE:   Recent Labs     02/11/22  0930   AST 22   ALT 14   BILITOT <0.2   ALKPHOS 155*     PT/INR: No results for input(s): PROTIME, INR in the last 72 hours. APTT: No results for input(s): APTT in the last 72 hours.   UA:  Recent Labs     02/11/22  0910   COLORU YELLOW   PHUR 5.0 5. 5   WBCUA >900*   RBCUA 37*   BACTERIA 4+*   CLARITYU TURBID*   SPECGRAV 1.015   LEUKOCYTESUR LARGE*   UROBILINOGEN 0.2   BILIRUBINUR Negative   BLOODU SMALL*   GLUCOSEU Negative     No results for input(s): PH, PCO2, PO2 in the last 72 hours. Cx:      Films: This note was transcribed using [id number] [employer]. Please disregard any translational errors.       Christi Hdz [phone]

## 2022-02-14 LAB
A/G RATIO: 1.3 (ref 1.1–2.2)
ALBUMIN SERPL-MCNC: 3.8 G/DL (ref 3.4–5)
ALP BLD-CCNC: 141 U/L (ref 40–129)
ALT SERPL-CCNC: 17 U/L (ref 10–40)
ANION GAP SERPL CALCULATED.3IONS-SCNC: 15 MMOL/L (ref 3–16)
AST SERPL-CCNC: 24 U/L (ref 15–37)
BASOPHILS ABSOLUTE: 0 K/UL (ref 0–0.2)
BASOPHILS RELATIVE PERCENT: 0.3 %
BILIRUB SERPL-MCNC: <0.2 MG/DL (ref 0–1)
BUN BLDV-MCNC: 26 MG/DL (ref 7–20)
CALCIUM SERPL-MCNC: 9.2 MG/DL (ref 8.3–10.6)
CHLORIDE BLD-SCNC: 102 MMOL/L (ref 99–110)
CO2: 26 MMOL/L (ref 21–32)
CREAT SERPL-MCNC: 1.1 MG/DL (ref 0.6–1.2)
EOSINOPHILS ABSOLUTE: 0 K/UL (ref 0–0.6)
EOSINOPHILS RELATIVE PERCENT: 0.1 %
GFR AFRICAN AMERICAN: 59
GFR NON-AFRICAN AMERICAN: 49
GLUCOSE BLD-MCNC: 116 MG/DL (ref 70–99)
GLUCOSE BLD-MCNC: 130 MG/DL (ref 70–99)
GLUCOSE BLD-MCNC: 130 MG/DL (ref 70–99)
GLUCOSE BLD-MCNC: 140 MG/DL (ref 70–99)
GLUCOSE BLD-MCNC: 142 MG/DL (ref 70–99)
HCT VFR BLD CALC: 35.1 % (ref 36–48)
HEMOGLOBIN: 11 G/DL (ref 12–16)
LYMPHOCYTES ABSOLUTE: 1 K/UL (ref 1–5.1)
LYMPHOCYTES RELATIVE PERCENT: 17.6 %
MCH RBC QN AUTO: 25.5 PG (ref 26–34)
MCHC RBC AUTO-ENTMCNC: 31.3 G/DL (ref 31–36)
MCV RBC AUTO: 81.4 FL (ref 80–100)
MONOCYTES ABSOLUTE: 0.1 K/UL (ref 0–1.3)
MONOCYTES RELATIVE PERCENT: 2.5 %
NEUTROPHILS ABSOLUTE: 4.3 K/UL (ref 1.7–7.7)
NEUTROPHILS RELATIVE PERCENT: 79.5 %
ORGANISM: ABNORMAL
PDW BLD-RTO: 14.5 % (ref 12.4–15.4)
PERFORMED ON: ABNORMAL
PLATELET # BLD: 192 K/UL (ref 135–450)
PMV BLD AUTO: 9 FL (ref 5–10.5)
POTASSIUM SERPL-SCNC: 4.4 MMOL/L (ref 3.5–5.1)
RBC # BLD: 4.31 M/UL (ref 4–5.2)
SODIUM BLD-SCNC: 143 MMOL/L (ref 136–145)
TOTAL PROTEIN: 6.8 G/DL (ref 6.4–8.2)
URINE CULTURE, ROUTINE: ABNORMAL
URINE CULTURE, ROUTINE: ABNORMAL
WBC # BLD: 5.4 K/UL (ref 4–11)

## 2022-02-14 PROCEDURE — 80053 COMPREHEN METABOLIC PANEL: CPT

## 2022-02-14 PROCEDURE — 9990000010 HC NO CHARGE VISIT

## 2022-02-14 PROCEDURE — 6360000002 HC RX W HCPCS: Performed by: INTERNAL MEDICINE

## 2022-02-14 PROCEDURE — 6370000000 HC RX 637 (ALT 250 FOR IP): Performed by: INTERNAL MEDICINE

## 2022-02-14 PROCEDURE — 94640 AIRWAY INHALATION TREATMENT: CPT

## 2022-02-14 PROCEDURE — 94761 N-INVAS EAR/PLS OXIMETRY MLT: CPT

## 2022-02-14 PROCEDURE — 36592 COLLECT BLOOD FROM PICC: CPT

## 2022-02-14 PROCEDURE — 2580000003 HC RX 258: Performed by: INTERNAL MEDICINE

## 2022-02-14 PROCEDURE — 2060000000 HC ICU INTERMEDIATE R&B

## 2022-02-14 PROCEDURE — 99233 SBSQ HOSP IP/OBS HIGH 50: CPT | Performed by: INTERNAL MEDICINE

## 2022-02-14 PROCEDURE — 2580000003 HC RX 258: Performed by: EMERGENCY MEDICINE

## 2022-02-14 PROCEDURE — 85025 COMPLETE CBC W/AUTO DIFF WBC: CPT

## 2022-02-14 RX ORDER — POLYETHYLENE GLYCOL 3350 17 G/17G
17 POWDER, FOR SOLUTION ORAL DAILY
Qty: 527 G | Refills: 1 | Status: SHIPPED | OUTPATIENT
Start: 2022-02-14 | End: 2022-03-16

## 2022-02-14 RX ORDER — OXYCODONE HYDROCHLORIDE AND ACETAMINOPHEN 5; 325 MG/1; MG/1
0.5 TABLET ORAL EVERY 6 HOURS PRN
Qty: 6 TABLET | Refills: 0 | Status: SHIPPED | OUTPATIENT
Start: 2022-02-14 | End: 2022-02-17

## 2022-02-14 RX ORDER — FUROSEMIDE 20 MG/1
20 TABLET ORAL 2 TIMES DAILY
Qty: 60 TABLET | Refills: 3 | Status: SHIPPED | OUTPATIENT
Start: 2022-02-14

## 2022-02-14 RX ORDER — PREDNISONE 10 MG/1
TABLET ORAL
Qty: 30 TABLET | Refills: 0 | Status: SHIPPED | OUTPATIENT
Start: 2022-02-14 | End: 2022-04-06

## 2022-02-14 RX ORDER — SODIUM CHLORIDE 9 MG/ML
25 INJECTION, SOLUTION INTRAVENOUS PRN
Status: DISCONTINUED | OUTPATIENT
Start: 2022-02-14 | End: 2022-02-15 | Stop reason: HOSPADM

## 2022-02-14 RX ORDER — HYDROXYCHLOROQUINE SULFATE 200 MG/1
300 TABLET, FILM COATED ORAL DAILY
Qty: 30 TABLET | Refills: 0 | Status: SHIPPED | OUTPATIENT
Start: 2022-02-21

## 2022-02-14 RX ORDER — OXYCODONE HYDROCHLORIDE AND ACETAMINOPHEN 5; 325 MG/1; MG/1
0.5 TABLET ORAL EVERY 6 HOURS PRN
Status: DISCONTINUED | OUTPATIENT
Start: 2022-02-14 | End: 2022-02-15 | Stop reason: HOSPADM

## 2022-02-14 RX ORDER — LIDOCAINE HYDROCHLORIDE 10 MG/ML
5 INJECTION, SOLUTION EPIDURAL; INFILTRATION; INTRACAUDAL; PERINEURAL ONCE
Status: DISCONTINUED | OUTPATIENT
Start: 2022-02-14 | End: 2022-02-15 | Stop reason: HOSPADM

## 2022-02-14 RX ADMIN — LACOSAMIDE 100 MG: 100 TABLET, FILM COATED ORAL at 08:24

## 2022-02-14 RX ADMIN — POLYVINYL ALCOHOL, POVIDONE 2 DROP: .5; .6 LIQUID OPHTHALMIC at 21:52

## 2022-02-14 RX ADMIN — OXYCODONE AND ACETAMINOPHEN 0.5 TABLET: 5; 325 TABLET ORAL at 09:42

## 2022-02-14 RX ADMIN — ENOXAPARIN SODIUM 40 MG: 100 INJECTION SUBCUTANEOUS at 21:53

## 2022-02-14 RX ADMIN — TROSPIUM CHLORIDE 20 MG: 20 TABLET, FILM COATED ORAL at 21:52

## 2022-02-14 RX ADMIN — ACETAMINOPHEN 650 MG: 325 TABLET ORAL at 21:57

## 2022-02-14 RX ADMIN — SODIUM CHLORIDE, PRESERVATIVE FREE 10 ML: 5 INJECTION INTRAVENOUS at 08:25

## 2022-02-14 RX ADMIN — MEROPENEM 500 MG: 500 INJECTION, POWDER, FOR SOLUTION INTRAVENOUS at 15:33

## 2022-02-14 RX ADMIN — Medication 6 MG: at 21:53

## 2022-02-14 RX ADMIN — ASPIRIN 81 MG 81 MG: 81 TABLET ORAL at 08:25

## 2022-02-14 RX ADMIN — FUROSEMIDE 20 MG: 10 INJECTION, SOLUTION INTRAMUSCULAR; INTRAVENOUS at 18:13

## 2022-02-14 RX ADMIN — SODIUM CHLORIDE, PRESERVATIVE FREE 10 ML: 5 INJECTION INTRAVENOUS at 21:53

## 2022-02-14 RX ADMIN — HYDRALAZINE HYDROCHLORIDE 25 MG: 25 TABLET, FILM COATED ORAL at 08:25

## 2022-02-14 RX ADMIN — LACOSAMIDE 100 MG: 100 TABLET, FILM COATED ORAL at 21:52

## 2022-02-14 RX ADMIN — BUDESONIDE AND FORMOTEROL FUMARATE DIHYDRATE 2 PUFF: 160; 4.5 AEROSOL RESPIRATORY (INHALATION) at 07:42

## 2022-02-14 RX ADMIN — FERROUS SULFATE TAB EC 324 MG (65 MG FE EQUIVALENT) 325 MG: 324 (65 FE) TABLET DELAYED RESPONSE at 08:24

## 2022-02-14 RX ADMIN — ACETAMINOPHEN 650 MG: 325 TABLET ORAL at 06:17

## 2022-02-14 RX ADMIN — ENOXAPARIN SODIUM 40 MG: 100 INJECTION SUBCUTANEOUS at 08:23

## 2022-02-14 RX ADMIN — IPRATROPIUM BROMIDE AND ALBUTEROL SULFATE 1 AMPULE: .5; 2.5 SOLUTION RESPIRATORY (INHALATION) at 07:42

## 2022-02-14 RX ADMIN — MEMANTINE 10 MG: 5 TABLET ORAL at 21:52

## 2022-02-14 RX ADMIN — BUDESONIDE AND FORMOTEROL FUMARATE DIHYDRATE 2 PUFF: 160; 4.5 AEROSOL RESPIRATORY (INHALATION) at 20:46

## 2022-02-14 RX ADMIN — IPRATROPIUM BROMIDE AND ALBUTEROL SULFATE 1 AMPULE: .5; 2.5 SOLUTION RESPIRATORY (INHALATION) at 16:01

## 2022-02-14 RX ADMIN — SODIUM CHLORIDE 25 ML: 9 INJECTION, SOLUTION INTRAVENOUS at 22:11

## 2022-02-14 RX ADMIN — POLYVINYL ALCOHOL, POVIDONE 2 DROP: .5; .6 LIQUID OPHTHALMIC at 08:25

## 2022-02-14 RX ADMIN — LEVETIRACETAM 500 MG: 500 TABLET, FILM COATED ORAL at 21:52

## 2022-02-14 RX ADMIN — MEROPENEM 500 MG: 500 INJECTION, POWDER, FOR SOLUTION INTRAVENOUS at 22:12

## 2022-02-14 RX ADMIN — FUROSEMIDE 20 MG: 10 INJECTION, SOLUTION INTRAMUSCULAR; INTRAVENOUS at 08:24

## 2022-02-14 RX ADMIN — PREDNISONE 40 MG: 20 TABLET ORAL at 08:24

## 2022-02-14 RX ADMIN — PREGABALIN 150 MG: 75 CAPSULE ORAL at 08:25

## 2022-02-14 RX ADMIN — PREGABALIN 150 MG: 75 CAPSULE ORAL at 21:52

## 2022-02-14 RX ADMIN — MEMANTINE 10 MG: 5 TABLET ORAL at 08:24

## 2022-02-14 RX ADMIN — Medication 3000 UNITS: at 08:24

## 2022-02-14 RX ADMIN — SODIUM CHLORIDE, PRESERVATIVE FREE 10 ML: 5 INJECTION INTRAVENOUS at 21:51

## 2022-02-14 RX ADMIN — MEROPENEM 500 MG: 500 INJECTION, POWDER, FOR SOLUTION INTRAVENOUS at 09:38

## 2022-02-14 RX ADMIN — INSULIN LISPRO 1 UNITS: 100 INJECTION, SOLUTION INTRAVENOUS; SUBCUTANEOUS at 08:31

## 2022-02-14 RX ADMIN — LEVETIRACETAM 500 MG: 500 TABLET, FILM COATED ORAL at 08:24

## 2022-02-14 RX ADMIN — GABAPENTIN 100 MG: 100 CAPSULE ORAL at 21:52

## 2022-02-14 RX ADMIN — DULOXETINE HYDROCHLORIDE 20 MG: 20 CAPSULE, DELAYED RELEASE ORAL at 21:52

## 2022-02-14 RX ADMIN — HYDRALAZINE HYDROCHLORIDE 25 MG: 25 TABLET, FILM COATED ORAL at 15:39

## 2022-02-14 RX ADMIN — PHENYTOIN 100 MG: 50 TABLET, CHEWABLE ORAL at 21:53

## 2022-02-14 RX ADMIN — OXYCODONE AND ACETAMINOPHEN 0.5 TABLET: 5; 325 TABLET ORAL at 15:43

## 2022-02-14 RX ADMIN — HYDRALAZINE HYDROCHLORIDE 25 MG: 25 TABLET, FILM COATED ORAL at 21:52

## 2022-02-14 RX ADMIN — FAMOTIDINE 20 MG: 20 TABLET ORAL at 21:52

## 2022-02-14 RX ADMIN — DOCUSATE SODIUM 100 MG: 100 CAPSULE, LIQUID FILLED ORAL at 08:24

## 2022-02-14 RX ADMIN — IPRATROPIUM BROMIDE AND ALBUTEROL SULFATE 1 AMPULE: .5; 2.5 SOLUTION RESPIRATORY (INHALATION) at 11:35

## 2022-02-14 RX ADMIN — PHENYTOIN 100 MG: 50 TABLET, CHEWABLE ORAL at 15:43

## 2022-02-14 RX ADMIN — ALLOPURINOL 100 MG: 100 TABLET ORAL at 21:52

## 2022-02-14 RX ADMIN — HYDROXYCHLOROQUINE SULFATE 300 MG: 200 TABLET ORAL at 08:24

## 2022-02-14 RX ADMIN — PHENYTOIN 100 MG: 50 TABLET, CHEWABLE ORAL at 08:23

## 2022-02-14 RX ADMIN — IPRATROPIUM BROMIDE AND ALBUTEROL SULFATE 1 AMPULE: .5; 2.5 SOLUTION RESPIRATORY (INHALATION) at 20:46

## 2022-02-14 RX ADMIN — ATORVASTATIN CALCIUM 10 MG: 10 TABLET, FILM COATED ORAL at 08:24

## 2022-02-14 ASSESSMENT — PAIN DESCRIPTION - FREQUENCY: FREQUENCY: CONTINUOUS

## 2022-02-14 ASSESSMENT — PAIN DESCRIPTION - LOCATION: LOCATION: LEG

## 2022-02-14 ASSESSMENT — PAIN DESCRIPTION - PROGRESSION: CLINICAL_PROGRESSION: NOT CHANGED

## 2022-02-14 ASSESSMENT — PAIN - FUNCTIONAL ASSESSMENT: PAIN_FUNCTIONAL_ASSESSMENT: PREVENTS OR INTERFERES SOME ACTIVE ACTIVITIES AND ADLS

## 2022-02-14 ASSESSMENT — PAIN DESCRIPTION - PAIN TYPE: TYPE: ACUTE PAIN

## 2022-02-14 ASSESSMENT — PAIN SCALES - GENERAL
PAINLEVEL_OUTOF10: 2
PAINLEVEL_OUTOF10: 6
PAINLEVEL_OUTOF10: 6
PAINLEVEL_OUTOF10: 0
PAINLEVEL_OUTOF10: 0
PAINLEVEL_OUTOF10: 3

## 2022-02-14 ASSESSMENT — PAIN DESCRIPTION - ONSET: ONSET: AWAKENED FROM SLEEP

## 2022-02-14 ASSESSMENT — PAIN DESCRIPTION - ORIENTATION: ORIENTATION: RIGHT;LEFT

## 2022-02-14 ASSESSMENT — PAIN DESCRIPTION - DESCRIPTORS: DESCRIPTORS: THROBBING

## 2022-02-14 NOTE — PROGRESS NOTES
Occupational Therapy  No eval/discharge    Name: Savanna Acharya  : 1950  MRN: [id number]    OT order noted. Per chart review, pt is dependent for mobility, using brenda lift to get to electric wheelchair, total care for ADLs since . OT will sign off due to baseline functional status at this time. Anticipate return to [facility] [place] with/without OT based on facility's discretion.      Electronically signed by YUMI Martinez/L  License # [id number]

## 2022-02-14 NOTE — PROGRESS NOTES
Pulmonary Progress Note    Date of Admission: 2/11/2022   LOS: 3 days       CC:  wheezing     Subjective:  Awake. complains of leg pain but feeling better over all. ROS:        Assessment:     History of COVID-19  Echo demonstrating grade 1 diastolic dysfunction December 2021  Obesity, BMI 48  Acute hypoxemic respiratory failure    Plan: This note may have been transcribed using [id number] [employer]. Please disregard any translational errors. Hospital Day: 3     COPD with exacerbation? * hx of smoking   * hx of wheezing. Resolved with Duoneb's    * pred  * plan to d/c with Duoneb's  Prn         Abnormal radiograph  *Chest x-ray consistent with pulmonary edema  *lasix bid         Acute hypoxemic respiratory failure saturation less than 90% on room air with acute hypercapnic respiratory failure  * room air.        UTI  * ceftriaxone  * culture ESBL.          Obesity  * denies symptoms of ELYSIA    Ok to discharge from a pulmonary . pov     Will sign off at this time. Thanks for the consult. Please call with questions. Data:        PHYSICAL EXAM:   Blood pressure (!) 164/55, pulse 81, temperature 98.3 °F (36.8 °C), temperature source Oral, resp. rate 18, height 4' 11\" (1.499 m), weight 238 lb 12.1 oz (108.3 kg), SpO2 94 %.'    Body mass index is 48.22 kg/m². Gen: No distress. ENT:   Resp: No accessory muscle use. No crackles. No wheezes. No rhonchi. CV: Regular rate. Regular rhythm. No murmur or rub. No edema. Skin: Warm, dry, normal texture and turgor. No nodule on exposed extremities. M/S: No cyanosis. No clubbing. No joint deformity. Psych: awake.       Medications:    Scheduled Meds:   predniSONE  40 mg Oral Daily    furosemide  20 mg IntraVENous BID    insulin lispro  0-6 Units SubCUTAneous TID WC    insulin lispro  0-3 Units SubCUTAneous Nightly    enoxaparin  40 mg SubCUTAneous BID    cefTRIAXone (ROCEPHIN) IV  2,000 mg IntraVENous Q24H    ipratropium-albuterol 1 ampule Inhalation Q4H WA    allopurinol  100 mg Oral Nightly    aspirin  81 mg Oral Daily    docusate sodium  100 mg Oral Daily    DULoxetine  20 mg Oral Nightly    famotidine  20 mg Oral Nightly    ferrous sulfate  325 mg Oral Daily with breakfast    gabapentin  100 mg Oral Nightly    hydrALAZINE  25 mg Oral TID    hydroxychloroquine  300 mg Oral Daily    lacosamide  100 mg Oral 2 times per day    levETIRAcetam  500 mg Oral BID    melatonin  6 mg Oral Nightly    memantine  10 mg Oral BID    phenytoin  100 mg Oral TID    polyvinyl alcohol-povidone  2 drop Both Eyes BID    pregabalin  150 mg Oral 2 times per day    tiotropium  2 puff Inhalation Daily    Vitamin D  3,000 Units Oral Daily    sodium chloride flush  5-40 mL IntraVENous 2 times per day    lidocaine 1 % injection  5 mL IntraDERmal Once    sodium chloride flush  5-40 mL IntraVENous 2 times per day    budesonide-formoterol  2 puff Inhalation BID    trospium  20 mg Oral Nightly    atorvastatin  10 mg Oral Daily       Continuous Infusions:   dextrose      sodium chloride 25 mL (02/13/22 7237)    sodium chloride         PRN Meds:  glucose, dextrose, glucagon (rDNA), dextrose, albuterol, albuterol sulfate HFA, loperamide, magnesium hydroxide, ondansetron, sodium chloride flush, sodium chloride, ondansetron **OR** ondansetron, polyethylene glycol, acetaminophen **OR** acetaminophen, sodium chloride flush, sodium chloride    Labs reviewed:  CBC:   Recent Labs     02/11/22 0930 02/12/22  0553   WBC 6.5 5.7   HGB 11.4* 10.5*   HCT 36.8 33.7*   MCV 84.0 83.2    187     BMP:   Recent Labs     02/11/22  0930 02/12/22  0553 02/12/22 2005    146* 144   K 4.9 4.9 4.4    108 105   CO2 27 28 29   BUN 19 22* 24*   CREATININE 1.1 1.0 1.1     LIVER PROFILE:   Recent Labs     02/11/22  0930   AST 22   ALT 14   BILITOT <0.2   ALKPHOS 155*     PT/INR: No results for input(s): PROTIME, INR in the last 72 hours.   APTT: No results

## 2022-02-14 NOTE — CARE COORDINATION
Discharge order noted. JERROD contacted Zoila Pyle in admissions at [facility] to inquire if patient can return with Merrem every 6 hours IV and with an IJ central line in place due to inability to insert a PICC line. Zoila Pyle is checking with the nursing department to ensure ability to provide care.      Celeste HENDRICKSN RN  Case Management  [phone]    Electronically signed by Celeste Vargas RN on 2/14/2022 at 12:00 PM

## 2022-02-14 NOTE — PROGRESS NOTES
Physical Therapy  Sierra Lili  [id number]  PT orders received. Per chart review, pt has been dependent for mobility since 2020, using a brenda lift to get to electric wheelchair and receiving total care for ADLs. PT will sign off due to baseline functional status at this time. Anticipate return to [facility] without PT.   Electronically signed by Abad Andrade on 2/14/2022 at 1:14 PM

## 2022-02-14 NOTE — CARE COORDINATION
CM notified patient to have tunneled cath placed on 2/15/22 for discharge to [facility] and IV antibiotic therapy. Invanz every 24 hours x 10 days. Verified with admissions at [facility] patient is ok for admission.      Grace GARCIA RN  Case Management  [phone]    Electronically signed by Grace Molina RN on 2/14/2022 at 5:36 PM

## 2022-02-14 NOTE — PROGRESS NOTES
Hospitalist Progress Note      PCP: Steve Fields    Date of Admission: 2/11/2022    Subjective: SOB better, off O2    Medications:  Reviewed    Infusion Medications    dextrose      sodium chloride 25 mL (02/13/22 5263)    sodium chloride       Scheduled Medications    predniSONE  40 mg Oral Daily    furosemide  20 mg IntraVENous BID    insulin lispro  0-6 Units SubCUTAneous TID WC    insulin lispro  0-3 Units SubCUTAneous Nightly    enoxaparin  40 mg SubCUTAneous BID    cefTRIAXone (ROCEPHIN) IV  2,000 mg IntraVENous Q24H    ipratropium-albuterol  1 ampule Inhalation Q4H WA    allopurinol  100 mg Oral Nightly    aspirin  81 mg Oral Daily    docusate sodium  100 mg Oral Daily    DULoxetine  20 mg Oral Nightly    famotidine  20 mg Oral Nightly    ferrous sulfate  325 mg Oral Daily with breakfast    gabapentin  100 mg Oral Nightly    hydrALAZINE  25 mg Oral TID    hydroxychloroquine  300 mg Oral Daily    lacosamide  100 mg Oral 2 times per day    levETIRAcetam  500 mg Oral BID    melatonin  6 mg Oral Nightly    memantine  10 mg Oral BID    phenytoin  100 mg Oral TID    polyvinyl alcohol-povidone  2 drop Both Eyes BID    pregabalin  150 mg Oral 2 times per day    tiotropium  2 puff Inhalation Daily    Vitamin D  3,000 Units Oral Daily    sodium chloride flush  5-40 mL IntraVENous 2 times per day    lidocaine 1 % injection  5 mL IntraDERmal Once    sodium chloride flush  5-40 mL IntraVENous 2 times per day    budesonide-formoterol  2 puff Inhalation BID    trospium  20 mg Oral Nightly    atorvastatin  10 mg Oral Daily     PRN Meds: glucose, dextrose, glucagon (rDNA), dextrose, albuterol, albuterol sulfate HFA, loperamide, magnesium hydroxide, ondansetron, sodium chloride flush, sodium chloride, ondansetron **OR** ondansetron, polyethylene glycol, acetaminophen **OR** acetaminophen, sodium chloride flush, sodium chloride      Intake/Output Summary (Last 24 hours) at 2/14/2022 0019  Last data filed at 2/13/2022 2103  Gross per 24 hour   Intake 891 ml   Output 1600 ml   Net -709 ml       Physical Exam Performed:    BP (!) 145/57   Pulse 69   Temp 98.5 °F (36.9 °C) (Oral)   Resp 20   Ht 4' 11\" (1.499 m)   Wt 240 lb 4.8 oz (109 kg)   SpO2 93%   BMI 48.53 kg/m²        General appearance: No apparent distress appears stated age and cooperative. HEENT Normal cephalic, atraumatic without obvious deformity.  Pupils equal, round, and reactive to light.  Extra ocular muscles intact.  Conjunctivae/corneas clear. Neck: Supple, No jugular venous distention/bruits.  Trachea midline without thyromegaly or adenopathy with full range of motion. Lungs: diminished  Heart: Regular rate and rhythm with Normal S1/S2   Abdomen: Soft, non-tender or non-distended without rigidity or guarding and positive bowel sounds   Extremities: No clubbing, cyanosis, or edema bilaterally.    Skin: Skin color, texture, turgor normal.  No rashes or lesions. Neurologic: Awake, neurovascularly intact with sensory/motor intact upper extremities/lower extremities, bilaterally.  Cranial nerves: II-XII intact, grossly non-focal.  Mental status: Awake  Capillary Refill: Acceptable  < 3 seconds  Peripheral Pulses: +3 Easily felt, not easily obliterated with pressure       Labs:   Recent Labs     02/11/22 0930 02/12/22  0553   WBC 6.5 5.7   HGB 11.4* 10.5*   HCT 36.8 33.7*    187     Recent Labs     02/11/22 0930 02/12/22  0553 02/12/22 2005    146* 144   K 4.9 4.9 4.4    108 105   CO2 27 28 29   BUN 19 22* 24*   CREATININE 1.1 1.0 1.1   CALCIUM 9.1 9.0 9.0     Recent Labs     02/11/22 0930   AST 22   ALT 14   BILITOT <0.2   ALKPHOS 155*     No results for input(s): INR in the last 72 hours.   Recent Labs     02/11/22 0930   TROPONINI <0.01       Urinalysis:      Lab Results   Component Value Date    NITRU POSITIVE 02/11/2022    WBCUA >900 02/11/2022    BACTERIA 4+ 02/11/2022    RBCUA 37 02/11/2022    BLOODU SMALL 02/11/2022    SPECGRAV 1.015 02/11/2022    GLUCOSEU Negative 02/11/2022       Radiology:  XR CHEST PORTABLE   Final Result   Right internal jugular line present with tip projecting over the superior   cavoatrial junction. Perihilar opacities. Pulmonary edema versus multifocal pneumonia. CT HEAD WO CONTRAST   Final Result   No acute intracranial abnormality. RECOMMENDATIONS:   Unavailable         XR CHEST PORTABLE   Final Result   Mild pulmonary edema                 Assessment/Plan:    Active Hospital Problems    Diagnosis     COPD with acute exacerbation ([facility] [address] [J44.1]            Acute on chr hypoxic/hypercapnic resp failure - pt refused BIPAP, placed on O2, wean as tolerated     Acute COPD exacerbation - started on solumedrol, cont home neb/inhalers, pulm consulted, switch to PO pred-->taper on dc     Acute diastolic CHF - received lasix in the ER, monitor I/O, associated with elevated BNP. Net -2L, started on IV lasix BID, reviewed last echo from dec 2021     UTI - UA abnormal, cont IV abx, await urine cx        DVT Prophylaxis: lovenox  Diet: ADULT DIET;  Regular  Code Status: Full Code    PT/OT Eval Status: ordered    Dispo - cont care, poss dc in am after pt/ot eval    Nikki Joseph MD

## 2022-02-14 NOTE — PROGRESS NOTES
Unable to locate vessel appropriate for picc/midline placement. Unable to rotate arm to access medial portion of arm.  Reported off to [employer]

## 2022-02-14 NOTE — DISCHARGE INSTR - COC
Continuity of Care Form    Patient Name: Joyce Glynn   :  1950  MRN:  [id number]    Admit date:  2022  Discharge date:  2/15/22    Code Status Order: Full Code   Advance Directives:      Admitting Physician:  Obie Triplett MD  PCP: Batsheva Thomas    Discharging Nurse: CONCHITA SHEPARD JR. [facility] Unit/Room#: V0C-6834/5121-01  Discharging Unit Phone Number: [phone]    Emergency Contact:   Extended Emergency Contact Information  Primary Emergency Contact: Beth Bain Phone: [phone]  Relation: Child    Past Surgical History:  History reviewed. No pertinent surgical history. Immunization History: There is no immunization history on file for this patient.     Active Problems:  Patient Active Problem List   Diagnosis Code    Acute respiratory failure with hypoxia (HCC) J96.01    COVID-19 virus infection U07.1    Pneumonia J18.9    Sepsis ([address] A41.9    Fever R50.9    Tachycardia R00.0    Exposure to COVID-19 virus Z20.822    Hyperglycemia R73.9    Dementia ([facility] F03.90    Renal failure N19    Asthma J45.909    Essential hypertension I10    Systemic lupus erythematosus ([facility] M32.9    Acute renal failure superimposed on stage 3 chronic kidney disease (HCC) N17.9, N18.30    Septic shock (HCC) A41.9, R65.21    Pneumonia due to COVID-19 virus U07.1, N03.94    Acute metabolic encephalopathy V36.89    Metabolic acidosis P87.0    Tachypnea R06.82    Chest pain R07.9    COPD with acute exacerbation ([facility]) J44.1       Isolation/Infection:   Isolation            Contact          Patient Infection Status       Infection Onset Added Last Indicated Last Indicated By Review Planned Expiration Resolved Resolved By    ESBL (Extended Spectrum Beta Lactamase) 22 Culture, Urine        Resolved    COVID-19 (Rule Out) 22 COVID-19, Rapid (Ordered)   22 Rule-Out Test Resulted    COVID-19 (Rule Out) 21 COVID-19, Rapid Therapies: Physical Therapy and Occupational Therapy  Weight Bearing Status/Restrictions: No weight bearing restirctions  Other Medical Equipment (for information only, NOT a DME order):  hospital bed  Other Treatments: ***    Heart Failure Instructions for Daily Management  Patient was treated for acute diastolic heart failure. she  will require the following:    Please weigh daily on the same scale and approximately the same time of day. Report weight gain of 3 pounds/day or 5 pounds/week to : denisse BAILEY and Monica Alvarez [phone]. Please use hospital discharge weight as baseline reference. Please monitor for signs and symptoms of and report to MD:  Worsening Heart Failure: sudden weight gain, shortness of breath, lower extremity or general edema/swelling, abdominal bloating/swelling, inability to lie flat, intolerance to usual activity, or cough (especially at night). Report these finding even if no increase in weight. Dehydration:  having difficulty or a decrease in urination, dizziness, worsening fatigue, or new onset/worsening of generalized weakness. Please continue a LOW SODIUM diet and LIMIT fluid intake to 48 - 64 ounces ( 1.5 - 2 liters) per day. Call Monica Alvarez [phone] and denisse BAILEY and/or Siria Sandoval @ [phone] with any questions or concerns. Please continue heart failure education to patient and family/support system. See After Visit Summary for hospital follow up appointment details. Consider spiritual care referral for support and/or completion of advance directives [phone]. Consider: having the [facility] MD complete required 7 day follow up and palliative care consult for ongoing goals of care, end of life, and/or chronic disease management discussions. Patient's personal belongings (please select all that are sent with patient):   All belongings sent with patient from patient room    RN SIGNATURE: Electronically signed by Devora Contreras RN on 2/15/22 at 11:24 AM EST    CASE MANAGEMENT/SOCIAL WORK SECTION    Inpatient Status Date: 02/11/2022    Readmission Risk Assessment Score:  Readmission Risk              Risk of Unplanned Readmission:  35           Discharging [facility] Facility/ Agency   [facility]  Address:  [address]   Phone:  [phone]     / signature: Electronically signed by Renee Fair RN on 2/15/22 at 12:20 PM EST    PHYSICIAN SECTION    Prognosis: Fair    Condition at Discharge: Stable    Rehab Potential (if transferring to Rehab): Fair    Recommended Labs or Other Treatments After Discharge: NA    Physician Certification: I certify the above information and transfer of Barbara Cox  is necessary for the continuing treatment of the diagnosis listed and that she requires Fahad Moreno for less 30 days.      Update Admission H&P: Changes in H&P as follows - refer to dc summary    PHYSICIAN SIGNATURE:  Electronically signed by Jonathan Yanez MD on 2/14/22 at 11:24 AM EST

## 2022-02-15 ENCOUNTER — APPOINTMENT (OUTPATIENT)
Dept: INTERVENTIONAL RADIOLOGY/VASCULAR | Age: 72
DRG: 291 | End: 2022-02-15
Payer: MEDICARE

## 2022-02-15 VITALS
OXYGEN SATURATION: 93 % | BODY MASS INDEX: 48.49 KG/M2 | HEIGHT: 59 IN | WEIGHT: 240.52 LBS | RESPIRATION RATE: 22 BRPM | HEART RATE: 71 BPM | SYSTOLIC BLOOD PRESSURE: 117 MMHG | TEMPERATURE: 98.7 F | DIASTOLIC BLOOD PRESSURE: 62 MMHG

## 2022-02-15 LAB
GLUCOSE BLD-MCNC: 138 MG/DL (ref 70–99)
GLUCOSE BLD-MCNC: 141 MG/DL (ref 70–99)
GLUCOSE BLD-MCNC: 95 MG/DL (ref 70–99)
INR BLD: 0.93 (ref 0.88–1.12)
PERFORMED ON: ABNORMAL
PERFORMED ON: ABNORMAL
PERFORMED ON: NORMAL
PROTHROMBIN TIME: 10.5 SEC (ref 9.9–12.7)

## 2022-02-15 PROCEDURE — 94761 N-INVAS EAR/PLS OXIMETRY MLT: CPT

## 2022-02-15 PROCEDURE — 6360000002 HC RX W HCPCS: Performed by: INTERNAL MEDICINE

## 2022-02-15 PROCEDURE — 6370000000 HC RX 637 (ALT 250 FOR IP): Performed by: INTERNAL MEDICINE

## 2022-02-15 PROCEDURE — 36558 INSERT TUNNELED CV CATH: CPT

## 2022-02-15 PROCEDURE — 76937 US GUIDE VASCULAR ACCESS: CPT

## 2022-02-15 PROCEDURE — 2580000003 HC RX 258: Performed by: EMERGENCY MEDICINE

## 2022-02-15 PROCEDURE — 85610 PROTHROMBIN TIME: CPT

## 2022-02-15 PROCEDURE — 2709999900 IR TUNNELED CVC PLACE WO SQ PORT/PUMP > 5 YEARS

## 2022-02-15 PROCEDURE — 94640 AIRWAY INHALATION TREATMENT: CPT

## 2022-02-15 PROCEDURE — 77001 FLUOROGUIDE FOR VEIN DEVICE: CPT

## 2022-02-15 PROCEDURE — 02HV33Z INSERTION OF INFUSION DEVICE INTO SUPERIOR VENA CAVA, PERCUTANEOUS APPROACH: ICD-10-PCS | Performed by: INTERNAL MEDICINE

## 2022-02-15 PROCEDURE — 0JH63XZ INSERTION OF TUNNELED VASCULAR ACCESS DEVICE INTO CHEST SUBCUTANEOUS TISSUE AND FASCIA, PERCUTANEOUS APPROACH: ICD-10-PCS | Performed by: INTERNAL MEDICINE

## 2022-02-15 PROCEDURE — 2580000003 HC RX 258: Performed by: INTERNAL MEDICINE

## 2022-02-15 RX ADMIN — IPRATROPIUM BROMIDE AND ALBUTEROL SULFATE 1 AMPULE: .5; 2.5 SOLUTION RESPIRATORY (INHALATION) at 12:04

## 2022-02-15 RX ADMIN — BUDESONIDE AND FORMOTEROL FUMARATE DIHYDRATE 2 PUFF: 160; 4.5 AEROSOL RESPIRATORY (INHALATION) at 08:16

## 2022-02-15 RX ADMIN — HYDRALAZINE HYDROCHLORIDE 25 MG: 25 TABLET, FILM COATED ORAL at 14:50

## 2022-02-15 RX ADMIN — PREGABALIN 150 MG: 75 CAPSULE ORAL at 09:11

## 2022-02-15 RX ADMIN — LACOSAMIDE 100 MG: 100 TABLET, FILM COATED ORAL at 09:12

## 2022-02-15 RX ADMIN — ATORVASTATIN CALCIUM 10 MG: 10 TABLET, FILM COATED ORAL at 09:11

## 2022-02-15 RX ADMIN — PHENYTOIN 100 MG: 50 TABLET, CHEWABLE ORAL at 14:50

## 2022-02-15 RX ADMIN — IPRATROPIUM BROMIDE AND ALBUTEROL SULFATE 1 AMPULE: .5; 2.5 SOLUTION RESPIRATORY (INHALATION) at 08:04

## 2022-02-15 RX ADMIN — MEROPENEM 500 MG: 500 INJECTION, POWDER, FOR SOLUTION INTRAVENOUS at 15:43

## 2022-02-15 RX ADMIN — MEROPENEM 500 MG: 500 INJECTION, POWDER, FOR SOLUTION INTRAVENOUS at 03:28

## 2022-02-15 RX ADMIN — SODIUM CHLORIDE, PRESERVATIVE FREE 10 ML: 5 INJECTION INTRAVENOUS at 09:12

## 2022-02-15 RX ADMIN — PHENYTOIN 100 MG: 50 TABLET, CHEWABLE ORAL at 11:49

## 2022-02-15 RX ADMIN — PREDNISONE 40 MG: 20 TABLET ORAL at 09:11

## 2022-02-15 RX ADMIN — DOCUSATE SODIUM 100 MG: 100 CAPSULE, LIQUID FILLED ORAL at 09:11

## 2022-02-15 RX ADMIN — HYDROXYCHLOROQUINE SULFATE 300 MG: 200 TABLET ORAL at 09:11

## 2022-02-15 RX ADMIN — MEMANTINE 10 MG: 5 TABLET ORAL at 09:10

## 2022-02-15 RX ADMIN — MEROPENEM 500 MG: 500 INJECTION, POWDER, FOR SOLUTION INTRAVENOUS at 09:22

## 2022-02-15 RX ADMIN — INSULIN LISPRO 1 UNITS: 100 INJECTION, SOLUTION INTRAVENOUS; SUBCUTANEOUS at 12:38

## 2022-02-15 RX ADMIN — POLYVINYL ALCOHOL, POVIDONE 2 DROP: .5; .6 LIQUID OPHTHALMIC at 09:12

## 2022-02-15 RX ADMIN — ASPIRIN 81 MG 81 MG: 81 TABLET ORAL at 09:10

## 2022-02-15 RX ADMIN — HYDRALAZINE HYDROCHLORIDE 25 MG: 25 TABLET, FILM COATED ORAL at 09:11

## 2022-02-15 RX ADMIN — Medication 3000 UNITS: at 09:11

## 2022-02-15 RX ADMIN — TIOTROPIUM BROMIDE INHALATION SPRAY 2 PUFF: 3.12 SPRAY, METERED RESPIRATORY (INHALATION) at 08:14

## 2022-02-15 RX ADMIN — LEVETIRACETAM 500 MG: 500 TABLET, FILM COATED ORAL at 09:11

## 2022-02-15 RX ADMIN — FERROUS SULFATE TAB EC 324 MG (65 MG FE EQUIVALENT) 325 MG: 324 (65 FE) TABLET DELAYED RESPONSE at 09:10

## 2022-02-15 RX ADMIN — FUROSEMIDE 20 MG: 10 INJECTION, SOLUTION INTRAMUSCULAR; INTRAVENOUS at 09:12

## 2022-02-15 ASSESSMENT — PAIN SCALES - GENERAL
PAINLEVEL_OUTOF10: 7
PAINLEVEL_OUTOF10: 0
PAINLEVEL_OUTOF10: 0

## 2022-02-15 ASSESSMENT — PAIN DESCRIPTION - DESCRIPTORS: DESCRIPTORS: THROBBING

## 2022-02-15 ASSESSMENT — PAIN DESCRIPTION - LOCATION: LOCATION: LEG

## 2022-02-15 ASSESSMENT — PAIN DESCRIPTION - ORIENTATION: ORIENTATION: LEFT

## 2022-02-15 ASSESSMENT — PAIN DESCRIPTION - PAIN TYPE: TYPE: ACUTE PAIN

## 2022-02-15 ASSESSMENT — PAIN DESCRIPTION - ONSET: ONSET: AWAKENED FROM SLEEP

## 2022-02-15 ASSESSMENT — PAIN DESCRIPTION - FREQUENCY: FREQUENCY: CONTINUOUS

## 2022-02-15 ASSESSMENT — PAIN - FUNCTIONAL ASSESSMENT: PAIN_FUNCTIONAL_ASSESSMENT: PREVENTS OR INTERFERES SOME ACTIVE ACTIVITIES AND ADLS

## 2022-02-15 ASSESSMENT — PAIN DESCRIPTION - PROGRESSION: CLINICAL_PROGRESSION: NOT CHANGED

## 2022-02-15 NOTE — PROGRESS NOTES
Report called to ry at [employer] [facility] [employer].  All questions answered    Electronically signed by Ambrose Hoffman RN on 2/15/2022 at 5:10 PM

## 2022-02-15 NOTE — DISCHARGE SUMMARY
Hospital Medicine Discharge Summary    Patient: Yesica Gamez     Gender: female  : 1950   Age: 66 y.o. MRN: [id number]    Admitting Physician: Radha Wallace MD  Discharge Physician: Neil Weinstein MD     Code Status: Full Code     Admit Date: 2022   Discharge Date:   2/15/22    Disposition:  NH    Discharge Diagnoses:       Follow-up appointments:  two weeks with PCP    Outpatient to do list: none    Condition at Discharge:  Stable    Hospital Course: The patient is a 66 y.o. female who presents to [facility] with AMS from [facility]. Pt has apparent h/o chr resp failure, was found to be confused and with worsening SOB and hence sent to the ER. In the ED a COVID 19 test was negative but a CXR showed pulm edema. She has COPD and was found to have acute on chr hypoxic/hypercapnic resp failure but refused BIPAP and was placed on O2 with symptom resolution. She was also treated for an acute diastolic CHF exacerbation with lasix and DCd with symptom resolution. She was also treated for an ESBL E coli UTI with merrem that was switched to ertapenem at [place]. Pt had no symptoms of a UTI, no leucocytosis or hemodynamic compromise. UTI diagnosis based on anabnormal UA. Pt likely colonised but not infected. Can DC the ertapenem- communicated with care coordinator and PA at [facility] looking after the patient. .. Discharge Medications:   Current Discharge Medication List      START taking these medications    Details   predniSONE (DELTASONE) 10 MG tablet Take 4 tablets once a day for 3 days, then take 3 tablets once a day for 3 days, then take 2 tablets once a day for 3 days, then take 1 tablet once a day for 3 days, then stop. Qty: 30 tablet, Refills: 0      polyethylene glycol (GLYCOLAX) 17 g packet Take 17 g by mouth daily  Qty: 527 g, Refills: 1      ertapenem (INVANZ) infusion Infuse 1,000 mg intravenously every 24 hours for 10 days Compound per protocol.   Qty: 10 g, Refills: 0 oxyCODONE-acetaminophen (PERCOCET) 5-325 MG per tablet Take 0.5 tablets by mouth every 6 hours as needed for Pain for up to 3 days.   Qty: 6 tablet, Refills: 0    Comments: Reduce doses taken as pain becomes manageable  Associated Diagnoses: COPD with acute exacerbation ([facility]           Current Discharge Medication List      CONTINUE these medications which have CHANGED    Details   hydroxychloroquine (PLAQUENIL) 200 MG tablet Take 1.5 tablets by mouth daily  Qty: 30 tablet, Refills: 0    Associated Diagnoses: Systemic lupus erythematosus, unspecified SLE type, unspecified organ involvement status ([facility])      furosemide (LASIX) 20 MG tablet Take 1 tablet by mouth 2 times daily  Qty: 60 tablet, Refills: 3           Current Discharge Medication List      CONTINUE these medications which have NOT CHANGED    Details   oxyCODONE-acetaminophen (PERCOCET) 2.5-325 MG per tablet Take 1 tablet by mouth every 6 hours as needed for Pain.      tiotropium (SPIRIVA RESPIMAT) 2.5 MCG/ACT AERS inhaler Inhale 2 puffs into the lungs daily      ondansetron (ZOFRAN) 4 MG tablet Take 4 mg by mouth every 6 hours as needed for Nausea or Vomiting      memantine (NAMENDA) 10 MG tablet Take 10 mg by mouth 2 times daily Indications: Dementia due to Vascular Disease      albuterol (PROVENTIL) (2.5 MG/3ML) 0.083% nebulizer solution Take 2.5 mg by nebulization every 8 hours as needed for Shortness of Breath       polyvinyl alcohol-povidone 5-6 MG/ML SOLN opthalmic solution Place 2 drops into both eyes 2 times daily      docusate sodium (COLACE) 100 MG capsule Take 100 mg by mouth daily      dextromethorphan (DELSYM) 30 MG/5ML extended release liquid Take 60 mg by mouth 2 times daily as needed for Cough      DULoxetine (CYMBALTA) 20 MG extended release capsule Take 20 mg by mouth nightly      famotidine (PEPCID) 20 MG tablet Take 20 mg by mouth nightly      Umeclidinium Bromide (INCRUSE ELLIPTA) 62.5 MCG/INH AEPB Inhale 1 puff into the lungs daily loperamide (IMODIUM) 2 MG capsule Take 2 mg by mouth every 6 hours as needed for Diarrhea      acetaminophen (TYLENOL) 325 MG tablet Take 650 mg by mouth every 4 hours as needed for Pain      albuterol sulfate HFA (VENTOLIN HFA) 108 (90 Base) MCG/ACT inhaler Inhale 2 puffs into the lungs every 6 hours as needed for Wheezing or Shortness of Breath      allopurinol (ZYLOPRIM) 100 MG tablet Take 100 mg by mouth nightly      aspirin 81 MG chewable tablet Take 81 mg by mouth daily      fluticasone-vilanterol (BREO ELLIPTA) 100-25 MCG/INH AEPB inhaler Inhale 1 puff into the lungs daily      tolterodine (DETROL LA) 2 MG extended release capsule Take 2 mg by mouth daily      ferrous sulfate (IRON 325) 325 (65 Fe) MG tablet Take 325 mg by mouth daily (with breakfast)      gabapentin (NEURONTIN) 100 MG capsule Take 100 mg by mouth nightly. hydrALAZINE (APRESOLINE) 25 MG tablet Take 25 mg by mouth 3 times daily       levETIRAcetam (KEPPRA) 500 MG tablet Take 500 mg by mouth 2 times daily       pregabalin (LYRICA) 150 MG capsule Take 150 mg by mouth every 12 hours. melatonin 3 MG TABS tablet Take 6 mg by mouth nightly      magnesium hydroxide (MILK OF MAGNESIA) 400 MG/5ML suspension Take 30 mLs by mouth daily as needed for Constipation       phenytoin (DILANTIN) 50 MG tablet Take 100 mg by mouth 3 times daily       simvastatin (ZOCOR) 10 MG tablet Take 10 mg by mouth nightly      lacosamide (VIMPAT) 50 MG TABS tablet Take 100 mg by mouth every 12 hours.       vitamin D (CHOLECALCIFEROL) 25 MCG (1000 UT) TABS tablet Take 3,000 Units by mouth daily       Vitamin E 100 units TABS Take 100 Units by mouth daily           Current Discharge Medication List          Discharge ROS:  A complete review of systems was asked and negative except for above     Discharge Exam:    BP (!) 171/85   Pulse 75   Temp 98 °F (36.7 °C) (Axillary)   Resp 16   Ht 4' 11\" (1.499 m)   Wt 240 lb 8.4 oz (109.1 kg)   SpO2 100%   BMI 48.58 kg/m²   General appearance: No apparent distress appears stated age and cooperative. HEENT Normal cephalic, atraumatic without obvious deformity.  Pupils equal, round, and reactive to light.  Extra ocular muscles intact.  Conjunctivae/corneas clear. Neck: Supple, No jugular venous distention/bruits.  Trachea midline without thyromegaly or adenopathy with full range of motion. Lungs: diminished  Heart: Regular rate and rhythm with Normal S1/S2   Abdomen: Soft, non-tender or non-distended without rigidity or guarding and positive bowel sounds   Extremities: No clubbing, cyanosis, or edema bilaterally.    Skin: Skin color, texture, turgor normal.  No rashes or lesions. Neurologic: Awake, neurovascularly intact with sensory/motor intact upper extremities/lower extremities, bilaterally.  Cranial nerves: II-XII intact, grossly non-focal.  Mental status: Awake  Capillary Refill: Acceptable  < 3 seconds  Peripheral Pulses: +3 Easily felt, not easily obliterated with pressure    Labs:  For convenience and continuity at follow-up the following most recent labs are provided:    Lab Results   Component Value Date    WBC 5.4 02/14/2022    HGB 11.0 02/14/2022    HCT 35.1 02/14/2022    MCV 81.4 02/14/2022     02/14/2022     02/14/2022    K 4.4 02/14/2022    K 4.9 02/12/2022     02/14/2022    CO2 26 02/14/2022    BUN 26 02/14/2022    CREATININE 1.1 02/14/2022    CALCIUM 9.2 02/14/2022    PHOS 2.5 04/28/2020    ALKPHOS 141 02/14/2022    ALT 17 02/14/2022    AST 24 02/14/2022    BILITOT <0.2 02/14/2022    BILIDIR <0.2 04/15/2020    LABALBU 3.8 02/14/2022     Lab Results   Component Value Date    INR 0.93 02/15/2022       Radiology:  CT HEAD WO CONTRAST    Result Date: 2/11/2022  EXAMINATION: CT OF THE HEAD WITHOUT CONTRAST  2/11/2022 9:16 am TECHNIQUE: CT of the head was performed without the administration of intravenous contrast. Dose modulation, iterative reconstruction, and/or weight based adjustment of the mA/kV was utilized to reduce the radiation dose to as low as reasonably achievable. COMPARISON: None. HISTORY: ORDERING SYSTEM PROVIDED HISTORY: AMS TECHNOLOGIST PROVIDED HISTORY: Has a \"code stroke\" or \"stroke alert\" been called? ->No Reason for exam:->AMS Decision Support Exception - unselect if not a suspected or confirmed emergency medical condition->Emergency Medical Condition (MA) Reason for Exam: ams FINDINGS: BRAIN/VENTRICLES: There is no acute intracranial hemorrhage, mass effect or midline shift. No abnormal extra-axial fluid collection. The gray-white differentiation is maintained without evidence of an acute infarct. There is no evidence of hydrocephalus. There are mild atrophic changes. ORBITS: The visualized portion of the orbits demonstrate no acute abnormality. SINUSES: The visualized paranasal sinuses and mastoid air cells demonstrate no acute abnormality. SOFT TISSUES/SKULL:  No acute abnormality of the visualized skull or soft tissues. No acute intracranial abnormality. RECOMMENDATIONS: Unavailable     XR CHEST PORTABLE    Result Date: 2/11/2022  EXAMINATION: ONE XRAY VIEW OF THE CHEST 2/11/2022 4:16 pm COMPARISON: Chest x-ray same day and 12/12/2021. HISTORY: ORDERING SYSTEM PROVIDED HISTORY: s/p R IJ CVC TECHNOLOGIST PROVIDED HISTORY: Reason for exam:->s/p R IJ CVC Reason for Exam: s/p R IJ CVC FINDINGS: Right internal jugular line present with tip projecting over the superior cavoatrial junction. Cardiomediastinal silhouette is enlarged. Perihilar opacities. No pleural effusion on this projection. No pneumothorax appreciated on this projection. Partially visualized left shoulder arthroplasty. Right internal jugular line present with tip projecting over the superior cavoatrial junction. Perihilar opacities. Pulmonary edema versus multifocal pneumonia.      XR CHEST PORTABLE    Result Date: 2/11/2022  EXAMINATION: ONE XRAY VIEW OF THE CHEST 2/11/2022 8:58 am COMPARISON: 12/12/2021 HISTORY: ORDERING SYSTEM PROVIDED HISTORY: SOB TECHNOLOGIST PROVIDED HISTORY: Reason for exam:->SOB Reason for Exam: sob FINDINGS: Lung volumes are low accentuating heart size and bronchovascular markings at the lung bases. Patient body habitus limits evaluation of fine pulmonary parenchymal changes. There is cardiomegaly. There is mild vascular indistinctness No focal lung consolidation is noted Left shoulder arthroplasty is seen. Degenerative changes are seen in the right shoulder joint. Mild pulmonary edema       EKG     Rhythm: normal sinus   Rate: normal  Clinical Impression: no acute changes        The patient was seen and examined on day of discharge and this discharge summary is in conjunction with any daily progress note from day of discharge. Time Spent on discharge is 30 minutes  in the examination, evaluation, counseling and review of medications and discharge plan. Note that more than 30 minutes was spent in preparing discharge papers, discussing discharge with patient, medication review, etc.       Signed:    Pankaj Silva MD   2/15/2022      Thank you Yudith Alvarado for the opportunity to be involved in this patient's care.  If you have any questions or concerns please feel free to contact me at [facility]

## 2022-02-15 NOTE — CARE COORDINATION
CASE MANAGEMENT DISCHARGE SUMMARY: Discharge order noted. Able to return to [facility] long-term care. Daughter notified of discharge and pickup time.      DISCHARGE DATE: 2/15/22    DISCHARGED TO:     Discharging to Facility/ Agency   · Name: [facility]  · Address:  [address]   Phone:  [phone]                REPORT NUMBER: [id number] NUMBER: [phone]     TRANSPORTATION: [id number] [place] [address] Avenue: [address] [place] [address],Suite [facility]      Yes MUSA Updated   Yes Case Management   Yes Physician   Yes Nurse    Yes Destination updated (SNF/HHC)    Yes Whiteboard Note Updated with above    Adri GARCIA RN  Case Management  [id number]    Electronically signed by Adri Smith RN on 2/15/2022 at 12:27 PM

## 2022-02-15 NOTE — PLAN OF CARE
Problem: Skin Integrity:  Goal: Will show no infection signs and symptoms  Description: Will show no infection signs and symptoms  Outcome: Ongoing  Goal: Absence of new skin breakdown  Description: Absence of new skin breakdown  Outcome: Ongoing     Problem: Falls - Risk of:  Goal: Will remain free from falls  Description: Will remain free from falls  Outcome: Ongoing  Goal: Absence of physical injury  Description: Absence of physical injury  Outcome: Ongoing     Problem: Activity Intolerance:  Goal: Ability to tolerate increased activity will improve  Description: Ability to tolerate increased activity will improve  Outcome: Ongoing     Problem: Airway Clearance - Ineffective:  Goal: Ability to maintain a clear airway will improve  Description: Ability to maintain a clear airway will improve  Outcome: Ongoing     Problem: Breathing Pattern - Ineffective:  Goal: Ability to achieve and maintain a regular respiratory rate will improve  Description: Ability to achieve and maintain a regular respiratory rate will improve  Outcome: Ongoing     Problem: Gas Exchange - Impaired:  Goal: Levels of oxygenation will improve  Description: Levels of oxygenation will improve  Outcome: Ongoing     Problem: Pain:  Description: Pain management should include both nonpharmacologic and pharmacologic interventions.   Goal: Pain level will decrease  Description: Pain level will decrease  Outcome: Ongoing  Goal: Control of acute pain  Description: Control of acute pain  Outcome: Ongoing  Goal: Control of chronic pain  Description: Control of chronic pain  Outcome: Ongoing     Problem: Skin Integrity:  Goal: Will show no infection signs and symptoms  Description: Will show no infection signs and symptoms  Outcome: Ongoing  Goal: Absence of new skin breakdown  Description: Absence of new skin breakdown  Outcome: Ongoing     Problem: Falls - Risk of:  Goal: Will remain free from falls  Description: Will remain free from falls  Outcome: Ongoing  Goal: Absence of physical injury  Description: Absence of physical injury  Outcome: Ongoing     Problem: Activity Intolerance:  Goal: Ability to tolerate increased activity will improve  Description: Ability to tolerate increased activity will improve  Outcome: Ongoing     Problem: Airway Clearance - Ineffective:  Goal: Ability to maintain a clear airway will improve  Description: Ability to maintain a clear airway will improve  Outcome: Ongoing     Problem: Breathing Pattern - Ineffective:  Goal: Ability to achieve and maintain a regular respiratory rate will improve  Description: Ability to achieve and maintain a regular respiratory rate will improve  Outcome: Ongoing     Problem: Gas Exchange - Impaired:  Goal: Levels of oxygenation will improve  Description: Levels of oxygenation will improve  Outcome: Ongoing     Problem: Pain:  Description: Pain management should include both nonpharmacologic and pharmacologic interventions.   Goal: Pain level will decrease  Description: Pain level will decrease  Outcome: Ongoing  Goal: Control of acute pain  Description: Control of acute pain  Outcome: Ongoing  Goal: Control of chronic pain  Description: Control of chronic pain  Outcome: Ongoing

## 2022-02-15 NOTE — PROGRESS NOTES
Pt picked up via [facility] transport to be transported to [facility]. Pt discharged with pants and cards from family. Report given to EMT's and AVS/MUSA sent with patient. Pt sent with tunneled IJ in place and patent. All questions answered.     Electronically signed by Amish Woodson RN on 2/15/2022 at 5:27 PM

## 2022-02-15 NOTE — PLAN OF CARE
Problem: Skin Integrity:  Goal: Will show no infection signs and symptoms  Description: Will show no infection signs and symptoms  2/15/2022 0938 by Ashwini Rogers  Outcome: Ongoing     Problem: Skin Integrity:  Goal: Absence of new skin breakdown  Description: Absence of new skin breakdown  2/15/2022 0938 by Ashwini Rogers  Outcome: Ongoing     Problem: Falls - Risk of:  Goal: Will remain free from falls  Description: Will remain free from falls  2/15/2022 0938 by Ashwini Rogers  Outcome: Ongoing     Problem: Falls - Risk of:  Goal: Absence of physical injury  Description: Absence of physical injury  2/15/2022 0938 by Ashwini Rogers  Outcome: Ongoing     Problem:  Activity Intolerance:  Goal: Ability to tolerate increased activity will improve  Description: Ability to tolerate increased activity will improve  2/15/2022 0938 by Ashwini Rogers  Outcome: Ongoing     Problem: Airway Clearance - Ineffective:  Goal: Ability to maintain a clear airway will improve  Description: Ability to maintain a clear airway will improve  2/15/2022 0938 by Ashwini Rogers  Outcome: Ongoing     Problem: Breathing Pattern - Ineffective:  Goal: Ability to achieve and maintain a regular respiratory rate will improve  Description: Ability to achieve and maintain a regular respiratory rate will improve  2/15/2022 0938 by Ashwini Rogers  Outcome: Ongoing     Problem: Pain:  Goal: Pain level will decrease  Description: Pain level will decrease  2/15/2022 0938 by Ashwini Rogers  Outcome: Ongoing     Problem: Pain:  Goal: Control of chronic pain  Description: Control of chronic pain  2/15/2022 0938 by Ashwini Rogers  Outcome: Ongoing     Problem: Pain:  Goal: Control of acute pain  Description: Control of acute pain  2/15/2022 0938 by Ashwini Rogers  Outcome: Ongoing

## 2022-02-15 NOTE — PROGRESS NOTES
DC order noted. Pt has received > 60 mins of HF education at bedside. HF dc instructions have been placed on AVS as well as on MUSA as pt is returning to LTC at [facility]. Facility MD to manage the f/u.

## 2022-02-15 NOTE — PROGRESS NOTES
Hospitalist Progress Note      PCP: Belinda Christian    Date of Admission: 2/11/2022    Subjective: off O2, feels better    Medications:  Reviewed    Infusion Medications    sodium chloride      dextrose      sodium chloride 25 mL (02/14/22 2211)    sodium chloride       Scheduled Medications    meropenem  500 mg IntraVENous Q6H    lidocaine 1 % injection  5 mL IntraDERmal Once    predniSONE  40 mg Oral Daily    furosemide  20 mg IntraVENous BID    insulin lispro  0-6 Units SubCUTAneous TID WC    insulin lispro  0-3 Units SubCUTAneous Nightly    enoxaparin  40 mg SubCUTAneous BID    ipratropium-albuterol  1 ampule Inhalation Q4H WA    allopurinol  100 mg Oral Nightly    aspirin  81 mg Oral Daily    docusate sodium  100 mg Oral Daily    DULoxetine  20 mg Oral Nightly    famotidine  20 mg Oral Nightly    ferrous sulfate  325 mg Oral Daily with breakfast    gabapentin  100 mg Oral Nightly    hydrALAZINE  25 mg Oral TID    hydroxychloroquine  300 mg Oral Daily    lacosamide  100 mg Oral 2 times per day    levETIRAcetam  500 mg Oral BID    melatonin  6 mg Oral Nightly    memantine  10 mg Oral BID    phenytoin  100 mg Oral TID    polyvinyl alcohol-povidone  2 drop Both Eyes BID    pregabalin  150 mg Oral 2 times per day    tiotropium  2 puff Inhalation Daily    Vitamin D  3,000 Units Oral Daily    sodium chloride flush  5-40 mL IntraVENous 2 times per day    lidocaine 1 % injection  5 mL IntraDERmal Once    sodium chloride flush  5-40 mL IntraVENous 2 times per day    budesonide-formoterol  2 puff Inhalation BID    trospium  20 mg Oral Nightly    atorvastatin  10 mg Oral Daily     PRN Meds: oxyCODONE-acetaminophen, sodium chloride, glucose, dextrose, glucagon (rDNA), dextrose, albuterol, albuterol sulfate HFA, loperamide, magnesium hydroxide, ondansetron, sodium chloride flush, sodium chloride, ondansetron **OR** ondansetron, polyethylene glycol, acetaminophen **OR** acetaminophen, sodium chloride flush, sodium chloride      Intake/Output Summary (Last 24 hours) at 2/15/2022 0153  Last data filed at 2/14/2022 1434  Gross per 24 hour   Intake 720 ml   Output 500 ml   Net 220 ml       Physical Exam Performed:    BP (!) 131/56   Pulse 70   Temp 97.9 °F (36.6 °C) (Oral)   Resp 16   Ht 4' 11\" (1.499 m)   Wt 238 lb 12.1 oz (108.3 kg)   SpO2 98%   BMI 48.22 kg/m²          General appearance: No apparent distress appears stated age and cooperative. HEENT Normal cephalic, atraumatic without obvious deformity.  Pupils equal, round, and reactive to light.  Extra ocular muscles intact.  Conjunctivae/corneas clear. Neck: Supple, No jugular venous distention/bruits.  Trachea midline without thyromegaly or adenopathy with full range of motion. Lungs: diminished  Heart: Regular rate and rhythm with Normal S1/S2   Abdomen: Soft, non-tender or non-distended without rigidity or guarding and positive bowel sounds   Extremities: No clubbing, cyanosis, or edema bilaterally.    Skin: Skin color, texture, turgor normal.  No rashes or lesions. Neurologic: Awake, neurovascularly intact with sensory/motor intact upper extremities/lower extremities, bilaterally.  Cranial nerves: II-XII intact, grossly non-focal.  Mental status: Awake  Capillary Refill: Acceptable  < 3 seconds  Peripheral Pulses: +3 Easily felt, not easily obliterated with pressure       Labs:   Recent Labs     02/12/22  0553 02/14/22  1255   WBC 5.7 5.4   HGB 10.5* 11.0*   HCT 33.7* 35.1*    192     Recent Labs     02/12/22  0553 02/12/22 2005 02/14/22  1255   * 144 143   K 4.9 4.4 4.4    105 102   CO2 28 29 26   BUN 22* 24* 26*   CREATININE 1.0 1.1 1.1   CALCIUM 9.0 9.0 9.2     Recent Labs     02/14/22  1255   AST 24   ALT 17   BILITOT <0.2   ALKPHOS 141*     No results for input(s): INR in the last 72 hours. No results for input(s): Bruno Min in the last 72 hours.     Urinalysis:      Lab Results Component Value Date    NITRU POSITIVE 02/11/2022    WBCUA >900 02/11/2022    BACTERIA 4+ 02/11/2022    RBCUA 37 02/11/2022    BLOODU SMALL 02/11/2022    SPECGRAV 1.015 02/11/2022    GLUCOSEU Negative 02/11/2022       Radiology:  XR CHEST PORTABLE   Final Result   Right internal jugular line present with tip projecting over the superior   cavoatrial junction. Perihilar opacities. Pulmonary edema versus multifocal pneumonia. CT HEAD WO CONTRAST   Final Result   No acute intracranial abnormality. RECOMMENDATIONS:   Unavailable         XR CHEST PORTABLE   Final Result   Mild pulmonary edema         IR TUNNELED CVC PLACE WO SQ PORT/PUMP > 5 YEARS    (Results Pending)           Assessment/Plan:    Active Hospital Problems    Diagnosis     COPD with acute exacerbation ([facility] [address] [J44.1]               Acute on chr hypoxic/hypercapnic resp failure - pt refused BIPAP, placed on O2, resolved     Acute COPD exacerbation - started on solumedrol, cont home neb/inhalers, pulm consulted, switch to PO pred-->taper on dc     Acute diastolic CHF - received lasix in the ER, monitor I/O, associated with elevated BNP. Net -2L, started on IV lasix BID, reviewed last echo from dec 2021     UTI - UA abnormal, cont IV abx, urine cx with ESBL-->switched abx to merrem #1        DVT Prophylaxis: lovenox  Diet: ADULT DIET; Regular;  Low Sodium (2 gm); 1800 ml  Code Status: Full Code    PT/OT Eval Status: ordered    Dispo - unable to get a PICC line, plan tunneled line placement in am and then dc to NH on IV abx in am    Mary Almaguer MD

## 2022-02-15 NOTE — PROGRESS NOTES
Attempted to call report to [facility] for a 2nd time, no answer.     Electronically signed by Erica Cohn RN on 2/15/2022 at 4:36 PM

## 2022-04-03 ENCOUNTER — HOSPITAL ENCOUNTER (EMERGENCY)
Age: 72
Discharge: HOME | DRG: 389 | End: 2022-04-03
Payer: MEDICARE

## 2022-04-03 ENCOUNTER — APPOINTMENT (OUTPATIENT)
Dept: CT IMAGING | Age: 72
DRG: 389 | End: 2022-04-03
Payer: MEDICARE

## 2022-04-03 VITALS
WEIGHT: 230.6 LBS | RESPIRATION RATE: 19 BRPM | TEMPERATURE: 98 F | OXYGEN SATURATION: 97 % | DIASTOLIC BLOOD PRESSURE: 49 MMHG | HEART RATE: 60 BPM | BODY MASS INDEX: 46.49 KG/M2 | HEIGHT: 59 IN | SYSTOLIC BLOOD PRESSURE: 111 MMHG

## 2022-04-03 DIAGNOSIS — R10.9 INTERMITTENT ABDOMINAL PAIN: Primary | ICD-10-CM

## 2022-04-03 PROCEDURE — 74176 CT ABD & PELVIS W/O CONTRAST: CPT

## 2022-04-03 PROCEDURE — 99284 EMERGENCY DEPT VISIT MOD MDM: CPT

## 2022-04-03 ASSESSMENT — ENCOUNTER SYMPTOMS
DIARRHEA: 1
CHEST TIGHTNESS: 0
SHORTNESS OF BREATH: 0
COUGH: 0
CONSTIPATION: 1
BACK PAIN: 0
ABDOMINAL PAIN: 1
WHEEZING: 0
EYE PAIN: 0
RESPIRATORY NEGATIVE: 1
SORE THROAT: 0
VOMITING: 0
NAUSEA: 0

## 2022-04-03 NOTE — ED NOTES
Call to lab to check on blood work that was drawn by phlebotomist.     Garcia Holden, MADHU  04/03/22 8339

## 2022-04-03 NOTE — ED NOTES
Message left with daughter regarding discharge back to facility      Bina Jean Baptiste RN  04/03/22 4730 [FreeTextEntry1] : 50 

## 2022-04-03 NOTE — ED PROVIDER NOTES
[facility]        Pt Name: Sabine Lara  MRN: [id number]  [place] 1950  Date of evaluation: 4/3/2022  Provider: CYNTHIA Calderon - ANABELA  PCP: Nina Flores  Note Started: 2:22 PM EDT       JOSEFINA. I have evaluated this patient. My supervising physician was available for consultation. CHIEF COMPLAINT       Chief Complaint   Patient presents with    Abdominal Pain     pt states before coming, she had loose stools and abd. pain, now she states she does not have pain       HISTORY OF PRESENT ILLNESS   (Location, Timing/Onset, Context/Setting, Quality, Duration, Modifying Factors, Severity, Associated Signs and Symptoms)  Note limiting factors. Chief Complaint: Abdominal pain    Sabine Lara is a 66 y.o. female who presents to the ED for recent abdominal pain. She is not experiencing any now per the patient. However over the past 2 to 3 days she was having some constipation, and abdominal pain. She was given a laxative. She then had very frequent diarrhea stools and therefore at the nursing home gave her a Imodium/antidiarrheal medication. Not having any urinary complaints. Patient denies any complaints now including any nausea. Came to the emergency department as requested by her daughter for further evaluation and treatment. Daughter is at bedside. Nursing Notes were all reviewed and agreed with or any disagreements were addressed in the HPI. REVIEW OF SYSTEMS    (2-9 systems for level 4, 10 or more for level 5)     Review of Systems   Constitutional: Negative for chills and fever. HENT: Negative for congestion and sore throat. Eyes: Negative for pain and visual disturbance. Respiratory: Negative. Negative for cough, chest tightness, shortness of breath and wheezing. Cardiovascular: Negative. Negative for chest pain, palpitations and leg swelling.    Gastrointestinal: Positive for abdominal mouth every 6 hours as needed for Nausea or VomitingHistorical Med      memantine (NAMENDA) 10 MG tablet Take 10 mg by mouth 2 times daily Indications: Dementia due to Vascular DiseaseHistorical Med      albuterol (PROVENTIL) (2.5 MG/3ML) 0.083% nebulizer solution Take 2.5 mg by nebulization every 8 hours as needed for Shortness of Breath Historical Med      polyvinyl alcohol-povidone 5-6 MG/ML SOLN opthalmic solution Place 2 drops into both eyes 2 times dailyHistorical Med      docusate sodium (COLACE) 100 MG capsule Take 100 mg by mouth dailyHistorical Med      dextromethorphan (DELSYM) 30 MG/5ML extended release liquid Take 60 mg by mouth 2 times daily as needed for CoughHistorical Med      DULoxetine (CYMBALTA) 20 MG extended release capsule Take 20 mg by mouth nightlyHistorical Med      famotidine (PEPCID) 20 MG tablet Take 20 mg by mouth nightlyHistorical Med      Umeclidinium Bromide (INCRUSE ELLIPTA) 62.5 MCG/INH AEPB Inhale 1 puff into the lungs dailyHistorical Med      loperamide (IMODIUM) 2 MG capsule Take 2 mg by mouth every 6 hours as needed for DiarrheaHistorical Med      acetaminophen (TYLENOL) 325 MG tablet Take 650 mg by mouth every 4 hours as needed for PainHistorical Med      albuterol sulfate HFA (VENTOLIN HFA) 108 (90 Base) MCG/ACT inhaler Inhale 2 puffs into the lungs every 6 hours as needed for Wheezing or Shortness of BreathHistorical Med      allopurinol (ZYLOPRIM) 100 MG tablet Take 100 mg by mouth nightlyHistorical Med      aspirin 81 MG chewable tablet Take 81 mg by mouth dailyHistorical Med      fluticasone-vilanterol (BREO ELLIPTA) 100-25 MCG/INH AEPB inhaler Inhale 1 puff into the lungs dailyHistorical Med      tolterodine (DETROL LA) 2 MG extended release capsule Take 2 mg by mouth dailyHistorical Med      ferrous sulfate (IRON 325) 325 (65 Fe) MG tablet Take 325 mg by mouth daily (with breakfast)Historical Med      gabapentin (NEURONTIN) 100 MG capsule Take 100 mg by mouth nightly. Historical Med      hydrALAZINE (APRESOLINE) 25 MG tablet Take 25 mg by mouth 3 times daily Historical Med      levETIRAcetam (KEPPRA) 500 MG tablet Take 500 mg by mouth 2 times daily Historical Med      pregabalin (LYRICA) 150 MG capsule Take 150 mg by mouth every 12 hours. Historical Med      melatonin 3 MG TABS tablet Take 6 mg by mouth nightlyHistorical Med      magnesium hydroxide (MILK OF MAGNESIA) 400 MG/5ML suspension Take 30 mLs by mouth daily as needed for Constipation Historical Med      phenytoin (DILANTIN) 50 MG tablet Take 100 mg by mouth 3 times daily Historical Med      simvastatin (ZOCOR) 10 MG tablet Take 10 mg by mouth nightlyHistorical Med      lacosamide (VIMPAT) 50 MG TABS tablet Take 100 mg by mouth every 12 hours. Historical Med      vitamin D (CHOLECALCIFEROL) 25 MCG (1000 UT) TABS tablet Take 3,000 Units by mouth daily Historical Med      Vitamin E 100 units TABS Take 100 Units by mouth dailyHistorical Med               ALLERGIES     Patient has no known allergies. FAMILYHISTORY     History reviewed. No pertinent family history. SOCIAL HISTORY       Social History     Tobacco Use    Smoking status: Never Smoker    Smokeless tobacco: Never Used   Vaping Use    Vaping Use: Never used   Substance Use Topics    Alcohol use: Never    Drug use: Never       SCREENINGS    [place] Coma Scale  Eye Opening: Spontaneous  Best Verbal Response: Oriented  Best Motor Response: Obeys commands  [place] Coma Scale Score: 15        PHYSICAL EXAM    (up to 7 for level 4, 8 or more for level 5)     ED Triage Vitals [04/03/22 1341]   BP Temp Temp Source Pulse Resp SpO2 Height Weight   (!) 124/51 98 °F (36.7 °C) Oral 56 20 100 % 4' 11\" (1.499 m) 230 lb 9.6 oz (104.6 kg)       Physical Exam  Vitals and nursing note reviewed. Constitutional:       General: She is not in acute distress. Appearance: Normal appearance. She is obese. She is not ill-appearing, toxic-appearing or diaphoretic. HENT:      Head: Normocephalic and atraumatic. No raccoon eyes, Garvin's sign, right periorbital erythema or left periorbital erythema. Right Ear: Hearing and external ear normal.      Left Ear: Hearing and external ear normal.      Nose: Nose normal. No laceration, nasal tenderness, mucosal edema, congestion or rhinorrhea. Right Nostril: No epistaxis. Left Nostril: No epistaxis. Mouth/Throat:      Lips: Pink. No lesions. Mouth: Mucous membranes are moist.      Tongue: No lesions. Tongue does not deviate from midline. Pharynx: Oropharynx is clear. Uvula midline. No pharyngeal swelling, oropharyngeal exudate, posterior oropharyngeal erythema or uvula swelling. Tonsils: No tonsillar exudate or tonsillar abscesses. Eyes:      General: Lids are normal.         Right eye: No discharge. Left eye: No discharge. Extraocular Movements: Extraocular movements intact. Neck:      Trachea: Phonation normal. No abnormal tracheal secretions or tracheal deviation. Comments: No meningismus   Cardiovascular:      Rate and Rhythm: Normal rate and regular rhythm. Pulses: Normal pulses. Heart sounds: Normal heart sounds. No murmur heard. No friction rub. No gallop. Pulmonary:      Effort: Pulmonary effort is normal. No respiratory distress. Breath sounds: Normal breath sounds. No stridor. No wheezing, rhonchi or rales. Abdominal:      General: Bowel sounds are normal. There is no distension. Palpations: Abdomen is soft. Tenderness: There is no abdominal tenderness. There is no right CVA tenderness, left CVA tenderness, guarding or rebound. Hernia: No hernia is present. Comments: Large body habitus limits exam, BMI 46.58   Musculoskeletal:         General: Normal range of motion. Cervical back: Full passive range of motion without pain, normal range of motion and neck supple. No rigidity.  No spinous process tenderness or muscular tenderness. Lymphadenopathy:      Cervical: No cervical adenopathy. Skin:     General: Skin is warm and dry. Capillary Refill: Capillary refill takes less than 2 seconds. Neurological:      General: No focal deficit present. Mental Status: She is alert and oriented to person, place, and time. GCS: GCS eye subscore is 4. GCS verbal subscore is 5. GCS motor subscore is 6. Cranial Nerves: No cranial nerve deficit. Sensory: Sensation is intact. Motor: Motor function is intact. Gait: Gait is intact. Psychiatric:         Mood and Affect: Mood normal.         Behavior: Behavior normal.         DIAGNOSTIC RESULTS   LABS:    Labs Reviewed - No data to display    When ordered only abnormal lab results are displayed. All other labs were within normal range or not returned as of this dictation. EKG: When ordered, EKG's are interpreted by the Emergency Department Physician in the absence of a cardiologist.  Please see their note for interpretation of EKG. RADIOLOGY:   Non-plain film images such as CT, Ultrasound and MRI are read by the radiologist. Plain radiographic images are visualized and preliminarily interpreted by the ED Provider with the below findings:        Interpretation per the Radiologist below, if available at the time of this note:    CT ABDOMEN PELVIS WO CONTRAST Additional Contrast? Oral   Final Result   No acute process in the abdomen or pelvis. No results found. PROCEDURES   Unless otherwise noted below, none     Procedures    CRITICAL CARE TIME   CRITICAL CARE NOTE:  There was a high probability of clinically significant life-threatening deterioration of the patient's condition requiring my urgent intervention. Total critical care time was at least 5 minutes.     This includes vital sign monitoring, pulse oximetry monitoring, telemetry monitoring, clinical response to the IV medications, reviewing the nursing notes, consultation time, dictation/documentation time, and interpretation of the labwork. This excludes any separately billable procedures performed. CONSULTS:  None      EMERGENCY DEPARTMENT COURSE and DIFFERENTIAL DIAGNOSIS/MDM:   Vitals:    Vitals:    04/03/22 1730 04/03/22 1745 04/03/22 1800 04/03/22 1900   BP: (!) 129/53 (!) 129/49 (!) 121/51 (!) 111/49   Pulse: 59 61 58 60   Resp: 11 13 13 19   Temp:    98 °F (36.7 °C)   TempSrc:       SpO2:  98% 97% 97%   Weight:       Height:           Patient was given the following medications:  Medications - No data to display        MDM: See HPI and above for full presentation physical exam.  Differential diagnoses include gastritis, gastroenteritis, UTI, constipation, small bowel obstruction, intra-abdominal emergency, other    Patient is eating and drinking appropriately. Not having any nausea or vomiting. Has not had any diarrhea since coming to the emergency department. She is afebrile and hemodynamically stable. CT with oral contrast was obtained showing no acute process. I do not see a need for admission or blood work at this point in time. She can get these done on an outpatient basis at the [facility] if they were to deem it necessary. I have a low suspicion for any intra-abdominal pathology at this time. Patient and daughter who is at bedside are in agreement with the plan as well as plan of discharge back to the [facility] and verbalized understanding for her to follow-up with PCP. She will return at a point time for any new or worsening symptoms. [facility] be [facility]. I estimate there is LOW risk for ACUTE APPENDICITIS, BOWEL OBSTRUCTION, CHOLECYSTITIS, DIVERTICULITIS, INCARCERATED HERNIA, PANCREATITIS, or PERFORATED BOWEL or ULCER, thus I consider the discharge disposition reasonable. Also, there is no evidence or peritonitis, sepsis, or toxicity.  Amor Infante and I have discussed the diagnosis and risks, and we agree with discharging home to follow-up with their primary doctor. We also discussed returning to the Emergency Department immediately if new or worsening symptoms occur. We have discussed the symptoms which are most concerning (e.g., bloody stool, fever, changing or worsening pain, vomiting) that necessitate immediate return. FINAL IMPRESSION      1.  Intermittent abdominal pain          DISPOSITION/PLAN   DISPOSITION Decision To Discharge 04/03/2022 05:45:14 PM      PATIENT REFERRED TO:  Taniya Raymundo  [address] [facility] [address] [facility]  [phone]    Schedule an appointment as soon as possible for a visit in [facility] Emergency Department  [address] [place], [place] [address]  [phone]  [phone]  Go to   As needed, If symptoms worsen      DISCHARGE MEDICATIONS:  Discharge Medication List as of 4/3/2022  5:57 PM          DISCONTINUED MEDICATIONS:  Discharge Medication List as of 4/3/2022  5:57 PM                 (Please note that portions of this note were completed with a voice recognition program.  Efforts were made to edit the dictations but occasionally words are mis-transcribed.)    CYNTHIA Luna - CNP (electronically signed)            CYNTHIA Luna - CNP  04/03/22 2001

## 2022-04-06 ENCOUNTER — HOSPITAL ENCOUNTER (INPATIENT)
Age: 72
LOS: 3 days | Discharge: HOME | DRG: 389 | End: 2022-04-09
Attending: EMERGENCY MEDICINE | Admitting: INTERNAL MEDICINE
Payer: MEDICARE

## 2022-04-06 ENCOUNTER — APPOINTMENT (OUTPATIENT)
Dept: CT IMAGING | Age: 72
DRG: 389 | End: 2022-04-06
Payer: MEDICARE

## 2022-04-06 DIAGNOSIS — K56.7 ILEUS (HCC): ICD-10-CM

## 2022-04-06 DIAGNOSIS — R10.30 LOWER ABDOMINAL PAIN: Primary | ICD-10-CM

## 2022-04-06 DIAGNOSIS — Z71.89 GOALS OF CARE, COUNSELING/DISCUSSION: ICD-10-CM

## 2022-04-06 DIAGNOSIS — K59.00 CONSTIPATION, UNSPECIFIED CONSTIPATION TYPE: ICD-10-CM

## 2022-04-06 PROBLEM — R10.9 ABDOMINAL PAIN: Status: ACTIVE | Noted: 2022-04-06

## 2022-04-06 LAB
A/G RATIO: 1.1 (ref 1.1–2.2)
ALBUMIN SERPL-MCNC: 3.8 G/DL (ref 3.4–5)
ALP BLD-CCNC: 160 U/L (ref 40–129)
ALT SERPL-CCNC: 10 U/L (ref 10–40)
ANION GAP SERPL CALCULATED.3IONS-SCNC: 18 MMOL/L (ref 3–16)
AST SERPL-CCNC: 22 U/L (ref 15–37)
BASOPHILS ABSOLUTE: 0.1 K/UL (ref 0–0.2)
BASOPHILS RELATIVE PERCENT: 0.9 %
BILIRUB SERPL-MCNC: 0.3 MG/DL (ref 0–1)
BUN BLDV-MCNC: 11 MG/DL (ref 7–20)
CALCIUM SERPL-MCNC: 9.4 MG/DL (ref 8.3–10.6)
CHLORIDE BLD-SCNC: 99 MMOL/L (ref 99–110)
CO2: 24 MMOL/L (ref 21–32)
CREAT SERPL-MCNC: 1 MG/DL (ref 0.6–1.2)
EOSINOPHILS ABSOLUTE: 0.2 K/UL (ref 0–0.6)
EOSINOPHILS RELATIVE PERCENT: 2.4 %
GFR AFRICAN AMERICAN: >60
GFR NON-AFRICAN AMERICAN: 54
GLUCOSE BLD-MCNC: 73 MG/DL (ref 70–99)
HCT VFR BLD CALC: 38.8 % (ref 36–48)
HEMOGLOBIN: 12.2 G/DL (ref 12–16)
LIPASE: 20 U/L (ref 13–60)
LYMPHOCYTES ABSOLUTE: 1.8 K/UL (ref 1–5.1)
LYMPHOCYTES RELATIVE PERCENT: 26.9 %
MCH RBC QN AUTO: 26.1 PG (ref 26–34)
MCHC RBC AUTO-ENTMCNC: 31.4 G/DL (ref 31–36)
MCV RBC AUTO: 83.3 FL (ref 80–100)
MONOCYTES ABSOLUTE: 0.6 K/UL (ref 0–1.3)
MONOCYTES RELATIVE PERCENT: 8.7 %
NEUTROPHILS ABSOLUTE: 4 K/UL (ref 1.7–7.7)
NEUTROPHILS RELATIVE PERCENT: 61.1 %
PDW BLD-RTO: 15.1 % (ref 12.4–15.4)
PLATELET # BLD: 192 K/UL (ref 135–450)
PMV BLD AUTO: 9.2 FL (ref 5–10.5)
POTASSIUM REFLEX MAGNESIUM: 4.2 MMOL/L (ref 3.5–5.1)
RBC # BLD: 4.66 M/UL (ref 4–5.2)
SODIUM BLD-SCNC: 141 MMOL/L (ref 136–145)
TOTAL PROTEIN: 7.2 G/DL (ref 6.4–8.2)
TROPONIN: 0.01 NG/ML
WBC # BLD: 6.6 K/UL (ref 4–11)

## 2022-04-06 PROCEDURE — 94640 AIRWAY INHALATION TREATMENT: CPT

## 2022-04-06 PROCEDURE — 74176 CT ABD & PELVIS W/O CONTRAST: CPT

## 2022-04-06 PROCEDURE — 93005 ELECTROCARDIOGRAM TRACING: CPT | Performed by: EMERGENCY MEDICINE

## 2022-04-06 PROCEDURE — 94761 N-INVAS EAR/PLS OXIMETRY MLT: CPT

## 2022-04-06 PROCEDURE — 80053 COMPREHEN METABOLIC PANEL: CPT

## 2022-04-06 PROCEDURE — 85025 COMPLETE CBC W/AUTO DIFF WBC: CPT

## 2022-04-06 PROCEDURE — 6360000002 HC RX W HCPCS: Performed by: INTERNAL MEDICINE

## 2022-04-06 PROCEDURE — 2580000003 HC RX 258: Performed by: INTERNAL MEDICINE

## 2022-04-06 PROCEDURE — 2700000000 HC OXYGEN THERAPY PER DAY

## 2022-04-06 PROCEDURE — 6370000000 HC RX 637 (ALT 250 FOR IP): Performed by: PHYSICIAN ASSISTANT

## 2022-04-06 PROCEDURE — 96372 THER/PROPH/DIAG INJ SC/IM: CPT

## 2022-04-06 PROCEDURE — 99284 EMERGENCY DEPT VISIT MOD MDM: CPT

## 2022-04-06 PROCEDURE — 1200000000 HC SEMI PRIVATE

## 2022-04-06 PROCEDURE — 84484 ASSAY OF TROPONIN QUANT: CPT

## 2022-04-06 PROCEDURE — 83690 ASSAY OF LIPASE: CPT

## 2022-04-06 PROCEDURE — 6360000002 HC RX W HCPCS: Performed by: PHYSICIAN ASSISTANT

## 2022-04-06 RX ORDER — MORPHINE SULFATE 4 MG/ML
4 INJECTION, SOLUTION INTRAMUSCULAR; INTRAVENOUS ONCE
Status: COMPLETED | OUTPATIENT
Start: 2022-04-06 | End: 2022-04-06

## 2022-04-06 RX ORDER — ONDANSETRON 4 MG/1
4 TABLET, ORALLY DISINTEGRATING ORAL ONCE
Status: COMPLETED | OUTPATIENT
Start: 2022-04-06 | End: 2022-04-06

## 2022-04-06 RX ORDER — LIDOCAINE HYDROCHLORIDE 10 MG/ML
5 INJECTION, SOLUTION EPIDURAL; INFILTRATION; INTRACAUDAL; PERINEURAL ONCE
Status: DISCONTINUED | OUTPATIENT
Start: 2022-04-06 | End: 2022-04-09 | Stop reason: HOSPADM

## 2022-04-06 RX ORDER — SODIUM CHLORIDE 9 MG/ML
INJECTION, SOLUTION INTRAVENOUS PRN
Status: DISCONTINUED | OUTPATIENT
Start: 2022-04-06 | End: 2022-04-09 | Stop reason: HOSPADM

## 2022-04-06 RX ORDER — POTASSIUM CHLORIDE 7.45 MG/ML
10 INJECTION INTRAVENOUS PRN
Status: DISCONTINUED | OUTPATIENT
Start: 2022-04-06 | End: 2022-04-09 | Stop reason: HOSPADM

## 2022-04-06 RX ORDER — PROMETHAZINE HYDROCHLORIDE 25 MG/1
12.5 TABLET ORAL EVERY 6 HOURS PRN
Status: DISCONTINUED | OUTPATIENT
Start: 2022-04-06 | End: 2022-04-09 | Stop reason: HOSPADM

## 2022-04-06 RX ORDER — SODIUM CHLORIDE 0.9 % (FLUSH) 0.9 %
5-40 SYRINGE (ML) INJECTION PRN
Status: DISCONTINUED | OUTPATIENT
Start: 2022-04-06 | End: 2022-04-06 | Stop reason: SDUPTHER

## 2022-04-06 RX ORDER — ACETAMINOPHEN 325 MG/1
650 TABLET ORAL EVERY 6 HOURS PRN
Status: DISCONTINUED | OUTPATIENT
Start: 2022-04-06 | End: 2022-04-09 | Stop reason: HOSPADM

## 2022-04-06 RX ORDER — ONDANSETRON 2 MG/ML
4 INJECTION INTRAMUSCULAR; INTRAVENOUS ONCE
Status: DISCONTINUED | OUTPATIENT
Start: 2022-04-06 | End: 2022-04-09 | Stop reason: HOSPADM

## 2022-04-06 RX ORDER — MORPHINE SULFATE 2 MG/ML
2 INJECTION, SOLUTION INTRAMUSCULAR; INTRAVENOUS EVERY 4 HOURS PRN
Status: DISCONTINUED | OUTPATIENT
Start: 2022-04-06 | End: 2022-04-09 | Stop reason: HOSPADM

## 2022-04-06 RX ORDER — SODIUM CHLORIDE 9 MG/ML
25 INJECTION, SOLUTION INTRAVENOUS PRN
Status: DISCONTINUED | OUTPATIENT
Start: 2022-04-06 | End: 2022-04-06 | Stop reason: SDUPTHER

## 2022-04-06 RX ORDER — ALBUTEROL SULFATE 2.5 MG/3ML
2.5 SOLUTION RESPIRATORY (INHALATION) EVERY 8 HOURS PRN
Status: DISCONTINUED | OUTPATIENT
Start: 2022-04-06 | End: 2022-04-09 | Stop reason: HOSPADM

## 2022-04-06 RX ORDER — POTASSIUM CHLORIDE 20 MEQ/1
40 TABLET, EXTENDED RELEASE ORAL PRN
Status: DISCONTINUED | OUTPATIENT
Start: 2022-04-06 | End: 2022-04-06

## 2022-04-06 RX ORDER — ACETAMINOPHEN 650 MG/1
650 SUPPOSITORY RECTAL EVERY 6 HOURS PRN
Status: DISCONTINUED | OUTPATIENT
Start: 2022-04-06 | End: 2022-04-09 | Stop reason: HOSPADM

## 2022-04-06 RX ORDER — SODIUM CHLORIDE 0.9 % (FLUSH) 0.9 %
5-40 SYRINGE (ML) INJECTION EVERY 12 HOURS SCHEDULED
Status: DISCONTINUED | OUTPATIENT
Start: 2022-04-06 | End: 2022-04-06 | Stop reason: SDUPTHER

## 2022-04-06 RX ORDER — MAGNESIUM SULFATE IN WATER 40 MG/ML
2000 INJECTION, SOLUTION INTRAVENOUS PRN
Status: DISCONTINUED | OUTPATIENT
Start: 2022-04-06 | End: 2022-04-09 | Stop reason: HOSPADM

## 2022-04-06 RX ORDER — HYDRALAZINE HYDROCHLORIDE 20 MG/ML
10 INJECTION INTRAMUSCULAR; INTRAVENOUS EVERY 6 HOURS PRN
Status: DISCONTINUED | OUTPATIENT
Start: 2022-04-06 | End: 2022-04-09 | Stop reason: HOSPADM

## 2022-04-06 RX ORDER — LEVETIRACETAM 5 MG/ML
500 INJECTION INTRAVASCULAR EVERY 12 HOURS
Status: DISCONTINUED | OUTPATIENT
Start: 2022-04-06 | End: 2022-04-09

## 2022-04-06 RX ORDER — SODIUM CHLORIDE 9 MG/ML
INJECTION, SOLUTION INTRAVENOUS CONTINUOUS
Status: DISCONTINUED | OUTPATIENT
Start: 2022-04-06 | End: 2022-04-08

## 2022-04-06 RX ORDER — SODIUM CHLORIDE 0.9 % (FLUSH) 0.9 %
10 SYRINGE (ML) INJECTION PRN
Status: DISCONTINUED | OUTPATIENT
Start: 2022-04-06 | End: 2022-04-09 | Stop reason: HOSPADM

## 2022-04-06 RX ORDER — SODIUM CHLORIDE 0.9 % (FLUSH) 0.9 %
10 SYRINGE (ML) INJECTION EVERY 12 HOURS SCHEDULED
Status: DISCONTINUED | OUTPATIENT
Start: 2022-04-06 | End: 2022-04-09 | Stop reason: HOSPADM

## 2022-04-06 RX ORDER — ONDANSETRON 2 MG/ML
4 INJECTION INTRAMUSCULAR; INTRAVENOUS EVERY 6 HOURS PRN
Status: DISCONTINUED | OUTPATIENT
Start: 2022-04-06 | End: 2022-04-09 | Stop reason: HOSPADM

## 2022-04-06 RX ORDER — MORPHINE SULFATE 2 MG/ML
2 INJECTION, SOLUTION INTRAMUSCULAR; INTRAVENOUS ONCE
Status: DISCONTINUED | OUTPATIENT
Start: 2022-04-06 | End: 2022-04-09 | Stop reason: HOSPADM

## 2022-04-06 RX ORDER — PHENYTOIN SODIUM 50 MG/ML
50 INJECTION, SOLUTION INTRAMUSCULAR; INTRAVENOUS EVERY 8 HOURS
Status: DISCONTINUED | OUTPATIENT
Start: 2022-04-06 | End: 2022-04-07

## 2022-04-06 RX ORDER — POTASSIUM CHLORIDE 7.45 MG/ML
10 INJECTION INTRAVENOUS PRN
Status: DISCONTINUED | OUTPATIENT
Start: 2022-04-06 | End: 2022-04-06

## 2022-04-06 RX ADMIN — LEVETIRACETAM 500 MG: 5 INJECTION INTRAVENOUS at 22:53

## 2022-04-06 RX ADMIN — MORPHINE SULFATE 2 MG: 2 INJECTION, SOLUTION INTRAMUSCULAR; INTRAVENOUS at 22:36

## 2022-04-06 RX ADMIN — Medication 10 ML: at 22:36

## 2022-04-06 RX ADMIN — MORPHINE SULFATE 4 MG: 4 INJECTION, SOLUTION INTRAMUSCULAR; INTRAVENOUS at 15:40

## 2022-04-06 RX ADMIN — SODIUM CHLORIDE: 9 INJECTION, SOLUTION INTRAVENOUS at 22:50

## 2022-04-06 RX ADMIN — ONDANSETRON 4 MG: 4 TABLET, ORALLY DISINTEGRATING ORAL at 15:39

## 2022-04-06 RX ADMIN — ALBUTEROL SULFATE 2.5 MG: 2.5 SOLUTION RESPIRATORY (INHALATION) at 20:45

## 2022-04-06 RX ADMIN — ONDANSETRON 4 MG: 2 INJECTION INTRAMUSCULAR; INTRAVENOUS at 22:36

## 2022-04-06 ASSESSMENT — ENCOUNTER SYMPTOMS
COUGH: 0
ABDOMINAL PAIN: 1
NAUSEA: 1
EYE PAIN: 0
SHORTNESS OF BREATH: 0
BACK PAIN: 0
VOMITING: 0
SORE THROAT: 0

## 2022-04-06 ASSESSMENT — PAIN DESCRIPTION - PAIN TYPE
TYPE: ACUTE PAIN
TYPE: ACUTE PAIN

## 2022-04-06 ASSESSMENT — PAIN DESCRIPTION - FREQUENCY
FREQUENCY: INTERMITTENT
FREQUENCY: CONTINUOUS

## 2022-04-06 ASSESSMENT — PAIN DESCRIPTION - ONSET: ONSET: ON-GOING

## 2022-04-06 ASSESSMENT — PAIN DESCRIPTION - DESCRIPTORS
DESCRIPTORS: ACHING
DESCRIPTORS: THROBBING

## 2022-04-06 ASSESSMENT — PAIN - FUNCTIONAL ASSESSMENT
PAIN_FUNCTIONAL_ASSESSMENT: PREVENTS OR INTERFERES SOME ACTIVE ACTIVITIES AND ADLS
PAIN_FUNCTIONAL_ASSESSMENT: 0-10

## 2022-04-06 ASSESSMENT — PAIN SCALES - GENERAL
PAINLEVEL_OUTOF10: 8
PAINLEVEL_OUTOF10: 4
PAINLEVEL_OUTOF10: 10
PAINLEVEL_OUTOF10: 8
PAINLEVEL_OUTOF10: 7

## 2022-04-06 ASSESSMENT — PAIN DESCRIPTION - LOCATION
LOCATION: ABDOMEN
LOCATION: ABDOMEN

## 2022-04-06 ASSESSMENT — PAIN DESCRIPTION - ORIENTATION
ORIENTATION: LEFT;LOWER
ORIENTATION: LEFT;LOWER

## 2022-04-06 ASSESSMENT — PAIN DESCRIPTION - PROGRESSION: CLINICAL_PROGRESSION: NOT CHANGED

## 2022-04-06 NOTE — ED PROVIDER NOTES
[address] [place] [address]      Pt Name: Rommel Sanchez  JUDITH:[id number]  Kathygfkizzy 1950  Date of evaluation: 4/6/2022  Provider: Uzma De La Torre PA-C     This patient was seen and evaluated by attending physician Dr. Rashawn Nick MD      Chief Complaint:    Chief Complaint   Patient presents with    Abdominal Pain     LLQ, x 1 weeks, was seen 3 days ago for same issue. denies diarrhea. c/o nausea         Nursing Notes, Past Medical Hx, Past Surgical Hx, Social Hx, Allergies, and Family Hx were all reviewed and agreed with or any disagreements were addressed in the HPI.    HPI: (Location, Duration, Timing, Severity, Quality, Assoc Sx, Context, Modifying factors)    Chief Complaint of left-sided lower abdominal pain. Patient states he was seen here couple days ago for the same. Says the pain is still the same and felt like it got worse for her. Says she [facility] has not given her anything for pain. Has some nausea but no vomiting. Denies diarrhea or constipation. Denies chest pain, denies back pain. Denies fever. No other complaints. This is a  74 y.o. female who presents to emergency room with the above complaint.     PastMedical/Surgical History:      Diagnosis Date    Anemia     Asthma     CKD stage 3 due to type 2 diabetes mellitus (HCC)     COPD (chronic obstructive pulmonary disease) (HCC)     Dementia (HCC)     DM [facility]) [facility] [address]     GERD (gastroesophageal reflux disease)     Gout     HTN (hypertension)     Seizure ([facility] [address]          Procedure Laterality Date    IR TUNNELED CATHETER PLACEMENT GREATER THAN 5 YEARS  2/15/2022    IR TUNNELED CATHETER PLACEMENT GREATER THAN 5 YEARS 2/15/2022 [facility]       Medications:  Current Discharge Medication List      CONTINUE these medications which have NOT CHANGED    Details   hydroxychloroquine (PLAQUENIL) 200 MG tablet Take 1.5 tablets by mouth daily  Qty: 30 tablet, Refills: 0 Associated Diagnoses: Systemic lupus erythematosus, unspecified SLE type, unspecified organ involvement status ([facility])      furosemide (LASIX) 20 MG tablet Take 1 tablet by mouth 2 times daily  Qty: 60 tablet, Refills: 3      tiotropium (SPIRIVA RESPIMAT) 2.5 MCG/ACT AERS inhaler Inhale 1 puff into the lungs at bedtime       ondansetron (ZOFRAN) 4 MG tablet Take 4 mg by mouth every 6 hours as needed for Nausea or Vomiting      memantine (NAMENDA) 10 MG tablet Take 10 mg by mouth 2 times daily Indications: Dementia due to Vascular Disease      albuterol (PROVENTIL) (2.5 MG/3ML) 0.083% nebulizer solution Take 2.5 mg by nebulization every 8 hours as needed for Shortness of Breath       polyvinyl alcohol-povidone 5-6 MG/ML SOLN opthalmic solution Place 2 drops into both eyes 2 times daily      docusate sodium (COLACE) 100 MG capsule Take 100 mg by mouth daily      dextromethorphan (DELSYM) 30 MG/5ML extended release liquid Take 60 mg by mouth 2 times daily as needed for Cough      DULoxetine (CYMBALTA) 20 MG extended release capsule Take 20 mg by mouth nightly      famotidine (PEPCID) 20 MG tablet Take 20 mg by mouth nightly      Umeclidinium Bromide (INCRUSE ELLIPTA) 62.5 MCG/INH AEPB Inhale 1 puff into the lungs daily      loperamide (IMODIUM) 2 MG capsule Take 2 mg by mouth every 6 hours as needed for Diarrhea      acetaminophen (TYLENOL) 325 MG tablet Take 650 mg by mouth every 4 hours as needed for Pain      albuterol sulfate HFA (VENTOLIN HFA) 108 (90 Base) MCG/ACT inhaler Inhale 2 puffs into the lungs every 6 hours as needed for Wheezing or Shortness of Breath      allopurinol (ZYLOPRIM) 100 MG tablet Take 100 mg by mouth nightly      aspirin 81 MG chewable tablet Take 81 mg by mouth daily      fluticasone-vilanterol (BREO ELLIPTA) 100-25 MCG/INH AEPB inhaler Inhale 1 puff into the lungs daily      tolterodine (DETROL LA) 2 MG extended release capsule Take 2 mg by mouth daily      ferrous sulfate (IRON 325) 325 (65 Fe) MG tablet Take 325 mg by mouth daily (with breakfast)      gabapentin (NEURONTIN) 100 MG capsule Take 100 mg by mouth nightly. hydrALAZINE (APRESOLINE) 25 MG tablet Take 25 mg by mouth 3 times daily       levETIRAcetam (KEPPRA) 500 MG tablet Take 500 mg by mouth 2 times daily       pregabalin (LYRICA) 150 MG capsule Take 150 mg by mouth every 12 hours. melatonin 3 MG TABS tablet Take 3 mg by mouth nightly       magnesium hydroxide (MILK OF MAGNESIA) 400 MG/5ML suspension Take 30 mLs by mouth daily as needed for Constipation       phenytoin (DILANTIN) 50 MG tablet Take 100 mg by mouth 3 times daily       simvastatin (ZOCOR) 10 MG tablet Take 10 mg by mouth nightly      lacosamide (VIMPAT) 50 MG TABS tablet Take 50 mg by mouth every 12 hours. vitamin D (CHOLECALCIFEROL) 25 MCG (1000 UT) TABS tablet Take 3,000 Units by mouth daily       Vitamin E 100 units TABS Take 100 Units by mouth daily               Review of Systems:  (2-9 systems needed)  Review of Systems   Constitutional: Negative for chills and fever. HENT: Negative for congestion and sore throat. Eyes: Negative for pain and visual disturbance. Respiratory: Negative for cough and shortness of breath. Cardiovascular: Negative for chest pain and leg swelling. Gastrointestinal: Positive for abdominal pain and nausea. Negative for vomiting. Genitourinary: Negative for dysuria and frequency. Musculoskeletal: Negative for back pain and neck pain. Skin: Negative for rash and wound. Neurological: Negative for dizziness and light-headedness. \"Positives and Pertinent negatives as per HPI\"    Physical Exam:  Physical Exam  Vitals and nursing note reviewed. Constitutional:       Appearance: She is well-developed. She is not diaphoretic. HENT:      Head: Normocephalic and atraumatic. Nose: Nose normal.      Mouth/Throat:      Mouth: Mucous membranes are moist.      Pharynx: Oropharynx is clear.  No oropharyngeal exudate or posterior oropharyngeal erythema. Eyes:      General:         Right eye: No discharge. Left eye: No discharge. Cardiovascular:      Rate and Rhythm: Normal rate and regular rhythm. Heart sounds: Normal heart sounds. No murmur heard. No friction rub. No gallop. Pulmonary:      Effort: Pulmonary effort is normal. No respiratory distress. Breath sounds: Normal breath sounds. No wheezing or rales. Chest:      Chest wall: No tenderness. Abdominal:      General: Abdomen is flat. Bowel sounds are normal. There is no distension. Palpations: Abdomen is soft. There is no mass. Tenderness: There is abdominal tenderness in the left lower quadrant. There is no guarding or rebound. Musculoskeletal:         General: Normal range of motion. Cervical back: Normal range of motion and neck supple. Skin:     General: Skin is warm and dry. Neurological:      General: No focal deficit present. Mental Status: She is alert and oriented to person, place, and time.    Psychiatric:         Behavior: Behavior normal.         MEDICAL DECISION MAKING    Vitals:    Vitals:    04/08/22 1431 04/08/22 1800 04/08/22 1945 04/08/22 2359   BP: 136/64 (!) 160/61 (!) 158/82 (!) 141/80   Pulse: 74 80 79 75   Resp: 20 20 19 18   Temp: 99 °F (37.2 °C) 97.8 °F (36.6 °C) 98.7 °F (37.1 °C) 98.2 °F (36.8 °C)   TempSrc: Oral Oral Oral Oral   SpO2: 92% 99%  94%   Weight:           LABS:  Labs Reviewed   COMPREHENSIVE METABOLIC PANEL W/ REFLEX TO MG FOR LOW K - Abnormal; Notable for the following components:       Result Value    Anion Gap 18 (*)     GFR Non-African American 54 (*)     Alkaline Phosphatase 160 (*)     All other components within normal limits   URINALYSIS WITH REFLEX TO CULTURE - Abnormal; Notable for the following components:    Clarity, UA SL CLOUDY (*)     Ketones, Urine 40 (*)     Nitrite, Urine POSITIVE (*)     Leukocyte Esterase, Urine LARGE (*)     All other components within normal limits   BASIC METABOLIC PANEL W/ REFLEX TO MG FOR LOW K - Abnormal; Notable for the following components:    CO2 17 (*)     Anion Gap 22 (*)     Glucose 64 (*)     All other components within normal limits    Narrative:     Collection has been rescheduled by Andrew Redmond at 04/07/2022 06:07 Reason:   Failed attempt at venipuncture   CBC WITH AUTO DIFFERENTIAL - Abnormal; Notable for the following components:    Hemoglobin 11.9 (*)     RDW 15.5 (*)     All other components within normal limits    Narrative:     Collection has been rescheduled by Andrew Redmond at 04/07/2022 06:07 Reason:   Failed attempt at venipuncture   MICROSCOPIC URINALYSIS - Abnormal; Notable for the following components:    Bacteria, UA 4+ (*)     WBC, UA 55 (*)     All other components within normal limits   POCT GLUCOSE - Abnormal; Notable for the following components:    POC Glucose 62 (*)     All other components within normal limits   CULTURE, URINE    Narrative:     ORDER#: [id number]                          ORDERED BY: Beverley Walters  SOURCE: Urine Clean Catch                  COLLECTED:  04/07/22 16:18  ANTIBIOTICS AT [facility]LEJO.:                      RECEIVED :  04/07/22 16:18   CBC WITH AUTO DIFFERENTIAL   LIPASE   TROPONIN   BASIC METABOLIC PANEL W/ REFLEX TO MG FOR LOW K   BASIC METABOLIC PANEL W/ REFLEX TO MG FOR LOW K   POCT GLUCOSE   POCT GLUCOSE   POCT GLUCOSE   POCT GLUCOSE        Remainder of labs reviewed and were negative at this time or not returned at the time of this note. RADIOLOGY:   Non-plain film images such as CT, Ultrasound and MRI are read by the radiologist. Ada Connell PA-C have directly visualized the radiologic plain film image(s) with the below findings:      Interpretation per the Radiologist below, if available at the time of this note:    XR ABDOMEN (KUB) (SINGLE AP VIEW)   Final Result   Unremarkable abdomen.          CT ABDOMEN PELVIS WO CONTRAST Additional Contrast? None   Final Result   Previous cholecystectomy which is unchanged. Mildly dilated loops of large and small bowel throughout the lower abdomen   and pelvis with small air-fluid levels throughout which is more apparent and   could be due to enteritis or developing early ileus. Recommend follow-up. Scattered diverticula along the sigmoid colon with no pericolonic   inflammation. Metallic prosthetic devices in both hips partially obscuring the lower pelvis   with no obvious pelvic mass or active inflammatory changes. No hydronephrosis or renal stones and no urinary obstruction. Previous cholecystectomy and chronic liver disease which is unchanged. Mild bibasilar atelectasis which is more prominent. CT ABDOMEN PELVIS WO CONTRAST Additional Contrast? Oral    Result Date: 4/3/2022  EXAMINATION: CT OF THE ABDOMEN AND PELVIS WITHOUT CONTRAST 4/3/2022 4:13 pm TECHNIQUE: CT of the abdomen and pelvis was performed without the administration of intravenous contrast. Multiplanar reformatted images are provided for review. Dose modulation, iterative reconstruction, and/or weight based adjustment of the mA/kV was utilized to reduce the radiation dose to as low as reasonably achievable. COMPARISON: None. HISTORY: ORDERING SYSTEM PROVIDED HISTORY: abd pain with swings from constipation to diarrhea, r/o SBO TECHNOLOGIST PROVIDED HISTORY: Reason for exam:->abd pain with swings from constipation to diarrhea, r/o SBO Additional Contrast?->Oral Decision Support Exception - unselect if not a suspected or confirmed emergency medical condition->Emergency Medical Condition (MA) Reason for Exam: abd pain with swings from constipation to diarrhea, r/o SBO FINDINGS: Lower Chest: Unremarkable. Organs: Liver is normal in contour and attenuation. Gallbladder is surgically absent. No biliary ductal dilatation. Pancreas is unremarkable. Adrenals are unremarkable. Spleen is normal in size. No renal calculi or hydronephrosis.   Mild calcific atherosclerosis. GI/Bowel: Bowel is non-dilated without wall thickening. Appendix is normal. Pelvis: Unremarkable. Peritoneum/Retroperitoneum:No free fluid, free air, organized fluid collection or lymphadenopathy. Bones: Multilevel degenerative disc disease. Mild body wall edema. No acute process in the abdomen or pelvis.           MEDICAL DECISION MAKING / ED COURSE:      PROCEDURES:   Procedures    None    Patient was given:  Medications   morphine (PF) injection 2 mg (2 mg IntraVENous Not Given 4/6/22 1514)   ondansetron (ZOFRAN) injection 4 mg (4 mg IntraVENous Not Given 4/6/22 1514)   lidocaine PF 1 % injection 5 mL (has no administration in time range)   sodium chloride flush 0.9 % injection 10 mL (10 mLs IntraVENous Given 4/8/22 0830)   sodium chloride flush 0.9 % injection 10 mL (has no administration in time range)   0.9 % sodium chloride infusion (has no administration in time range)   potassium chloride 10 mEq/100 mL IVPB (Peripheral Line) (has no administration in time range)   magnesium sulfate 2000 mg in 50 mL IVPB premix (has no administration in time range)   enoxaparin (LOVENOX) injection 40 mg (40 mg SubCUTAneous Given 4/8/22 0827)   promethazine (PHENERGAN) tablet 12.5 mg ( Oral See Alternative 4/8/22 7296)     Or   ondansetron (ZOFRAN) injection 4 mg (4 mg IntraVENous Given 4/8/22 0190)   magnesium hydroxide (MILK OF MAGNESIA) 400 MG/5ML suspension 30 mL (has no administration in time range)   acetaminophen (TYLENOL) tablet 650 mg (has no administration in time range)     Or   acetaminophen (TYLENOL) suppository 650 mg (has no administration in time range)   albuterol (PROVENTIL) nebulizer solution 2.5 mg (2.5 mg Nebulization Given 4/6/22 2045)   hydrALAZINE (APRESOLINE) injection 10 mg (has no administration in time range)   lacosamide (VIMPAT) 50 mg in dextrose 5 % 55 mL IVPB (0 mg IntraVENous Stopped 4/8/22 1409)   levETIRAcetam (KEPPRA) 500 mg/100 mL IVPB (0 mg IntraVENous Stopped 4/8/22 0918)   tiotropium (SPIRIVA RESPIMAT) 2.5 MCG/ACT inhaler 2 puff (2 puffs Inhalation Given 4/8/22 0802)   morphine (PF) injection 2 mg (2 mg IntraVENous Given 4/8/22 1302)   phenytoin (DILANTIN) injection 100 mg (100 mg IntraVENous Given 4/8/22 1435)   ondansetron (ZOFRAN-ODT) disintegrating tablet 4 mg (4 mg Oral Given 4/6/22 1539)   morphine (PF) injection 4 mg (4 mg IntraMUSCular Given 4/6/22 1540)   phenytoin (DILANTIN) injection 50 mg (50 mg IntraVENous Given 4/7/22 0845)     Emergency room course: Patient on exam throat is clear. Nonerythematous no exudate. Cardiovascular regular rhythm, lungs are clear. No wheeze rales or rhonchi noted. Patient abdomen is obese with mild tenderness on the left lower with palpation no palpable mass. No CVA or flank tenderness. Normal bowel sounds all 4 quadrant. Full range of motion all extremity. Bilateral lower extremities show no edema. She is alert oriented x4. Does not appear to be in acute distress. Lab result from today shows:  CBC within normal limits with a white count of 6.6. CMP unremarkable. Lipase 20.0. Troponin less than 0.01. Did place a call out to GI regarding this patient CT. Discussed patient case with my attending Dr. Britta Gamez who will be following up with this patient. See her ED note for remainder of ER course and final disposition. The patient tolerated their visit well. I evaluated the patient. The physician was available for consultation as needed. The patient and / or the family were informed of the results of any tests, a time was given to answer questions, a plan was proposed and they agreed with plan. CLINICAL IMPRESSION:  1. Lower abdominal pain    2. Constipation, unspecified constipation type    3. Goals of care, counseling/discussion    4.  Ileus ([address]        DISPOSITION  DISPOSITION Admitted 04/06/2022 07:57:46 PM          PATIENT REFERRED TO:  No follow-up provider specified.     DISCHARGE MEDICATIONS:  Current Discharge Medication List          DISCONTINUED MEDICATIONS:  Current Discharge Medication List      STOP taking these medications       predniSONE (DELTASONE) 10 MG tablet Comments:   Reason for Stopping:         oxyCODONE-acetaminophen (PERCOCET) 2.5-325 MG per tablet Comments:   Reason for Stopping:                      (Please note the MDM and HPI sections of this note were completed with a voice recognition program.  Efforts were made to edit the dictations but occasionally words are mis-transcribed.)    Electronically signed, Tere Gonzalez PA-C,          Tere Gonzalez PA-C  04/09/22 4020

## 2022-04-06 NOTE — PROGRESS NOTES
Medication Reconciliation     List of medications patient is currently taking is complete. Source of information:   1. Medication list from [facility]  2. EPIC records        Notes regarding home medications:  1.  Tried calling to determine what pt received PTA and no answer  Milli Berg, Pharmacy Intern 4/6/2022 7:00 PM

## 2022-04-06 NOTE — ED NOTES
USGPIV attempt x2 by this RN and x2 by Charge MADHU LR made aware      Fartun Terry RN  04/06/22 6845

## 2022-04-07 LAB
ANION GAP SERPL CALCULATED.3IONS-SCNC: 22 MMOL/L (ref 3–16)
BACTERIA: ABNORMAL /HPF
BASOPHILS ABSOLUTE: 0.1 K/UL (ref 0–0.2)
BASOPHILS RELATIVE PERCENT: 0.7 %
BILIRUBIN URINE: NEGATIVE
BLOOD, URINE: NEGATIVE
BUN BLDV-MCNC: 14 MG/DL (ref 7–20)
CALCIUM SERPL-MCNC: 8.9 MG/DL (ref 8.3–10.6)
CHLORIDE BLD-SCNC: 104 MMOL/L (ref 99–110)
CLARITY: ABNORMAL
CO2: 17 MMOL/L (ref 21–32)
COLOR: YELLOW
CREAT SERPL-MCNC: 0.9 MG/DL (ref 0.6–1.2)
EKG ATRIAL RATE: 76 BPM
EKG DIAGNOSIS: NORMAL
EKG P AXIS: 50 DEGREES
EKG P-R INTERVAL: 156 MS
EKG Q-T INTERVAL: 454 MS
EKG QRS DURATION: 158 MS
EKG QTC CALCULATION (BAZETT): 510 MS
EKG R AXIS: -52 DEGREES
EKG T AXIS: 20 DEGREES
EKG VENTRICULAR RATE: 76 BPM
EOSINOPHILS ABSOLUTE: 0.4 K/UL (ref 0–0.6)
EOSINOPHILS RELATIVE PERCENT: 5.2 %
EPITHELIAL CELLS, UA: 2 /HPF (ref 0–5)
GFR AFRICAN AMERICAN: >60
GFR NON-AFRICAN AMERICAN: >60
GLUCOSE BLD-MCNC: 62 MG/DL (ref 70–99)
GLUCOSE BLD-MCNC: 64 MG/DL (ref 70–99)
GLUCOSE URINE: NEGATIVE MG/DL
HCT VFR BLD CALC: 37.7 % (ref 36–48)
HEMOGLOBIN: 11.9 G/DL (ref 12–16)
HYALINE CASTS: 2 /LPF (ref 0–8)
KETONES, URINE: 40 MG/DL
LEUKOCYTE ESTERASE, URINE: ABNORMAL
LYMPHOCYTES ABSOLUTE: 2.2 K/UL (ref 1–5.1)
LYMPHOCYTES RELATIVE PERCENT: 31.8 %
MCH RBC QN AUTO: 26.5 PG (ref 26–34)
MCHC RBC AUTO-ENTMCNC: 31.5 G/DL (ref 31–36)
MCV RBC AUTO: 84.1 FL (ref 80–100)
MICROSCOPIC EXAMINATION: YES
MONOCYTES ABSOLUTE: 0.9 K/UL (ref 0–1.3)
MONOCYTES RELATIVE PERCENT: 12.5 %
NEUTROPHILS ABSOLUTE: 3.5 K/UL (ref 1.7–7.7)
NEUTROPHILS RELATIVE PERCENT: 49.8 %
NITRITE, URINE: POSITIVE
PDW BLD-RTO: 15.5 % (ref 12.4–15.4)
PERFORMED ON: ABNORMAL
PH UA: 6 (ref 5–8)
PLATELET # BLD: 186 K/UL (ref 135–450)
PMV BLD AUTO: 9 FL (ref 5–10.5)
POTASSIUM REFLEX MAGNESIUM: 4.3 MMOL/L (ref 3.5–5.1)
PROTEIN UA: NEGATIVE MG/DL
RBC # BLD: 4.49 M/UL (ref 4–5.2)
RBC UA: 2 /HPF (ref 0–4)
SODIUM BLD-SCNC: 143 MMOL/L (ref 136–145)
SPECIFIC GRAVITY UA: 1.02 (ref 1–1.03)
URINE REFLEX TO CULTURE: YES
URINE TYPE: ABNORMAL
UROBILINOGEN, URINE: 0.2 E.U./DL
WBC # BLD: 7 K/UL (ref 4–11)
WBC UA: 55 /HPF (ref 0–5)

## 2022-04-07 PROCEDURE — 6370000000 HC RX 637 (ALT 250 FOR IP): Performed by: INTERNAL MEDICINE

## 2022-04-07 PROCEDURE — C9254 INJECTION, LACOSAMIDE: HCPCS | Performed by: INTERNAL MEDICINE

## 2022-04-07 PROCEDURE — 93010 ELECTROCARDIOGRAM REPORT: CPT | Performed by: INTERNAL MEDICINE

## 2022-04-07 PROCEDURE — 6360000002 HC RX W HCPCS: Performed by: FAMILY MEDICINE

## 2022-04-07 PROCEDURE — 6360000002 HC RX W HCPCS: Performed by: INTERNAL MEDICINE

## 2022-04-07 PROCEDURE — 87086 URINE CULTURE/COLONY COUNT: CPT

## 2022-04-07 PROCEDURE — 1200000000 HC SEMI PRIVATE

## 2022-04-07 PROCEDURE — 85025 COMPLETE CBC W/AUTO DIFF WBC: CPT

## 2022-04-07 PROCEDURE — 2580000003 HC RX 258: Performed by: INTERNAL MEDICINE

## 2022-04-07 PROCEDURE — 80048 BASIC METABOLIC PNL TOTAL CA: CPT

## 2022-04-07 PROCEDURE — 94761 N-INVAS EAR/PLS OXIMETRY MLT: CPT

## 2022-04-07 PROCEDURE — 94640 AIRWAY INHALATION TREATMENT: CPT

## 2022-04-07 PROCEDURE — 2700000000 HC OXYGEN THERAPY PER DAY

## 2022-04-07 PROCEDURE — 36415 COLL VENOUS BLD VENIPUNCTURE: CPT

## 2022-04-07 PROCEDURE — 81001 URINALYSIS AUTO W/SCOPE: CPT

## 2022-04-07 RX ORDER — PHENYTOIN SODIUM 50 MG/ML
100 INJECTION, SOLUTION INTRAMUSCULAR; INTRAVENOUS EVERY 8 HOURS
Status: DISCONTINUED | OUTPATIENT
Start: 2022-04-07 | End: 2022-04-09

## 2022-04-07 RX ORDER — PHENYTOIN SODIUM 50 MG/ML
50 INJECTION, SOLUTION INTRAMUSCULAR; INTRAVENOUS ONCE
Status: COMPLETED | OUTPATIENT
Start: 2022-04-07 | End: 2022-04-07

## 2022-04-07 RX ADMIN — DEXTROSE MONOHYDRATE 50 MG: 50 INJECTION, SOLUTION INTRAVENOUS at 11:22

## 2022-04-07 RX ADMIN — PHENYTOIN SODIUM 50 MG: 50 INJECTION INTRAMUSCULAR; INTRAVENOUS at 00:05

## 2022-04-07 RX ADMIN — MORPHINE SULFATE 2 MG: 2 INJECTION, SOLUTION INTRAMUSCULAR; INTRAVENOUS at 21:45

## 2022-04-07 RX ADMIN — SODIUM CHLORIDE: 9 INJECTION, SOLUTION INTRAVENOUS at 21:38

## 2022-04-07 RX ADMIN — LEVETIRACETAM 500 MG: 5 INJECTION INTRAVENOUS at 08:43

## 2022-04-07 RX ADMIN — TIOTROPIUM BROMIDE INHALATION SPRAY 2 PUFF: 3.12 SPRAY, METERED RESPIRATORY (INHALATION) at 08:54

## 2022-04-07 RX ADMIN — PHENYTOIN SODIUM 100 MG: 50 INJECTION INTRAMUSCULAR; INTRAVENOUS at 14:34

## 2022-04-07 RX ADMIN — Medication 10 ML: at 21:57

## 2022-04-07 RX ADMIN — MORPHINE SULFATE 2 MG: 2 INJECTION, SOLUTION INTRAMUSCULAR; INTRAVENOUS at 11:09

## 2022-04-07 RX ADMIN — LEVETIRACETAM 500 MG: 5 INJECTION INTRAVENOUS at 21:56

## 2022-04-07 RX ADMIN — PHENYTOIN SODIUM 50 MG: 50 INJECTION INTRAMUSCULAR; INTRAVENOUS at 06:50

## 2022-04-07 RX ADMIN — MORPHINE SULFATE 2 MG: 2 INJECTION, SOLUTION INTRAMUSCULAR; INTRAVENOUS at 17:34

## 2022-04-07 RX ADMIN — PHENYTOIN SODIUM 50 MG: 50 INJECTION INTRAMUSCULAR; INTRAVENOUS at 08:45

## 2022-04-07 RX ADMIN — ONDANSETRON 4 MG: 2 INJECTION INTRAMUSCULAR; INTRAVENOUS at 04:29

## 2022-04-07 RX ADMIN — PHENYTOIN SODIUM 100 MG: 50 INJECTION INTRAMUSCULAR; INTRAVENOUS at 21:41

## 2022-04-07 RX ADMIN — ENOXAPARIN SODIUM 40 MG: 40 INJECTION SUBCUTANEOUS at 08:44

## 2022-04-07 RX ADMIN — Medication 10 ML: at 08:46

## 2022-04-07 RX ADMIN — MORPHINE SULFATE 2 MG: 2 INJECTION, SOLUTION INTRAMUSCULAR; INTRAVENOUS at 04:29

## 2022-04-07 RX ADMIN — DEXTROSE MONOHYDRATE 50 MG: 50 INJECTION, SOLUTION INTRAVENOUS at 00:08

## 2022-04-07 ASSESSMENT — PAIN DESCRIPTION - PROGRESSION: CLINICAL_PROGRESSION: NOT CHANGED

## 2022-04-07 ASSESSMENT — PAIN SCALES - GENERAL
PAINLEVEL_OUTOF10: 8
PAINLEVEL_OUTOF10: 7
PAINLEVEL_OUTOF10: 2
PAINLEVEL_OUTOF10: 4
PAINLEVEL_OUTOF10: 7
PAINLEVEL_OUTOF10: 8
PAINLEVEL_OUTOF10: 2
PAINLEVEL_OUTOF10: 0

## 2022-04-07 ASSESSMENT — PAIN DESCRIPTION - PAIN TYPE
TYPE: ACUTE PAIN
TYPE: ACUTE PAIN

## 2022-04-07 ASSESSMENT — PAIN DESCRIPTION - ONSET: ONSET: ON-GOING

## 2022-04-07 ASSESSMENT — PAIN DESCRIPTION - LOCATION
LOCATION: ABDOMEN
LOCATION: ABDOMEN

## 2022-04-07 ASSESSMENT — PAIN DESCRIPTION - DESCRIPTORS: DESCRIPTORS: ACHING

## 2022-04-07 ASSESSMENT — PAIN DESCRIPTION - ORIENTATION
ORIENTATION: LEFT;LOWER
ORIENTATION: LEFT;LOWER

## 2022-04-07 ASSESSMENT — PAIN DESCRIPTION - FREQUENCY: FREQUENCY: CONTINUOUS

## 2022-04-07 ASSESSMENT — PAIN - FUNCTIONAL ASSESSMENT: PAIN_FUNCTIONAL_ASSESSMENT: ACTIVITIES ARE NOT PREVENTED

## 2022-04-07 NOTE — PROGRESS NOTES
Hospitalist Progress Note      PCP: Darlyn Taylor    Date of Admission: 4/6/2022    Chief Complaint: pain    Hospital Course:      Subjective: complaining of abdominal discomfort       Medications:  Reviewed    Infusion Medications    sodium chloride      sodium chloride 75 mL/hr at 04/07/22 0310     Scheduled Medications    phenytoin  100 mg IntraVENous Q8H    phenytoin  50 mg IntraVENous Once    morphine  2 mg IntraVENous Once    ondansetron  4 mg IntraVENous Once    lidocaine 1 % injection  5 mL IntraDERmal Once    sodium chloride flush  10 mL IntraVENous 2 times per day    enoxaparin  40 mg SubCUTAneous Daily    lacosamide (VIMPAT) IVPB  50 mg IntraVENous BID    levetiracetam  500 mg IntraVENous Q12H    tiotropium  2 puff Inhalation Daily     PRN Meds: sodium chloride flush, sodium chloride, potassium chloride, magnesium sulfate, promethazine **OR** ondansetron, magnesium hydroxide, acetaminophen **OR** acetaminophen, albuterol, hydrALAZINE, morphine      Intake/Output Summary (Last 24 hours) at 4/7/2022 0842  Last data filed at 4/7/2022 0310  Gross per 24 hour   Intake 420.04 ml   Output --   Net 420.04 ml       Exam:    BP (!) 147/68   Pulse 73   Temp 98.4 °F (36.9 °C) (Oral)   Resp 20   Wt 231 lb 11.3 oz (105.1 kg)   SpO2 96%   BMI 46.80 kg/m²     General appearance: No apparent distress, appears stated age and cooperative. HEENT: Pupils equal, round, and reactive to light. Conjunctivae/corneas clear. Neck: Supple, with full range of motion. No jugular venous distention. Trachea midline. Respiratory:  Normal respiratory effort. Clear to auscultation, bilaterally without Rales/Wheezes/Rhonchi. Cardiovascular: Regular rate and rhythm with normal S1/S2 without murmurs, rubs or gallops. Abdomen: Soft, diffusely -tender, non-distended with normal bowel sounds. Musculoskeletal: No clubbing, cyanosis or edema bilaterally. Full range of motion without deformity.   Skin: Skin color, texture, turgor normal.  No rashes or lesions. Neurologic:  Neurovascularly intact without any focal sensory/motor deficits. Cranial nerves: II-XII intact, grossly non-focal.  Psychiatric: Alert and oriented, thought content appropriate, normal insight  Capillary Refill: Brisk,< 3 seconds   Peripheral Pulses: +2 palpable, equal bilaterally       Labs:   Recent Labs     04/06/22  1452 04/07/22  0732   WBC 6.6 7.0   HGB 12.2 11.9*   HCT 38.8 37.7    186     Recent Labs     04/06/22  1452      K 4.2   CL 99   CO2 24   BUN 11   CREATININE 1.0   CALCIUM 9.4     Recent Labs     04/06/22  1452   AST 22   ALT 10   BILITOT 0.3   ALKPHOS 160*     No results for input(s): INR in the last 72 hours. Recent Labs     04/06/22 1452   TROPONINI 0.01       Urinalysis:      Lab Results   Component Value Date    NITRU POSITIVE 02/11/2022    WBCUA >900 02/11/2022    BACTERIA 4+ 02/11/2022    RBCUA 37 02/11/2022    BLOODU SMALL 02/11/2022    SPECGRAV 1.015 02/11/2022    GLUCOSEU Negative 02/11/2022       Radiology:  CT ABDOMEN PELVIS WO CONTRAST Additional Contrast? None   Final Result   Previous cholecystectomy which is unchanged. Mildly dilated loops of large and small bowel throughout the lower abdomen   and pelvis with small air-fluid levels throughout which is more apparent and   could be due to enteritis or developing early ileus. Recommend follow-up. Scattered diverticula along the sigmoid colon with no pericolonic   inflammation. Metallic prosthetic devices in both hips partially obscuring the lower pelvis   with no obvious pelvic mass or active inflammatory changes. No hydronephrosis or renal stones and no urinary obstruction. Previous cholecystectomy and chronic liver disease which is unchanged. Mild bibasilar atelectasis which is more prominent.                  Assessment/Plan:    Active Hospital Problems    Diagnosis Date Noted    Abdominal pain [R10.9] 04/06/2022 Assessment  Abdominal pain, constipation likely secondary to ileus  - N.p.o., gentle IV fluid therapy normal saline  GI consulted from emergency department    H/o seizure:   - Vimpat, Keppra to IV    Diabetes mellitus  Hypertension  GERD  COPD   -- cont home medications      DVT Prophylaxis: lovenox  Diet: Diet NPO  Code Status: Full Code    PT/OT Eval Status: n/a, is total care    Angelia Muir MD

## 2022-04-07 NOTE — H&P
Hospital Medicine History & Physical      PCP: Elizabet To    Date of Admission: 4/6/2022    Chief Complaint: Vomiting abdominal pain    History Of Present Illness:  Patient is a 75-year-old female with past medical history of diabetes mellitus, hypertension, GERD, COPD who presents to the hospital for nausea vomiting and abdominal pain. According to the patient she has been having left lower quadrant pain, 7/10 intensity, nonradiating, it has been there for past 1 week but has gotten worse in the recent few days. Patient also mentions her last bowel movement was on Monday. On further evaluation in the emergency department patient was found to have dilated loops of small and large bowel throughout abdomen suggestive of ileus. Past Medical History:          Diagnosis Date    Anemia     Asthma     CKD stage 3 due to type 2 diabetes mellitus (HCC)     COPD (chronic obstructive pulmonary disease) (HCC)     Dementia (HCC)     DM [facility]diabetes mellitus) ([facility] [address]     GERD (gastroesophageal reflux disease)     Gout     HTN (hypertension)     Seizure ([facility] [address]        Past Surgical History:          Procedure Laterality Date    IR TUNNELED CATHETER PLACEMENT GREATER THAN 5 YEARS  2/15/2022    IR TUNNELED CATHETER PLACEMENT GREATER THAN 5 YEARS 2/15/2022 [facility]       Medications Prior to Admission:      Prior to Admission medications    Medication Sig Start Date End Date Taking?  Authorizing Provider   hydroxychloroquine (PLAQUENIL) 200 MG tablet Take 1.5 tablets by mouth daily 2/21/22   Charla Cruz MD   furosemide (LASIX) 20 MG tablet Take 1 tablet by mouth 2 times daily 2/14/22   Charla Cruz MD   tiotropium (SPIRIVA RESPIMAT) 2.5 MCG/ACT AERS inhaler Inhale 1 puff into the lungs at bedtime     Historical Provider, MD   ondansetron (ZOFRAN) 4 MG tablet Take 4 mg by mouth every 6 hours as needed for Nausea or Vomiting    Historical Provider, MD   memantine (NAMENDA) 10 MG tablet Take 10 mg by mouth 2 times daily Indications: Dementia due to Vascular Disease    Historical Provider, MD   albuterol (PROVENTIL) (2.5 MG/3ML) 0.083% nebulizer solution Take 2.5 mg by nebulization every 8 hours as needed for Shortness of Breath     Historical Provider, MD   polyvinyl alcohol-povidone 5-6 MG/ML SOLN opthalmic solution Place 2 drops into both eyes 2 times daily    Historical Provider, MD   docusate sodium (COLACE) 100 MG capsule Take 100 mg by mouth daily    Historical Provider, MD   dextromethorphan (DELSYM) 30 MG/5ML extended release liquid Take 60 mg by mouth 2 times daily as needed for Cough    Historical Provider, MD   DULoxetine (CYMBALTA) 20 MG extended release capsule Take 20 mg by mouth nightly    Historical Provider, MD   famotidine (PEPCID) 20 MG tablet Take 20 mg by mouth nightly    Historical Provider, MD   Umeclidinium Bromide (INCRUSE ELLIPTA) 62.5 MCG/INH AEPB Inhale 1 puff into the lungs daily    Historical Provider, MD   loperamide (IMODIUM) 2 MG capsule Take 2 mg by mouth every 6 hours as needed for Diarrhea    Historical Provider, MD   acetaminophen (TYLENOL) 325 MG tablet Take 650 mg by mouth every 4 hours as needed for Pain    Historical Provider, MD   albuterol sulfate HFA (VENTOLIN HFA) 108 (90 Base) MCG/ACT inhaler Inhale 2 puffs into the lungs every 6 hours as needed for Wheezing or Shortness of Breath    Historical Provider, MD   allopurinol (ZYLOPRIM) 100 MG tablet Take 100 mg by mouth nightly    Historical Provider, MD   aspirin 81 MG chewable tablet Take 81 mg by mouth daily    Historical Provider, MD   fluticasone-vilanterol (BREO ELLIPTA) 100-25 MCG/INH AEPB inhaler Inhale 1 puff into the lungs daily    Historical Provider, MD   tolterodine (DETROL LA) 2 MG extended release capsule Take 2 mg by mouth daily    Historical Provider, MD   ferrous sulfate (IRON 325) 325 (65 Fe) MG tablet Take 325 mg by mouth daily (with breakfast)    Historical Provider, MD gabapentin (NEURONTIN) 100 MG capsule Take 100 mg by mouth nightly. Historical Provider, MD   hydrALAZINE (APRESOLINE) 25 MG tablet Take 25 mg by mouth 3 times daily     Historical Provider, MD   levETIRAcetam (KEPPRA) 500 MG tablet Take 500 mg by mouth 2 times daily     Historical Provider, MD   pregabalin (LYRICA) 150 MG capsule Take 150 mg by mouth every 12 hours. Historical Provider, MD   melatonin 3 MG TABS tablet Take 3 mg by mouth nightly     Historical Provider, MD   magnesium hydroxide (MILK OF MAGNESIA) 400 MG/5ML suspension Take 30 mLs by mouth daily as needed for Constipation     Historical Provider, MD   phenytoin (DILANTIN) 50 MG tablet Take 100 mg by mouth 3 times daily     Historical Provider, MD   simvastatin (ZOCOR) 10 MG tablet Take 10 mg by mouth nightly    Historical Provider, MD   lacosamide (VIMPAT) 50 MG TABS tablet Take 50 mg by mouth every 12 hours. Historical Provider, MD   vitamin D (CHOLECALCIFEROL) 25 MCG (1000 UT) TABS tablet Take 3,000 Units by mouth daily     Historical Provider, MD   Vitamin E 100 units TABS Take 100 Units by mouth daily    Historical Provider, MD       Allergies:  Patient has no known allergies. Social History:      TOBACCO:   reports that she has never smoked. She has never used smokeless tobacco.  ETOH:   reports no history of alcohol use. Family History:       Reviewed in detail and non contributory      History reviewed. No pertinent family history. REVIEW OF SYSTEMS:   Pertinent positives as noted in the HPI. All other systems reviewed and negative. PHYSICAL EXAM PERFORMED:    BP (!) 133/55   Pulse 71   Temp 98.8 °F (37.1 °C)   Resp 14   Wt 235 lb 14.3 oz (107 kg)   SpO2 100%   BMI 47.64 kg/m²     General appearance:  No apparent distress, cooperative. HEENT:  Normal cephalic, atraumatic without obvious deformity. Conjunctivae/corneas clear. Neck: Supple, with full range of motion.  No cervical lymphadenopathy  Respiratory: Normal respiratory effort. Clear to auscultation, bilaterally without Rales/Wheezes/Rhonchi. Cardiovascular:  Regular rate and rhythm with normal S1/S2 without murmurs, rubs or gallops. Abdomen: has tenderness to palpation on LLQ, Soft, non-tender, non-distended, normal bowel sounds. Musculoskeletal:  No edema noted bilaterally. No tenderness on palpation   Skin: no rash visible  Neurologic:  Neurologically intact without any focal sensory/motor deficits. grossly non-focal.  Psychiatric:  Alert and oriented, normal mood  Peripheral Pulses: +2 palpable, equal bilaterally       Labs:     Recent Labs     04/06/22  1452   WBC 6.6   HGB 12.2   HCT 38.8        Recent Labs     04/06/22  1452      K 4.2   CL 99   CO2 24   BUN 11   CREATININE 1.0   CALCIUM 9.4     Recent Labs     04/06/22  1452   AST 22   ALT 10   BILITOT 0.3   ALKPHOS 160*     No results for input(s): INR in the last 72 hours. Recent Labs     04/06/22 1452   TROPONINI 0.01       Urinalysis:      Lab Results   Component Value Date    NITRU POSITIVE 02/11/2022    WBCUA >900 02/11/2022    BACTERIA 4+ 02/11/2022    RBCUA 37 02/11/2022    BLOODU SMALL 02/11/2022    SPECGRAV 1.015 02/11/2022    GLUCOSEU Negative 02/11/2022       Radiology:       CT ABDOMEN PELVIS WO CONTRAST Additional Contrast? None   Final Result   Previous cholecystectomy which is unchanged. Mildly dilated loops of large and small bowel throughout the lower abdomen   and pelvis with small air-fluid levels throughout which is more apparent and   could be due to enteritis or developing early ileus. Recommend follow-up. Scattered diverticula along the sigmoid colon with no pericolonic   inflammation. Metallic prosthetic devices in both hips partially obscuring the lower pelvis   with no obvious pelvic mass or active inflammatory changes. No hydronephrosis or renal stones and no urinary obstruction.       Previous cholecystectomy and chronic liver disease which is unchanged. Mild bibasilar atelectasis which is more prominent. Active Hospital Problems    Diagnosis Date Noted    Abdominal pain [R10.9] 04/06/2022       Patient is a 75-year-old female with past medical history of diabetes mellitus, hypertension, GERD, COPD who presents to the hospital for nausea vomiting and abdominal pain. According to the patient she has been having left lower quadrant pain, 7/10 intensity, nonradiating, it has been there for past 1 week but has gotten worse in the recent few days. Patient also mentions her last bowel movement was on Monday. On further evaluation in the emergency department patient was found to have dilated loops of small and large bowel throughout abdomen suggestive of ileus. Assessment  Abdominal pain, constipation likely secondary to ileus  Diabetes mellitus  Hypertension  GERD  COPD      Plan  N.p.o., gentle IV fluid therapy normal saline  GI consulted from emergency department  Pain control  switch to p.o. pain  Vimpat, Keppra to IV  DVT prophylaxis-Lovenox  Diet: Diet NPO Effective Now  Code Status: Prior    PT/OT Eval Status: ordered    Dispo - pending clinical improvement       Kristy Acosta MD    The note was completed using EMR and Dragon dictation system. Every effort was made to ensure accuracy; however, inadvertent computerized transcription errors may be present. Thank you Brielle Gonsales for the opportunity to be involved in this patient's care. If you have any questions or concerns please feel free to contact me at [phone].     Kristy Acosta MD

## 2022-04-07 NOTE — PROGRESS NOTES
Occupational Therapy  No Eval/Discharge    Judithann Gosselin  4/7/2022    OT order noted. Per chart review, pt is dependent for mobility, using brenda lift to get to electric wheelchair, total care for ADLs since 2020. OT will sign off due to baseline functional status at this time. Anticipate return to [facility] with/without OT based on facility's discretion.      Logan Mclain, OTR/L [id number]

## 2022-04-07 NOTE — CARE COORDINATION
SALLY spoke to daughter Loi Anglin today regarding the discharge plan. She stated that mom is from [facility] and she is comfortable with her return but has been considering placing her at another facility. SW did inform because long term care facilities can take some time she will transition back to [facility] while she is still looking. SALLY pulled up a list from the [employer] and emailed a list to her attention at [email or web address]. There were quite a few facilities in network but SW explained that they will have to have a bed available for long term care. If she is interested in us reaching out to anyone prior to discharge she will call to let us know or have the facility to call us directly. Today she reports that she would like to check them out online. SALLY spoke to Terrell from [facility] and she confirms that the patient can return at discharge. She would like a rapid test for COVID 48 hours prior to departure. SALLY will continue to follow this patient. Respectfully submitted,    LULÚ Grossman  [facility]   [phone]    Electronically signed by LULÚ Campo on 4/7/2022 at 8:57 AM

## 2022-04-07 NOTE — PLAN OF CARE
Problem: Falls - Risk of:  Goal: Will remain free from falls  Description: Will remain free from falls  Outcome: Ongoing  Goal: Absence of physical injury  Description: Absence of physical injury  Outcome: Ongoing     Problem: Skin Integrity:  Goal: Will show no infection signs and symptoms  Description: Will show no infection signs and symptoms  Outcome: Ongoing  Goal: Absence of new skin breakdown  Description: Absence of new skin breakdown  Outcome: Ongoing     Problem: Pain:  Goal: Pain level will decrease  Description: Pain level will decrease  Outcome: Ongoing  Goal: Control of acute pain  Description: Control of acute pain  Outcome: Ongoing  Goal: Control of chronic pain  Description: Control of chronic pain  Outcome: Ongoing     Problem: Discharge Planning:  Goal: Discharged to appropriate level of care  Description: Discharged to appropriate level of care  Outcome: Ongoing

## 2022-04-07 NOTE — PLAN OF CARE
Problem: Falls - Risk of:  Goal: Will remain free from falls  Description: Will remain free from falls  4/7/2022 1133 by Monalisa Mcfadden RN  Outcome: Ongoing  4/7/2022 0231 by Devang Iqbal RN  Outcome: Ongoing  Goal: Absence of physical injury  Description: Absence of physical injury  4/7/2022 1133 by Monalisa Mcfadden RN  Outcome: Ongoing  4/7/2022 0231 by Devang Iqbal RN  Outcome: Ongoing     Problem: Skin Integrity:  Goal: Will show no infection signs and symptoms  Description: Will show no infection signs and symptoms  4/7/2022 1133 by Monalisa Mcfadden RN  Outcome: Ongoing  4/7/2022 0231 by Devang Iqbal RN  Outcome: Ongoing  Goal: Absence of new skin breakdown  Description: Absence of new skin breakdown  4/7/2022 1133 by Monalisa Mcfadden RN  Outcome: Ongoing  4/7/2022 0231 by Devang Iqbal RN  Outcome: Ongoing     Problem: Pain:  Description: Pain management should include both nonpharmacologic and pharmacologic interventions.   Goal: Pain level will decrease  Description: Pain level will decrease  4/7/2022 1133 by Monalisa Mcfadden RN  Outcome: Ongoing  4/7/2022 0231 by Devang Iqbal RN  Outcome: Ongoing  Goal: Control of acute pain  Description: Control of acute pain  4/7/2022 1133 by Monalisa Mcfadden RN  Outcome: Ongoing  4/7/2022 0231 by Devang Iqbal RN  Outcome: Ongoing  Goal: Control of chronic pain  Description: Control of chronic pain  4/7/2022 1133 by Monalisa Mcfadden RN  Outcome: Ongoing  4/7/2022 0231 by Devang Iqbal RN  Outcome: Ongoing     Problem: Discharge Planning:  Goal: Discharged to appropriate level of care  Description: Discharged to appropriate level of care  4/7/2022 1133 by Monalisa Mcfadden RN  Outcome: Ongoing  4/7/2022 0231 by Devang Iqbal RN  Outcome: Ongoing

## 2022-04-07 NOTE — PROGRESS NOTES
Arrived to place PICC line in patient with, Allan Arambula RN at bedside, pre procedure and allergies reviewed. Unfortunately unable to place picc d/t veins being insufficient in size (please see images below). The pt's arm circumference is also too large for the needle to cannulate the vein. Was able to obtain a 20G PIV in the R AC. Pt tolerated well, blood return and flushed well. Pt left in stable condition and bed braked and in lowest position. Pt call light within reach.  Handoff to [place]

## 2022-04-07 NOTE — PROGRESS NOTES
4 Eyes Skin Assessment     NAME:  Kim Camarillo  YOB: 1950  MEDICAL RECORD NUMBER:  [id number]    The patient is being assess for  Admission    I agree that 2 RN's have performed a thorough Head to Toe Skin Assessment on the patient. ALL assessment sites listed below have been assessed. Areas assessed by both nurses:    Head, Face, Ears, Shoulders, Back, Chest, Arms, Elbows, Hands, Sacrum. Buttock, Coccyx, Ischium and Legs. Feet and Heels        Does the Patient have a Wound? Yes wound(s) were present on assessment.  LDA wound assessment was Initiated and completed        Shekhar Prevention initiated:  Yes   Wound Care Orders initiated:  No    Pressure Injury (Stage 3,4, Unstageable, DTI, NWPT, and Complex wounds) if present place consult order under [de-identified] No    New and Established Ostomies if present place consult order under : No      Nurse 1 eSignature: Electronically signed by Joelle Harvey RN on 4/7/22 at 3:37 AM EDT    **SHARE this note so that the co-signing nurse is able to place an eSignature**    Nurse 2 eSignature: Electronically signed by Chau Verde RN on 4/7/22 at 5:12 PM EDT

## 2022-04-07 NOTE — ED NOTES
PICC Nurse at bedside reported that patient is not able to have a PICC line d/t the size of her veins. She was able to place an US guided peripheral line in the [facility]. The line flushes well and gives a blood return. Patient is comfortable on the stretcher and denies any needs at this time.       Rubina Gregg RN  04/06/22 [id number]

## 2022-04-07 NOTE — PROGRESS NOTES
Pharmacy Note     Medication changed from PO to IV per physician order. Phenytoin 100 mg chewable equivalent is 100 mg IV q8h. Should pt require extended IV doses, may want to check a phenytoin level in a few days.     Thanks   Elizabeth Mendes, [facility] 4/7/2022 7:56 AM

## 2022-04-07 NOTE — ACP (ADVANCE CARE PLANNING)
Advance Care Planning     Advance Care Planning Activator (Inpatient)  Conversation Note      Date of ACP Conversation: 4/7/2022     Conversation Conducted with: Patient with Decision Making Capacity    ACP Activator: LULÚ Mejia    Health Care Decision Maker:     Current Designated Health Care Decision Maker:     Primary Decision Maker: Sammie Leroy [facility] - [phone]    Care Preferences    Ventilation: \"If you were in your present state of health and suddenly became very ill and were unable to breathe on your own, what would your preference be about the use of a ventilator (breathing machine) if it were available to you? \"      Would the patient desire the use of ventilator (breathing machine)?: yes    \"If your health worsens and it becomes clear that your chance of recovery is unlikely, what would your preference be about the use of a ventilator (breathing machine) if it were available to you? \"     Would the patient desire the use of ventilator (breathing machine)?: No      Resuscitation  \"CPR works best to restart the heart when there is a sudden event, like a heart attack, in someone who is otherwise healthy. Unfortunately, CPR does not typically restart the heart for people who have serious health conditions or who are very sick. \"    \"In the event your heart stopped as a result of an underlying serious health condition, would you want attempts to be made to restart your heart (answer \"yes\" for attempt to resuscitate) or would you prefer a natural death (answer \"no\" for do not attempt to resuscitate)? \" yes       [] Yes   [] No   Educated Patient / Karrie [place] regarding differences between Advance Directives and portable DNR orders.     Length of ACP Conversation in minutes:  2    Conversation Outcomes:  [x] ACP discussion completed  [] Existing advance directive reviewed with patient; no changes to patient's previously recorded wishes  [] New Advance Directive completed  [] Portable Do Not Rescitate prepared for Provider review and signature  [] POLST/POST/MOLST/MOST prepared for Provider review and signature      Follow-up plan:    [] Schedule follow-up conversation to continue planning  [] Referred individual to Provider for additional questions/concerns   [] Advised patient/agent/surrogate to review completed ACP document and update if needed with changes in condition, patient preferences or care setting    [x] This note routed to one or more involved healthcare providers Morse Boxer primary care physician. Respectfully submitted,    Isabel KRAUS, RASHEEDA-S  [facility]   [phone]    Electronically signed by LULÚ Ramos on 4/7/2022 at 2:02 PM

## 2022-04-07 NOTE — PROGRESS NOTES
Physical Therapy    PT orders received. Per chart review, pt has been dependent for mobility since at least 2020 using brenda lift at baseline for transfer to electric wheelchair. Pt also total care for ADLs. Will sign off at this time due to baseline functional status.   Derek Frausto,   [facility]

## 2022-04-07 NOTE — PROGRESS NOTES
Pt admitted to [id number] via stretcher. Pt is A&O x 4, anxious and c/o stomach pain rating 10/10. Pt oriented to room, call light, fall precautions and diet order. Pt c/o nausea and states that it's d/t the O2 she wears. Pt medicated with morphine and zofran per PRN order. Pt incontinent of urine and stool, cheikh care performed and purewick placed. Pt states she uses a wheel chair at the NH. Call light and needs in reach. Bed alarm active.

## 2022-04-08 ENCOUNTER — APPOINTMENT (OUTPATIENT)
Dept: GENERAL RADIOLOGY | Age: 72
DRG: 389 | End: 2022-04-08
Payer: MEDICARE

## 2022-04-08 LAB
ANION GAP SERPL CALCULATED.3IONS-SCNC: 14 MMOL/L (ref 3–16)
BUN BLDV-MCNC: 11 MG/DL (ref 7–20)
CALCIUM SERPL-MCNC: 8.9 MG/DL (ref 8.3–10.6)
CHLORIDE BLD-SCNC: 107 MMOL/L (ref 99–110)
CO2: 24 MMOL/L (ref 21–32)
CREAT SERPL-MCNC: 0.8 MG/DL (ref 0.6–1.2)
GFR AFRICAN AMERICAN: >60
GFR NON-AFRICAN AMERICAN: >60
GLUCOSE BLD-MCNC: 79 MG/DL (ref 70–99)
GLUCOSE BLD-MCNC: 82 MG/DL (ref 70–99)
GLUCOSE BLD-MCNC: 85 MG/DL (ref 70–99)
GLUCOSE BLD-MCNC: 88 MG/DL (ref 70–99)
GLUCOSE BLD-MCNC: 89 MG/DL (ref 70–99)
PERFORMED ON: NORMAL
POTASSIUM REFLEX MAGNESIUM: 4.3 MMOL/L (ref 3.5–5.1)
SODIUM BLD-SCNC: 145 MMOL/L (ref 136–145)
URINE CULTURE, ROUTINE: NORMAL

## 2022-04-08 PROCEDURE — 1200000000 HC SEMI PRIVATE

## 2022-04-08 PROCEDURE — C9254 INJECTION, LACOSAMIDE: HCPCS | Performed by: INTERNAL MEDICINE

## 2022-04-08 PROCEDURE — 2700000000 HC OXYGEN THERAPY PER DAY

## 2022-04-08 PROCEDURE — 36415 COLL VENOUS BLD VENIPUNCTURE: CPT

## 2022-04-08 PROCEDURE — 6360000002 HC RX W HCPCS: Performed by: INTERNAL MEDICINE

## 2022-04-08 PROCEDURE — 74018 RADEX ABDOMEN 1 VIEW: CPT

## 2022-04-08 PROCEDURE — 80048 BASIC METABOLIC PNL TOTAL CA: CPT

## 2022-04-08 PROCEDURE — 6360000002 HC RX W HCPCS: Performed by: FAMILY MEDICINE

## 2022-04-08 PROCEDURE — 2580000003 HC RX 258: Performed by: INTERNAL MEDICINE

## 2022-04-08 PROCEDURE — 94761 N-INVAS EAR/PLS OXIMETRY MLT: CPT

## 2022-04-08 PROCEDURE — 94640 AIRWAY INHALATION TREATMENT: CPT

## 2022-04-08 RX ADMIN — PHENYTOIN SODIUM 100 MG: 50 INJECTION INTRAMUSCULAR; INTRAVENOUS at 06:05

## 2022-04-08 RX ADMIN — DEXTROSE MONOHYDRATE 50 MG: 50 INJECTION, SOLUTION INTRAVENOUS at 13:04

## 2022-04-08 RX ADMIN — MORPHINE SULFATE 2 MG: 2 INJECTION, SOLUTION INTRAMUSCULAR; INTRAVENOUS at 03:44

## 2022-04-08 RX ADMIN — Medication 10 ML: at 08:30

## 2022-04-08 RX ADMIN — ONDANSETRON 4 MG: 2 INJECTION INTRAMUSCULAR; INTRAVENOUS at 06:08

## 2022-04-08 RX ADMIN — LEVETIRACETAM 500 MG: 5 INJECTION INTRAVENOUS at 08:29

## 2022-04-08 RX ADMIN — PHENYTOIN SODIUM 100 MG: 50 INJECTION INTRAMUSCULAR; INTRAVENOUS at 14:35

## 2022-04-08 RX ADMIN — TIOTROPIUM BROMIDE INHALATION SPRAY 2 PUFF: 3.12 SPRAY, METERED RESPIRATORY (INHALATION) at 08:02

## 2022-04-08 RX ADMIN — DEXTROSE MONOHYDRATE 50 MG: 50 INJECTION, SOLUTION INTRAVENOUS at 00:43

## 2022-04-08 RX ADMIN — MORPHINE SULFATE 2 MG: 2 INJECTION, SOLUTION INTRAMUSCULAR; INTRAVENOUS at 13:02

## 2022-04-08 RX ADMIN — ENOXAPARIN SODIUM 40 MG: 40 INJECTION SUBCUTANEOUS at 08:27

## 2022-04-08 RX ADMIN — MORPHINE SULFATE 2 MG: 2 INJECTION, SOLUTION INTRAMUSCULAR; INTRAVENOUS at 08:27

## 2022-04-08 ASSESSMENT — PAIN DESCRIPTION - LOCATION
LOCATION: ABDOMEN
LOCATION: ABDOMEN

## 2022-04-08 ASSESSMENT — PAIN DESCRIPTION - ONSET
ONSET: ON-GOING
ONSET: ON-GOING

## 2022-04-08 ASSESSMENT — PAIN DESCRIPTION - DESCRIPTORS
DESCRIPTORS: ACHING
DESCRIPTORS: ACHING

## 2022-04-08 ASSESSMENT — PAIN - FUNCTIONAL ASSESSMENT
PAIN_FUNCTIONAL_ASSESSMENT: PREVENTS OR INTERFERES SOME ACTIVE ACTIVITIES AND ADLS
PAIN_FUNCTIONAL_ASSESSMENT: PREVENTS OR INTERFERES SOME ACTIVE ACTIVITIES AND ADLS

## 2022-04-08 ASSESSMENT — PAIN SCALES - GENERAL
PAINLEVEL_OUTOF10: 0
PAINLEVEL_OUTOF10: 8
PAINLEVEL_OUTOF10: 0
PAINLEVEL_OUTOF10: 7
PAINLEVEL_OUTOF10: 7
PAINLEVEL_OUTOF10: 0
PAINLEVEL_OUTOF10: 7

## 2022-04-08 ASSESSMENT — PAIN DESCRIPTION - PAIN TYPE
TYPE: ACUTE PAIN
TYPE: ACUTE PAIN

## 2022-04-08 ASSESSMENT — PAIN DESCRIPTION - ORIENTATION
ORIENTATION: LEFT;LOWER
ORIENTATION: LEFT;LOWER

## 2022-04-08 ASSESSMENT — PAIN DESCRIPTION - PROGRESSION
CLINICAL_PROGRESSION: NOT CHANGED
CLINICAL_PROGRESSION: NOT CHANGED

## 2022-04-08 ASSESSMENT — PAIN DESCRIPTION - FREQUENCY
FREQUENCY: CONTINUOUS
FREQUENCY: CONTINUOUS

## 2022-04-08 NOTE — PLAN OF CARE
Problem: Falls - Risk of:  Goal: Will remain free from falls  Description: Will remain free from falls  4/8/2022 0916 by Dalila Seaman RN  Outcome: Ongoing  4/8/2022 0200 by Siva Mast RN  Outcome: Ongoing  Goal: Absence of physical injury  Description: Absence of physical injury  4/8/2022 0916 by Dalila Seaman RN  Outcome: Ongoing  4/8/2022 0200 by Siva Mast RN  Outcome: Ongoing     Problem: Skin Integrity:  Goal: Will show no infection signs and symptoms  Description: Will show no infection signs and symptoms  4/8/2022 0916 by Dalila Seaman RN  Outcome: Ongoing  4/8/2022 0200 by Siva Mast RN  Outcome: Ongoing  Goal: Absence of new skin breakdown  Description: Absence of new skin breakdown  4/8/2022 0916 by Dalila Seaman RN  Outcome: Ongoing  4/8/2022 0200 by Siva Mast RN  Outcome: Ongoing     Problem: Pain:  Goal: Pain level will decrease  Description: Pain level will decrease  4/8/2022 0916 by Dalila Seaman RN  Outcome: Ongoing  4/8/2022 0200 by Siva Mast RN  Outcome: Ongoing  Goal: Control of acute pain  Description: Control of acute pain  4/8/2022 0916 by Dalila Seaman RN  Outcome: Ongoing  4/8/2022 0200 by Siva Mast RN  Outcome: Ongoing  Goal: Control of chronic pain  Description: Control of chronic pain  4/8/2022 0916 by Dalila Seaman RN  Outcome: Ongoing  4/8/2022 0200 by Siva Mast RN  Outcome: Ongoing     Problem: Discharge Planning:  Goal: Discharged to appropriate level of care  Description: Discharged to appropriate level of care  4/8/2022 0916 by Dalila Seaman RN  Outcome: Ongoing  4/8/2022 0200 by Siva Mast RN  Outcome: Ongoing

## 2022-04-08 NOTE — PROGRESS NOTES
Hospitalist Progress Note      PCP: Gordon Doherty    Date of Admission: 4/6/2022    Chief Complaint: pain    Hospital Course:      Subjective: KUB shows ileus improving. Liquid diet started      Medications:  Reviewed    Infusion Medications    sodium chloride      sodium chloride 75 mL/hr at 04/07/22 2138     Scheduled Medications    phenytoin  100 mg IntraVENous Q8H    morphine  2 mg IntraVENous Once    ondansetron  4 mg IntraVENous Once    lidocaine 1 % injection  5 mL IntraDERmal Once    sodium chloride flush  10 mL IntraVENous 2 times per day    enoxaparin  40 mg SubCUTAneous Daily    lacosamide (VIMPAT) IVPB  50 mg IntraVENous BID    levetiracetam  500 mg IntraVENous Q12H    tiotropium  2 puff Inhalation Daily     PRN Meds: sodium chloride flush, sodium chloride, potassium chloride, magnesium sulfate, promethazine **OR** ondansetron, magnesium hydroxide, acetaminophen **OR** acetaminophen, albuterol, hydrALAZINE, morphine      Intake/Output Summary (Last 24 hours) at 4/8/2022 1024  Last data filed at 4/8/2022 0915  Gross per 24 hour   Intake 10 ml   Output 1200 ml   Net -1190 ml       Exam:    BP (!) 162/65   Pulse 85   Temp 98.8 °F (37.1 °C) (Oral)   Resp 24   Wt 228 lb 13.4 oz (103.8 kg)   SpO2 95%   BMI 46.22 kg/m²     General appearance: No apparent distress, appears stated age and cooperative. HEENT: Pupils equal, round, and reactive to light. Conjunctivae/corneas clear. Neck: Supple, with full range of motion. No jugular venous distention. Trachea midline. Respiratory:  Normal respiratory effort. Clear to auscultation, bilaterally without Rales/Wheezes/Rhonchi. Cardiovascular: Regular rate and rhythm with normal S1/S2 without murmurs, rubs or gallops. Abdomen: Soft, diffusely -tender, non-distended with normal bowel sounds. Musculoskeletal: No clubbing, cyanosis or edema bilaterally. Full range of motion without deformity.   Skin: Skin color, texture, turgor normal. No rashes or lesions. Neurologic:  Neurovascularly intact without any focal sensory/motor deficits. Cranial nerves: II-XII intact, grossly non-focal.  Psychiatric: Alert and oriented, thought content appropriate, normal insight  Capillary Refill: Brisk,< 3 seconds   Peripheral Pulses: +2 palpable, equal bilaterally       Labs:   Recent Labs     04/06/22  1452 04/07/22  0732   WBC 6.6 7.0   HGB 12.2 11.9*   HCT 38.8 37.7    186     Recent Labs     04/06/22  1452 04/07/22  0732 04/08/22  0752    143 145   K 4.2 4.3 4.3   CL 99 104 107   CO2 24 17* 24   BUN 11 14 11   CREATININE 1.0 0.9 0.8   CALCIUM 9.4 8.9 8.9     Recent Labs     04/06/22  1452   AST 22   ALT 10   BILITOT 0.3   ALKPHOS 160*     No results for input(s): INR in the last 72 hours. Recent Labs     04/06/22 1452   TROPONINI 0.01       Urinalysis:      Lab Results   Component Value Date    NITRU POSITIVE 04/07/2022    WBCUA 55 04/07/2022    BACTERIA 4+ 04/07/2022    RBCUA 2 04/07/2022    BLOODU Negative 04/07/2022    SPECGRAV 1.025 04/07/2022    GLUCOSEU Negative 04/07/2022       Radiology:  CT ABDOMEN PELVIS WO CONTRAST Additional Contrast? None   Final Result   Previous cholecystectomy which is unchanged. Mildly dilated loops of large and small bowel throughout the lower abdomen   and pelvis with small air-fluid levels throughout which is more apparent and   could be due to enteritis or developing early ileus. Recommend follow-up. Scattered diverticula along the sigmoid colon with no pericolonic   inflammation. Metallic prosthetic devices in both hips partially obscuring the lower pelvis   with no obvious pelvic mass or active inflammatory changes. No hydronephrosis or renal stones and no urinary obstruction. Previous cholecystectomy and chronic liver disease which is unchanged. Mild bibasilar atelectasis which is more prominent.          XR ABDOMEN (KUB) (SINGLE AP VIEW)    (Results Pending) Assessment/Plan:    Active Hospital Problems    Diagnosis Date Noted    Abdominal pain [R10.9] 04/06/2022     Assessment  Abdominal pain, constipation likely secondary to ileus  - initially N.p.o., gentle IV fluid therapy normal saline  - KUB 4/8 shows improvement -- liquid diet started    H/o seizure:   - Vimpat, Keppra to IV    Diabetes mellitus  Hypertension  GERD  COPD   -- cont home medications      DVT Prophylaxis: lovenox  Diet: Diet NPO  Code Status: Full Code    PT/OT Eval Status: n/a, is total care    Cathy Monaco MD

## 2022-04-08 NOTE — PLAN OF CARE
Problem: Falls - Risk of:  Goal: Will remain free from falls  Description: Will remain free from falls  4/8/2022 0949 by Joe Boothe RN  Outcome: Ongoing  4/8/2022 0916 by oJe Boothe RN  Outcome: Ongoing  4/8/2022 0200 by Florencio Gilford, RN  Outcome: Ongoing  Goal: Absence of physical injury  Description: Absence of physical injury  4/8/2022 0949 by Joe Boothe RN  Outcome: Ongoing  4/8/2022 0916 by Joe Boothe RN  Outcome: Ongoing  4/8/2022 0200 by Florencio Gilford, RN  Outcome: Ongoing     Problem: Skin Integrity:  Goal: Will show no infection signs and symptoms  Description: Will show no infection signs and symptoms  4/8/2022 0949 by Joe Boothe RN  Outcome: Ongoing  4/8/2022 0916 by Joe Boothe RN  Outcome: Ongoing  4/8/2022 0200 by Florencio Gilford, RN  Outcome: Ongoing  Goal: Absence of new skin breakdown  Description: Absence of new skin breakdown  4/8/2022 0949 by Joe Boothe RN  Outcome: Ongoing  4/8/2022 0916 by Joe Boothe RN  Outcome: Ongoing  4/8/2022 0200 by Florencio Gilford, RN  Outcome: Ongoing     Problem: Pain:  Goal: Pain level will decrease  Description: Pain level will decrease  4/8/2022 0949 by Joe Boothe RN  Outcome: Ongoing  4/8/2022 0916 by Joe Boothe RN  Outcome: Ongoing  4/8/2022 0200 by Florencio Gilford, RN  Outcome: Ongoing  Goal: Control of acute pain  Description: Control of acute pain  4/8/2022 0949 by Joe Boothe RN  Outcome: Ongoing  4/8/2022 0916 by Joe Boothe RN  Outcome: Ongoing  4/8/2022 0200 by Florencio Gilford, RN  Outcome: Ongoing  Goal: Control of chronic pain  Description: Control of chronic pain  4/8/2022 0949 by Joe Boothe RN  Outcome: Ongoing  4/8/2022 0916 by Joe Boothe RN  Outcome: Ongoing  4/8/2022 0200 by Florencio Gilford, RN  Outcome: Ongoing     Problem: Discharge Planning:  Goal: Discharged to appropriate level of care  Description: Discharged to appropriate level of

## 2022-04-08 NOTE — CONSULTS
[facility]   GI Consult Note      Reason for Consult:  Abdominal pain. Requesting Physician:  luan    CHIEF COMPLAINT:  Abdominal pain    History Obtained From:  patient    HISTORY OF PRESENT ILLNESS:                The patient is a 74 y.o. female with significant past medical history of COPD and DM. She is admitted with nausea and vomiting and abdominal pain. CT suggested ileus or enteritis. Her abdominal xray in follow up is unremarkable. She denies pain and states she wants to eat.     Past Medical History:        Diagnosis Date    Anemia     Asthma     CKD stage 3 due to type 2 diabetes mellitus (HCC)     COPD (chronic obstructive pulmonary disease) (HCC)     Dementia (HCC)     [facility] [address]     GERD (gastroesophageal reflux disease)     Gout     HTN (hypertension)     Seizure ([facility] [address]      Past Surgical History:        Procedure Laterality Date    IR TUNNELED CATHETER PLACEMENT GREATER THAN 5 YEARS  2/15/2022    IR TUNNELED CATHETER PLACEMENT GREATER THAN 5 YEARS 2/15/2022 [facility]     Current Medications:    Current Facility-Administered Medications: phenytoin (DILANTIN) injection 100 mg, 100 mg, IntraVENous, Q8H  morphine (PF) injection 2 mg, 2 mg, IntraVENous, Once  ondansetron (ZOFRAN) injection 4 mg, 4 mg, IntraVENous, Once  lidocaine PF 1 % injection 5 mL, 5 mL, IntraDERmal, Once  sodium chloride flush 0.9 % injection 10 mL, 10 mL, IntraVENous, 2 times per day  sodium chloride flush 0.9 % injection 10 mL, 10 mL, IntraVENous, PRN  0.9 % sodium chloride infusion, , IntraVENous, PRN  potassium chloride 10 mEq/100 mL IVPB (Peripheral Line), 10 mEq, IntraVENous, PRN  magnesium sulfate 2000 mg in 50 mL IVPB premix, 2,000 mg, IntraVENous, PRN  enoxaparin (LOVENOX) injection 40 mg, 40 mg, SubCUTAneous, Daily  promethazine (PHENERGAN) tablet 12.5 mg, 12.5 mg, Oral, Q6H PRN **OR** ondansetron (ZOFRAN) injection 4 mg, 4 mg, IntraVENous, Q6H PRN  magnesium hydroxide (MILK OF MAGNESIA) 400 MG/5ML suspension 30 mL, 30 mL, Oral, Daily PRN  acetaminophen (TYLENOL) tablet 650 mg, 650 mg, Oral, Q6H PRN **OR** acetaminophen (TYLENOL) suppository 650 mg, 650 mg, Rectal, Q6H PRN  albuterol (PROVENTIL) nebulizer solution 2.5 mg, 2.5 mg, Nebulization, Q8H PRN  0.9 % sodium chloride infusion, , IntraVENous, Continuous  hydrALAZINE (APRESOLINE) injection 10 mg, 10 mg, IntraVENous, Q6H PRN  lacosamide (VIMPAT) 50 mg in dextrose 5 % 55 mL IVPB, 50 mg, IntraVENous, BID  levETIRAcetam (KEPPRA) 500 mg/100 mL IVPB, 500 mg, IntraVENous, Q12H  tiotropium (SPIRIVA RESPIMAT) 2.5 MCG/ACT inhaler 2 puff, 2 puff, Inhalation, Daily  morphine (PF) injection 2 mg, 2 mg, IntraVENous, Q4H PRN  Allergies:  Patient has no known allergies. Social History:    Social History     Socioeconomic History    Marital status: Single     Spouse name: Not on file    Number of children: Not on file    Years of education: Not on file    Highest education level: Not on file   Occupational History    Not on file   Tobacco Use    Smoking status: Never Smoker    Smokeless tobacco: Never Used   Vaping Use    Vaping Use: Never used   Substance and Sexual Activity    Alcohol use: Never    Drug use: Never    Sexual activity: Not Currently   Other Topics Concern    Not on file   Social History Narrative    Not on file     Social Determinants of Health     Financial Resource Strain:     Difficulty of Paying Living Expenses: Not on file   Food Insecurity:     Worried About Running Out of Food in the Last Year: Not on file    [place] of Food in the Last Year: Not on file   Transportation Needs:     Lack of Transportation (Medical): Not on file    Lack of Transportation (Non-Medical):  Not on file   Physical Activity:     Days of Exercise per Week: Not on file    Minutes of Exercise per Session: Not on file   Stress:     Feeling of Stress : Not on file   Social Connections:     Frequency of Communication with Friends and Family: Not on file    Frequency of Social Gatherings with Friends and Family: Not on file    Attends Sabianist Services: Not on file    Active Member of Clubs or Organizations: Not on file    Attends Club or Organization Meetings: Not on file    Marital Status: Not on file   Intimate Partner Violence:     Fear of Current or Ex-Partner: Not on file    Emotionally Abused: Not on file    Physically Abused: Not on file    Sexually Abused: Not on file   Housing Stability:     Unable to Pay for Housing in the Last Year: Not on file    Number of [place] in the Last Year: Not on file    Unstable Housing in the Last Year: Not on file       Family History:   History reviewed. No pertinent family history.   REVIEW OF SYSTEMS:    CONSTITUTIONAL:  negative  EYES:  negative  HEENT:  negative  RESPIRATORY:  negative  CARDIOVASCULAR:  negative  GASTROINTESTINAL:  negative  INTEGUMENT/BREAST:  negative  HEMATOLOGIC/LYMPHATIC:  negative  ENDOCRINE:  negative  MUSCULOSKELETAL:  negative  NEUROLOGICAL:  negative  PHYSICAL EXAM:    General Appearance: alert and oriented to person, place and time, well developed and well- nourished, in no acute distress  Skin: warm and dry, no rash or erythema  Head: normocephalic and atraumatic  Eyes: pupils equal, round, and reactive to light, extraocular eye movements intact, conjunctivae normal  ENT: tympanic membrane, external ear and ear canal normal bilaterally, nose without deformity, nasal mucosa and turbinates normal without polyps  Neck: supple and non-tender without mass, no thyromegaly or thyroid nodules, no cervical lymphadenopathy  Pulmonary/Chest: clear to auscultation bilaterally- no wheezes, rales or rhonchi, normal air movement, no respiratory distress  Cardiovascular: normal rate, regular rhythm, normal S1 and S2, no murmurs, rubs, clicks, or gallops, distal pulses intact, no carotid bruits  Abdomen: soft, non-tender, non-distended, normal bowel sounds, no masses or organomegaly  Extremities: no cyanosis, clubbing or edema  Musculoskeletal: normal range of motion, no joint swelling, deformity or tenderness  Neurologic: reflexes normal and symmetric, no cranial nerve deficit, gait, coordination and speech normal  Vitals:    /64   Pulse 74   Temp 99 °F (37.2 °C) (Oral)   Resp 20   Wt 228 lb 13.4 oz (103.8 kg)   SpO2 92%   BMI 46.22 kg/m²     DATA:    CBC with Differential:    Lab Results   Component Value Date    WBC 7.0 04/07/2022    RBC 4.49 04/07/2022    HGB 11.9 04/07/2022    HCT 37.7 04/07/2022     04/07/2022    MCV 84.1 04/07/2022    MCH 26.5 04/07/2022    MCHC 31.5 04/07/2022    RDW 15.5 04/07/2022    BANDSPCT 4 04/28/2020    METASPCT 2 04/28/2020    LYMPHOPCT 31.8 04/07/2022    MONOPCT 12.5 04/07/2022    MYELOPCT 4 04/27/2020    BASOPCT 0.7 04/07/2022    MONOSABS 0.9 04/07/2022    LYMPHSABS 2.2 04/07/2022    EOSABS 0.4 04/07/2022    BASOSABS 0.1 04/07/2022     CMP:    Lab Results   Component Value Date     04/08/2022    K 4.3 04/08/2022     04/08/2022    CO2 24 04/08/2022    BUN 11 04/08/2022    CREATININE 0.8 04/08/2022    GFRAA >60 04/08/2022    AGRATIO 1.1 04/06/2022    LABGLOM >60 04/08/2022    GLUCOSE 88 04/08/2022    PROT 7.2 04/06/2022    LABALBU 3.8 04/06/2022    CALCIUM 8.9 04/08/2022    BILITOT 0.3 04/06/2022    ALKPHOS 160 04/06/2022    AST 22 04/06/2022    ALT 10 04/06/2022     Hepatic Function Panel:    Lab Results   Component Value Date    ALKPHOS 160 04/06/2022    ALT 10 04/06/2022    AST 22 04/06/2022    PROT 7.2 04/06/2022    BILITOT 0.3 04/06/2022    BILIDIR <0.2 04/15/2020    IBILI see below 04/15/2020    LABALBU 3.8 04/06/2022     Albumin:    Lab Results   Component Value Date    LABALBU 3.8 04/06/2022     Calcium:    Lab Results   Component Value Date    CALCIUM 8.9 04/08/2022     Ionized Calcium:  No results found for: IONCA  Magnesium:    Lab Results   Component Value Date    MG 2.30 12/13/2021 Phosphorus:    Lab Results   Component Value Date    PHOS 2.5 04/28/2020     LDH:    Lab Results   Component Value Date     04/15/2020       IMPRESSION/RECOMMENDATIONS:      1. Abdominal pain and vomiting likely from gastroenteritis which is resolving with unremarkable abdominal xrays in follow up. OK to start clear liquid diet and advance as tolerated. No indication for endoscopy.     Electronically signed by Stephanie West MD on 4/8/2022 at 3:22 PM

## 2022-04-09 VITALS
BODY MASS INDEX: 46.11 KG/M2 | WEIGHT: 228.7 LBS | DIASTOLIC BLOOD PRESSURE: 77 MMHG | RESPIRATION RATE: 18 BRPM | SYSTOLIC BLOOD PRESSURE: 157 MMHG | TEMPERATURE: 99.1 F | OXYGEN SATURATION: 94 % | HEART RATE: 92 BPM | HEIGHT: 59 IN

## 2022-04-09 LAB
ANION GAP SERPL CALCULATED.3IONS-SCNC: 15 MMOL/L (ref 3–16)
BUN BLDV-MCNC: 8 MG/DL (ref 7–20)
CALCIUM SERPL-MCNC: 9.3 MG/DL (ref 8.3–10.6)
CHLORIDE BLD-SCNC: 107 MMOL/L (ref 99–110)
CO2: 24 MMOL/L (ref 21–32)
CREAT SERPL-MCNC: 0.8 MG/DL (ref 0.6–1.2)
GFR AFRICAN AMERICAN: >60
GFR NON-AFRICAN AMERICAN: >60
GLUCOSE BLD-MCNC: 92 MG/DL (ref 70–99)
GLUCOSE BLD-MCNC: 94 MG/DL (ref 70–99)
PERFORMED ON: NORMAL
POTASSIUM REFLEX MAGNESIUM: 4.2 MMOL/L (ref 3.5–5.1)
SARS-COV-2, NAAT: NOT DETECTED
SODIUM BLD-SCNC: 146 MMOL/L (ref 136–145)

## 2022-04-09 PROCEDURE — 2700000000 HC OXYGEN THERAPY PER DAY

## 2022-04-09 PROCEDURE — 94640 AIRWAY INHALATION TREATMENT: CPT

## 2022-04-09 PROCEDURE — 87635 SARS-COV-2 COVID-19 AMP PRB: CPT

## 2022-04-09 PROCEDURE — 94760 N-INVAS EAR/PLS OXIMETRY 1: CPT

## 2022-04-09 PROCEDURE — 6370000000 HC RX 637 (ALT 250 FOR IP): Performed by: FAMILY MEDICINE

## 2022-04-09 PROCEDURE — 6360000002 HC RX W HCPCS: Performed by: INTERNAL MEDICINE

## 2022-04-09 PROCEDURE — 80048 BASIC METABOLIC PNL TOTAL CA: CPT

## 2022-04-09 PROCEDURE — 36415 COLL VENOUS BLD VENIPUNCTURE: CPT

## 2022-04-09 RX ORDER — SODIUM CHLORIDE 0.9 % (FLUSH) 0.9 %
5-40 SYRINGE (ML) INJECTION EVERY 12 HOURS SCHEDULED
Status: DISCONTINUED | OUTPATIENT
Start: 2022-04-09 | End: 2022-04-09 | Stop reason: HOSPADM

## 2022-04-09 RX ORDER — LEVETIRACETAM 500 MG/1
500 TABLET ORAL 2 TIMES DAILY
Status: DISCONTINUED | OUTPATIENT
Start: 2022-04-09 | End: 2022-04-09 | Stop reason: HOSPADM

## 2022-04-09 RX ORDER — HYDROXYCHLOROQUINE SULFATE 200 MG/1
300 TABLET, FILM COATED ORAL DAILY
Status: DISCONTINUED | OUTPATIENT
Start: 2022-04-09 | End: 2022-04-09 | Stop reason: HOSPADM

## 2022-04-09 RX ORDER — SODIUM CHLORIDE 9 MG/ML
25 INJECTION, SOLUTION INTRAVENOUS PRN
Status: DISCONTINUED | OUTPATIENT
Start: 2022-04-09 | End: 2022-04-09 | Stop reason: HOSPADM

## 2022-04-09 RX ORDER — LACOSAMIDE 50 MG/1
50 TABLET ORAL EVERY 12 HOURS SCHEDULED
Status: DISCONTINUED | OUTPATIENT
Start: 2022-04-09 | End: 2022-04-09 | Stop reason: HOSPADM

## 2022-04-09 RX ORDER — SODIUM CHLORIDE 0.9 % (FLUSH) 0.9 %
5-40 SYRINGE (ML) INJECTION PRN
Status: DISCONTINUED | OUTPATIENT
Start: 2022-04-09 | End: 2022-04-09 | Stop reason: HOSPADM

## 2022-04-09 RX ORDER — LIDOCAINE HYDROCHLORIDE 10 MG/ML
5 INJECTION, SOLUTION EPIDURAL; INFILTRATION; INTRACAUDAL; PERINEURAL ONCE
Status: DISCONTINUED | OUTPATIENT
Start: 2022-04-09 | End: 2022-04-09 | Stop reason: HOSPADM

## 2022-04-09 RX ORDER — PHENYTOIN 50 MG/1
100 TABLET, CHEWABLE ORAL 3 TIMES DAILY
Status: DISCONTINUED | OUTPATIENT
Start: 2022-04-09 | End: 2022-04-09 | Stop reason: HOSPADM

## 2022-04-09 RX ADMIN — LEVETIRACETAM 500 MG: 500 TABLET, FILM COATED ORAL at 10:01

## 2022-04-09 RX ADMIN — HYDROXYCHLOROQUINE SULFATE 300 MG: 200 TABLET ORAL at 10:00

## 2022-04-09 RX ADMIN — ENOXAPARIN SODIUM 40 MG: 40 INJECTION SUBCUTANEOUS at 08:33

## 2022-04-09 RX ADMIN — LACOSAMIDE 50 MG: 50 TABLET, FILM COATED ORAL at 10:01

## 2022-04-09 RX ADMIN — PHENYTOIN 100 MG: 50 TABLET, CHEWABLE ORAL at 10:48

## 2022-04-09 RX ADMIN — TIOTROPIUM BROMIDE INHALATION SPRAY 2 PUFF: 3.12 SPRAY, METERED RESPIRATORY (INHALATION) at 08:16

## 2022-04-09 ASSESSMENT — PAIN DESCRIPTION - ORIENTATION: ORIENTATION: LOWER

## 2022-04-09 ASSESSMENT — PAIN DESCRIPTION - PAIN TYPE: TYPE: ACUTE PAIN

## 2022-04-09 ASSESSMENT — PAIN SCALES - GENERAL: PAINLEVEL_OUTOF10: 9

## 2022-04-09 ASSESSMENT — PAIN DESCRIPTION - LOCATION: LOCATION: ABDOMEN

## 2022-04-09 NOTE — CARE COORDINATION
DISCHARGE SUMMARY     DATE OF DISCHARGE: 04/09/2022    DISCHARGE DESTINATION: [facility]    FACILITY    Level of Care: Intermediate    Discharging to Facility/ Agency   · Name: [facility]  · Address:  [address]   · Phone:  [phone]      TRANSPORTATION: [place] Name:  [address] up Time: 1259    Phone Number: [phone]    COMMENTS: SALLY contacted Clemente Thomas at [facility] [phone]. Message left. SALLY contacted the main number at [facility] [phone] and spoke with Charles Leach to inform him that this pt will be returning. AVS faxed. SALLY contacted pts dgnuno, Karinashmuel Sparks [phone]. Message left requesting a return call. SALLY updated bedside RN who was sending a rapid covid test.  THEODORA Lerma  [phone]  Electronically signed by Elvia Seen on 4/9/2022 at 10:00 AM     Addendum:  SALLY received a call back from Clemente Thomas with [facility] stating that she received this [employer] message and is aware that this pt is returning today.   Fredis Lerma  [phone]  Electronically signed by Elvia Seen on 4/9/2022 at 10:40 AM

## 2022-04-09 NOTE — PROGRESS NOTES
First attempt from PCU nurse ultra sounded guided was missed. Site clean, dry, intact. Another PCU nurse will try to place once by Ultrasound guided.  Electronically signed by Sidra Martinez RN on 4/9/2022 at 2:03 AM

## 2022-04-09 NOTE — PROGRESS NOTES
Arrived to place a PICC on pt. During communications with bedside MADHU Garcia, pt as been assessed by DIT during this admission. A PIV was placed and has now stopped working. Pt has had a tunneled CVC back in February of 2022. Per the order for PICC left arm only, there is no appropriate vessels for central line or PIV at this time. Handoff of assessment to MADHU Garcia, recommendations to IR or IJ potential for emergency access. Thank you for allowing our services and care.

## 2022-04-09 NOTE — DISCHARGE INSTR - COC
22 Culture, Urine        Resolved    COVID-19 (Rule Out) 22 COVID-19, Rapid (Ordered)   22 Rule-Out Test Resulted    COVID-19 (Rule Out) 21 COVID-19, Rapid (Ordered)   21 Rule-Out Test Resulted    COVID-19 20 COVID-19   20     COVID-19 (Rule Out) 20 COVID-19 (Ordered)   20 Rule-Out Test Resulted            Nurse Assessment:  Last Vital Signs: BP (!) 157/77   Pulse 92   Temp 99.1 °F (37.3 °C) (Oral)   Resp 18   Ht 4' 11\" (1.499 m)   Wt 228 lb 11.2 oz (103.7 kg)   SpO2 94%   BMI 46.19 kg/m²     Last documented pain score (0-10 scale): Pain Level: 9  Last Weight:   Wt Readings from Last 1 Encounters:   22 228 lb 11.2 oz (103.7 kg)     Mental Status:  {IP PT MENTAL STATUS:78736}    IV Access:  { MUSA IV ACCESS:646743567}    Nursing Mobility/ADLs:  Walking   {P DME EUDL:558982861}  Transfer  {P DME ZHHV:009938430}  Bathing  {P DME FKHD:630392168}  Dressing  {P DME DNPP:552487875}  Toileting  {P DME FXCJ:298005292}  Feeding  {P DME TEZO:831461967}  Med Admin  {P DME ZJGD:272103364}  Med Delivery   { MUSA MED Delivery:425317927}    Wound Care Documentation and Therapy:        Elimination:  Continence: Bowel: {YES / NN:27123}  Bladder: {YES / FZ:17817}  Urinary Catheter: {Urinary Catheter:608334341}   Colostomy/Ileostomy/Ileal Conduit: {YES / UU:51913}       Date of Last BM: ***    Intake/Output Summary (Last 24 hours) at 2022 1004  Last data filed at 2022 1431  Gross per 24 hour   Intake 0 ml   Output 175 ml   Net -175 ml     I/O last 3 completed shifts:   In: 10 [I.V.:10]  Out: 975 [Urine:975]    Safety Concerns:     [employer] Safety Concerns:880147581}    Impairments/Disabilities:      [employer] Impairments/Disabilities:527728636}    Nutrition Therapy:  Current Nutrition Therapy:   [employer] Diet List:135391830}    Routes of Feeding: {CHP DME Other Feedings:393624709}  Liquids: {Slp liquid thickness:97066}  Daily Fluid Restriction: {CHP DME Yes amt example:425286805}  Last Modified Barium Swallow with Video (Video Swallowing Test): {Done Not Done [facility]:630394079}    Treatments at the Time of Hospital Discharge:   Respiratory Treatments: ***  Oxygen Therapy:  {Therapy; copd oxygen:02798}  Ventilator:    { CC Vent ERLK:472231159}    Rehab Therapies: {THERAPEUTIC INTERVENTION:3916191336}  Weight Bearing Status/Restrictions: [address] Konstantin CC Weight Bearin}  Other Medical Equipment (for information only, NOT a DME order):  {EQUIPMENT:348209250}  Other Treatments: ***    Patient's personal belongings (please select all that are sent with patient):  {CHP DME Belongings:365859011}    RN SIGNATURE:  {Esignature:279179822}    CASE MANAGEMENT/SOCIAL WORK SECTION    Inpatient Status Date: 2022    Readmission Risk Assessment Score:  Readmission Risk              Risk of Unplanned Readmission:  29           Discharging to Facility/ Agency   [facility]  [facility] [address]    Phone:  [phone]          / signature: Electronically signed by Patria El on 2022 at 10:06 AM      PHYSICIAN SECTION    Prognosis: Good    Condition at Discharge: Stable    Rehab Potential (if transferring to Rehab): Good    Recommended Labs or Other Treatments After Discharge: Chronic conditions. Physician Certification: I certify the above information and transfer of Rommel Sanchez  is necessary for the continuing treatment of the diagnosis listed and that she requires Intermediate Nursing Care for less 30 days.      Update Admission H&P: No change in H&P    PHYSICIAN SIGNATURE:  Electronically signed by Sadi Reyes MD on 22 at 10:08 AM EDT

## 2022-04-09 NOTE — PROGRESS NOTES
Secure message sent to [facility], DO Chad about patients IV situation. New orders placed. See MAR.  Electronically signed by Judy Aviles RN on 4/9/2022 at 3:07 AM

## 2022-04-09 NOTE — PROGRESS NOTES
PICC team called. Patient placed on list. Spoke to [employer]ne [employer] representative.  Electronically signed by Judy Aviles RN on 4/9/2022 at 3:16 AM

## 2022-04-09 NOTE — PLAN OF CARE
Problem: Falls - Risk of:  Goal: Will remain free from falls  Description: Will remain free from falls  4/9/2022 0959 by Mitra Ball RN  Outcome: Ongoing  4/9/2022 0212 by Deepak Morgan RN  Outcome: Ongoing  Goal: Absence of physical injury  Description: Absence of physical injury  4/9/2022 0959 by Mitra Ball RN  Outcome: Ongoing  4/9/2022 0212 by Deepak Morgan RN  Outcome: Ongoing     Problem: Skin Integrity:  Goal: Will show no infection signs and symptoms  Description: Will show no infection signs and symptoms  4/9/2022 0959 by Mitra Ball RN  Outcome: Ongoing  4/9/2022 0212 by Deepak Morgan RN  Outcome: Ongoing  Goal: Absence of new skin breakdown  Description: Absence of new skin breakdown  4/9/2022 0959 by Mitra Ball RN  Outcome: Ongoing  4/9/2022 0212 by Deepak Morgan RN  Outcome: Ongoing     Problem: Pain:  Goal: Pain level will decrease  Description: Pain level will decrease  4/9/2022 0959 by Mitra Ball RN  Outcome: Ongoing  4/9/2022 0212 by Deepak Morgan RN  Outcome: Ongoing  Goal: Control of acute pain  Description: Control of acute pain  4/9/2022 0959 by Mitra Ball RN  Outcome: Ongoing  4/9/2022 0212 by Deepak Morgan RN  Outcome: Ongoing  Goal: Control of chronic pain  Description: Control of chronic pain  4/9/2022 0959 by Mitra Ball RN  Outcome: Ongoing  4/9/2022 0212 by Deepak Morgan RN  Outcome: Ongoing     Problem: Discharge Planning:  Goal: Discharged to appropriate level of care  Description: Discharged to appropriate level of care  4/9/2022 0959 by Mitra Ball RN  Outcome: Ongoing  4/9/2022 0212 by Deepak Morgan RN  Outcome: Ongoing

## 2022-04-09 NOTE — PROGRESS NOTES
Patients IV occluded. No signs of infiltration. PCU nurse will try to place IV with ultrasound when available. Patient resting in bed.

## 2022-04-12 NOTE — DISCHARGE SUMMARY
Hospital Medicine Discharge Summary    Patient ID: Claudeen Craven      Patient's PCP: Stacy Gross    Admit Date: 4/6/2022     Discharge Date: 4/9/2022      Admitting Physician: Ilya Holt MD     Discharge Physician: Arvin Fox MD     Discharge Diagnoses: Active Hospital Problems    Diagnosis Date Noted    Abdominal pain [R10.9] 04/06/2022       The patient was seen and examined on day of discharge and this discharge summary is in conjunction with any daily progress note from day of discharge. Hospital Course:     Abdominal pain, constipation likely secondary to ileus  - initially N.p.o., gentle IV fluid therapy normal saline  - KUB 4/8 shows improvement -- liquid diet started     H/o seizure: - Vimpat, Keppra to IV     Diabetes mellitus  Hypertension  GERD  COPD   -- cont home medications        Exam:     BP (!) 157/77   Pulse 92   Temp 99.1 °F (37.3 °C) (Oral)   Resp 18   Ht 4' 11\" (1.499 m)   Wt 228 lb 11.2 oz (103.7 kg)   SpO2 94%   BMI 46.19 kg/m²     General appearance: No apparent distress, appears stated age and cooperative. HEENT: Pupils equal, round, and reactive to light. Conjunctivae/corneas clear. Neck: Supple, with full range of motion. No jugular venous distention. Trachea midline. Respiratory:  Normal respiratory effort. Clear to auscultation, bilaterally without Rales/Wheezes/Rhonchi. Cardiovascular: Regular rate and rhythm with normal S1/S2 without murmurs, rubs or gallops. Abdomen: Soft, non-tender, non-distended with normal bowel sounds. Musculoskelatal: No clubbing, cyanosis or edema bilaterally. Full range of motion without deformity. Skin: Skin color, texture, turgor normal.  No rashes or lesions. Neurologic:  Neurovascularly intact without any focal sensory/motor deficits.  Cranial nerves: II-XII intact, grossly non-focal.  Psychiatric: Alert and oriented, thought content appropriate, normal insight      Consults:     IP CONSULT TO GI    Significant Diagnostic Studies:          Radiology:  CT ABDOMEN PELVIS WO CONTRAST Additional Contrast? None   Final Result   Previous cholecystectomy which is unchanged.       Mildly dilated loops of large and small bowel throughout the lower abdomen   and pelvis with small air-fluid levels throughout which is more apparent and   could be due to enteritis or developing early ileus. Recommend follow-up.       Scattered diverticula along the sigmoid colon with no pericolonic   inflammation.       Metallic prosthetic devices in both hips partially obscuring the lower pelvis   with no obvious pelvic mass or active inflammatory changes.       No hydronephrosis or renal stones and no urinary obstruction.       Previous cholecystectomy and chronic liver disease which is unchanged.       Mild bibasilar atelectasis which is more prominent.             PCP/SNF to follow up: F/u with PCP within 1 week    Disposition:  ECF    Condition on D/C:  Stable     Discharge Instructions/Follow-up:  F/u chronic conditions    Code Status:  Prior     Activity: activity as tolerated    Diet: regular diet    Labs:  For convenience and continuity at follow-up the following most recent labs are provided:      CBC:    Lab Results   Component Value Date    WBC 7.0 04/07/2022    HGB 11.9 04/07/2022    HCT 37.7 04/07/2022     04/07/2022       Renal:    Lab Results   Component Value Date     04/09/2022    K 4.2 04/09/2022     04/09/2022    CO2 24 04/09/2022    BUN 8 04/09/2022    CREATININE 0.8 04/09/2022    CALCIUM 9.3 04/09/2022    PHOS 2.5 04/28/2020       Discharge Medications:     Discharge Medication List as of 4/9/2022 11:29 AM           Details   hydroxychloroquine (PLAQUENIL) 200 MG tablet Take 1.5 tablets by mouth daily, Disp-30 tablet, R-0Print      furosemide (LASIX) 20 MG tablet Take 1 tablet by mouth 2 times daily, Disp-60 tablet, R-3Print      tiotropium (SPIRIVA RESPIMAT) 2.5 MCG/ACT AERS inhaler Inhale 1 puff into the lungs at bedtime Historical Med      ondansetron (ZOFRAN) 4 MG tablet Take 4 mg by mouth every 6 hours as needed for Nausea or VomitingHistorical Med      memantine (NAMENDA) 10 MG tablet Take 10 mg by mouth 2 times daily Indications: Dementia due to Vascular DiseaseHistorical Med      albuterol (PROVENTIL) (2.5 MG/3ML) 0.083% nebulizer solution Take 2.5 mg by nebulization every 8 hours as needed for Shortness of Breath Historical Med      polyvinyl alcohol-povidone 5-6 MG/ML SOLN opthalmic solution Place 2 drops into both eyes 2 times dailyHistorical Med      docusate sodium (COLACE) 100 MG capsule Take 100 mg by mouth dailyHistorical Med      dextromethorphan (DELSYM) 30 MG/5ML extended release liquid Take 60 mg by mouth 2 times daily as needed for CoughHistorical Med      DULoxetine (CYMBALTA) 20 MG extended release capsule Take 20 mg by mouth nightlyHistorical Med      famotidine (PEPCID) 20 MG tablet Take 20 mg by mouth nightlyHistorical Med      Umeclidinium Bromide (INCRUSE ELLIPTA) 62.5 MCG/INH AEPB Inhale 1 puff into the lungs dailyHistorical Med      loperamide (IMODIUM) 2 MG capsule Take 2 mg by mouth every 6 hours as needed for DiarrheaHistorical Med      acetaminophen (TYLENOL) 325 MG tablet Take 650 mg by mouth every 4 hours as needed for PainHistorical Med      albuterol sulfate HFA (VENTOLIN HFA) 108 (90 Base) MCG/ACT inhaler Inhale 2 puffs into the lungs every 6 hours as needed for Wheezing or Shortness of BreathHistorical Med      allopurinol (ZYLOPRIM) 100 MG tablet Take 100 mg by mouth nightlyHistorical Med      aspirin 81 MG chewable tablet Take 81 mg by mouth dailyHistorical Med      fluticasone-vilanterol (BREO ELLIPTA) 100-25 MCG/INH AEPB inhaler Inhale 1 puff into the lungs dailyHistorical Med      tolterodine (DETROL LA) 2 MG extended release capsule Take 2 mg by mouth dailyHistorical Med      ferrous sulfate (IRON 325) 325 (65 Fe) MG tablet Take 325 mg by mouth daily (with breakfast)Historical Med      gabapentin (NEURONTIN) 100 MG capsule Take 100 mg by mouth nightly. Historical Med      hydrALAZINE (APRESOLINE) 25 MG tablet Take 25 mg by mouth 3 times daily Historical Med      levETIRAcetam (KEPPRA) 500 MG tablet Take 500 mg by mouth 2 times daily Historical Med      pregabalin (LYRICA) 150 MG capsule Take 150 mg by mouth every 12 hours. Historical Med      melatonin 3 MG TABS tablet Take 3 mg by mouth nightly Historical Med      magnesium hydroxide (MILK OF MAGNESIA) 400 MG/5ML suspension Take 30 mLs by mouth daily as needed for Constipation Historical Med      phenytoin (DILANTIN) 50 MG tablet Take 100 mg by mouth 3 times daily Historical Med      simvastatin (ZOCOR) 10 MG tablet Take 10 mg by mouth nightlyHistorical Med      lacosamide (VIMPAT) 50 MG TABS tablet Take 50 mg by mouth every 12 hours. Historical Med      vitamin D (CHOLECALCIFEROL) 25 MCG (1000 UT) TABS tablet Take 3,000 Units by mouth daily Historical Med      Vitamin E 100 units TABS Take 100 Units by mouth dailyHistorical Med             Time Spent on discharge is more than 45 minutes in the examination, evaluation, counseling and review of medications and discharge plan. Signed: Arvin Fox MD   4/11/2022      Thank you Stacy Gross for the opportunity to be involved in this patient's care. If you have any questions or concerns please feel free to contact me at [phone].

## 2022-05-19 NOTE — PROGRESS NOTES
Physician Progress Note      Kimberly Sims  CSN #:                  [id number]  :                       1950  ADMIT DATE:       2022 2:20 PM  Blaze Malloy DATE:        2022 12:14 PM  RESPONDING  PROVIDER #:        Aida Key MD          QUERY TEXT:    Patient admitted with BMI 47.64. If possible, please document in progress   notes and discharge summary if you are evaluating and /or treating any of the   following: The medical record reflects the following:  Risk Factors: from ECF - hx of DM/HTN  Clinical Indicators: 4'11\" 228lbs - BMI- 46.19  Treatment: monitor weight, increased nursing care  Options provided:  -- Obesity  -- Morbid obesity  -- Severe obesity  -- Overweight  -- BMI not clinically significant  -- Other - I will add my own diagnosis  -- Disagree - Not applicable / Not valid  -- Disagree - Clinically unable to determine / Unknown  -- Refer to Clinical Documentation Reviewer    PROVIDER RESPONSE TEXT:    This patient has morbid obesity.     Query created by: Angeles Jaeger on 5/10/2022 6:54 AM      Electronically signed by:  Aida Key MD 2022 10:48 AM

## 2023-12-28 ENCOUNTER — APPOINTMENT (OUTPATIENT)
Dept: GENERAL RADIOLOGY | Age: 73
DRG: 208 | End: 2023-12-28
Payer: MEDICARE

## 2023-12-28 ENCOUNTER — HOSPITAL ENCOUNTER (INPATIENT)
Age: 73
LOS: 6 days | Discharge: NURSING, REHAB OR LONG-TERM CARE | DRG: 208 | End: 2024-01-03
Attending: EMERGENCY MEDICINE | Admitting: INTERNAL MEDICINE
Payer: MEDICARE

## 2023-12-28 DIAGNOSIS — J96.01 ACUTE RESPIRATORY FAILURE WITH HYPOXIA (HCC): Primary | ICD-10-CM

## 2023-12-28 DIAGNOSIS — Z71.89 GOALS OF CARE, COUNSELING/DISCUSSION: ICD-10-CM

## 2023-12-28 DIAGNOSIS — R79.89 ELEVATED TROPONIN: ICD-10-CM

## 2023-12-28 DIAGNOSIS — N17.9 AKI (ACUTE KIDNEY INJURY) (HCC): ICD-10-CM

## 2023-12-28 DIAGNOSIS — D64.9 ANEMIA, UNSPECIFIED TYPE: ICD-10-CM

## 2023-12-28 DIAGNOSIS — F01.50 VASCULAR DEMENTIA, UNSPECIFIED DEMENTIA SEVERITY, UNSPECIFIED WHETHER BEHAVIORAL, PSYCHOTIC, OR MOOD DISTURBANCE OR ANXIETY (HCC): ICD-10-CM

## 2023-12-28 DIAGNOSIS — R79.89 ELEVATED BRAIN NATRIURETIC PEPTIDE (BNP) LEVEL: ICD-10-CM

## 2023-12-28 PROBLEM — I50.23 SYSTOLIC CHF, ACUTE ON CHRONIC (HCC): Status: ACTIVE | Noted: 2023-12-28

## 2023-12-28 PROBLEM — J96.02 ACUTE RESPIRATORY FAILURE WITH HYPOXIA AND HYPERCAPNIA (HCC): Status: ACTIVE | Noted: 2020-04-14

## 2023-12-28 PROBLEM — E66.01 MORBID OBESITY WITH BMI OF 45.0-49.9, ADULT (HCC): Status: ACTIVE | Noted: 2023-12-28

## 2023-12-28 PROBLEM — I50.31 ACUTE DIASTOLIC HEART FAILURE (HCC): Status: ACTIVE | Noted: 2023-12-28

## 2023-12-28 LAB
ALBUMIN SERPL-MCNC: 3.9 G/DL (ref 3.4–5)
ALBUMIN/GLOB SERPL: 1.3 {RATIO} (ref 1.1–2.2)
ALP SERPL-CCNC: 110 U/L (ref 40–129)
ALT SERPL-CCNC: 13 U/L (ref 10–40)
ANION GAP SERPL CALCULATED.3IONS-SCNC: 10 MMOL/L (ref 3–16)
ANTI-XA UNFRAC HEPARIN: <0.1 IU/ML (ref 0.3–0.7)
APTT BLD: 21.7 SEC (ref 22.7–35.9)
AST SERPL-CCNC: 28 U/L (ref 15–37)
BASE EXCESS BLDA CALC-SCNC: -2 MMOL/L (ref -3–3)
BASE EXCESS BLDA CALC-SCNC: -5 MMOL/L (ref -3–3)
BASE EXCESS BLDV CALC-SCNC: -2.9 MMOL/L
BASE EXCESS BLDV CALC-SCNC: -3.4 MMOL/L
BASE EXCESS BLDV CALC-SCNC: -4.2 MMOL/L
BASOPHILS # BLD: 0.1 K/UL (ref 0–0.2)
BASOPHILS NFR BLD: 0.8 %
BILIRUB SERPL-MCNC: 0.3 MG/DL (ref 0–1)
BUN SERPL-MCNC: 20 MG/DL (ref 7–20)
CALCIUM SERPL-MCNC: 8.9 MG/DL (ref 8.3–10.6)
CHLORIDE SERPL-SCNC: 106 MMOL/L (ref 99–110)
CO2 BLDA-SCNC: 25.9 MMOL/L
CO2 BLDA-SCNC: 26.9 MMOL/L
CO2 BLDV-SCNC: 27 MMOL/L
CO2 BLDV-SCNC: 28 MMOL/L
CO2 BLDV-SCNC: 28 MMOL/L
CO2 SERPL-SCNC: 26 MMOL/L (ref 21–32)
COHGB MFR BLDA: 1.4 % (ref 0–1.5)
COHGB MFR BLDA: 1.5 % (ref 0–1.5)
COHGB MFR BLDV: 1.7 %
COHGB MFR BLDV: 1.8 %
COHGB MFR BLDV: 1.8 %
CREAT SERPL-MCNC: 1.7 MG/DL (ref 0.6–1.2)
DEPRECATED RDW RBC AUTO: 16.9 % (ref 12.4–15.4)
EKG ATRIAL RATE: 83 BPM
EKG DIAGNOSIS: NORMAL
EKG P AXIS: 33 DEGREES
EKG P-R INTERVAL: 134 MS
EKG Q-T INTERVAL: 416 MS
EKG QRS DURATION: 154 MS
EKG QTC CALCULATION (BAZETT): 488 MS
EKG R AXIS: -49 DEGREES
EKG T AXIS: 48 DEGREES
EKG VENTRICULAR RATE: 83 BPM
EOSINOPHIL # BLD: 0.3 K/UL (ref 0–0.6)
EOSINOPHIL NFR BLD: 3.3 %
FERRITIN SERPL IA-MCNC: 512.7 NG/ML (ref 15–150)
FLUAV RNA UPPER RESP QL NAA+PROBE: NEGATIVE
FLUBV AG NPH QL: NEGATIVE
GFR SERPLBLD CREATININE-BSD FMLA CKD-EPI: 31 ML/MIN/{1.73_M2}
GLUCOSE SERPL-MCNC: 100 MG/DL (ref 70–99)
HCO3 BLDA-SCNC: 24.4 MMOL/L (ref 21–29)
HCO3 BLDA-SCNC: 24.7 MMOL/L (ref 21–29)
HCO3 BLDV-SCNC: 25 MMOL/L (ref 23–29)
HCO3 BLDV-SCNC: 26 MMOL/L (ref 23–29)
HCO3 BLDV-SCNC: 26 MMOL/L (ref 23–29)
HCT VFR BLD AUTO: 33.7 % (ref 36–48)
HGB BLD-MCNC: 10.2 G/DL (ref 12–16)
HGB BLDA-MCNC: 10.3 G/DL (ref 12–16)
HGB BLDA-MCNC: 9.9 G/DL (ref 12–16)
INR PPP: 1.01 (ref 0.84–1.16)
IRON SATN MFR SERPL: 19 % (ref 15–50)
IRON SERPL-MCNC: 44 UG/DL (ref 37–145)
LYMPHOCYTES # BLD: 2.5 K/UL (ref 1–5.1)
LYMPHOCYTES NFR BLD: 29 %
MCH RBC QN AUTO: 26.6 PG (ref 26–34)
MCHC RBC AUTO-ENTMCNC: 30.4 G/DL (ref 31–36)
MCV RBC AUTO: 87.6 FL (ref 80–100)
METHGB MFR BLDA: 0.4 %
METHGB MFR BLDA: 0.6 %
METHGB MFR BLDV: 0.1 %
METHGB MFR BLDV: 0.2 %
METHGB MFR BLDV: 0.3 %
MONOCYTES # BLD: 0.7 K/UL (ref 0–1.3)
MONOCYTES NFR BLD: 7.9 %
NEUTROPHILS # BLD: 5.2 K/UL (ref 1.7–7.7)
NEUTROPHILS NFR BLD: 59 %
NT-PROBNP SERPL-MCNC: 2071 PG/ML (ref 0–449)
O2 THERAPY: ABNORMAL
PCO2 BLDA: 48.2 MMHG (ref 35–45)
PCO2 BLDA: 71.8 MMHG (ref 35–45)
PCO2 BLDV: 69 MMHG (ref 40–50)
PCO2 BLDV: 70.7 MMHG (ref 40–50)
PCO2 BLDV: 71.3 MMHG (ref 40–50)
PH BLDA: 7.14 [PH] (ref 7.35–7.45)
PH BLDA: 7.31 [PH] (ref 7.35–7.45)
PH BLDV: 7.17 [PH] (ref 7.35–7.45)
PH BLDV: 7.17 [PH] (ref 7.35–7.45)
PH BLDV: 7.18 [PH] (ref 7.35–7.45)
PLATELET # BLD AUTO: 147 K/UL (ref 135–450)
PMV BLD AUTO: 9.8 FL (ref 5–10.5)
PO2 BLDA: 141 MMHG (ref 75–108)
PO2 BLDA: 143 MMHG (ref 75–108)
PO2 BLDV: 50 MMHG
PO2 BLDV: 60 MMHG
PO2 BLDV: 61 MMHG
POTASSIUM SERPL-SCNC: 5.6 MMOL/L (ref 3.5–5.1)
PROCALCITONIN SERPL IA-MCNC: 0.13 NG/ML (ref 0–0.15)
PROT SERPL-MCNC: 6.8 G/DL (ref 6.4–8.2)
PROTHROMBIN TIME: 13.3 SEC (ref 11.5–14.8)
RBC # BLD AUTO: 3.84 M/UL (ref 4–5.2)
SAO2 % BLDA: 99.2 %
SAO2 % BLDA: 99.6 %
SAO2 % BLDV: 83 %
SAO2 % BLDV: 90 %
SAO2 % BLDV: 90 %
SARS-COV-2 RDRP RESP QL NAA+PROBE: NOT DETECTED
SODIUM SERPL-SCNC: 142 MMOL/L (ref 136–145)
TIBC SERPL-MCNC: 231 UG/DL (ref 260–445)
TROPONIN, HIGH SENSITIVITY: 52 NG/L (ref 0–14)
TROPONIN, HIGH SENSITIVITY: 53 NG/L (ref 0–14)
TSH SERPL DL<=0.005 MIU/L-ACNC: 0.88 UIU/ML (ref 0.27–4.2)
WBC # BLD AUTO: 8.7 K/UL (ref 4–11)

## 2023-12-28 PROCEDURE — 6360000002 HC RX W HCPCS: Performed by: INTERNAL MEDICINE

## 2023-12-28 PROCEDURE — 02HV33Z INSERTION OF INFUSION DEVICE INTO SUPERIOR VENA CAVA, PERCUTANEOUS APPROACH: ICD-10-PCS | Performed by: HOSPITALIST

## 2023-12-28 PROCEDURE — 83540 ASSAY OF IRON: CPT

## 2023-12-28 PROCEDURE — 84443 ASSAY THYROID STIM HORMONE: CPT

## 2023-12-28 PROCEDURE — C1769 GUIDE WIRE: HCPCS

## 2023-12-28 PROCEDURE — 36415 COLL VENOUS BLD VENIPUNCTURE: CPT

## 2023-12-28 PROCEDURE — 82803 BLOOD GASES ANY COMBINATION: CPT

## 2023-12-28 PROCEDURE — 87635 SARS-COV-2 COVID-19 AMP PRB: CPT

## 2023-12-28 PROCEDURE — 80053 COMPREHEN METABOLIC PANEL: CPT

## 2023-12-28 PROCEDURE — 5A09357 ASSISTANCE WITH RESPIRATORY VENTILATION, LESS THAN 24 CONSECUTIVE HOURS, CONTINUOUS POSITIVE AIRWAY PRESSURE: ICD-10-PCS | Performed by: INTERNAL MEDICINE

## 2023-12-28 PROCEDURE — 94660 CPAP INITIATION&MGMT: CPT

## 2023-12-28 PROCEDURE — 71045 X-RAY EXAM CHEST 1 VIEW: CPT

## 2023-12-28 PROCEDURE — 83550 IRON BINDING TEST: CPT

## 2023-12-28 PROCEDURE — 85730 THROMBOPLASTIN TIME PARTIAL: CPT

## 2023-12-28 PROCEDURE — 99291 CRITICAL CARE FIRST HOUR: CPT | Performed by: INTERNAL MEDICINE

## 2023-12-28 PROCEDURE — 0BH17EZ INSERTION OF ENDOTRACHEAL AIRWAY INTO TRACHEA, VIA NATURAL OR ARTIFICIAL OPENING: ICD-10-PCS | Performed by: INTERNAL MEDICINE

## 2023-12-28 PROCEDURE — 36569 INSJ PICC 5 YR+ W/O IMAGING: CPT

## 2023-12-28 PROCEDURE — 2100000000 HC CCU R&B

## 2023-12-28 PROCEDURE — 93005 ELECTROCARDIOGRAM TRACING: CPT | Performed by: EMERGENCY MEDICINE

## 2023-12-28 PROCEDURE — 85610 PROTHROMBIN TIME: CPT

## 2023-12-28 PROCEDURE — 2580000003 HC RX 258: Performed by: INTERNAL MEDICINE

## 2023-12-28 PROCEDURE — 85025 COMPLETE CBC W/AUTO DIFF WBC: CPT

## 2023-12-28 PROCEDURE — 87804 INFLUENZA ASSAY W/OPTIC: CPT

## 2023-12-28 PROCEDURE — 6370000000 HC RX 637 (ALT 250 FOR IP): Performed by: INTERNAL MEDICINE

## 2023-12-28 PROCEDURE — 6360000002 HC RX W HCPCS: Performed by: EMERGENCY MEDICINE

## 2023-12-28 PROCEDURE — 82728 ASSAY OF FERRITIN: CPT

## 2023-12-28 PROCEDURE — 94761 N-INVAS EAR/PLS OXIMETRY MLT: CPT

## 2023-12-28 PROCEDURE — 99285 EMERGENCY DEPT VISIT HI MDM: CPT

## 2023-12-28 PROCEDURE — 96374 THER/PROPH/DIAG INJ IV PUSH: CPT

## 2023-12-28 PROCEDURE — 31500 INSERT EMERGENCY AIRWAY: CPT | Performed by: INTERNAL MEDICINE

## 2023-12-28 PROCEDURE — 94002 VENT MGMT INPAT INIT DAY: CPT

## 2023-12-28 PROCEDURE — 5A1945Z RESPIRATORY VENTILATION, 24-96 CONSECUTIVE HOURS: ICD-10-PCS | Performed by: INTERNAL MEDICINE

## 2023-12-28 PROCEDURE — 94640 AIRWAY INHALATION TREATMENT: CPT

## 2023-12-28 PROCEDURE — 36600 WITHDRAWAL OF ARTERIAL BLOOD: CPT

## 2023-12-28 PROCEDURE — 93010 ELECTROCARDIOGRAM REPORT: CPT | Performed by: INTERNAL MEDICINE

## 2023-12-28 PROCEDURE — 2700000000 HC OXYGEN THERAPY PER DAY

## 2023-12-28 PROCEDURE — 84484 ASSAY OF TROPONIN QUANT: CPT

## 2023-12-28 PROCEDURE — 83880 ASSAY OF NATRIURETIC PEPTIDE: CPT

## 2023-12-28 PROCEDURE — 2500000003 HC RX 250 WO HCPCS: Performed by: NURSE PRACTITIONER

## 2023-12-28 PROCEDURE — 84145 PROCALCITONIN (PCT): CPT

## 2023-12-28 PROCEDURE — 85520 HEPARIN ASSAY: CPT

## 2023-12-28 PROCEDURE — 2500000003 HC RX 250 WO HCPCS

## 2023-12-28 PROCEDURE — 2580000003 HC RX 258: Performed by: NURSE PRACTITIONER

## 2023-12-28 RX ORDER — VENLAFAXINE 50 MG/1
50 TABLET ORAL 2 TIMES DAILY
COMMUNITY

## 2023-12-28 RX ORDER — LACOSAMIDE 50 MG/1
50 TABLET ORAL EVERY 12 HOURS SCHEDULED
Status: DISCONTINUED | OUTPATIENT
Start: 2023-12-28 | End: 2023-12-28

## 2023-12-28 RX ORDER — DOCUSATE SODIUM 100 MG/1
100 CAPSULE, LIQUID FILLED ORAL DAILY
Status: DISCONTINUED | OUTPATIENT
Start: 2023-12-29 | End: 2023-12-29

## 2023-12-28 RX ORDER — ACETAMINOPHEN 325 MG/1
650 TABLET ORAL EVERY 6 HOURS PRN
Status: DISCONTINUED | OUTPATIENT
Start: 2023-12-28 | End: 2024-01-03

## 2023-12-28 RX ORDER — TROSPIUM CHLORIDE 20 MG/1
20 TABLET, FILM COATED ORAL
Status: DISCONTINUED | OUTPATIENT
Start: 2023-12-28 | End: 2023-12-28 | Stop reason: CLARIF

## 2023-12-28 RX ORDER — LIDOCAINE HYDROCHLORIDE 10 MG/ML
5 INJECTION, SOLUTION EPIDURAL; INFILTRATION; INTRACAUDAL; PERINEURAL ONCE
Status: DISCONTINUED | OUTPATIENT
Start: 2023-12-28 | End: 2024-01-02

## 2023-12-28 RX ORDER — POTASSIUM CHLORIDE 7.45 MG/ML
10 INJECTION INTRAVENOUS PRN
Status: DISCONTINUED | OUTPATIENT
Start: 2023-12-28 | End: 2023-12-31

## 2023-12-28 RX ORDER — POLYETHYLENE GLYCOL 3350 17 G/17G
17 POWDER, FOR SOLUTION ORAL DAILY PRN
Status: DISCONTINUED | OUTPATIENT
Start: 2023-12-28 | End: 2024-01-03 | Stop reason: HOSPADM

## 2023-12-28 RX ORDER — SODIUM CHLORIDE 0.9 % (FLUSH) 0.9 %
5-40 SYRINGE (ML) INJECTION EVERY 12 HOURS SCHEDULED
Status: DISCONTINUED | OUTPATIENT
Start: 2023-12-28 | End: 2023-12-28 | Stop reason: SDUPTHER

## 2023-12-28 RX ORDER — MIDAZOLAM HYDROCHLORIDE 1 MG/ML
2 INJECTION INTRAMUSCULAR; INTRAVENOUS ONCE
Status: COMPLETED | OUTPATIENT
Start: 2023-12-28 | End: 2023-12-29

## 2023-12-28 RX ORDER — POTASSIUM CHLORIDE 750 MG/1
10 TABLET, EXTENDED RELEASE ORAL DAILY
COMMUNITY

## 2023-12-28 RX ORDER — HYDROXYCHLOROQUINE SULFATE 200 MG/1
200 TABLET, FILM COATED ORAL DAILY
COMMUNITY

## 2023-12-28 RX ORDER — VIBEGRON 75 MG/1
75 TABLET, FILM COATED ORAL DAILY
COMMUNITY

## 2023-12-28 RX ORDER — FLUTICASONE PROPIONATE 50 MCG
1 SPRAY, SUSPENSION (ML) NASAL DAILY
COMMUNITY

## 2023-12-28 RX ORDER — HYDRALAZINE HYDROCHLORIDE 25 MG/1
25 TABLET, FILM COATED ORAL 3 TIMES DAILY
Status: DISCONTINUED | OUTPATIENT
Start: 2023-12-28 | End: 2024-01-03 | Stop reason: HOSPADM

## 2023-12-28 RX ORDER — FUROSEMIDE 10 MG/ML
20 INJECTION INTRAMUSCULAR; INTRAVENOUS ONCE
Status: COMPLETED | OUTPATIENT
Start: 2023-12-28 | End: 2023-12-28

## 2023-12-28 RX ORDER — FENTANYL CITRATE-0.9 % NACL/PF 10 MCG/ML
25-300 PLASTIC BAG, INJECTION (ML) INTRAVENOUS CONTINUOUS
Status: DISCONTINUED | OUTPATIENT
Start: 2023-12-28 | End: 2024-01-01

## 2023-12-28 RX ORDER — PREGABALIN 75 MG/1
150 CAPSULE ORAL EVERY 12 HOURS SCHEDULED
Status: DISCONTINUED | OUTPATIENT
Start: 2023-12-28 | End: 2023-12-28

## 2023-12-28 RX ORDER — ALBUTEROL SULFATE 2.5 MG/3ML
2.5 SOLUTION RESPIRATORY (INHALATION)
Status: DISCONTINUED | OUTPATIENT
Start: 2023-12-28 | End: 2024-01-03 | Stop reason: HOSPADM

## 2023-12-28 RX ORDER — PHENYTOIN 50 MG/1
100 TABLET, CHEWABLE ORAL 3 TIMES DAILY
Status: DISCONTINUED | OUTPATIENT
Start: 2023-12-28 | End: 2023-12-28

## 2023-12-28 RX ORDER — AMPICILLIN TRIHYDRATE 500 MG
1 CAPSULE ORAL 2 TIMES DAILY
COMMUNITY

## 2023-12-28 RX ORDER — HYDROXYCHLOROQUINE SULFATE 200 MG/1
300 TABLET, FILM COATED ORAL DAILY
Status: DISCONTINUED | OUTPATIENT
Start: 2023-12-28 | End: 2023-12-28 | Stop reason: DRUGHIGH

## 2023-12-28 RX ORDER — VITAMIN B COMPLEX
3000 TABLET ORAL DAILY
Status: DISCONTINUED | OUTPATIENT
Start: 2023-12-29 | End: 2024-01-03 | Stop reason: HOSPADM

## 2023-12-28 RX ORDER — MENTHOL 40 MG/ML
1 GEL TOPICAL DAILY
COMMUNITY

## 2023-12-28 RX ORDER — LOPERAMIDE HYDROCHLORIDE 2 MG/1
2 CAPSULE ORAL 4 TIMES DAILY PRN
Status: ON HOLD | COMMUNITY
End: 2024-01-03 | Stop reason: HOSPADM

## 2023-12-28 RX ORDER — SERTRALINE HYDROCHLORIDE 25 MG/1
25 TABLET, FILM COATED ORAL DAILY
Status: ON HOLD | COMMUNITY
End: 2024-01-03

## 2023-12-28 RX ORDER — HEPARIN SODIUM 1000 [USP'U]/ML
80 INJECTION, SOLUTION INTRAVENOUS; SUBCUTANEOUS PRN
Status: DISCONTINUED | OUTPATIENT
Start: 2023-12-28 | End: 2023-12-28

## 2023-12-28 RX ORDER — DEXTROMETHORPHAN POLISTIREX 30 MG/5ML
60 SUSPENSION ORAL 2 TIMES DAILY PRN
Status: DISCONTINUED | OUTPATIENT
Start: 2023-12-28 | End: 2023-12-28 | Stop reason: CLARIF

## 2023-12-28 RX ORDER — ENOXAPARIN SODIUM 100 MG/ML
30 INJECTION SUBCUTANEOUS 2 TIMES DAILY
Status: DISCONTINUED | OUTPATIENT
Start: 2023-12-28 | End: 2023-12-28

## 2023-12-28 RX ORDER — GABAPENTIN 100 MG/1
100 CAPSULE ORAL NIGHTLY
Status: DISCONTINUED | OUTPATIENT
Start: 2023-12-28 | End: 2024-01-03 | Stop reason: HOSPADM

## 2023-12-28 RX ORDER — FUROSEMIDE 20 MG/1
20 TABLET ORAL DAILY
Status: ON HOLD | COMMUNITY
End: 2024-01-03

## 2023-12-28 RX ORDER — MIDAZOLAM HYDROCHLORIDE 1 MG/ML
1-10 INJECTION, SOLUTION INTRAVENOUS CONTINUOUS
Status: DISCONTINUED | OUTPATIENT
Start: 2023-12-28 | End: 2024-01-02

## 2023-12-28 RX ORDER — TROSPIUM CHLORIDE 20 MG/1
20 TABLET, FILM COATED ORAL
Status: DISCONTINUED | OUTPATIENT
Start: 2023-12-29 | End: 2024-01-03 | Stop reason: HOSPADM

## 2023-12-28 RX ORDER — HEPARIN SODIUM 1000 [USP'U]/ML
40 INJECTION, SOLUTION INTRAVENOUS; SUBCUTANEOUS PRN
Status: DISCONTINUED | OUTPATIENT
Start: 2023-12-28 | End: 2023-12-28

## 2023-12-28 RX ORDER — SODIUM CHLORIDE 9 MG/ML
25 INJECTION, SOLUTION INTRAVENOUS PRN
Status: DISCONTINUED | OUTPATIENT
Start: 2023-12-28 | End: 2024-01-03 | Stop reason: HOSPADM

## 2023-12-28 RX ORDER — POTASSIUM CHLORIDE 20 MEQ/1
40 TABLET, EXTENDED RELEASE ORAL PRN
Status: DISCONTINUED | OUTPATIENT
Start: 2023-12-28 | End: 2023-12-31

## 2023-12-28 RX ORDER — ONDANSETRON 2 MG/ML
4 INJECTION INTRAMUSCULAR; INTRAVENOUS EVERY 6 HOURS PRN
Status: DISCONTINUED | OUTPATIENT
Start: 2023-12-28 | End: 2024-01-03 | Stop reason: HOSPADM

## 2023-12-28 RX ORDER — HYDROXYCHLOROQUINE SULFATE 200 MG/1
200 TABLET, FILM COATED ORAL DAILY
Status: DISCONTINUED | OUTPATIENT
Start: 2023-12-29 | End: 2024-01-03 | Stop reason: HOSPADM

## 2023-12-28 RX ORDER — ALLOPURINOL 100 MG/1
100 TABLET ORAL NIGHTLY
Status: DISCONTINUED | OUTPATIENT
Start: 2023-12-28 | End: 2024-01-03 | Stop reason: HOSPADM

## 2023-12-28 RX ORDER — MIRTAZAPINE 7.5 MG/1
7.5 TABLET, FILM COATED ORAL NIGHTLY
COMMUNITY

## 2023-12-28 RX ORDER — SODIUM CHLORIDE 9 MG/ML
INJECTION, SOLUTION INTRAVENOUS PRN
Status: DISCONTINUED | OUTPATIENT
Start: 2023-12-28 | End: 2023-12-28 | Stop reason: SDUPTHER

## 2023-12-28 RX ORDER — ATORVASTATIN CALCIUM 10 MG/1
10 TABLET, FILM COATED ORAL NIGHTLY
Status: DISCONTINUED | OUTPATIENT
Start: 2023-12-28 | End: 2024-01-03 | Stop reason: HOSPADM

## 2023-12-28 RX ORDER — GUAIFENESIN/DEXTROMETHORPHAN 100-10MG/5
10 SYRUP ORAL 2 TIMES DAILY PRN
Status: DISCONTINUED | OUTPATIENT
Start: 2023-12-28 | End: 2024-01-03 | Stop reason: HOSPADM

## 2023-12-28 RX ORDER — ONDANSETRON 4 MG/1
4 TABLET, ORALLY DISINTEGRATING ORAL EVERY 8 HOURS PRN
Status: DISCONTINUED | OUTPATIENT
Start: 2023-12-28 | End: 2024-01-03 | Stop reason: HOSPADM

## 2023-12-28 RX ORDER — DULOXETIN HYDROCHLORIDE 20 MG/1
20 CAPSULE, DELAYED RELEASE ORAL NIGHTLY
Status: DISCONTINUED | OUTPATIENT
Start: 2023-12-28 | End: 2023-12-28

## 2023-12-28 RX ORDER — FUROSEMIDE 10 MG/ML
40 INJECTION INTRAMUSCULAR; INTRAVENOUS 2 TIMES DAILY
Status: DISCONTINUED | OUTPATIENT
Start: 2023-12-28 | End: 2023-12-31

## 2023-12-28 RX ORDER — ACETAMINOPHEN 650 MG/1
650 SUPPOSITORY RECTAL EVERY 6 HOURS PRN
Status: DISCONTINUED | OUTPATIENT
Start: 2023-12-28 | End: 2024-01-03

## 2023-12-28 RX ORDER — SODIUM CHLORIDE 0.9 % (FLUSH) 0.9 %
5-40 SYRINGE (ML) INJECTION PRN
Status: DISCONTINUED | OUTPATIENT
Start: 2023-12-28 | End: 2023-12-28 | Stop reason: SDUPTHER

## 2023-12-28 RX ORDER — SODIUM CHLORIDE 0.9 % (FLUSH) 0.9 %
5-40 SYRINGE (ML) INJECTION EVERY 12 HOURS SCHEDULED
Status: DISCONTINUED | OUTPATIENT
Start: 2023-12-28 | End: 2024-01-03 | Stop reason: HOSPADM

## 2023-12-28 RX ORDER — LEVETIRACETAM 500 MG/1
500 TABLET ORAL 2 TIMES DAILY
Status: DISCONTINUED | OUTPATIENT
Start: 2023-12-28 | End: 2023-12-28

## 2023-12-28 RX ORDER — ASPIRIN 81 MG/1
81 TABLET, CHEWABLE ORAL DAILY
Status: DISCONTINUED | OUTPATIENT
Start: 2023-12-29 | End: 2024-01-03 | Stop reason: HOSPADM

## 2023-12-28 RX ORDER — MAGNESIUM SULFATE IN WATER 40 MG/ML
2000 INJECTION, SOLUTION INTRAVENOUS PRN
Status: DISCONTINUED | OUTPATIENT
Start: 2023-12-28 | End: 2024-01-03 | Stop reason: HOSPADM

## 2023-12-28 RX ORDER — PROPOFOL 10 MG/ML
5-50 INJECTION, EMULSION INTRAVENOUS CONTINUOUS
Status: DISCONTINUED | OUTPATIENT
Start: 2023-12-28 | End: 2024-01-02

## 2023-12-28 RX ORDER — MEMANTINE HYDROCHLORIDE 10 MG/1
10 TABLET ORAL 2 TIMES DAILY
Status: DISCONTINUED | OUTPATIENT
Start: 2023-12-28 | End: 2024-01-03 | Stop reason: HOSPADM

## 2023-12-28 RX ORDER — LANOLIN ALCOHOL/MO/W.PET/CERES
3 CREAM (GRAM) TOPICAL NIGHTLY
Status: DISCONTINUED | OUTPATIENT
Start: 2023-12-28 | End: 2024-01-03 | Stop reason: HOSPADM

## 2023-12-28 RX ORDER — SODIUM CHLORIDE 0.9 % (FLUSH) 0.9 %
5-40 SYRINGE (ML) INJECTION PRN
Status: DISCONTINUED | OUTPATIENT
Start: 2023-12-28 | End: 2024-01-03 | Stop reason: HOSPADM

## 2023-12-28 RX ORDER — TRAMADOL HYDROCHLORIDE 50 MG/1
50 TABLET ORAL EVERY 6 HOURS PRN
Status: ON HOLD | COMMUNITY
End: 2024-01-03 | Stop reason: HOSPADM

## 2023-12-28 RX ORDER — METHYLPREDNISOLONE SODIUM SUCCINATE 40 MG/ML
40 INJECTION, POWDER, LYOPHILIZED, FOR SOLUTION INTRAMUSCULAR; INTRAVENOUS DAILY
Status: DISCONTINUED | OUTPATIENT
Start: 2023-12-28 | End: 2024-01-03

## 2023-12-28 RX ORDER — HEPARIN SODIUM 1000 [USP'U]/ML
80 INJECTION, SOLUTION INTRAVENOUS; SUBCUTANEOUS ONCE
Status: COMPLETED | OUTPATIENT
Start: 2023-12-28 | End: 2023-12-28

## 2023-12-28 RX ORDER — HEPARIN SODIUM 10000 [USP'U]/100ML
0-4000 INJECTION, SOLUTION INTRAVENOUS CONTINUOUS
Status: DISCONTINUED | OUTPATIENT
Start: 2023-12-28 | End: 2023-12-29

## 2023-12-28 RX ORDER — FERROUS SULFATE 325(65) MG
325 TABLET ORAL
Status: DISCONTINUED | OUTPATIENT
Start: 2023-12-29 | End: 2023-12-29

## 2023-12-28 RX ORDER — FAMOTIDINE 20 MG/1
20 TABLET, FILM COATED ORAL NIGHTLY
Status: DISCONTINUED | OUTPATIENT
Start: 2023-12-28 | End: 2023-12-28

## 2023-12-28 RX ORDER — PROPOFOL 10 MG/ML
INJECTION, EMULSION INTRAVENOUS
Status: DISPENSED
Start: 2023-12-28 | End: 2023-12-29

## 2023-12-28 RX ORDER — LIDOCAINE 50 MG/G
1 PATCH TOPICAL DAILY
COMMUNITY

## 2023-12-28 RX ADMIN — MOMETASONE FUROATE AND FORMOTEROL FUMARATE DIHYDRATE 2 PUFF: 100; 5 AEROSOL RESPIRATORY (INHALATION) at 20:15

## 2023-12-28 RX ADMIN — SODIUM CHLORIDE, PRESERVATIVE FREE 10 ML: 5 INJECTION INTRAVENOUS at 22:42

## 2023-12-28 RX ADMIN — MEMANTINE 10 MG: 10 TABLET ORAL at 23:35

## 2023-12-28 RX ADMIN — METHYLPREDNISOLONE SODIUM SUCCINATE 40 MG: 40 INJECTION, POWDER, LYOPHILIZED, FOR SOLUTION INTRAMUSCULAR; INTRAVENOUS at 19:30

## 2023-12-28 RX ADMIN — HEPARIN SODIUM 1830 UNITS/HR: 10000 INJECTION, SOLUTION INTRAVENOUS at 19:41

## 2023-12-28 RX ADMIN — HYDRALAZINE HYDROCHLORIDE 25 MG: 25 TABLET, FILM COATED ORAL at 22:40

## 2023-12-28 RX ADMIN — Medication 25 MCG/HR: at 16:56

## 2023-12-28 RX ADMIN — ALBUTEROL SULFATE 2.5 MG: 2.5 SOLUTION RESPIRATORY (INHALATION) at 11:55

## 2023-12-28 RX ADMIN — PROPOFOL 45 MCG/KG/MIN: 10 INJECTION, EMULSION INTRAVENOUS at 22:13

## 2023-12-28 RX ADMIN — ALLOPURINOL 100 MG: 100 TABLET ORAL at 22:40

## 2023-12-28 RX ADMIN — ALBUTEROL SULFATE 2.5 MG: 2.5 SOLUTION RESPIRATORY (INHALATION) at 20:15

## 2023-12-28 RX ADMIN — ATORVASTATIN CALCIUM 10 MG: 10 TABLET, FILM COATED ORAL at 22:40

## 2023-12-28 RX ADMIN — HEPARIN SODIUM 8140 UNITS: 1000 INJECTION INTRAVENOUS; SUBCUTANEOUS at 19:38

## 2023-12-28 RX ADMIN — Medication 20 MG: at 19:30

## 2023-12-28 RX ADMIN — FUROSEMIDE 40 MG: 10 INJECTION, SOLUTION INTRAMUSCULAR; INTRAVENOUS at 19:31

## 2023-12-28 RX ADMIN — MIDAZOLAM HYDROCHLORIDE 2 MG/HR: 1 INJECTION, SOLUTION INTRAVENOUS at 19:44

## 2023-12-28 RX ADMIN — FUROSEMIDE 20 MG: 10 INJECTION, SOLUTION INTRAMUSCULAR; INTRAVENOUS at 11:20

## 2023-12-28 RX ADMIN — ALBUTEROL SULFATE 2.5 MG: 2.5 SOLUTION RESPIRATORY (INHALATION) at 16:43

## 2023-12-28 RX ADMIN — PROPOFOL 20 MCG/KG/MIN: 10 INJECTION, EMULSION INTRAVENOUS at 15:53

## 2023-12-28 RX ADMIN — ALBUTEROL SULFATE 2.5 MG: 2.5 SOLUTION RESPIRATORY (INHALATION) at 09:53

## 2023-12-28 RX ADMIN — MIDAZOLAM 2 MG: 1 INJECTION INTRAMUSCULAR; INTRAVENOUS at 19:41

## 2023-12-28 RX ADMIN — PROPOFOL 45 MCG/KG/MIN: 10 INJECTION, EMULSION INTRAVENOUS at 18:48

## 2023-12-28 RX ADMIN — GABAPENTIN 100 MG: 100 CAPSULE ORAL at 22:40

## 2023-12-28 ASSESSMENT — PULMONARY FUNCTION TESTS
PIF_VALUE: 22
PIF_VALUE: 23
PIF_VALUE: 26
PIF_VALUE: 21
PIF_VALUE: 28
PIF_VALUE: 23
PIF_VALUE: 25
PIF_VALUE: 25
PIF_VALUE: 38
PIF_VALUE: 38
PIF_VALUE: 25
PIF_VALUE: 24
PIF_VALUE: 26
PIF_VALUE: 23
PIF_VALUE: 40
PIF_VALUE: 26
PIF_VALUE: 23
PIF_VALUE: 25
PIF_VALUE: 25
PIF_VALUE: 27
PIF_VALUE: 29
PIF_VALUE: 31
PIF_VALUE: 33
PIF_VALUE: 23
PIF_VALUE: 22
PIF_VALUE: 27
PIF_VALUE: 37
PIF_VALUE: 30
PIF_VALUE: 22
PIF_VALUE: 23
PIF_VALUE: 25
PIF_VALUE: 27
PIF_VALUE: 26
PIF_VALUE: 42
PIF_VALUE: 30
PIF_VALUE: 24
PIF_VALUE: 24
PIF_VALUE: 26
PIF_VALUE: 25
PIF_VALUE: 28
PIF_VALUE: 26
PIF_VALUE: 28
PIF_VALUE: 24
PIF_VALUE: 38
PIF_VALUE: 26
PIF_VALUE: 38
PIF_VALUE: 40
PIF_VALUE: 32
PIF_VALUE: 25
PIF_VALUE: 26
PIF_VALUE: 30
PIF_VALUE: 27
PIF_VALUE: 25
PIF_VALUE: 30
PIF_VALUE: 34
PIF_VALUE: 22
PIF_VALUE: 34
PIF_VALUE: 24
PIF_VALUE: 27
PIF_VALUE: 37

## 2023-12-28 ASSESSMENT — PAIN SCALES - GENERAL
PAINLEVEL_OUTOF10: 0
PAINLEVEL_OUTOF10: 0

## 2023-12-28 ASSESSMENT — LIFESTYLE VARIABLES: HOW OFTEN DO YOU HAVE A DRINK CONTAINING ALCOHOL: NEVER

## 2023-12-28 ASSESSMENT — PAIN - FUNCTIONAL ASSESSMENT: PAIN_FUNCTIONAL_ASSESSMENT: NONE - DENIES PAIN

## 2023-12-28 NOTE — ED NOTES
Received call from CHAYITO Carl NP; will need ICU bed. Joe duffy RN notified. Receiving MADHU Evans at bedside and aware of all.

## 2023-12-28 NOTE — DISCHARGE INSTRUCTIONS
Extra Heart Failure sites:     https://[employer]iq.com/publication/?u=845809   --- this is American Heart Association interactive Healthier Living with Heart Failure guidebook.  Please click hyperlink or copy / paste link into search bar. Use your mouse to scroll through the pages.  Lots of information about weight monitoring, diet tips, activity, meds, etc    HF Wilson jd  -- this is a free smart phone jd available for iPhone and Android download.  Use your phone to track sodium / fluid intake, zone tool symptom tracking, weights, medications, etc. Click on this hyperlink  HF Wilson Jd   for QR code for easy download.      DASH (Dietary Approach to Stop Hypertension) diet --  https://www.nhlbi.nih.gov/education/dash-eating-plan -- this diet is a flexible eating plan that promotes heart healthy eating style.  Click on hyperlink or copy / paste link into search bar.  Lots of low sodium recipes and tips.    https://www.[employer]cura/recipes  -- more free recipes

## 2023-12-28 NOTE — ED TRIAGE NOTES
To ER from [facility] via [facility]. Reported to have needed o2 at nursing home, and to have CP last night, now resolved. 83 RA on arrival. Denies any pain. A/ox4 but slow to respond.

## 2023-12-28 NOTE — CONSULTS
HF RN consult noted per order set, chart reviewed. Pt is being treated for acute resp failure felt to be due to COPD exacerbation as well as an acute on chronic HF exacerbation. Pt carries a history of HFpEF. Both Pulmonology and Cardiology are consulted and are following. A repeat echo is pending. She resides in a LTC facility at [facility] where her care is managed to include meals.  Pt is presently intubated. Appropriate HF orders are in place ie daily wts, I&Os, JIN dt (now NPO d/t intubation). HF added to both Careplans and education. HF dc instructions placed on AVS as well as on MUSA for facility to follow. HF RN will continue to follow peripherally at this time to help ensure GDMT is met as well as any appropriate outpt follow up, if needed.

## 2023-12-28 NOTE — H&P
History and Physical      Name:  Dyan Chaudhary /Age/Sex: 1950  (73 y.o. female)   MRN & CSN:  [id number] & [id number] Admission Date/Time: 2023  7:41 AM   Location:  [address]15 PCP: Alphonso Del Castillo Yonathan       [facility] Day: 1    Assessment and Plan:   Dyan Chaudhary is a 73 y.o.  female with past medical history of COPD, seizure disorder, CAD, GERD, chronic anemia, vascular dementia, was sent from [facility] for evaluation of hypoxia/chest pain.  Patient is a poor historian.  Currently on BiPAP.  History obtained via chart review.  Patient apparently noted to have low oxygen saturation in the nursing home.  Upon arrival to emergency room, patient was noted to be hypoxic into the 80s upon arrival.  Initially Placed on nasal cannula.  VBG showed pH of 7.17, pCO2 of 70.7 placed on BiPAP.  proBNP elevated.  Started on IV diuresis.  X-ray chest shows cardiomegaly with atelectatic lungs    Assessment    Acute hypoxic respiratory failure likely secondary to acute CHF, unspecified, exacerbation  Acute COPD exacerbation  CAD  Hypertension  Vascular dementia  H/o Seizure disorder  GERD    Plan    Telemetry monitoring  Admit patient to PCU.  Continue BiPAP support at this time.  Consult pulmonary/cardiology.  Management appreciated.  Check VBG  Continue IV diuresis with Lasix 40 mg IV twice daily  Started on daily IV Solu-Medrol  Continue Dulera/Incruse Ellipta  N.p.o. for now.  Low threshold for intubation as per pulmonary.  Patient is full code.  Daily weights, strict I's and O's  Obtain echocardiogram  Continue aspirin/Lipitor/hydralazine  Continue Neurontin/Lyrica/Plaquenil  Continue Namenda  Continue Pepcid/allopurinol/ferrous sulfate  Palliative care evaluation  Pharmacy to confirm home meds    Diet ADULT DIET; Regular; No Added Salt (3-4 gm)   DVT Prophylaxis [x] Lovenox, []  Heparin, [] SCDs, [] Ambulation   GI Prophylaxis [] PPI,  [x] H2 Blocker,  [] Carafate,  [] Diet/Tube Feeds   Code

## 2023-12-28 NOTE — CONSULTS
Pulmonary Critical Care Consult Note     Patient's name:  Dyan Chaudhary  Medical Record Number: [id number]  Patient's account/billing number: [id number]  Patient's YOB: 1950  Age: 73 y.o.  Date of Admission: 12/28/2023  7:41 AM  Date of Consult: 12/28/2023      Primary Care Physician: Alphonso Del Castillo      Code Status: Full Code    Reason for consult: Acute respiratory failure with hypoxia and hypercapnia    Assessment and Plan     Acute respiratory with hypoxia and hypercapnia  CHF acute on chronic diastolic exacerbation  H/o COPD/asthma  LUCIA  Elevated Troponin  Dementia       Plan:  Bipap switched to AVAPS, check ABG in 30 minutes, high risk for intubation  IV diuresis  Systemic steroid  Bronchodilators  Mclain catheter strict I&O  Check PCT  Check 2D echocardiogram  Due to the immediate potential for life-threatening deterioration due to above , I spent 34 minutes providing critical care.  This time is excluding time spent performing procedures.  This note was transcribed using Dragon Dictation software. Please disregard any translational errors.        HISTORY OF PRESENT ILLNESS:   Mr./Ms. Dyan Chaudhary is a 73 y.o. lady with past medical history stated below significant for history of asthma/COPD, history of smoking, nursing home resident,  Presented with hypoxia, sob and chest pain  Found hypoxic and hypercapnic  Elevated BNP and Troponin     Negative COVID and influenza.  Chest x-ray with cardiomegaly and increased vascular congestion.      Past Medical History:        Diagnosis Date    Anemia     Asthma     CKD stage 3 due to type 2 diabetes mellitus (HCC)     COPD (chronic obstructive pulmonary disease) (HCC)     Dementia (HCC)     DM (diabetes mellitus) (HCC)     GERD (gastroesophageal reflux disease)     Gout     HTN (hypertension)     Seizure (HCC)        Past Surgical History:        Procedure Laterality Date    IR TUNNELED CATHETER

## 2023-12-28 NOTE — CONSULTS
Referring Physician: Dr. AMILCAR Shafer  Reason for Consultation: \"CHF exacerbation\"  Chief Complaint: Unable to obtain secondary to medical condition.    Subjective:   History of Present Illness:  Dyan Chaudhary is a 73 y.o. patient who presented to the hospital from a nursing home with hypoxia.  Patient was just recently intubated and is unable to provide any history.  Per reports she has dementia.  Patient's daughter is present bedside who states that she seemed to be in her usual state of health when last seen by her about 4 days ago.  Evidently yesterday she began having worsening shortness of breath and hypoxia.  The daughter states the patient has not been ambulatory for at least 4 years.  She acknowledges the patient does have issues with dementia but knows family and would have appropriate conversation.  She was given a trial of BiPAP but evidently respiratory status continued to decompensate and was ultimately intubated.    Past Medical History:   has a past medical history of Anemia, Asthma, CKD stage 3 due to type 2 diabetes mellitus (HCC), COPD (chronic obstructive pulmonary disease) ([facility]), Dementia ([facility]), DM (diabetes mellitus) ([facility]), GERD (gastroesophageal reflux disease), Gout, HTN (hypertension), and Seizure ([facility]).    Surgical History:   has a past surgical history that includes IR TUNNELED CVC PLACE WO SQ PORT/PUMP > 5 YEARS (2/15/2022).     Social History:   reports that she has never smoked. She has never used smokeless tobacco. She reports that she does not drink alcohol and does not use drugs.     Family History:  family history is not on file.    Home Medications:  Were reviewed and are listed in nursing record and/or below  Prior to Admission medications    Medication Sig Start Date End Date Taking? Authorizing Provider   Cranberry 450 MG CAPS Take 1 capsule by mouth in the morning and at bedtime   Yes Provider, MD Ellis   Menthol, Topical Analgesic, (BIOFREEZE) 4 % GEL Apply 1 Dose

## 2023-12-28 NOTE — DISCHARGE INSTR - COC
Readmission:  0         Discharging to Facility/ Agency   Name: [facility]  Address:  [address] [place] [address]   Phone:  [phone]      / signature: Electronically signed by Chinyere Hodges RN on 1/3/2024 at 5:37 PM      PHYSICIAN SECTION    Prognosis: Fair    Condition at Discharge: Stable    Rehab Potential (if transferring to Rehab): Fair    Recommended Labs or Other Treatments After Discharge:     Physician Certification: I certify the above information and transfer of Dyan Chaudhary  is necessary for the continuing treatment of the diagnosis listed and that she requires Intermediate Nursing Care for greater 30 days.     Update Admission H&P: No change in H&P    PHYSICIAN SIGNATURE:  Electronically signed by Willi Carpenter MD on 1/3/24 at 5:15 PM EST

## 2023-12-29 PROBLEM — I50.23 SYSTOLIC CHF, ACUTE ON CHRONIC (HCC): Status: RESOLVED | Noted: 2023-12-28 | Resolved: 2023-12-29

## 2023-12-29 PROBLEM — J96.21 ACUTE ON CHRONIC RESPIRATORY FAILURE WITH HYPOXIA AND HYPERCAPNIA (HCC): Status: ACTIVE | Noted: 2023-12-29

## 2023-12-29 PROBLEM — J96.22 ACUTE ON CHRONIC RESPIRATORY FAILURE WITH HYPOXIA AND HYPERCAPNIA (HCC): Status: ACTIVE | Noted: 2023-12-29

## 2023-12-29 LAB
ANION GAP SERPL CALCULATED.3IONS-SCNC: 10 MMOL/L (ref 3–16)
BASOPHILS # BLD: 0 K/UL (ref 0–0.2)
BASOPHILS NFR BLD: 0.3 %
BUN SERPL-MCNC: 22 MG/DL (ref 7–20)
CALCIUM SERPL-MCNC: 8.8 MG/DL (ref 8.3–10.6)
CHLORIDE SERPL-SCNC: 108 MMOL/L (ref 99–110)
CHOLEST SERPL-MCNC: 129 MG/DL (ref 0–199)
CO2 SERPL-SCNC: 25 MMOL/L (ref 21–32)
CREAT SERPL-MCNC: 1.5 MG/DL (ref 0.6–1.2)
DEPRECATED RDW RBC AUTO: 16.1 % (ref 12.4–15.4)
EOSINOPHIL # BLD: 0 K/UL (ref 0–0.6)
EOSINOPHIL NFR BLD: 0 %
GFR SERPLBLD CREATININE-BSD FMLA CKD-EPI: 36 ML/MIN/{1.73_M2}
GLUCOSE BLD-MCNC: 127 MG/DL (ref 70–99)
GLUCOSE BLD-MCNC: 131 MG/DL (ref 70–99)
GLUCOSE SERPL-MCNC: 140 MG/DL (ref 70–99)
HCT VFR BLD AUTO: 29.4 % (ref 36–48)
HDLC SERPL-MCNC: 62 MG/DL (ref 40–60)
HGB BLD-MCNC: 9.4 G/DL (ref 12–16)
LDLC SERPL CALC-MCNC: 54 MG/DL
LYMPHOCYTES # BLD: 0.8 K/UL (ref 1–5.1)
LYMPHOCYTES NFR BLD: 13.8 %
MAGNESIUM SERPL-MCNC: 2 MG/DL (ref 1.8–2.4)
MCH RBC QN AUTO: 26.3 PG (ref 26–34)
MCHC RBC AUTO-ENTMCNC: 31.8 G/DL (ref 31–36)
MCV RBC AUTO: 82.9 FL (ref 80–100)
MONOCYTES # BLD: 0.1 K/UL (ref 0–1.3)
MONOCYTES NFR BLD: 2 %
NEUTROPHILS # BLD: 4.9 K/UL (ref 1.7–7.7)
NEUTROPHILS NFR BLD: 83.9 %
ORGANISM: ABNORMAL
PERFORMED ON: ABNORMAL
PERFORMED ON: ABNORMAL
PLATELET # BLD AUTO: 133 K/UL (ref 135–450)
PMV BLD AUTO: 10 FL (ref 5–10.5)
PNEUMONIA PANEL MOLECULAR: ABNORMAL
POTASSIUM SERPL-SCNC: 4.3 MMOL/L (ref 3.5–5.1)
RBC # BLD AUTO: 3.55 M/UL (ref 4–5.2)
REPORT: NORMAL
SODIUM SERPL-SCNC: 143 MMOL/L (ref 136–145)
TRIGL SERPL-MCNC: 63 MG/DL (ref 0–150)
VLDLC SERPL CALC-MCNC: 13 MG/DL
WBC # BLD AUTO: 5.9 K/UL (ref 4–11)

## 2023-12-29 PROCEDURE — 87070 CULTURE OTHR SPECIMN AEROBIC: CPT

## 2023-12-29 PROCEDURE — 2100000000 HC CCU R&B

## 2023-12-29 PROCEDURE — 6360000002 HC RX W HCPCS: Performed by: INTERNAL MEDICINE

## 2023-12-29 PROCEDURE — 85025 COMPLETE CBC W/AUTO DIFF WBC: CPT

## 2023-12-29 PROCEDURE — 94640 AIRWAY INHALATION TREATMENT: CPT

## 2023-12-29 PROCEDURE — 2580000003 HC RX 258: Performed by: NURSE PRACTITIONER

## 2023-12-29 PROCEDURE — 31624 DX BRONCHOSCOPE/LAVAGE: CPT | Performed by: INTERNAL MEDICINE

## 2023-12-29 PROCEDURE — 2580000003 HC RX 258: Performed by: INTERNAL MEDICINE

## 2023-12-29 PROCEDURE — 87186 SC STD MICRODIL/AGAR DIL: CPT

## 2023-12-29 PROCEDURE — 31622 DX BRONCHOSCOPE/WASH: CPT

## 2023-12-29 PROCEDURE — 87633 RESP VIRUS 12-25 TARGETS: CPT

## 2023-12-29 PROCEDURE — 6360000004 HC RX CONTRAST MEDICATION

## 2023-12-29 PROCEDURE — 6360000002 HC RX W HCPCS

## 2023-12-29 PROCEDURE — 83735 ASSAY OF MAGNESIUM: CPT

## 2023-12-29 PROCEDURE — 87205 SMEAR GRAM STAIN: CPT

## 2023-12-29 PROCEDURE — 80048 BASIC METABOLIC PNL TOTAL CA: CPT

## 2023-12-29 PROCEDURE — 6370000000 HC RX 637 (ALT 250 FOR IP): Performed by: INTERNAL MEDICINE

## 2023-12-29 PROCEDURE — 86403 PARTICLE AGGLUT ANTBDY SCRN: CPT

## 2023-12-29 PROCEDURE — 0BC18ZZ EXTIRPATION OF MATTER FROM TRACHEA, VIA NATURAL OR ARTIFICIAL OPENING ENDOSCOPIC: ICD-10-PCS | Performed by: INTERNAL MEDICINE

## 2023-12-29 PROCEDURE — 99152 MOD SED SAME PHYS/QHP 5/>YRS: CPT | Performed by: INTERNAL MEDICINE

## 2023-12-29 PROCEDURE — 2580000003 HC RX 258

## 2023-12-29 PROCEDURE — 99291 CRITICAL CARE FIRST HOUR: CPT | Performed by: INTERNAL MEDICINE

## 2023-12-29 PROCEDURE — C8929 TTE W OR WO FOL WCON,DOPPLER: HCPCS

## 2023-12-29 PROCEDURE — 94761 N-INVAS EAR/PLS OXIMETRY MLT: CPT

## 2023-12-29 PROCEDURE — 80061 LIPID PANEL: CPT

## 2023-12-29 PROCEDURE — 2500000003 HC RX 250 WO HCPCS: Performed by: NURSE PRACTITIONER

## 2023-12-29 PROCEDURE — 0B9D8ZX DRAINAGE OF RIGHT MIDDLE LUNG LOBE, VIA NATURAL OR ARTIFICIAL OPENING ENDOSCOPIC, DIAGNOSTIC: ICD-10-PCS | Performed by: INTERNAL MEDICINE

## 2023-12-29 PROCEDURE — 94003 VENT MGMT INPAT SUBQ DAY: CPT

## 2023-12-29 PROCEDURE — 87077 CULTURE AEROBIC IDENTIFY: CPT

## 2023-12-29 PROCEDURE — 2700000000 HC OXYGEN THERAPY PER DAY

## 2023-12-29 RX ORDER — WATER 10 ML/10ML
INJECTION INTRAMUSCULAR; INTRAVENOUS; SUBCUTANEOUS
Status: COMPLETED
Start: 2023-12-29 | End: 2023-12-29

## 2023-12-29 RX ORDER — FENTANYL CITRATE 50 UG/ML
INJECTION, SOLUTION INTRAMUSCULAR; INTRAVENOUS
Status: COMPLETED
Start: 2023-12-29 | End: 2023-12-29

## 2023-12-29 RX ORDER — MIDAZOLAM HYDROCHLORIDE 1 MG/ML
INJECTION INTRAMUSCULAR; INTRAVENOUS
Status: COMPLETED
Start: 2023-12-29 | End: 2023-12-29

## 2023-12-29 RX ORDER — FERROUS SULFATE 300 MG/5ML
300 LIQUID (ML) ORAL DAILY
Status: DISCONTINUED | OUTPATIENT
Start: 2023-12-30 | End: 2024-01-03 | Stop reason: HOSPADM

## 2023-12-29 RX ORDER — HEPARIN SODIUM 5000 [USP'U]/ML
5000 INJECTION, SOLUTION INTRAVENOUS; SUBCUTANEOUS EVERY 8 HOURS SCHEDULED
Status: DISCONTINUED | OUTPATIENT
Start: 2023-12-29 | End: 2024-01-03 | Stop reason: HOSPADM

## 2023-12-29 RX ORDER — LIDOCAINE HYDROCHLORIDE 10 MG/ML
INJECTION, SOLUTION EPIDURAL; INFILTRATION; INTRACAUDAL; PERINEURAL
Status: DISPENSED
Start: 2023-12-29 | End: 2023-12-30

## 2023-12-29 RX ADMIN — FUROSEMIDE 40 MG: 10 INJECTION, SOLUTION INTRAMUSCULAR; INTRAVENOUS at 09:37

## 2023-12-29 RX ADMIN — ALBUTEROL SULFATE 2.5 MG: 2.5 SOLUTION RESPIRATORY (INHALATION) at 16:13

## 2023-12-29 RX ADMIN — Medication 100 MCG/HR: at 00:55

## 2023-12-29 RX ADMIN — PERFLUTREN 2 ML: 6.52 INJECTION, SUSPENSION INTRAVENOUS at 15:43

## 2023-12-29 RX ADMIN — PROPOFOL 20 MCG/KG/MIN: 10 INJECTION, EMULSION INTRAVENOUS at 22:07

## 2023-12-29 RX ADMIN — Medication 3 MG: at 20:14

## 2023-12-29 RX ADMIN — HYDRALAZINE HYDROCHLORIDE 25 MG: 25 TABLET, FILM COATED ORAL at 09:38

## 2023-12-29 RX ADMIN — FENTANYL CITRATE 50 MCG: 50 INJECTION INTRAMUSCULAR; INTRAVENOUS at 12:16

## 2023-12-29 RX ADMIN — ATORVASTATIN CALCIUM 10 MG: 10 TABLET, FILM COATED ORAL at 20:14

## 2023-12-29 RX ADMIN — ALLOPURINOL 100 MG: 100 TABLET ORAL at 20:14

## 2023-12-29 RX ADMIN — MIDAZOLAM 4 MG: 1 INJECTION INTRAMUSCULAR; INTRAVENOUS at 12:16

## 2023-12-29 RX ADMIN — Medication 100 MCG/HR: at 10:10

## 2023-12-29 RX ADMIN — WATER 10 ML: 1 INJECTION INTRAMUSCULAR; INTRAVENOUS; SUBCUTANEOUS at 09:37

## 2023-12-29 RX ADMIN — MEMANTINE 10 MG: 10 TABLET ORAL at 09:39

## 2023-12-29 RX ADMIN — SODIUM CHLORIDE, PRESERVATIVE FREE 10 ML: 5 INJECTION INTRAVENOUS at 20:12

## 2023-12-29 RX ADMIN — GABAPENTIN 100 MG: 100 CAPSULE ORAL at 20:14

## 2023-12-29 RX ADMIN — FERROUS SULFATE TAB 325 MG (65 MG ELEMENTAL FE) 325 MG: 325 (65 FE) TAB at 09:38

## 2023-12-29 RX ADMIN — PROPOFOL 45 MCG/KG/MIN: 10 INJECTION, EMULSION INTRAVENOUS at 07:38

## 2023-12-29 RX ADMIN — MOMETASONE FUROATE AND FORMOTEROL FUMARATE DIHYDRATE 2 PUFF: 100; 5 AEROSOL RESPIRATORY (INHALATION) at 08:19

## 2023-12-29 RX ADMIN — ALBUTEROL SULFATE 2.5 MG: 2.5 SOLUTION RESPIRATORY (INHALATION) at 11:36

## 2023-12-29 RX ADMIN — MOMETASONE FUROATE AND FORMOTEROL FUMARATE DIHYDRATE 2 PUFF: 100; 5 AEROSOL RESPIRATORY (INHALATION) at 20:30

## 2023-12-29 RX ADMIN — PROPOFOL 40 MCG/KG/MIN: 10 INJECTION, EMULSION INTRAVENOUS at 10:43

## 2023-12-29 RX ADMIN — DOCUSATE SODIUM 100 MG: 50 LIQUID ORAL at 15:41

## 2023-12-29 RX ADMIN — PROPOFOL 30 MCG/KG/MIN: 10 INJECTION, EMULSION INTRAVENOUS at 14:56

## 2023-12-29 RX ADMIN — ALBUTEROL SULFATE 2.5 MG: 2.5 SOLUTION RESPIRATORY (INHALATION) at 08:19

## 2023-12-29 RX ADMIN — FUROSEMIDE 40 MG: 10 INJECTION, SOLUTION INTRAMUSCULAR; INTRAVENOUS at 18:36

## 2023-12-29 RX ADMIN — SODIUM CHLORIDE, PRESERVATIVE FREE 10 ML: 5 INJECTION INTRAVENOUS at 09:40

## 2023-12-29 RX ADMIN — Medication 20 MG: at 09:37

## 2023-12-29 RX ADMIN — Medication 3000 UNITS: at 09:38

## 2023-12-29 RX ADMIN — ALBUTEROL SULFATE 2.5 MG: 2.5 SOLUTION RESPIRATORY (INHALATION) at 20:30

## 2023-12-29 RX ADMIN — MEMANTINE 10 MG: 10 TABLET ORAL at 20:34

## 2023-12-29 RX ADMIN — METHYLPREDNISOLONE SODIUM SUCCINATE 40 MG: 40 INJECTION, POWDER, LYOPHILIZED, FOR SOLUTION INTRAMUSCULAR; INTRAVENOUS at 09:37

## 2023-12-29 RX ADMIN — PROPOFOL 45 MCG/KG/MIN: 10 INJECTION, EMULSION INTRAVENOUS at 02:41

## 2023-12-29 RX ADMIN — ASPIRIN 81 MG: 81 TABLET, CHEWABLE ORAL at 09:38

## 2023-12-29 RX ADMIN — TROSPIUM CHLORIDE 20 MG: 20 TABLET, FILM COATED ORAL at 09:40

## 2023-12-29 RX ADMIN — HYDRALAZINE HYDROCHLORIDE 25 MG: 25 TABLET, FILM COATED ORAL at 15:41

## 2023-12-29 ASSESSMENT — PULMONARY FUNCTION TESTS
PIF_VALUE: 26
PIF_VALUE: 26
PIF_VALUE: 25
PIF_VALUE: 26
PIF_VALUE: 26
PIF_VALUE: 23
PIF_VALUE: 26
PIF_VALUE: 25
PIF_VALUE: 24
PIF_VALUE: 31
PIF_VALUE: 27
PIF_VALUE: 26
PIF_VALUE: 25
PIF_VALUE: 25
PIF_VALUE: 22
PIF_VALUE: 26
PIF_VALUE: 25
PIF_VALUE: 25
PIF_VALUE: 26
PIF_VALUE: 38
PIF_VALUE: 24
PIF_VALUE: 25
PIF_VALUE: 24
PIF_VALUE: 26
PIF_VALUE: 24
PIF_VALUE: 22
PIF_VALUE: 25
PIF_VALUE: 26
PIF_VALUE: 23
PIF_VALUE: 26
PIF_VALUE: 25
PIF_VALUE: 22
PIF_VALUE: 25
PIF_VALUE: 24
PIF_VALUE: 29
PIF_VALUE: 24
PIF_VALUE: 25
PIF_VALUE: 23
PIF_VALUE: 25
PIF_VALUE: 24
PIF_VALUE: 25
PIF_VALUE: 26
PIF_VALUE: 25
PIF_VALUE: 26
PIF_VALUE: 23
PIF_VALUE: 19
PIF_VALUE: 23
PIF_VALUE: 24

## 2023-12-29 ASSESSMENT — PAIN SCALES - GENERAL
PAINLEVEL_OUTOF10: 0

## 2023-12-29 NOTE — CARE COORDINATION
Chart Reviewed.  Pt is from [facility].  Called to Ailyn [phone] who reports she is long term care and does NOT need a prior auth to return.  THEODORA Zavala     Case Management   [phone]    12/29/2023  12:53 PM

## 2023-12-29 NOTE — CARE COORDINATION
Pt is currently vented.  No family at bedside.  Call placed for Dgtr, left non urgent message asking for returned call to discuss DC plan, but NO Emergency at this time.  Will try another time.    THEODORA Zavala     Case Management   [phone]    12/29/2023  1:23 PM

## 2023-12-29 NOTE — PLAN OF CARE
Problem: Respiratory - Adult  Goal: Achieves optimal ventilation and oxygenation  Outcome: Progressing     Problem: Cardiovascular - Adult  Goal: Maintains optimal cardiac output and hemodynamic stability  Outcome: Progressing  Goal: Absence of cardiac dysrhythmias or at baseline  Outcome: Progressing     Problem: Musculoskeletal - Adult  Goal: Return mobility to safest level of function  Outcome: Progressing  Goal: Maintain proper alignment of affected body part  Outcome: Progressing  Goal: Return ADL status to a safe level of function  Outcome: Progressing     Problem: Metabolic/Fluid and Electrolytes - Adult  Goal: Electrolytes maintained within normal limits  Outcome: Progressing  Goal: Hemodynamic stability and optimal renal function maintained  Outcome: Progressing  Goal: Glucose maintained within prescribed range  Outcome: Progressing     Problem: Hematologic - Adult  Goal: Maintains hematologic stability  Outcome: Progressing     Problem: Pain  Goal: Verbalizes/displays adequate comfort level or baseline comfort level  Outcome: Progressing     Problem: Safety - Adult  Goal: Free from fall injury  Outcome: Progressing     Problem: Safety - Medical Restraint  Goal: Remains free of injury from restraints (Restraint for Interference with Medical Device)  Description: INTERVENTIONS:  1. Determine that other, less restrictive measures have been tried or would not be effective before applying the restraint  2. Evaluate the patient's condition at the time of restraint application  3. Inform patient/family regarding the reason for restraint  4. Q2H: Monitor safety, psychosocial status, comfort, nutrition and hydration  Outcome: Progressing

## 2023-12-29 NOTE — CONSULTS
Comprehensive Nutrition Assessment    Type and Reason for Visit:  Initial, Consult    Nutrition Recommendations/Plan:   Nutrition Support, If TF started recommend regimen below:  Initiate Peptide-Based High Protein  Vital High Protein  @ 10mL/hr  As tolerated, increase by 10 mL/hr q 4 hours until goal of 40 mL/hr  Maintenance water flush of 30 ml every 4 hours.      Malnutrition Assessment:  Malnutrition Status:  No malnutrition (12/29/23 1005)    Context:  Acute Illness       Nutrition Assessment:    Consult for TF order and management. Pt w/ CHF, acute respiratory failure. Pt intubated 12/28, sedated on 27.5 ml/hr propofol providing 726 kcals/day. No recent wt hx. Ogt in place. Defer CHF ed until extubated. TF recs above.    Nutrition Related Findings:    140 glucose, 143 Na Wound Type: None       Current Nutrition Intake & Therapies:    Average Meal Intake: NPO  Average Supplements Intake: NPO  Diet NPO  Current Tube Feeding (TF) Orders:  Goal TF & Flush Orders Provides: Vital HP @ 40 ml/hr (Calculated over 20 hrs provides 800 ml total volume, 800 kcals, 70 g protein, 669 ml free water)    Anthropometric Measures:  Height: 149.9 cm (4' 11\")  Ideal Body Weight (IBW): 95 lbs (43 kg)       Current Body Weight: 101.7 kg (224 lb 3.3 oz),   IBW.    Current BMI (kg/m2): 45.3                               Estimated Daily Nutrient Needs:  Energy Requirements Based On: Kcal/kg  Weight Used for Energy Requirements: Current  Energy (kcal/day): 1220 - 1526 (12-15 kcal/kg)  Weight Used for Protein Requirements: Ideal  Protein (g/day): 86 (2 g/kg IBW)     Fluid (ml/day): per provider while in ICU    Nutrition Diagnosis:   Inadequate oral intake related to impaired respiratory function as evidenced by intubation    Nutrition Interventions:   Food and/or Nutrient Delivery: Start Tube Feeding  Nutrition Education/Counseling: Education not indicated  Coordination of Nutrition Care: Continue to monitor while inpatient       Goals:

## 2023-12-29 NOTE — PROCEDURES
ICU PROCEDURE - ENDOTRACHEAL INTUBATION    Dyan Chaudhary     MRN#: [id number]  23      Acct #:@PAZ@     : 1950      INDICATION: as above    TIME OUT: taken    Permission obtained, risks/benefits reviewed:    ANESTHESIA:   []Ketamine  []Ativan  [] Morphine  [x]Propofol  []Other medications:      ESTIMATED BLOOD LOSS:  None.    COMPLICATIONS:  [x]N/A  [] Other:    LARYNGOSCOPIC AIRWAY GRADE (CORMACK-LEHANE):[]1  [x]2a  []2b []3  []4        INTUBATION EQUIPMENT USED:  [x] Direct laryngoscope only    OUTCOME: Successful placement of #  7.5  Taperguard Evac endotracheal via   [x]Oral route    INSERTION DEPTH:  23    cm from   [x]lip           CONFIRMATION OF TUBE POSITION:   [x]Capnography - Strong & repeatable exhaled CO2 detection   [x]Multiple point auscultation   [x]SpO2 response   [x]STAT X-ray   []Bronchoscopic assessment    UNUSUAL FINDINGS:    PROCEDURE:     Using direct laryngoscopy, the vocal cords were visualized and the endotracheal tube was placed through the cords under direct vision.     Good breath sounds were auscultated bilaterally without sounds over abdomen.  Appropriate strong & repeatable exhaled CO2 detection was confirmed.       Electronically signed by Carolann Gilbert MD on 2023 at 9:35 AM

## 2023-12-30 PROBLEM — R04.2 HEMOPTYSIS: Status: ACTIVE | Noted: 2023-12-30

## 2023-12-30 LAB
ANION GAP SERPL CALCULATED.3IONS-SCNC: 8 MMOL/L (ref 3–16)
BUN SERPL-MCNC: 24 MG/DL (ref 7–20)
CALCIUM SERPL-MCNC: 8.7 MG/DL (ref 8.3–10.6)
CHLORIDE SERPL-SCNC: 104 MMOL/L (ref 99–110)
CO2 SERPL-SCNC: 28 MMOL/L (ref 21–32)
CREAT SERPL-MCNC: 1.6 MG/DL (ref 0.6–1.2)
GFR SERPLBLD CREATININE-BSD FMLA CKD-EPI: 33 ML/MIN/{1.73_M2}
GLUCOSE BLD-MCNC: 103 MG/DL (ref 70–99)
GLUCOSE BLD-MCNC: 133 MG/DL (ref 70–99)
GLUCOSE BLD-MCNC: 152 MG/DL (ref 70–99)
GLUCOSE BLD-MCNC: 157 MG/DL (ref 70–99)
GLUCOSE SERPL-MCNC: 105 MG/DL (ref 70–99)
MAGNESIUM SERPL-MCNC: 2 MG/DL (ref 1.8–2.4)
NT-PROBNP SERPL-MCNC: 1834 PG/ML (ref 0–449)
PERFORMED ON: ABNORMAL
POTASSIUM SERPL-SCNC: 3.8 MMOL/L (ref 3.5–5.1)
SODIUM SERPL-SCNC: 140 MMOL/L (ref 136–145)

## 2023-12-30 PROCEDURE — 6370000000 HC RX 637 (ALT 250 FOR IP): Performed by: INTERNAL MEDICINE

## 2023-12-30 PROCEDURE — 94761 N-INVAS EAR/PLS OXIMETRY MLT: CPT

## 2023-12-30 PROCEDURE — 99291 CRITICAL CARE FIRST HOUR: CPT | Performed by: INTERNAL MEDICINE

## 2023-12-30 PROCEDURE — 2100000000 HC CCU R&B

## 2023-12-30 PROCEDURE — 36592 COLLECT BLOOD FROM PICC: CPT

## 2023-12-30 PROCEDURE — 94640 AIRWAY INHALATION TREATMENT: CPT

## 2023-12-30 PROCEDURE — 2580000003 HC RX 258: Performed by: INTERNAL MEDICINE

## 2023-12-30 PROCEDURE — 6360000002 HC RX W HCPCS: Performed by: INTERNAL MEDICINE

## 2023-12-30 PROCEDURE — 83735 ASSAY OF MAGNESIUM: CPT

## 2023-12-30 PROCEDURE — 2580000003 HC RX 258: Performed by: NURSE PRACTITIONER

## 2023-12-30 PROCEDURE — 2500000003 HC RX 250 WO HCPCS: Performed by: NURSE PRACTITIONER

## 2023-12-30 PROCEDURE — 2700000000 HC OXYGEN THERAPY PER DAY

## 2023-12-30 PROCEDURE — 83880 ASSAY OF NATRIURETIC PEPTIDE: CPT

## 2023-12-30 PROCEDURE — 80048 BASIC METABOLIC PNL TOTAL CA: CPT

## 2023-12-30 RX ORDER — LINEZOLID 2 MG/ML
600 INJECTION, SOLUTION INTRAVENOUS EVERY 12 HOURS
Status: DISCONTINUED | OUTPATIENT
Start: 2023-12-30 | End: 2024-01-03 | Stop reason: HOSPADM

## 2023-12-30 RX ADMIN — ALBUTEROL SULFATE 2.5 MG: 2.5 SOLUTION RESPIRATORY (INHALATION) at 16:24

## 2023-12-30 RX ADMIN — SODIUM CHLORIDE, PRESERVATIVE FREE 10 ML: 5 INJECTION INTRAVENOUS at 20:29

## 2023-12-30 RX ADMIN — HEPARIN SODIUM 5000 UNITS: 5000 INJECTION INTRAVENOUS; SUBCUTANEOUS at 21:56

## 2023-12-30 RX ADMIN — HEPARIN SODIUM 5000 UNITS: 5000 INJECTION INTRAVENOUS; SUBCUTANEOUS at 14:14

## 2023-12-30 RX ADMIN — MOMETASONE FUROATE AND FORMOTEROL FUMARATE DIHYDRATE 2 PUFF: 100; 5 AEROSOL RESPIRATORY (INHALATION) at 08:57

## 2023-12-30 RX ADMIN — FUROSEMIDE 40 MG: 10 INJECTION, SOLUTION INTRAMUSCULAR; INTRAVENOUS at 08:00

## 2023-12-30 RX ADMIN — ALBUTEROL SULFATE 2.5 MG: 2.5 SOLUTION RESPIRATORY (INHALATION) at 12:26

## 2023-12-30 RX ADMIN — FUROSEMIDE 40 MG: 10 INJECTION, SOLUTION INTRAMUSCULAR; INTRAVENOUS at 17:54

## 2023-12-30 RX ADMIN — GABAPENTIN 100 MG: 100 CAPSULE ORAL at 20:29

## 2023-12-30 RX ADMIN — HYDRALAZINE HYDROCHLORIDE 25 MG: 25 TABLET, FILM COATED ORAL at 20:28

## 2023-12-30 RX ADMIN — ALLOPURINOL 100 MG: 100 TABLET ORAL at 20:28

## 2023-12-30 RX ADMIN — MEMANTINE 10 MG: 10 TABLET ORAL at 08:01

## 2023-12-30 RX ADMIN — DOCUSATE SODIUM 100 MG: 50 LIQUID ORAL at 07:59

## 2023-12-30 RX ADMIN — LINEZOLID 600 MG: 600 INJECTION, SOLUTION INTRAVENOUS at 11:01

## 2023-12-30 RX ADMIN — TROSPIUM CHLORIDE 20 MG: 20 TABLET, FILM COATED ORAL at 07:59

## 2023-12-30 RX ADMIN — MEMANTINE 10 MG: 10 TABLET ORAL at 20:29

## 2023-12-30 RX ADMIN — PROPOFOL 20 MCG/KG/MIN: 10 INJECTION, EMULSION INTRAVENOUS at 18:26

## 2023-12-30 RX ADMIN — Medication 25 MCG/HR: at 08:20

## 2023-12-30 RX ADMIN — SODIUM CHLORIDE, PRESERVATIVE FREE 10 ML: 5 INJECTION INTRAVENOUS at 08:02

## 2023-12-30 RX ADMIN — HYDRALAZINE HYDROCHLORIDE 25 MG: 25 TABLET, FILM COATED ORAL at 14:07

## 2023-12-30 RX ADMIN — PROPOFOL 15 MCG/KG/MIN: 10 INJECTION, EMULSION INTRAVENOUS at 10:56

## 2023-12-30 RX ADMIN — ALBUTEROL SULFATE 2.5 MG: 2.5 SOLUTION RESPIRATORY (INHALATION) at 20:33

## 2023-12-30 RX ADMIN — Medication 3 MG: at 20:28

## 2023-12-30 RX ADMIN — MOMETASONE FUROATE AND FORMOTEROL FUMARATE DIHYDRATE 2 PUFF: 100; 5 AEROSOL RESPIRATORY (INHALATION) at 20:33

## 2023-12-30 RX ADMIN — METHYLPREDNISOLONE SODIUM SUCCINATE 40 MG: 40 INJECTION, POWDER, LYOPHILIZED, FOR SOLUTION INTRAMUSCULAR; INTRAVENOUS at 08:01

## 2023-12-30 RX ADMIN — Medication 300 MG: at 07:59

## 2023-12-30 RX ADMIN — ASPIRIN 81 MG: 81 TABLET, CHEWABLE ORAL at 08:00

## 2023-12-30 RX ADMIN — ALBUTEROL SULFATE 2.5 MG: 2.5 SOLUTION RESPIRATORY (INHALATION) at 08:57

## 2023-12-30 RX ADMIN — LINEZOLID 600 MG: 600 INJECTION, SOLUTION INTRAVENOUS at 23:40

## 2023-12-30 RX ADMIN — ATORVASTATIN CALCIUM 10 MG: 10 TABLET, FILM COATED ORAL at 20:28

## 2023-12-30 RX ADMIN — Medication 3000 UNITS: at 08:01

## 2023-12-30 RX ADMIN — Medication 25 MCG/HR: at 00:19

## 2023-12-30 RX ADMIN — Medication 20 MG: at 07:59

## 2023-12-30 RX ADMIN — HYDRALAZINE HYDROCHLORIDE 25 MG: 25 TABLET, FILM COATED ORAL at 08:01

## 2023-12-30 ASSESSMENT — PAIN SCALES - GENERAL
PAINLEVEL_OUTOF10: 0

## 2023-12-30 ASSESSMENT — PULMONARY FUNCTION TESTS
PIF_VALUE: 26
PIF_VALUE: 26
PIF_VALUE: 24
PIF_VALUE: 28
PIF_VALUE: 26
PIF_VALUE: 24
PIF_VALUE: 26
PIF_VALUE: 25
PIF_VALUE: 22
PIF_VALUE: 26
PIF_VALUE: 25
PIF_VALUE: 26
PIF_VALUE: 26
PIF_VALUE: 25
PIF_VALUE: 27
PIF_VALUE: 26
PIF_VALUE: 39
PIF_VALUE: 27
PIF_VALUE: 26
PIF_VALUE: 25
PIF_VALUE: 23
PIF_VALUE: 26
PIF_VALUE: 26
PIF_VALUE: 25
PIF_VALUE: 27

## 2023-12-30 NOTE — PROCEDURES
Bronchoscopy Inpatient Procedure Note    Date of Procedure: 12/30/2023      Anesthesia: Conscious Sedation. Total Dose: as per nurse documentation  Asa: III  Mallampati: intubated  Sedation time 15 minutes      Procedure: Flexible fiberoptic bronchoscopy with RML BAL     Estimated Blood Loss: Minimal    Complications: None      Consent to Procedure  The risks, benefits, complications, treatment options and expected outcomes were discussed with the patient and/or POA  The possibilities of reaction to medication, pulmonary aspiration, perforation of a viscus, bleeding, failure to diagnose a condition and creating a complication requiring transfusion or operation were discussed with the patient who freely signed the consent.      Description of Procedure   Dyan was monitored  standard endoscopy monitoring devices. Dyan Chaudhary and the procedure were verified as Flexible Fiberoptic Bronchoscopy.  A Time Out was held and the above information confirmed.     The patient was placed in the appropriate position. The patient was sedated.     The bronchoscope was then passed through ET tube  After careful inspection of the tracheal, the bronchoscope was sequentially passed into all segments of the left and right endobronchial trees to the second and/or third divisions.    Endobronchial findings  Severely inflamed airways bilaterally, some blood clots suctioned from distal trachea  Some sloughing noted at the medial side of the dago could be the source of bleeding as there was no bleeding on serial lavages  BAL obtain from RML

## 2023-12-30 NOTE — PLAN OF CARE
Problem: Respiratory - Adult  Goal: Achieves optimal ventilation and oxygenation  12/30/2023 0936 by Jaclyn Stiles RN  Outcome: Progressing  Flowsheets (Taken 12/29/2023 2000 by Yeimi Carson RN)  Achieves optimal ventilation and oxygenation:   Assess for changes in respiratory status   Assess for changes in mentation and behavior   Position to facilitate oxygenation and minimize respiratory effort   Assess the need for suctioning and aspirate as needed   Respiratory therapy support as indicated  12/29/2023 2237 by Yeimi Carson RN  Outcome: Progressing  Flowsheets (Taken 12/29/2023 2000)  Achieves optimal ventilation and oxygenation:   Assess for changes in respiratory status   Assess for changes in mentation and behavior   Position to facilitate oxygenation and minimize respiratory effort   Assess the need for suctioning and aspirate as needed   Respiratory therapy support as indicated     Problem: Cardiovascular - Adult  Goal: Maintains optimal cardiac output and hemodynamic stability  12/30/2023 0936 by Jaclyn Stiles RN  Outcome: Progressing  Flowsheets (Taken 12/29/2023 2000 by Yeimi Carson RN)  Maintains optimal cardiac output and hemodynamic stability:   Monitor blood pressure and heart rate   Monitor urine output and notify Licensed Independent Practitioner for values outside of normal range   Assess for signs of decreased cardiac output  12/29/2023 2237 by Yeimi Carson RN  Outcome: Progressing  Flowsheets (Taken 12/29/2023 2000)  Maintains optimal cardiac output and hemodynamic stability:   Monitor blood pressure and heart rate   Monitor urine output and notify Licensed Independent Practitioner for values outside of normal range   Assess for signs of decreased cardiac output  Goal: Absence of cardiac dysrhythmias or at baseline  Outcome: Progressing  Flowsheets (Taken 12/29/2023 2000 by Yeimi Carson RN)  Absence of cardiac dysrhythmias or at baseline:   Monitor cardiac rate

## 2023-12-30 NOTE — PLAN OF CARE
Problem: Respiratory - Adult  Goal: Achieves optimal ventilation and oxygenation  Outcome: Progressing  Flowsheets (Taken 12/29/2023 2000)  Achieves optimal ventilation and oxygenation:   Assess for changes in respiratory status   Assess for changes in mentation and behavior   Position to facilitate oxygenation and minimize respiratory effort   Assess the need for suctioning and aspirate as needed   Respiratory therapy support as indicated     Problem: Cardiovascular - Adult  Goal: Maintains optimal cardiac output and hemodynamic stability  Outcome: Progressing  Flowsheets (Taken 12/29/2023 2000)  Maintains optimal cardiac output and hemodynamic stability:   Monitor blood pressure and heart rate   Monitor urine output and notify Licensed Independent Practitioner for values outside of normal range   Assess for signs of decreased cardiac output     Problem: Safety - Adult  Goal: Free from fall injury  Outcome: Progressing     Problem: Safety - Medical Restraint  Goal: Remains free of injury from restraints (Restraint for Interference with Medical Device)  Description: INTERVENTIONS:  1. Determine that other, less restrictive measures have been tried or would not be effective before applying the restraint  2. Evaluate the patient's condition at the time of restraint application  3. Inform patient/family regarding the reason for restraint  4. Q2H: Monitor safety, psychosocial status, comfort, nutrition and hydration  Outcome: Progressing  Flowsheets  Taken 12/29/2023 2200  Remains free of injury from restraints (restraint for interference with medical device):   Determine that other, less restrictive measures have been tried or would not be effective before applying the restraint   Evaluate the patient's condition at the time of restraint application   Every 2 hours: Monitor safety, psychosocial status, comfort, nutrition and hydration   Inform patient/family regarding the reason for restraint    Problem: Skin/Tissue

## 2023-12-31 ENCOUNTER — APPOINTMENT (OUTPATIENT)
Dept: GENERAL RADIOLOGY | Age: 73
DRG: 208 | End: 2023-12-31
Payer: MEDICARE

## 2023-12-31 LAB
ANION GAP SERPL CALCULATED.3IONS-SCNC: 11 MMOL/L (ref 3–16)
BACTERIA SPEC RESP CULT: ABNORMAL
BASE EXCESS BLDA CALC-SCNC: 1.7 MMOL/L (ref -3–3)
BASOPHILS # BLD: 0 K/UL (ref 0–0.2)
BASOPHILS NFR BLD: 0.2 %
BUN SERPL-MCNC: 31 MG/DL (ref 7–20)
CALCIUM SERPL-MCNC: 8.5 MG/DL (ref 8.3–10.6)
CHLORIDE SERPL-SCNC: 102 MMOL/L (ref 99–110)
CO2 BLDA-SCNC: 29.8 MMOL/L
CO2 SERPL-SCNC: 26 MMOL/L (ref 21–32)
COHGB MFR BLDA: 1.3 % (ref 0–1.5)
CREAT SERPL-MCNC: 2.1 MG/DL (ref 0.6–1.2)
DEPRECATED RDW RBC AUTO: 16.1 % (ref 12.4–15.4)
EOSINOPHIL # BLD: 0.1 K/UL (ref 0–0.6)
EOSINOPHIL NFR BLD: 0.8 %
GFR SERPLBLD CREATININE-BSD FMLA CKD-EPI: 24 ML/MIN/{1.73_M2}
GLUCOSE BLD-MCNC: 103 MG/DL (ref 70–99)
GLUCOSE BLD-MCNC: 136 MG/DL (ref 70–99)
GLUCOSE BLD-MCNC: 142 MG/DL (ref 70–99)
GLUCOSE BLD-MCNC: 181 MG/DL (ref 70–99)
GLUCOSE SERPL-MCNC: 128 MG/DL (ref 70–99)
GRAM STN SPEC: ABNORMAL
GRAM STN SPEC: ABNORMAL
HCO3 BLDA-SCNC: 28.1 MMOL/L (ref 21–29)
HCT VFR BLD AUTO: 28.2 % (ref 36–48)
HGB BLD-MCNC: 9.2 G/DL (ref 12–16)
HGB BLDA-MCNC: 10 G/DL (ref 12–16)
LYMPHOCYTES # BLD: 2.5 K/UL (ref 1–5.1)
LYMPHOCYTES NFR BLD: 25.8 %
MAGNESIUM SERPL-MCNC: 1.9 MG/DL (ref 1.8–2.4)
MCH RBC QN AUTO: 26.5 PG (ref 26–34)
MCHC RBC AUTO-ENTMCNC: 32.8 G/DL (ref 31–36)
MCV RBC AUTO: 80.9 FL (ref 80–100)
METHGB MFR BLDA: 0.1 %
MONOCYTES # BLD: 1 K/UL (ref 0–1.3)
MONOCYTES NFR BLD: 10.5 %
NEUTROPHILS # BLD: 6.1 K/UL (ref 1.7–7.7)
NEUTROPHILS NFR BLD: 62.7 %
NT-PROBNP SERPL-MCNC: 1658 PG/ML (ref 0–449)
O2 THERAPY: ABNORMAL
ORGANISM: ABNORMAL
ORGANISM: ABNORMAL
PCO2 BLDA: 52.7 MMHG (ref 35–45)
PERFORMED ON: ABNORMAL
PH BLDA: 7.34 [PH] (ref 7.35–7.45)
PLATELET # BLD AUTO: 166 K/UL (ref 135–450)
PMV BLD AUTO: 9.9 FL (ref 5–10.5)
PO2 BLDA: 74.2 MMHG (ref 75–108)
POTASSIUM SERPL-SCNC: 3.5 MMOL/L (ref 3.5–5.1)
RBC # BLD AUTO: 3.49 M/UL (ref 4–5.2)
SAO2 % BLDA: 94.1 %
SODIUM SERPL-SCNC: 139 MMOL/L (ref 136–145)
WBC # BLD AUTO: 9.8 K/UL (ref 4–11)

## 2023-12-31 PROCEDURE — 80048 BASIC METABOLIC PNL TOTAL CA: CPT

## 2023-12-31 PROCEDURE — 6360000002 HC RX W HCPCS: Performed by: INTERNAL MEDICINE

## 2023-12-31 PROCEDURE — 2580000003 HC RX 258

## 2023-12-31 PROCEDURE — 6370000000 HC RX 637 (ALT 250 FOR IP): Performed by: INTERNAL MEDICINE

## 2023-12-31 PROCEDURE — 2700000000 HC OXYGEN THERAPY PER DAY

## 2023-12-31 PROCEDURE — 94761 N-INVAS EAR/PLS OXIMETRY MLT: CPT

## 2023-12-31 PROCEDURE — 2100000000 HC CCU R&B

## 2023-12-31 PROCEDURE — 94640 AIRWAY INHALATION TREATMENT: CPT

## 2023-12-31 PROCEDURE — 2580000003 HC RX 258: Performed by: INTERNAL MEDICINE

## 2023-12-31 PROCEDURE — 71045 X-RAY EXAM CHEST 1 VIEW: CPT

## 2023-12-31 PROCEDURE — 36600 WITHDRAWAL OF ARTERIAL BLOOD: CPT

## 2023-12-31 PROCEDURE — 85025 COMPLETE CBC W/AUTO DIFF WBC: CPT

## 2023-12-31 PROCEDURE — 36592 COLLECT BLOOD FROM PICC: CPT

## 2023-12-31 PROCEDURE — 2580000003 HC RX 258: Performed by: NURSE PRACTITIONER

## 2023-12-31 PROCEDURE — 99291 CRITICAL CARE FIRST HOUR: CPT | Performed by: INTERNAL MEDICINE

## 2023-12-31 PROCEDURE — 82803 BLOOD GASES ANY COMBINATION: CPT

## 2023-12-31 PROCEDURE — 2500000003 HC RX 250 WO HCPCS: Performed by: NURSE PRACTITIONER

## 2023-12-31 PROCEDURE — 6360000002 HC RX W HCPCS

## 2023-12-31 PROCEDURE — 83735 ASSAY OF MAGNESIUM: CPT

## 2023-12-31 PROCEDURE — 83880 ASSAY OF NATRIURETIC PEPTIDE: CPT

## 2023-12-31 RX ORDER — ALBUTEROL SULFATE 2.5 MG/3ML
SOLUTION RESPIRATORY (INHALATION)
Status: COMPLETED
Start: 2023-12-31 | End: 2023-12-31

## 2023-12-31 RX ORDER — FUROSEMIDE 10 MG/ML
40 INJECTION INTRAMUSCULAR; INTRAVENOUS DAILY
Status: DISCONTINUED | OUTPATIENT
Start: 2024-01-01 | End: 2024-01-03 | Stop reason: HOSPADM

## 2023-12-31 RX ORDER — WATER 10 ML/10ML
INJECTION INTRAMUSCULAR; INTRAVENOUS; SUBCUTANEOUS
Status: COMPLETED
Start: 2023-12-31 | End: 2023-12-31

## 2023-12-31 RX ADMIN — Medication 3000 UNITS: at 08:15

## 2023-12-31 RX ADMIN — WATER 1 ML: 1 INJECTION INTRAMUSCULAR; INTRAVENOUS; SUBCUTANEOUS at 08:36

## 2023-12-31 RX ADMIN — ALBUTEROL SULFATE 2.5 MG: 2.5 SOLUTION RESPIRATORY (INHALATION) at 11:16

## 2023-12-31 RX ADMIN — ATORVASTATIN CALCIUM 10 MG: 10 TABLET, FILM COATED ORAL at 20:28

## 2023-12-31 RX ADMIN — HYDRALAZINE HYDROCHLORIDE 25 MG: 25 TABLET, FILM COATED ORAL at 22:09

## 2023-12-31 RX ADMIN — MOMETASONE FUROATE AND FORMOTEROL FUMARATE DIHYDRATE 2 PUFF: 100; 5 AEROSOL RESPIRATORY (INHALATION) at 08:00

## 2023-12-31 RX ADMIN — ALBUTEROL SULFATE 2.5 MG: 2.5 SOLUTION RESPIRATORY (INHALATION) at 19:22

## 2023-12-31 RX ADMIN — ALBUTEROL SULFATE 2.5 MG: 2.5 SOLUTION RESPIRATORY (INHALATION) at 15:46

## 2023-12-31 RX ADMIN — Medication 3 MG: at 22:09

## 2023-12-31 RX ADMIN — SODIUM CHLORIDE, PRESERVATIVE FREE 10 ML: 5 INJECTION INTRAVENOUS at 08:11

## 2023-12-31 RX ADMIN — GABAPENTIN 100 MG: 100 CAPSULE ORAL at 20:30

## 2023-12-31 RX ADMIN — LINEZOLID 600 MG: 600 INJECTION, SOLUTION INTRAVENOUS at 22:44

## 2023-12-31 RX ADMIN — HYDRALAZINE HYDROCHLORIDE 25 MG: 25 TABLET, FILM COATED ORAL at 08:15

## 2023-12-31 RX ADMIN — MEMANTINE 10 MG: 10 TABLET ORAL at 08:18

## 2023-12-31 RX ADMIN — Medication 300 MG: at 08:17

## 2023-12-31 RX ADMIN — FUROSEMIDE 40 MG: 10 INJECTION, SOLUTION INTRAMUSCULAR; INTRAVENOUS at 08:16

## 2023-12-31 RX ADMIN — LINEZOLID 600 MG: 600 INJECTION, SOLUTION INTRAVENOUS at 11:43

## 2023-12-31 RX ADMIN — ASPIRIN 81 MG: 81 TABLET, CHEWABLE ORAL at 08:16

## 2023-12-31 RX ADMIN — HYDROXYCHLOROQUINE SULFATE 200 MG: 200 TABLET ORAL at 08:31

## 2023-12-31 RX ADMIN — Medication 50 MCG/HR: at 04:52

## 2023-12-31 RX ADMIN — SODIUM CHLORIDE, PRESERVATIVE FREE 10 ML: 5 INJECTION INTRAVENOUS at 20:29

## 2023-12-31 RX ADMIN — HEPARIN SODIUM 5000 UNITS: 5000 INJECTION INTRAVENOUS; SUBCUTANEOUS at 15:19

## 2023-12-31 RX ADMIN — ALBUTEROL SULFATE 2.5 MG: 2.5 SOLUTION RESPIRATORY (INHALATION) at 08:00

## 2023-12-31 RX ADMIN — HEPARIN SODIUM 5000 UNITS: 5000 INJECTION INTRAVENOUS; SUBCUTANEOUS at 22:14

## 2023-12-31 RX ADMIN — HYDRALAZINE HYDROCHLORIDE 25 MG: 25 TABLET, FILM COATED ORAL at 15:19

## 2023-12-31 RX ADMIN — METHYLPREDNISOLONE SODIUM SUCCINATE 40 MG: 40 INJECTION, POWDER, LYOPHILIZED, FOR SOLUTION INTRAMUSCULAR; INTRAVENOUS at 08:36

## 2023-12-31 RX ADMIN — DOCUSATE SODIUM 100 MG: 50 LIQUID ORAL at 08:15

## 2023-12-31 RX ADMIN — Medication 20 MG: at 08:15

## 2023-12-31 RX ADMIN — MEMANTINE 10 MG: 10 TABLET ORAL at 20:29

## 2023-12-31 RX ADMIN — HEPARIN SODIUM 5000 UNITS: 5000 INJECTION INTRAVENOUS; SUBCUTANEOUS at 05:16

## 2023-12-31 RX ADMIN — TROSPIUM CHLORIDE 20 MG: 20 TABLET, FILM COATED ORAL at 08:15

## 2023-12-31 RX ADMIN — MOMETASONE FUROATE AND FORMOTEROL FUMARATE DIHYDRATE 2 PUFF: 100; 5 AEROSOL RESPIRATORY (INHALATION) at 19:22

## 2023-12-31 RX ADMIN — ALLOPURINOL 100 MG: 100 TABLET ORAL at 20:28

## 2023-12-31 ASSESSMENT — PAIN SCALES - GENERAL
PAINLEVEL_OUTOF10: 0
PAINLEVEL_OUTOF10: 5
PAINLEVEL_OUTOF10: 6
PAINLEVEL_OUTOF10: 0
PAINLEVEL_OUTOF10: 0
PAINLEVEL_OUTOF10: 2
PAINLEVEL_OUTOF10: 0

## 2023-12-31 ASSESSMENT — PULMONARY FUNCTION TESTS
PIF_VALUE: 28
PIF_VALUE: 26
PIF_VALUE: 20
PIF_VALUE: 21
PIF_VALUE: 25
PIF_VALUE: 25
PIF_VALUE: 26
PIF_VALUE: 25
PIF_VALUE: 15
PIF_VALUE: 26
PIF_VALUE: 15
PIF_VALUE: 25
PIF_VALUE: 17
PIF_VALUE: 16
PIF_VALUE: 26
PIF_VALUE: 26
PIF_VALUE: 25
PIF_VALUE: 26
PIF_VALUE: 16
PIF_VALUE: 25
PIF_VALUE: 27
PIF_VALUE: 21
PIF_VALUE: 17
PIF_VALUE: 26
PIF_VALUE: 25

## 2023-12-31 NOTE — PLAN OF CARE
Problem: Respiratory - Adult  Goal: Achieves optimal ventilation and oxygenation  Outcome: Progressing  Flowsheets (Taken 12/30/2023 2000)  Achieves optimal ventilation and oxygenation:   Assess for changes in respiratory status   Assess for changes in mentation and behavior   Position to facilitate oxygenation and minimize respiratory effort   Respiratory therapy support as indicated   Assess the need for suctioning and aspirate as needed     Problem: Cardiovascular - Adult  Goal: Maintains optimal cardiac output and hemodynamic stability  Outcome: Progressing  Flowsheets (Taken 12/30/2023 2000)  Maintains optimal cardiac output and hemodynamic stability:   Monitor blood pressure and heart rate   Monitor urine output and notify Licensed Independent Practitioner for values outside of normal range   Assess for signs of decreased cardiac output     Problem: Safety - Adult  Goal: Free from fall injury  Outcome: Progressing     Problem: Safety - Medical Restraint  Goal: Remains free of injury from restraints (Restraint for Interference with Medical Device)  Description: INTERVENTIONS:  1. Determine that other, less restrictive measures have been tried or would not be effective before applying the restraint  2. Evaluate the patient's condition at the time of restraint application  3. Inform patient/family regarding the reason for restraint  4. Q2H: Monitor safety, psychosocial status, comfort, nutrition and hydration  Outcome: Progressing  Flowsheets (Taken 12/30/2023 2000)  Remains free of injury from restraints (restraint for interference with medical device):   Determine that other, less restrictive measures have been tried or would not be effective before applying the restraint   Evaluate the patient's condition at the time of restraint application   Inform patient/family regarding the reason for restraint   Every 2 hours: Monitor safety, psychosocial status, comfort, nutrition and hydration     Problem:

## 2024-01-01 LAB
ANION GAP SERPL CALCULATED.3IONS-SCNC: 13 MMOL/L (ref 3–16)
BASE EXCESS BLDA CALC-SCNC: 2.2 MMOL/L (ref -3–3)
BASE EXCESS BLDV CALC-SCNC: 3.2 MMOL/L
BASE EXCESS BLDV CALC-SCNC: 3.3 MMOL/L
BUN SERPL-MCNC: 33 MG/DL (ref 7–20)
CALCIUM SERPL-MCNC: 8.5 MG/DL (ref 8.3–10.6)
CHLORIDE SERPL-SCNC: 102 MMOL/L (ref 99–110)
CO2 BLDA-SCNC: 30.5 MMOL/L
CO2 BLDV-SCNC: 31 MMOL/L
CO2 BLDV-SCNC: 32 MMOL/L
CO2 SERPL-SCNC: 28 MMOL/L (ref 21–32)
COHGB MFR BLDA: 1.3 % (ref 0–1.5)
COHGB MFR BLDV: 1.5 %
COHGB MFR BLDV: 1.5 %
CREAT SERPL-MCNC: 1.6 MG/DL (ref 0.6–1.2)
DEPRECATED RDW RBC AUTO: 16 % (ref 12.4–15.4)
GFR SERPLBLD CREATININE-BSD FMLA CKD-EPI: 33 ML/MIN/{1.73_M2}
GLUCOSE BLD-MCNC: 104 MG/DL (ref 70–99)
GLUCOSE BLD-MCNC: 109 MG/DL (ref 70–99)
GLUCOSE BLD-MCNC: 143 MG/DL (ref 70–99)
GLUCOSE BLD-MCNC: 158 MG/DL (ref 70–99)
GLUCOSE BLD-MCNC: 159 MG/DL (ref 70–99)
GLUCOSE SERPL-MCNC: 116 MG/DL (ref 70–99)
HCO3 BLDA-SCNC: 28.9 MMOL/L (ref 21–29)
HCO3 BLDV-SCNC: 30 MMOL/L (ref 23–29)
HCO3 BLDV-SCNC: 30 MMOL/L (ref 23–29)
HCT VFR BLD AUTO: 27.9 % (ref 36–48)
HGB BLD-MCNC: 8.9 G/DL (ref 12–16)
HGB BLDA-MCNC: 9.9 G/DL (ref 12–16)
MAGNESIUM SERPL-MCNC: 1.9 MG/DL (ref 1.8–2.4)
MCH RBC QN AUTO: 26.2 PG (ref 26–34)
MCHC RBC AUTO-ENTMCNC: 31.9 G/DL (ref 31–36)
MCV RBC AUTO: 82.1 FL (ref 80–100)
METHGB MFR BLDA: 0.3 %
METHGB MFR BLDV: 0.2 %
METHGB MFR BLDV: 0.3 %
O2 THERAPY: ABNORMAL
PCO2 BLDA: 54.6 MMHG (ref 35–45)
PCO2 BLDV: 54.3 MMHG (ref 40–50)
PCO2 BLDV: 58.6 MMHG (ref 40–50)
PERFORMED ON: ABNORMAL
PH BLDA: 7.33 [PH] (ref 7.35–7.45)
PH BLDV: 7.32 [PH] (ref 7.35–7.45)
PH BLDV: 7.35 [PH] (ref 7.35–7.45)
PLATELET # BLD AUTO: 164 K/UL (ref 135–450)
PMV BLD AUTO: 10.5 FL (ref 5–10.5)
PO2 BLDA: 84.3 MMHG (ref 75–108)
PO2 BLDV: 51 MMHG
PO2 BLDV: 57 MMHG
POTASSIUM SERPL-SCNC: 3.2 MMOL/L (ref 3.5–5.1)
POTASSIUM SERPL-SCNC: 4.4 MMOL/L (ref 3.5–5.1)
RBC # BLD AUTO: 3.4 M/UL (ref 4–5.2)
SAO2 % BLDA: 95.6 %
SAO2 % BLDV: 81 %
SAO2 % BLDV: 88 %
SODIUM SERPL-SCNC: 143 MMOL/L (ref 136–145)
WBC # BLD AUTO: 11.8 K/UL (ref 4–11)

## 2024-01-01 PROCEDURE — 2580000003 HC RX 258: Performed by: INTERNAL MEDICINE

## 2024-01-01 PROCEDURE — 2500000003 HC RX 250 WO HCPCS: Performed by: NURSE PRACTITIONER

## 2024-01-01 PROCEDURE — 85027 COMPLETE CBC AUTOMATED: CPT

## 2024-01-01 PROCEDURE — 84132 ASSAY OF SERUM POTASSIUM: CPT

## 2024-01-01 PROCEDURE — 36600 WITHDRAWAL OF ARTERIAL BLOOD: CPT

## 2024-01-01 PROCEDURE — 80048 BASIC METABOLIC PNL TOTAL CA: CPT

## 2024-01-01 PROCEDURE — 6360000002 HC RX W HCPCS: Performed by: INTERNAL MEDICINE

## 2024-01-01 PROCEDURE — 94640 AIRWAY INHALATION TREATMENT: CPT

## 2024-01-01 PROCEDURE — 83735 ASSAY OF MAGNESIUM: CPT

## 2024-01-01 PROCEDURE — 2580000003 HC RX 258

## 2024-01-01 PROCEDURE — 6370000000 HC RX 637 (ALT 250 FOR IP): Performed by: INTERNAL MEDICINE

## 2024-01-01 PROCEDURE — 94761 N-INVAS EAR/PLS OXIMETRY MLT: CPT

## 2024-01-01 PROCEDURE — 2580000003 HC RX 258: Performed by: NURSE PRACTITIONER

## 2024-01-01 PROCEDURE — 2700000000 HC OXYGEN THERAPY PER DAY

## 2024-01-01 PROCEDURE — 2100000000 HC CCU R&B

## 2024-01-01 PROCEDURE — 36592 COLLECT BLOOD FROM PICC: CPT

## 2024-01-01 PROCEDURE — 82803 BLOOD GASES ANY COMBINATION: CPT

## 2024-01-01 PROCEDURE — 99291 CRITICAL CARE FIRST HOUR: CPT | Performed by: INTERNAL MEDICINE

## 2024-01-01 PROCEDURE — 94003 VENT MGMT INPAT SUBQ DAY: CPT

## 2024-01-01 RX ORDER — WATER 10 ML/10ML
INJECTION INTRAMUSCULAR; INTRAVENOUS; SUBCUTANEOUS
Status: COMPLETED
Start: 2024-01-01 | End: 2024-01-01

## 2024-01-01 RX ORDER — POTASSIUM CHLORIDE 29.8 MG/ML
20 INJECTION INTRAVENOUS
Status: COMPLETED | OUTPATIENT
Start: 2024-01-01 | End: 2024-01-01

## 2024-01-01 RX ADMIN — ASPIRIN 81 MG: 81 TABLET, CHEWABLE ORAL at 08:31

## 2024-01-01 RX ADMIN — POTASSIUM CHLORIDE 20 MEQ: 29.8 INJECTION, SOLUTION INTRAVENOUS at 10:06

## 2024-01-01 RX ADMIN — SODIUM CHLORIDE, PRESERVATIVE FREE 10 ML: 5 INJECTION INTRAVENOUS at 08:33

## 2024-01-01 RX ADMIN — MEMANTINE 10 MG: 10 TABLET ORAL at 08:32

## 2024-01-01 RX ADMIN — LINEZOLID 600 MG: 600 INJECTION, SOLUTION INTRAVENOUS at 23:23

## 2024-01-01 RX ADMIN — HYDRALAZINE HYDROCHLORIDE 25 MG: 25 TABLET, FILM COATED ORAL at 20:41

## 2024-01-01 RX ADMIN — HYDROXYCHLOROQUINE SULFATE 200 MG: 200 TABLET ORAL at 08:31

## 2024-01-01 RX ADMIN — POTASSIUM CHLORIDE 20 MEQ: 29.8 INJECTION, SOLUTION INTRAVENOUS at 11:36

## 2024-01-01 RX ADMIN — ALBUTEROL SULFATE 2.5 MG: 2.5 SOLUTION RESPIRATORY (INHALATION) at 08:06

## 2024-01-01 RX ADMIN — ALBUTEROL SULFATE 2.5 MG: 2.5 SOLUTION RESPIRATORY (INHALATION) at 11:47

## 2024-01-01 RX ADMIN — HEPARIN SODIUM 5000 UNITS: 5000 INJECTION INTRAVENOUS; SUBCUTANEOUS at 06:23

## 2024-01-01 RX ADMIN — TROSPIUM CHLORIDE 20 MG: 20 TABLET, FILM COATED ORAL at 08:31

## 2024-01-01 RX ADMIN — SODIUM CHLORIDE, PRESERVATIVE FREE 10 ML: 5 INJECTION INTRAVENOUS at 20:42

## 2024-01-01 RX ADMIN — ALBUTEROL SULFATE 2.5 MG: 2.5 SOLUTION RESPIRATORY (INHALATION) at 20:09

## 2024-01-01 RX ADMIN — LINEZOLID 600 MG: 600 INJECTION, SOLUTION INTRAVENOUS at 11:35

## 2024-01-01 RX ADMIN — MOMETASONE FUROATE AND FORMOTEROL FUMARATE DIHYDRATE 2 PUFF: 100; 5 AEROSOL RESPIRATORY (INHALATION) at 20:11

## 2024-01-01 RX ADMIN — GABAPENTIN 100 MG: 100 CAPSULE ORAL at 20:41

## 2024-01-01 RX ADMIN — Medication 20 MG: at 08:32

## 2024-01-01 RX ADMIN — METHYLPREDNISOLONE SODIUM SUCCINATE 40 MG: 40 INJECTION, POWDER, LYOPHILIZED, FOR SOLUTION INTRAMUSCULAR; INTRAVENOUS at 09:05

## 2024-01-01 RX ADMIN — ALBUTEROL SULFATE 2.5 MG: 2.5 SOLUTION RESPIRATORY (INHALATION) at 16:12

## 2024-01-01 RX ADMIN — MEMANTINE 10 MG: 10 TABLET ORAL at 20:46

## 2024-01-01 RX ADMIN — Medication 3000 UNITS: at 08:31

## 2024-01-01 RX ADMIN — ALLOPURINOL 100 MG: 100 TABLET ORAL at 20:41

## 2024-01-01 RX ADMIN — Medication 300 MG: at 08:31

## 2024-01-01 RX ADMIN — HYDRALAZINE HYDROCHLORIDE 25 MG: 25 TABLET, FILM COATED ORAL at 08:31

## 2024-01-01 RX ADMIN — WATER 10 ML: 1 INJECTION INTRAMUSCULAR; INTRAVENOUS; SUBCUTANEOUS at 09:06

## 2024-01-01 RX ADMIN — Medication 100 MCG/HR: at 07:22

## 2024-01-01 RX ADMIN — HEPARIN SODIUM 5000 UNITS: 5000 INJECTION INTRAVENOUS; SUBCUTANEOUS at 14:10

## 2024-01-01 RX ADMIN — DOCUSATE SODIUM 100 MG: 50 LIQUID ORAL at 08:31

## 2024-01-01 RX ADMIN — Medication 3 MG: at 20:42

## 2024-01-01 RX ADMIN — FUROSEMIDE 40 MG: 10 INJECTION, SOLUTION INTRAMUSCULAR; INTRAVENOUS at 08:32

## 2024-01-01 RX ADMIN — HEPARIN SODIUM 5000 UNITS: 5000 INJECTION INTRAVENOUS; SUBCUTANEOUS at 22:11

## 2024-01-01 RX ADMIN — ATORVASTATIN CALCIUM 10 MG: 10 TABLET, FILM COATED ORAL at 20:41

## 2024-01-01 RX ADMIN — MOMETASONE FUROATE AND FORMOTEROL FUMARATE DIHYDRATE 2 PUFF: 100; 5 AEROSOL RESPIRATORY (INHALATION) at 08:06

## 2024-01-01 ASSESSMENT — PULMONARY FUNCTION TESTS
PIF_VALUE: 10
PIF_VALUE: 20
PIF_VALUE: 25
PIF_VALUE: 15
PIF_VALUE: 11
PIF_VALUE: 26
PIF_VALUE: 35
PIF_VALUE: 15
PIF_VALUE: 25
PIF_VALUE: 26
PIF_VALUE: 15
PIF_VALUE: 25
PIF_VALUE: 15
PIF_VALUE: 25
PIF_VALUE: 28
PIF_VALUE: 10
PIF_VALUE: 28

## 2024-01-01 ASSESSMENT — PAIN SCALES - GENERAL
PAINLEVEL_OUTOF10: 0
PAINLEVEL_OUTOF10: 2
PAINLEVEL_OUTOF10: 0

## 2024-01-01 NOTE — PLAN OF CARE
Problem: Respiratory - Adult  Goal: Achieves optimal ventilation and oxygenation  1/1/2024 1037 by Jackie Angulo RN  Outcome: Progressing  1/1/2024 0636 by Darwin Nieves RN  Outcome: Progressing  Flowsheets (Taken 12/31/2023 2000)  Achieves optimal ventilation and oxygenation: Assess for changes in respiratory status     Problem: Cardiovascular - Adult  Goal: Maintains optimal cardiac output and hemodynamic stability  1/1/2024 1037 by Jackie Angulo RN  Outcome: Progressing  1/1/2024 0636 by Darwin Nieves RN  Outcome: Progressing  Flowsheets (Taken 12/31/2023 2000)  Maintains optimal cardiac output and hemodynamic stability: Monitor blood pressure and heart rate  Goal: Absence of cardiac dysrhythmias or at baseline  1/1/2024 0636 by Darwin Nieves RN  Outcome: Progressing     Problem: Metabolic/Fluid and Electrolytes - Adult  Goal: Electrolytes maintained within normal limits  Outcome: Progressing  Goal: Glucose maintained within prescribed range  1/1/2024 0636 by Darwin Nieves RN  Outcome: Progressing     Problem: Skin/Tissue Integrity - Adult  Goal: Incisions, wounds, or drain sites healing without S/S of infection  1/1/2024 1037 by Jackie Angulo RN  Outcome: Progressing  1/1/2024 0636 by Darwin Nieves RN  Outcome: Progressing

## 2024-01-01 NOTE — PLAN OF CARE
Problem: Respiratory - Adult  Goal: Achieves optimal ventilation and oxygenation  Outcome: Progressing  Flowsheets (Taken 12/31/2023 2000)  Achieves optimal ventilation and oxygenation: Assess for changes in respiratory status     Problem: Cardiovascular - Adult  Goal: Maintains optimal cardiac output and hemodynamic stability  Outcome: Progressing  Flowsheets (Taken 12/31/2023 2000)  Maintains optimal cardiac output and hemodynamic stability: Monitor blood pressure and heart rate  Goal: Absence of cardiac dysrhythmias or at baseline  Outcome: Progressing     Problem: Metabolic/Fluid and Electrolytes - Adult  Goal: Glucose maintained within prescribed range  Outcome: Progressing     Problem: Skin/Tissue Integrity - Adult  Goal: Incisions, wounds, or drain sites healing without S/S of infection  Outcome: Progressing     Problem: Gastrointestinal - Adult  Goal: Maintains adequate nutritional intake  Outcome: Progressing  Flowsheets (Taken 12/31/2023 2000)  Maintains adequate nutritional intake: Monitor intake and output, weight and lab values

## 2024-01-02 LAB
ANION GAP SERPL CALCULATED.3IONS-SCNC: 11 MMOL/L (ref 3–16)
BUN SERPL-MCNC: 32 MG/DL (ref 7–20)
CALCIUM SERPL-MCNC: 8.2 MG/DL (ref 8.3–10.6)
CHLORIDE SERPL-SCNC: 102 MMOL/L (ref 99–110)
CO2 SERPL-SCNC: 29 MMOL/L (ref 21–32)
CREAT SERPL-MCNC: 1.5 MG/DL (ref 0.6–1.2)
DEPRECATED RDW RBC AUTO: 16.2 % (ref 12.4–15.4)
GFR SERPLBLD CREATININE-BSD FMLA CKD-EPI: 36 ML/MIN/{1.73_M2}
GLUCOSE BLD-MCNC: 126 MG/DL (ref 70–99)
GLUCOSE SERPL-MCNC: 83 MG/DL (ref 70–99)
HCT VFR BLD AUTO: 28.5 % (ref 36–48)
HGB BLD-MCNC: 8.9 G/DL (ref 12–16)
MAGNESIUM SERPL-MCNC: 2 MG/DL (ref 1.8–2.4)
MCH RBC QN AUTO: 26.2 PG (ref 26–34)
MCHC RBC AUTO-ENTMCNC: 31.2 G/DL (ref 31–36)
MCV RBC AUTO: 84.1 FL (ref 80–100)
PERFORMED ON: ABNORMAL
PLATELET # BLD AUTO: 192 K/UL (ref 135–450)
PMV BLD AUTO: 9.8 FL (ref 5–10.5)
POTASSIUM SERPL-SCNC: 4.1 MMOL/L (ref 3.5–5.1)
RBC # BLD AUTO: 3.39 M/UL (ref 4–5.2)
SODIUM SERPL-SCNC: 142 MMOL/L (ref 136–145)
WBC # BLD AUTO: 11.5 K/UL (ref 4–11)

## 2024-01-02 PROCEDURE — 80048 BASIC METABOLIC PNL TOTAL CA: CPT

## 2024-01-02 PROCEDURE — 94640 AIRWAY INHALATION TREATMENT: CPT

## 2024-01-02 PROCEDURE — 2700000000 HC OXYGEN THERAPY PER DAY

## 2024-01-02 PROCEDURE — 6370000000 HC RX 637 (ALT 250 FOR IP): Performed by: INTERNAL MEDICINE

## 2024-01-02 PROCEDURE — 6360000002 HC RX W HCPCS: Performed by: INTERNAL MEDICINE

## 2024-01-02 PROCEDURE — 94761 N-INVAS EAR/PLS OXIMETRY MLT: CPT

## 2024-01-02 PROCEDURE — 83735 ASSAY OF MAGNESIUM: CPT

## 2024-01-02 PROCEDURE — 99233 SBSQ HOSP IP/OBS HIGH 50: CPT | Performed by: INTERNAL MEDICINE

## 2024-01-02 PROCEDURE — 1200000000 HC SEMI PRIVATE

## 2024-01-02 PROCEDURE — 85027 COMPLETE CBC AUTOMATED: CPT

## 2024-01-02 PROCEDURE — 2500000003 HC RX 250 WO HCPCS: Performed by: NURSE PRACTITIONER

## 2024-01-02 PROCEDURE — 2580000003 HC RX 258: Performed by: INTERNAL MEDICINE

## 2024-01-02 RX ORDER — CALCIUM GLUCONATE 20 MG/ML
1000 INJECTION, SOLUTION INTRAVENOUS ONCE
Status: COMPLETED | OUTPATIENT
Start: 2024-01-02 | End: 2024-01-02

## 2024-01-02 RX ADMIN — ACETAMINOPHEN 650 MG: 325 TABLET ORAL at 06:19

## 2024-01-02 RX ADMIN — MEMANTINE 10 MG: 10 TABLET ORAL at 09:47

## 2024-01-02 RX ADMIN — Medication 3 MG: at 22:22

## 2024-01-02 RX ADMIN — METHYLPREDNISOLONE SODIUM SUCCINATE 40 MG: 40 INJECTION, POWDER, LYOPHILIZED, FOR SOLUTION INTRAMUSCULAR; INTRAVENOUS at 09:27

## 2024-01-02 RX ADMIN — MOMETASONE FUROATE AND FORMOTEROL FUMARATE DIHYDRATE 2 PUFF: 100; 5 AEROSOL RESPIRATORY (INHALATION) at 08:28

## 2024-01-02 RX ADMIN — FUROSEMIDE 40 MG: 10 INJECTION, SOLUTION INTRAMUSCULAR; INTRAVENOUS at 09:26

## 2024-01-02 RX ADMIN — HYDRALAZINE HYDROCHLORIDE 25 MG: 25 TABLET, FILM COATED ORAL at 13:50

## 2024-01-02 RX ADMIN — ALBUTEROL SULFATE 2.5 MG: 2.5 SOLUTION RESPIRATORY (INHALATION) at 08:28

## 2024-01-02 RX ADMIN — ALLOPURINOL 100 MG: 100 TABLET ORAL at 22:22

## 2024-01-02 RX ADMIN — HEPARIN SODIUM 5000 UNITS: 5000 INJECTION INTRAVENOUS; SUBCUTANEOUS at 06:17

## 2024-01-02 RX ADMIN — Medication 2 PUFF: at 08:28

## 2024-01-02 RX ADMIN — Medication 3000 UNITS: at 09:26

## 2024-01-02 RX ADMIN — ATORVASTATIN CALCIUM 10 MG: 10 TABLET, FILM COATED ORAL at 22:23

## 2024-01-02 RX ADMIN — HYDRALAZINE HYDROCHLORIDE 25 MG: 25 TABLET, FILM COATED ORAL at 09:26

## 2024-01-02 RX ADMIN — HYDRALAZINE HYDROCHLORIDE 25 MG: 25 TABLET, FILM COATED ORAL at 22:22

## 2024-01-02 RX ADMIN — ALBUTEROL SULFATE 2.5 MG: 2.5 SOLUTION RESPIRATORY (INHALATION) at 16:16

## 2024-01-02 RX ADMIN — LINEZOLID 600 MG: 600 INJECTION, SOLUTION INTRAVENOUS at 12:29

## 2024-01-02 RX ADMIN — TROSPIUM CHLORIDE 20 MG: 20 TABLET, FILM COATED ORAL at 06:17

## 2024-01-02 RX ADMIN — MOMETASONE FUROATE AND FORMOTEROL FUMARATE DIHYDRATE 2 PUFF: 100; 5 AEROSOL RESPIRATORY (INHALATION) at 19:29

## 2024-01-02 RX ADMIN — ASPIRIN 81 MG: 81 TABLET, CHEWABLE ORAL at 09:26

## 2024-01-02 RX ADMIN — CALCIUM GLUCONATE 1000 MG: 20 INJECTION, SOLUTION INTRAVENOUS at 09:43

## 2024-01-02 RX ADMIN — GABAPENTIN 100 MG: 100 CAPSULE ORAL at 22:23

## 2024-01-02 RX ADMIN — ALBUTEROL SULFATE 2.5 MG: 2.5 SOLUTION RESPIRATORY (INHALATION) at 19:20

## 2024-01-02 RX ADMIN — SODIUM CHLORIDE, PRESERVATIVE FREE 10 ML: 5 INJECTION INTRAVENOUS at 22:21

## 2024-01-02 RX ADMIN — LINEZOLID 600 MG: 600 INJECTION, SOLUTION INTRAVENOUS at 22:39

## 2024-01-02 RX ADMIN — HEPARIN SODIUM 5000 UNITS: 5000 INJECTION INTRAVENOUS; SUBCUTANEOUS at 22:22

## 2024-01-02 RX ADMIN — HYDROXYCHLOROQUINE SULFATE 200 MG: 200 TABLET ORAL at 09:26

## 2024-01-02 RX ADMIN — MEMANTINE 10 MG: 10 TABLET ORAL at 22:22

## 2024-01-02 RX ADMIN — HEPARIN SODIUM 5000 UNITS: 5000 INJECTION INTRAVENOUS; SUBCUTANEOUS at 13:50

## 2024-01-02 RX ADMIN — SODIUM CHLORIDE, PRESERVATIVE FREE 10 ML: 5 INJECTION INTRAVENOUS at 09:27

## 2024-01-02 ASSESSMENT — PAIN DESCRIPTION - LOCATION: LOCATION: FOOT

## 2024-01-02 ASSESSMENT — PAIN SCALES - GENERAL
PAINLEVEL_OUTOF10: 0
PAINLEVEL_OUTOF10: 4

## 2024-01-02 ASSESSMENT — PAIN DESCRIPTION - DESCRIPTORS: DESCRIPTORS: ACHING

## 2024-01-02 ASSESSMENT — PAIN DESCRIPTION - ORIENTATION: ORIENTATION: RIGHT;LEFT

## 2024-01-02 NOTE — CARE COORDINATION
Chart Reviewed.  Extubated yesterday.  Goal:   return to [facility].  No precert to return.  Pt has dementia.  Will need transport back to facility.  THEODORA Zavala     Case Management   [phone]    1/2/2024  11:32 AM

## 2024-01-02 NOTE — PLAN OF CARE
Problem: Respiratory - Adult  Goal: Achieves optimal ventilation and oxygenation  1/1/2024 2156 by Darwin Nieves RN  Outcome: Progressing  Flowsheets (Taken 1/1/2024 2000)  Achieves optimal ventilation and oxygenation: Assess for changes in mentation and behavior  1/1/2024 1037 by Jackie Angulo RN  Outcome: Progressing     Problem: Cardiovascular - Adult  Goal: Maintains optimal cardiac output and hemodynamic stability  1/1/2024 2156 by Darwin Nieevs RN  Outcome: Progressing  Flowsheets (Taken 1/1/2024 2000)  Maintains optimal cardiac output and hemodynamic stability: Monitor blood pressure and heart rate  1/1/2024 1037 by Jackie Angulo RN  Outcome: Progressing     Problem: Metabolic/Fluid and Electrolytes - Adult  Goal: Electrolytes maintained within normal limits  1/1/2024 1037 by Jackie Angulo RN  Outcome: Progressing     Problem: Hematologic - Adult  Goal: Maintains hematologic stability  Outcome: Progressing  Flowsheets (Taken 1/1/2024 2000)  Maintains hematologic stability: Assess for signs and symptoms of bleeding or hemorrhage     Problem: Pain  Goal: Verbalizes/displays adequate comfort level or baseline comfort level  Outcome: Progressing     Problem: Safety - Adult  Goal: Free from fall injury  Outcome: Progressing     Problem: Skin/Tissue Integrity  Goal: Absence of new skin breakdown  Description: 1.  Monitor for areas of redness and/or skin breakdown  2.  Assess vascular access sites hourly  3.  Every 4-6 hours minimum:  Change oxygen saturation probe site  4.  Every 4-6 hours:  If on nasal continuous positive airway pressure, respiratory therapy assess nares and determine need for appliance change or resting period.  Outcome: Progressing     Problem: Skin/Tissue Integrity - Adult  Goal: Incisions, wounds, or drain sites healing without S/S of infection  1/1/2024 1037 by Jackie Angulo RN  Outcome: Progressing     Problem: Genitourinary - Adult  Goal: Urinary catheter remains

## 2024-01-03 ENCOUNTER — APPOINTMENT (OUTPATIENT)
Dept: GENERAL RADIOLOGY | Age: 74
DRG: 208 | End: 2024-01-03
Payer: MEDICARE

## 2024-01-03 VITALS
OXYGEN SATURATION: 92 % | TEMPERATURE: 97.8 F | HEIGHT: 59 IN | WEIGHT: 206.13 LBS | BODY MASS INDEX: 41.56 KG/M2 | DIASTOLIC BLOOD PRESSURE: 57 MMHG | RESPIRATION RATE: 18 BRPM | SYSTOLIC BLOOD PRESSURE: 127 MMHG | HEART RATE: 91 BPM

## 2024-01-03 LAB
ANION GAP SERPL CALCULATED.3IONS-SCNC: 7 MMOL/L (ref 3–16)
BASOPHILS # BLD: 0 K/UL (ref 0–0.2)
BASOPHILS NFR BLD: 0.3 %
BUN SERPL-MCNC: 32 MG/DL (ref 7–20)
CALCIUM SERPL-MCNC: 9.1 MG/DL (ref 8.3–10.6)
CHLORIDE SERPL-SCNC: 102 MMOL/L (ref 99–110)
CO2 SERPL-SCNC: 34 MMOL/L (ref 21–32)
CREAT SERPL-MCNC: 1.4 MG/DL (ref 0.6–1.2)
DEPRECATED RDW RBC AUTO: 16.3 % (ref 12.4–15.4)
EOSINOPHIL # BLD: 0.3 K/UL (ref 0–0.6)
EOSINOPHIL NFR BLD: 3 %
GFR SERPLBLD CREATININE-BSD FMLA CKD-EPI: 39 ML/MIN/{1.73_M2}
GLUCOSE BLD-MCNC: 102 MG/DL (ref 70–99)
GLUCOSE BLD-MCNC: 113 MG/DL (ref 70–99)
GLUCOSE BLD-MCNC: 141 MG/DL (ref 70–99)
GLUCOSE BLD-MCNC: 177 MG/DL (ref 70–99)
GLUCOSE SERPL-MCNC: 81 MG/DL (ref 70–99)
HCT VFR BLD AUTO: 29.4 % (ref 36–48)
HGB BLD-MCNC: 9.4 G/DL (ref 12–16)
LYMPHOCYTES # BLD: 2.4 K/UL (ref 1–5.1)
LYMPHOCYTES NFR BLD: 22.5 %
MAGNESIUM SERPL-MCNC: 2.2 MG/DL (ref 1.8–2.4)
MCH RBC QN AUTO: 26.8 PG (ref 26–34)
MCHC RBC AUTO-ENTMCNC: 31.9 G/DL (ref 31–36)
MCV RBC AUTO: 83.9 FL (ref 80–100)
MONOCYTES # BLD: 0.9 K/UL (ref 0–1.3)
MONOCYTES NFR BLD: 8.5 %
NEUTROPHILS # BLD: 7 K/UL (ref 1.7–7.7)
NEUTROPHILS NFR BLD: 65.7 %
NT-PROBNP SERPL-MCNC: 2611 PG/ML (ref 0–449)
PERFORMED ON: ABNORMAL
PHOSPHATE SERPL-MCNC: 4.9 MG/DL (ref 2.5–4.9)
PLATELET # BLD AUTO: 196 K/UL (ref 135–450)
PMV BLD AUTO: 10.5 FL (ref 5–10.5)
POTASSIUM SERPL-SCNC: 4 MMOL/L (ref 3.5–5.1)
RBC # BLD AUTO: 3.51 M/UL (ref 4–5.2)
SODIUM SERPL-SCNC: 143 MMOL/L (ref 136–145)
WBC # BLD AUTO: 10.7 K/UL (ref 4–11)

## 2024-01-03 PROCEDURE — 6370000000 HC RX 637 (ALT 250 FOR IP): Performed by: INTERNAL MEDICINE

## 2024-01-03 PROCEDURE — 6360000002 HC RX W HCPCS: Performed by: INTERNAL MEDICINE

## 2024-01-03 PROCEDURE — 80048 BASIC METABOLIC PNL TOTAL CA: CPT

## 2024-01-03 PROCEDURE — 2700000000 HC OXYGEN THERAPY PER DAY

## 2024-01-03 PROCEDURE — 6370000000 HC RX 637 (ALT 250 FOR IP): Performed by: NURSE PRACTITIONER

## 2024-01-03 PROCEDURE — 85025 COMPLETE CBC W/AUTO DIFF WBC: CPT

## 2024-01-03 PROCEDURE — 94761 N-INVAS EAR/PLS OXIMETRY MLT: CPT

## 2024-01-03 PROCEDURE — 71045 X-RAY EXAM CHEST 1 VIEW: CPT

## 2024-01-03 PROCEDURE — 2500000003 HC RX 250 WO HCPCS: Performed by: STUDENT IN AN ORGANIZED HEALTH CARE EDUCATION/TRAINING PROGRAM

## 2024-01-03 PROCEDURE — 83880 ASSAY OF NATRIURETIC PEPTIDE: CPT

## 2024-01-03 PROCEDURE — 94640 AIRWAY INHALATION TREATMENT: CPT

## 2024-01-03 PROCEDURE — 84100 ASSAY OF PHOSPHORUS: CPT

## 2024-01-03 PROCEDURE — 83735 ASSAY OF MAGNESIUM: CPT

## 2024-01-03 PROCEDURE — 2580000003 HC RX 258: Performed by: INTERNAL MEDICINE

## 2024-01-03 PROCEDURE — 99232 SBSQ HOSP IP/OBS MODERATE 35: CPT | Performed by: INTERNAL MEDICINE

## 2024-01-03 RX ORDER — FUROSEMIDE 40 MG/1
40 TABLET ORAL DAILY
DISCHARGE
Start: 2024-01-03

## 2024-01-03 RX ORDER — ACETAMINOPHEN 650 MG/1
650 SUPPOSITORY RECTAL EVERY 6 HOURS PRN
Status: DISCONTINUED | OUTPATIENT
Start: 2024-01-03 | End: 2024-01-03 | Stop reason: HOSPADM

## 2024-01-03 RX ORDER — SERTRALINE HYDROCHLORIDE 25 MG/1
25 TABLET, FILM COATED ORAL DAILY
Qty: 30 TABLET | Refills: 3 | Status: SHIPPED
Start: 2024-01-03

## 2024-01-03 RX ORDER — ACETAMINOPHEN 325 MG/1
650 TABLET ORAL EVERY 6 HOURS PRN
Status: DISCONTINUED | OUTPATIENT
Start: 2024-01-03 | End: 2024-01-03 | Stop reason: HOSPADM

## 2024-01-03 RX ORDER — PREDNISONE 20 MG/1
40 TABLET ORAL DAILY
Status: DISCONTINUED | OUTPATIENT
Start: 2024-01-04 | End: 2024-01-03 | Stop reason: HOSPADM

## 2024-01-03 RX ORDER — LINEZOLID 600 MG/1
600 TABLET, FILM COATED ORAL 2 TIMES DAILY
Qty: 8 TABLET | Refills: 0 | DISCHARGE
Start: 2024-01-03 | End: 2024-01-07

## 2024-01-03 RX ADMIN — Medication 2 PUFF: at 08:02

## 2024-01-03 RX ADMIN — HEPARIN SODIUM 5000 UNITS: 5000 INJECTION INTRAVENOUS; SUBCUTANEOUS at 14:03

## 2024-01-03 RX ADMIN — Medication 3000 UNITS: at 08:11

## 2024-01-03 RX ADMIN — METHYLPREDNISOLONE SODIUM SUCCINATE 40 MG: 40 INJECTION, POWDER, LYOPHILIZED, FOR SOLUTION INTRAMUSCULAR; INTRAVENOUS at 08:12

## 2024-01-03 RX ADMIN — POLYETHYLENE GLYCOL, PROPYLENE GLYCOL 2 DROP: .4; .3 LIQUID OPHTHALMIC at 08:13

## 2024-01-03 RX ADMIN — TROSPIUM CHLORIDE 20 MG: 20 TABLET, FILM COATED ORAL at 05:24

## 2024-01-03 RX ADMIN — POLYETHYLENE GLYCOL, PROPYLENE GLYCOL 2 DROP: .4; .3 LIQUID OPHTHALMIC at 00:26

## 2024-01-03 RX ADMIN — HYDROXYCHLOROQUINE SULFATE 200 MG: 200 TABLET ORAL at 08:11

## 2024-01-03 RX ADMIN — MEMANTINE 10 MG: 10 TABLET ORAL at 08:11

## 2024-01-03 RX ADMIN — HYDRALAZINE HYDROCHLORIDE 25 MG: 25 TABLET, FILM COATED ORAL at 14:02

## 2024-01-03 RX ADMIN — ACETAMINOPHEN 650 MG: 325 TABLET ORAL at 05:24

## 2024-01-03 RX ADMIN — ACETAMINOPHEN 650 MG: 325 TABLET ORAL at 18:30

## 2024-01-03 RX ADMIN — MOMETASONE FUROATE AND FORMOTEROL FUMARATE DIHYDRATE 2 PUFF: 100; 5 AEROSOL RESPIRATORY (INHALATION) at 08:02

## 2024-01-03 RX ADMIN — FUROSEMIDE 40 MG: 10 INJECTION, SOLUTION INTRAMUSCULAR; INTRAVENOUS at 08:12

## 2024-01-03 RX ADMIN — ASPIRIN 81 MG: 81 TABLET, CHEWABLE ORAL at 08:12

## 2024-01-03 RX ADMIN — DOCUSATE SODIUM 100 MG: 50 LIQUID ORAL at 08:11

## 2024-01-03 RX ADMIN — ALBUTEROL SULFATE 2.5 MG: 2.5 SOLUTION RESPIRATORY (INHALATION) at 15:24

## 2024-01-03 RX ADMIN — HYDRALAZINE HYDROCHLORIDE 25 MG: 25 TABLET, FILM COATED ORAL at 08:11

## 2024-01-03 RX ADMIN — Medication 300 MG: at 08:17

## 2024-01-03 RX ADMIN — HEPARIN SODIUM 5000 UNITS: 5000 INJECTION INTRAVENOUS; SUBCUTANEOUS at 05:24

## 2024-01-03 RX ADMIN — ALBUTEROL SULFATE 2.5 MG: 2.5 SOLUTION RESPIRATORY (INHALATION) at 08:02

## 2024-01-03 RX ADMIN — SODIUM CHLORIDE, PRESERVATIVE FREE 10 ML: 5 INJECTION INTRAVENOUS at 08:17

## 2024-01-03 RX ADMIN — LINEZOLID 600 MG: 600 INJECTION, SOLUTION INTRAVENOUS at 10:28

## 2024-01-03 RX ADMIN — MICONAZOLE NITRATE: 2 POWDER TOPICAL at 10:28

## 2024-01-03 ASSESSMENT — PAIN DESCRIPTION - LOCATION: LOCATION: FOOT

## 2024-01-03 ASSESSMENT — PAIN SCALES - GENERAL: PAINLEVEL_OUTOF10: 3

## 2024-01-03 NOTE — DISCHARGE SUMMARY
Date/Time    TSH 0.88 12/28/2023 09:31 AM     Troponin: No results found for: \"TROPONINT\"  Lactic Acid: No results for input(s): \"LACTA\" in the last 72 hours.  BNP:   Recent Labs     01/03/24  0446   PROBNP 2,611*     UA:  Lab Results   Component Value Date/Time    NITRU POSITIVE 04/07/2022 02:45 PM    COLORU Yellow 04/07/2022 02:45 PM    PHUR 6.0 04/07/2022 02:45 PM    WBCUA 55 04/07/2022 02:45 PM    RBCUA 2 04/07/2022 02:45 PM    BACTERIA 4+ 04/07/2022 02:45 PM    CLARITYU SL CLOUDY 04/07/2022 02:45 PM    SPECGRAV 1.025 04/07/2022 02:45 PM    LEUKOCYTESUR LARGE 04/07/2022 02:45 PM    UROBILINOGEN 0.2 04/07/2022 02:45 PM    BILIRUBINUR Negative 04/07/2022 02:45 PM    BLOODU Negative 04/07/2022 02:45 PM    GLUCOSEU Negative 04/07/2022 02:45 PM    KETUA 40 04/07/2022 02:45 PM     Urine Cultures:   Lab Results   Component Value Date/Time    LABURIN  04/07/2022 04:18 PM     >50,000 CFU/ml  Mixed pathogens  Multiple organisms isolated, no predominance. Culture  indicates probable contamination. Please review colony count  and clinical indications to determine if a repeat culture is  necessary. No further workup to be done.       Blood Cultures:   Lab Results   Component Value Date/Time    BC No Growth after 4 days of incubation. 12/12/2021 07:47 PM     Lab Results   Component Value Date/Time    BLOODCULT2 No Growth after 4 days of incubation. 12/13/2021 02:20 AM     Organism:   Lab Results   Component Value Date/Time    ORG Staph aureus MRSA DNA Detected 12/29/2023 12:36 PM    ORG Staph aureus MRSA 12/29/2023 12:36 PM    ORG Staph aureus MRSA 12/29/2023 12:36 PM       Time Spent Discharging patient 50 minutes    Electronically signed by Willi Carpenter MD on 1/3/2024 at 5:15 PM

## 2024-01-03 NOTE — PROGRESS NOTES
Pulmonary Critical Care Progress Note     Patient's name:  Dyan Chaudhary  Medical Record Number: [id number]  Patient's account/billing number: [id number]  Patient's YOB: 1950  Age: 73 y.o.  Date of Admission: 12/28/2023  7:41 AM  Date of Consult: 12/30/2023      Primary Care Physician: Alphonso Del Castillo      Code Status: Full Code    Reason for consult: Acute respiratory failure with hypoxia and hypercapnia    Assessment and Plan     Acute respiratory with hypoxia and hypercapnia  CHF acute on chronic diastolic exacerbation  H/o COPD/asthma  MRSA PNA  LUCIA  Elevated Troponin  Dementia   Hemoptysis       Plan:  Vent support, continue, SBT as tolerated   Systemic steroid  Bronchodilators  strict I&O  Started on Zyvox   SubQ heparin  Sedation propofol fentanyl and Versed  Due to the immediate potential for life-threatening deterioration due to above , I spent 34 minutes providing critical care.  This time is excluding time spent performing procedures.  This note was transcribed using Dragon Dictation software. Please disregard any translational errors.      Subjective:  Less hemoptysis  Echo results reviewed        HISTORY OF PRESENT ILLNESS:   Mr./MsZara Chaudhary is a 73 y.o. lady with past medical history stated below significant for history of asthma/COPD, history of smoking, nursing home resident,  Presented with hypoxia, sob and chest pain  Found hypoxic and hypercapnic  Elevated BNP and Troponin     Negative COVID and influenza.  Chest x-ray with cardiomegaly and increased vascular congestion.      REVIEW OF SYSTEMS:  Review of Systems -   Unable to obtain on mechanical ventilation        Physical Exam:    Vitals: BP (!) 133/59   Pulse 75   Temp 98.8 °F (37.1 °C) (Axillary)   Resp 16   Ht 1.499 m (4' 11\")   Wt 93.8 kg (206 lb 12.7 oz)   SpO2 97%   BMI 41.77 kg/m²     Last Body weight:   Wt Readings from Last 3 Encounters:   12/30/23 93.8 
                                                Pulmonary Critical Care Progress Note     Patient's name:  Dyan Chaudhary  Medical Record Number: [id number]  Patient's account/billing number: [id number]  Patient's YOB: 1950  Age: 73 y.o.  Date of Admission: 12/28/2023  7:41 AM  Date of Consult: 12/31/2023      Primary Care Physician: Alphonso Del Castillo      Code Status: Full Code    Reason for consult: Acute respiratory failure with hypoxia and hypercapnia    Assessment and Plan     Acute respiratory with hypoxia and hypercapnia  CHF acute on chronic diastolic exacerbation  H/o COPD/asthma  MRSA PNA  LUCIA  Elevated Troponin  Dementia   Hemoptysis       Plan:  Vent support, continue, SBT as tolerated   Systemic steroid  Bronchodilators  strict I&O  Zyvox   Check CXR  SubQ heparin  Sedation propofol fentanyl and Versed  Due to the immediate potential for life-threatening deterioration due to above , I spent 34 minutes providing critical care.  This time is excluding time spent performing procedures.  This note was transcribed using Dragon Dictation software. Please disregard any translational errors.      Subjective:  Less hemoptysis  Echo results reviewed        HISTORY OF PRESENT ILLNESS:   Mr./MsZara Chaudhary is a 73 y.o. lady with past medical history stated below significant for history of asthma/COPD, history of smoking, nursing home resident,  Presented with hypoxia, sob and chest pain  Found hypoxic and hypercapnic  Elevated BNP and Troponin     Negative COVID and influenza.  Chest x-ray with cardiomegaly and increased vascular congestion.      REVIEW OF SYSTEMS:  Review of Systems -   Unable to obtain on mechanical ventilation        Physical Exam:    Vitals: BP (!) 151/69   Pulse 81   Temp 99 °F (37.2 °C) (Oral)   Resp 20   Ht 1.499 m (4' 11\")   Wt 92.4 kg (203 lb 11.3 oz)   SpO2 100%   BMI 41.14 kg/m²     Last Body weight:   Wt Readings from Last 3 Encounters:   12/31/23 92.4 kg 
                                                Pulmonary Critical Care Progress Note     Patient's name:  Dyan Chaudhary  Medical Record Number: [id number]  Patient's account/billing number: [id number]  Patient's YOB: 1950  Age: 73 y.o.  Date of Admission: 12/28/2023  7:41 AM  Date of Consult: 1/3/2024      Primary Care Physician: Alphonso Del Castillo      Code Status: Full Code    Reason for consult: Acute respiratory failure with hypoxia and hypercapnia    Assessment and Plan     Acute respiratory with hypoxia and hypercapnia  CHF acute on chronic diastolic exacerbation  H/o COPD/asthma  MRSA PNA  LUCIA  Elevated Troponin  Dementia   Hemoptysis       Plan:  Wean oxygen as tolerated keep sats more than 90%  Systemic steroid x 3 days  Bronchodilators  strict I&O  Zyvox x 7 days    Subjective:  Out of ICU  Currently on 3 L  -4 L        HISTORY OF PRESENT ILLNESS:   ./Ms. Dyan Chaudhary is a 73 y.o. lady with past medical history stated below significant for history of asthma/COPD, history of smoking, nursing home resident,  Presented with hypoxia, sob and chest pain  Found hypoxic and hypercapnic  Elevated BNP and Troponin     Negative COVID and influenza.  Chest x-ray with cardiomegaly and increased vascular congestion.      REVIEW OF SYSTEMS:  Review of Systems -   Denied chest pain  No fever or chills  No nausea vomiting diarrhea  Wanting to go home    Physical Exam:    Vitals: /67   Pulse 72   Temp 98.3 °F (36.8 °C) (Oral)   Resp 16   Ht 1.499 m (4' 11\")   Wt 93.5 kg (206 lb 2.1 oz)   SpO2 100%   BMI 41.63 kg/m²     Last Body weight:   Wt Readings from Last 3 Encounters:   01/03/24 93.5 kg (206 lb 2.1 oz)   04/09/22 103.7 kg (228 lb 11.2 oz)   04/03/22 104.6 kg (230 lb 9.6 oz)       Body Mass Index : Body mass index is 41.63 kg/m².      Intake and Output summary:   Intake/Output Summary (Last 24 hours) at 1/3/2024 1051  Last data filed at 1/3/2024 0904  Gross per 24 hour   Intake 170 
    Referring Physician: Dr. AMILCAR Shafer  Reason for Consultation: \"CHF exacerbation\"  Chief Complaint: Unable to obtain secondary to medical condition.    Subjective:   History of Present Illness:  Dyan Chaudhary is a 73 y.o. patient who presented to the hospital from a nursing home with hypoxia.  Patient was just recently intubated and is unable to provide any history.  Per reports she has dementia.  Patient's daughter is present bedside who states that she seemed to be in her usual state of health when last seen by her about 4 days ago.  Evidently yesterday she began having worsening shortness of breath and hypoxia.  The daughter states the patient has not been ambulatory for at least 4 years.  She acknowledges the patient does have issues with dementia but knows family and would have appropriate conversation.  She was given a trial of BiPAP but evidently respiratory status continued to decompensate and was ultimately intubated.    Interval history:  Patient with hemoptysis overnight and IV heparin discontinued.  Patient remains critically ill and intubated.  Notes reviewed from pulmonary who completed bronchoscopy today.  No family present bedside during exam.    Past Medical History:   has a past medical history of Anemia, Asthma, CKD stage 3 due to type 2 diabetes mellitus (HCC), COPD (chronic obstructive pulmonary disease) (HCC), Dementia (HCC), DM (diabetes mellitus) (HCC), GERD (gastroesophageal reflux disease), Gout, HTN (hypertension), and Seizure (HCC).    Surgical History:   has a past surgical history that includes IR TUNNELED CVC PLACE WO SQ PORT/PUMP > 5 YEARS (2/15/2022).     Social History:   reports that she has never smoked. She has never used smokeless tobacco. She reports that she does not drink alcohol and does not use drugs.     Family History:  family history is not on file.    Home Medications:  Were reviewed and are listed in nursing record and/or below  Prior to Admission medications  
 Latest Reference Range & Units 12/31/23 15:40   Hemoglobin, Art, Extended 12.0 - 16.0 g/dL 10.0 (L)   pH, Arterial 7.350 - 7.450  7.336 (L)   pCO2, Arterial 35.0 - 45.0 mmHg 52.7 (H)   pO2, Arterial 75.0 - 108.0 mmHg 74.2 (L)   HCO3, Arterial 21.0 - 29.0 mmol/L 28.1   TCO2 (calc), Art Not Established mmol/L 29.8   Base Excess, Arterial -3.0 - 3.0 mmol/L 1.7   O2 Sat, Arterial >92 % 94.1   Methemoglobin, Arterial <1.5 % 0.1   Carboxyhgb, Arterial 0.0 - 1.5 % 1.3   (L): Data is abnormally low  (H): Data is abnormally high  Patient has been on SBT since this am on spontan. Vent settings 10/5, fio2= 30%. Sats=99%, rr=24. Notified Dr. Renee. Will return patient to [facility] as requested by Dr Renee. Notified Karen RT. Patient resting comfortably with fentanyl @ 25 mcg/hr. Daughter Melissa at bedside.  
0805: Patient's sedation cut in half to begin SAT/SBT per Dr Renee CC with RT at bedside to adjust ventilator settings to spontaneous. Tube feeds off.  0909: Patient's sedation turned off and patient alert and calm, following commands.  1145: ABG obtained and results messaged to critical care Dr Renee.   1240: Dr Renee ordered to extubate to BIPAP. RT notified.  1315: RT at bedside. Patient extubated and placed on BIPAP per order. Patient tolerated well and O2 sats WDL.  1600: Patient refusing BIPAP at this time. Notified CC and RT. Patient placed on NC 4 L and O2 sats WDL.   1800: Patient passed swallow test. Diet progressed per Dr Renee order.  
4 Eyes Skin Assessment     NAME:  Dyan Chaudhary  YOB: 1950  MEDICAL RECORD NUMBER:  [id number]    The patient is being assessed for  Shift Handoff    I agree that at least one RN has performed a thorough Head to Toe Skin Assessment on the patient. ALL assessment sites listed below have been assessed.      Areas assessed by both nurses:    Head, Face, Ears, Shoulders, Back, Chest, Arms, Elbows, Hands, Sacrum. Buttock, Coccyx, Ischium, and Legs. Feet and Heels        Does the Patient have a Wound? Yes wound(s) were present on assessment. LDA wound assessment was Initiated and completed by RN       Shekhar Prevention initiated by RN: Yes  Wound Care Orders initiated by RN: No    Pressure Injury (Stage 3,4, Unstageable, DTI, NWPT, and Complex wounds) if present, place Wound referral order by RN under : No    New Ostomies, if present place, Ostomy referral order under : No     Nurse 1 eSignature: Electronically signed by Jackie Angulo RN on 1/1/24 at 6:33 PM EST    **SHARE this note so that the co-signing nurse can place an eSignature**    Nurse 2 eSignature: Electronically signed by Darwin Nieves RN on 1/1/24 at 7:29 PM EST   
4 Eyes Skin Assessment     NAME:  Dyan Chaudhary  YOB: 1950  MEDICAL RECORD NUMBER:  [id number]    The patient is being assessed for  Transfer to New Unit    I agree that at least one RN has performed a thorough Head to Toe Skin Assessment on the patient. ALL assessment sites listed below have been assessed.      Areas assessed by both nurses:    Head, Face, Ears, Shoulders, Back, Chest, Arms, Elbows, Hands, Sacrum. Buttock, Coccyx, Ischium, Legs. Feet and Heels, and Under Medical Devices         Does the Patient have a Wound? Yes wound(s) were present on assessment. LDA wound assessment was Initiated and completed by RN       Shekhar Prevention initiated by RN: Yes  Wound Care Orders initiated by RN: Yes    Pressure Injury (Stage 3,4, Unstageable, DTI, NWPT, and Complex wounds) if present, place Wound referral order by RN under : No    New Ostomies, if present place, Ostomy referral order under : No     Nurse 1 eSignature: Electronically signed by Yaa Eason RN on 1/2/24 at 6:47 PM EST    **SHARE this note so that the co-signing nurse can place an eSignature**    Nurse 2 eSignature: {Esignature:[id number]}    
4 Eyes Skin Assessment     NAME:  Dyan Chaudhary  YOB: 1950  MEDICAL RECORD NUMBER:  [id number]    The patient is being assessed for  Admission    I agree that at least one RN has performed a thorough Head to Toe Skin Assessment on the patient. ALL assessment sites listed below have been assessed.      Areas assessed by both nurses:    Head, Face, Ears, Shoulders, Back, Chest, Arms, Elbows, Hands, Sacrum. Buttock, Coccyx, Ischium, Legs. Feet and Heels, and Under Medical Devices         Does the Patient have a Wound? Yes wound(s) were present on assessment. LDA wound assessment was Initiated and completed by RN       Shekhar Prevention initiated by RN: Yes  Wound Care Orders initiated by RN: No    Pressure Injury (Stage 3,4, Unstageable, DTI, NWPT, and Complex wounds) if present, place Wound referral order by RN under : No    New Ostomies, if present place, Ostomy referral order under : No     Nurse 1 eSignature: Electronically signed by Donny Cisneros RN on 12/28/23 at 8:51 PM EST    **SHARE this note so that the co-signing nurse can place an eSignature**    Nurse 2 eSignature: Electronically signed by Marcelina Benjamin RN on 12/29/23 at 5:15 AM EST    
4 Eyes Skin Assessment     NAME:  Dyan Chaudhary  YOB: 1950  MEDICAL RECORD NUMBER:  [id number]    The patient is being assessed for  Shift Handoff    I agree that at least one RN has performed a thorough Head to Toe Skin Assessment on the patient. ALL assessment sites listed below have been assessed.      Areas assessed by both nurses:    Head, Face, Ears, Shoulders, Back, Chest, Arms, Elbows, Hands, Sacrum. Buttock, Coccyx, Ischium, Legs. Feet and Heels, and Under Medical Devices         Does the Patient have a Wound? No noted wound(s)       Shekhar Prevention initiated by RN: Yes  Wound Care Orders initiated by RN: Yes    Pressure Injury (Stage 3,4, Unstageable, DTI, NWPT, and Complex wounds) if present, place Wound referral order by RN under : No    New Ostomies, if present place, Ostomy referral order under : No     Nurse 1 eSignature: Electronically signed by Nicole Silveira RN on 12/31/23 at 6:58 PM EST    **SHARE this note so that the co-signing nurse can place an eSignature**    Nurse 2 eSignature: {Esignature:[id number]}  
Agitation with suctioning patient, patient thrashing head/ coughing over vent. Sedation increased slightly to help with vent compliance. Will titrate down as possible   
Arrived to place PICC line with bedside RN MARIA E. Pre-procedure and timeout done with MADHU Hines, discussed limitations of placement and allergies.      Pt's right cephalic are all easily collapsible with no indication for a clot. Vein selected is large enough for catheter. Pt tolerated sterile procedure well, with no difficulty accessing basilic vein, when accessed - blood was free flowing and non-pulsatile. Guidewire, introducer, and catheter went in smoothly. PICC line verified with 3CG technology with peaked P-waves (please see image below).     Nurses:  OK to use PICC.    Please replace all existing IV tubing with new IV tubing prior to using the PICC for current IV infusions.  Please remove any PIVs from PICC arm.  Please refrain from obtaining BPs in the arm with a PICC.  All of the above may be sources of infection or damage to the PICC line/site.     Post procedure - reorganized pt table, placed pt in lowest position, with call light and educated on line care. Reported off to bedside RN.     Please call if you have any questions about the PICC or ML. The  will direct you to the PICC RN that is on call.     [phone]            
Clinical Pharmacy Note  Heparin Dosing Consult    Dyan Chaudhary is a 73 y.o. female ordered heparin per VTE/DVT/PE Nomogram by Dr. Figueroa.     Lab Results   Component Value Date/Time    APTT 21.7 12/28/2023 06:15 PM     Lab Results   Component Value Date/Time    HGB 10.2 12/28/2023 08:44 AM    HCT 33.7 12/28/2023 08:44 AM     12/28/2023 08:44 AM    INR 1.01 12/28/2023 06:15 PM       Ht Readings from Last 1 Encounters:   12/28/23 1.499 m (4' 11\")        Wt Readings from Last 1 Encounters:   12/28/23 101.7 kg (224 lb 3.3 oz)        Assessment/Plan:  Initial bolus: 8140 units  Initial infusion rate: 1830 units/hr  Next anti-Xa: 0200 12/29/23    Pharmacy will continue to monitor adjust heparin based on anti-Xa results using nomogram below:     VTE/DVT/PE Heparin Nomogram     Initial Bolus: 80 units/kg Max Bolus: 10,000 units       Initial Rate: 18 units/kg/hr Max Initial Rate: 2,100 units/hr     anti-Xa Bolus Titration   < 0.1 Heparin 80 units/kg bolus Increase drip by 4 units/kg/hr   0.1 - 0.29 Heparin 40 units/kg bolus Increase drip by 2 units/kg/hr   0.3 - 0.7 No Bolus No Change   0.71 - 0.8 No Bolus Decrease drip by 1 units/kg/hr   0.81 - 0.99 No Bolus Decrease drip by 2 units/kg/hr   > 1 Hold Heparin for 1 hour Decrease drip by 3 units/kg/hr       Obtain anti-Xa 6 hours after initial bolus and 6 hours after any dose change until two consecutive therapeutic anti-Xa levels are achieved - then daily.   
Comprehensive Nutrition Assessment    Type and Reason for Visit:  Reassess    Nutrition Recommendations/Plan:   Continue easy to chew low fat/low chol/high fiber/2 g Na diet     Malnutrition Assessment:  Malnutrition Status:  No malnutrition (12/29/23 1005)    Context:  Acute Illness       Nutrition Assessment:    Follow up. Previously on TF. Pt extubated, diet advanced to easy to chew, low fat/low chol/high fiber/2 g Na on 1/1. Pt finished half of breakfast this morning. Nutrition related labs reviewed. Continue to monitor intakes. Appetite returning. If intake declines add ONS.    Nutrition Related Findings:    113 glucose, 1/3 BM Wound Type: Stage II, Pressure Injury       Current Nutrition Intake & Therapies:    Average Meal Intake: 26-50%  Average Supplements Intake: None Ordered  ADULT DIET; Easy to Chew; Low Fat/Low Chol/High Fiber/2 gm Na; Low Sodium (2 gm)    Anthropometric Measures:  Height: 149.9 cm (4' 11\")  Ideal Body Weight (IBW): 95 lbs (43 kg)       Current Body Weight: 101.7 kg (224 lb 3.3 oz),   IBW.    Current BMI (kg/m2): 45.3                               Estimated Daily Nutrient Needs:  Energy Requirements Based On: Kcal/kg  Weight Used for Energy Requirements: Current  Energy (kcal/day): 1220 - 1526 (12-15 kcal/kg)  Weight Used for Protein Requirements: Ideal  Protein (g/day): 86 (2 g/kg IBW)     Fluid (ml/day): per provider while in ICU    Nutrition Diagnosis:   Inadequate oral intake related to impaired respiratory function as evidenced by intake 26-50%    Nutrition Interventions:   Food and/or Nutrient Delivery: Continue Current Diet  Nutrition Education/Counseling: Education not indicated  Coordination of Nutrition Care: Continue to monitor while inpatient       Goals:     Goals: Meet at least 75% of estimated needs, by next RD assessment       Nutrition Monitoring and Evaluation:   Behavioral-Environmental Outcomes: None Identified  Food/Nutrient Intake Outcomes: Food and Nutrient 
Dr. Ciro MD at bedside for a bronchoscopy. This RN administers 50 mcg IVP of fentanyl and 4mg IVP of versed for procedure. VSS throughout.   
Handoff / report completed with MADHU Jalloh to resume care. Patient in bed, VSS on ventilator. All questions answered.   
Home oxygen evaluation noted. Patient is to return to LTC facility. Home oxygen evaluation is not needed. Order discontinued.   
Hospitalist Progress Note  12/30/2023 9:47 AM  Subjective:   Admit Date: 12/28/2023  PCP: Alphonso Del Castillo Status: Inpatient   Interval History: Hospital Day: 3, admitted from [facility] with acute respiratory failure with hypoxia and hypercarbia (pH 7.14 / CO2 72).  Trial of BiPAP but respiratory status continued to decompensate and was ultimately intubated, requiring mechanical ventilation.   MRSA on pneumonia panel.  Acute on chronic diastolic heart failure (BNP 2,071) on furosemide and COPD exacerbation on methylprednisolone.  Acute kidney injury (eGFR 33).  Heparin DVT prophylaxis discontinued (12/29) for hemoptysis.      Diet: tube feed at 20 cc/hr  Right cephalic triple lumen PICC (12/28, day #3)  Orogastric tube (12/28, day #3)  Urinary catheter (12/28, day 32)  Endotracheal tube, 7.5 mm (12/28, day #3)  Medications:     propofol 20 mcg/kg/min    fentanyl 25 mcg/hr     heparin   5,000 Units SubCUTAneous TID [held]   docusate  100 mg Oral Daily   ferrous sulfate  300 mg Oral Daily   allopurinol  100 mg Oral Nightly   aspirin  81 mg Oral Daily   mometasone-formoterol  2 puff Inhalation BID RT   gabapentin  100 mg Oral Nightly   hydralazine  25 mg Oral TID   melatonin  3 mg Oral Nightly   memantine  10 mg Oral BID   atorvastatin  10 mg Oral Nightly   Vitamin D  3,000 Units Oral Daily   furosemide  40 mg IntraVENous BID   albuterol  2.5 mg Nebulization 4x Daily RT   methylprednisolone  40 mg IntraVENous Daily   trospium  20 mg Oral QAM AC   tiotropium  2 puff Inhalation Daily RT   hydroxychloroquine  200 mg Oral Daily   famotidine  20 mg IntraVENous Daily     Recent Labs     12/28/23  0844 12/29/23  0454   WBC 8.7 5.9   HGB 10.2* 9.4*    133*   MCV 87.6 82.9     Recent Labs     12/28/23  0844 12/29/23  0454 12/30/23  0434    143 140   K 5.6* 4.3 3.8    108 104   CO2 26 25 28   BUN 20 22* 24*   CREATININE 1.7* 1.5* 1.6*   GLUCOSE 100* 140* 105*     Recent Labs     
Late entry due to patient care    Dr. Thelma DONOVAN at bedside. Updated on patient, explained nightshift stopped sedation for SAT/SBT, pt switched to SBT. Minimal response from patient. Flipped back to vent settings (SIMV PC, RR 16, FIO2 30%, PEEP 5). Order to start TF as recommended by Dietitian and okay to restart sedation at initial rates.   Pt responded only to painful stimulation during AM assessment but did open eyes when called to, did not follow commands  
Late entry due to patient care.    1544: 5mL IVP propofol given under Dr. Renee verbal order and with him at the bedside    1545: 50mg rocuronium given    1559: patient intubated 7.5 ETT @ 23cm lip, positive color change.      
Narcotic Waste Documentation    Administered 50 mg of versed and wasted 50 mg per [facility] policy.     Electronically signed by Jaclyn Stiles RN on 12/30/2023 at 10:21 AM   Cristina Aleman RN   
Narcotic Waste Documentation    Administered 90 mcg of fentanyl and wasted 10 mg per [facility] policy.    Electronically signed by Darwin Nieves RN on 1/1/2024 at 7:29 PM  Electronically signed by Jackie Angulo RN on 1/1/2024 at 7:48 PM         
No significant events to note overnight. SR per monitor, tolerated minimal vent settings. Opens eyes to voice but does not track or follow commands, withdraws all extremities to pain. Becomes very agitated with any oral or ETT suction. UOP decreasing this AM. Sedation stopped @ 0630 for SAT trial with plans for SBT.  Bath given, linens changed. Bilateral soft wrist restraints continued for airway and line safety.   
No significant events to note overnight. Sedation weaned this shift from fentanyl @ 75mcg to 25mcg, and propofol @ 30mcg to 5 mcg. Pt opens eyes to voice, does not make eye contact or follow commands. Pulls arms against restraints toward ETT when stimulated. BLE withdraw to pain. Sedation stopped @ 0630 for SAT with plans to SBT. FiO2 weaned to 30%. Continuing to suction bloody secretions per ETT, with large clots/plugs at times. SR per monitor. UOP decreasing last several hours. OGT to LIWS with no significant output. Bath given, linens changed. Repositioned Q2H. Will cont to monitor.   
Over the phone consent completed for a bronchoscopy with patients daughter yung Torres RN and MADHU Mejia. All questions answered. Dr. Ciro MD updated of obtained consent.   
Patient noted to have a moderate amount of bloody secretion in ETT. Pulmonology notified. New order received to D/C heparin gtt.   
Patient transferred to room 4108 from CVICU.  Oriented to room, call light, and floor policies.  Plan of care reviewed with patient.  Pt is resting in bed and no pain at this time; no s/s of distress noted. Assessment completed; VSS. Pt rates a high fall risk; bed alarm to be placed on the bed/chair.  Safety precautions in place; call light and bedside table within reach.  Pt encouraged to call for needs or ambulation.  Pt VU.  Will continue to monitor.  Electronically signed by Yaa Eason RN on 1/2/2024 at 6:47 PM    
Perfect serve sent to NP petar Alfaro,  patient admitted with ARF with Hypoxia, with health history of asthma, copd, daphne, renal failure, Lupus, acute diastolic heart failure, dementia, and hypertension. Patient is complaining of 8/10 general. Do with wish any pain medication for the patient?   
Pharmacy Medication Reconciliation Note     List of medications Dyan Chaudhary is currently taking is complete.    Source of information:   1. [facility] medication list    Kirk Ward, Pharmacy Intern  12/28/2023  11:38 AM    
Pt does not want to wear bipap.  
Pt extubated per Dr. Gilbert's order.  Oral suctioned for a moderate amount of secretions and placed on bipap 10/5  rr12 30%.  Pt tolerated well.  Will continue to monitor.     Electronically signed by Keshia Soto RCP on 1/1/2024 at 1:19 PM   
Pulmonary Progress Note    Date of Admission: 12/28/2023   LOS: 5 days       CC:  Chief Complaint   Patient presents with    Shortness of Breath     To ER from [facility]. Reported to have needed o2 at nursing home, and to have CP last night, now resolved. 83 RA on arrival. A/ox4, slow to resond        Subjective:  Extubated   Yelling      ROS:        Assessment:        Hemoptysis  Dementia  Elevated troponin  Acute kidney injury  MRSA pneumonia  History of COPD/asthma  Acute CHF on chronic diastolic dysfunction  Acute hypoxemic and hypercapnic respiratory failure    Plan:     This note may have been transcribed using Dragon Dictation software. Please disregard any translational errors.       Hospital Day: 5       COPD/asthma   Extubated   Weaned to 3 L NC   Solumedrol   Spiriva   Dulera       MRSA pneumonia   Linezolid      Demenita  Baseline?         Transfer to [facility] surg    Data:        PHYSICAL EXAM:   Blood pressure (!) 93/42, pulse 73, temperature 98 °F (36.7 °C), temperature source Axillary, resp. rate 15, height 1.499 m (4' 11\"), weight 92.4 kg (203 lb 11.3 oz), SpO2 100 %.'  Body mass index is 41.14 kg/m².   Gen: No distress.    ENT:   Resp: No accessory muscle use. No crackles. No wheezes. No rhonchi.    CV: Regular rate. Regular rhythm. No murmur or rub. No edema.   Skin: Warm, dry, normal texture and turgor. No nodule on exposed extremities.   M/S: No cyanosis. No clubbing. No joint deformity.  Psych: awake and confused.       Medications:    Scheduled Meds:   furosemide  40 mg IntraVENous Daily    linezolid  600 mg IntraVENous Q12H    heparin (porcine)  5,000 Units SubCUTAneous 3 times per day    docusate  100 mg Oral Daily    ferrous Sulfate  300 mg Oral Daily    allopurinol  100 mg Oral Nightly    aspirin  81 mg Oral Daily    mometasone-formoterol  2 puff Inhalation BID RT    gabapentin  100 mg Oral Nightly    hydrALAZINE  25 mg Oral TID    melatonin  3 mg Oral Nightly    memantine  10 
This RN responded to vent alarm in patient's room. Corrugated tubing had been removed from vent tube. This RN reconnected tubing without complication. The patient appeared to be uncomfortable and restless. Fentanyl titration completed for CPOT goal. See MAR for admin details.   
lb 11.3 oz)   04/09/22 103.7 kg (228 lb 11.2 oz)   04/03/22 104.6 kg (230 lb 9.6 oz)       Body Mass Index : Body mass index is 41.14 kg/m².      Intake and Output summary:   Intake/Output Summary (Last 24 hours) at 1/1/2024 0958  Last data filed at 1/1/2024 0909  Gross per 24 hour   Intake 2210.95 ml   Output 2075 ml   Net 135.95 ml       Physical Examination:     PHYSICAL EXAM:    Gen: Sedated on mechanical ventilation  Eyes: PERRL. Anicteric sclera. No conjunctival injection.   ENT: No discharge. Posterior oropharynx clear. External appearance of ears and nose normal.  Neck: Trachea midline. No mass, no lymphadenopathy    Resp:  severely diminished no wheezing,   CV: Regular rate. Regular rhythm. No murmur or rub.++ edema.   GI: Soft, Non-tender. Non-distended. +BS  Skin: Warm, dry, w/o erythema.   Lymph: No cervical or supraclavicular LAD.   M/S: No cyanosis. No clubbing.    Neuro: Sedated on mechanical ventilation        Laboratory findings:-    CBC:   Recent Labs     01/01/24  0101   WBC 11.8*   HGB 8.9*        BMP:    Recent Labs     12/30/23  0434 12/31/23  0400 01/01/24  0500    139 143   K 3.8 3.5 3.2*    102 102   CO2 28 26 28   BUN 24* 31* 33*   CREATININE 1.6* 2.1* 1.6*   GLUCOSE 105* 128* 116*     S. Calcium:  Recent Labs     01/01/24  0500   CALCIUM 8.5     S. Ionized Calcium:No results for input(s): \"IONCA\" in the last 72 hours.  S. Magnesium:  Recent Labs     01/01/24  0500   MG 1.90       S. Glucose:  Recent Labs     12/31/23  2053 01/01/24  0035 01/01/24  0748   POCGLU 142* 109* 104*       Thyroid functions:   Lab Results   Component Value Date/Time    TSH 0.88 12/28/2023 09:31 AM          Radiology Review:  Pertinent images / reports were reviewed as a part of this visit.    CT Chest w/ contrast: No results found for this or any previous visit.      CT Chest w/o contrast: No results found for this or any previous visit.      CTPA: No results found for this or any previous 
Does not appear to be grossly volume overloaded per cardiology.   Elevated hsTnT not consistent with acute coronary syndrome per cardiology. Likely related to hypoxia.   COPD exacerbation on methylprednisolone.    Acute kidney injury (eGFR 24) on CKD 3b on IV diuretic.   Hemoptysis:  Heparin DVT prophylaxis discontinued (12/29).  Presumed secondary to traumatic intubation.  Appears to have resolved.  Hypokalemia:  Potassium chloride IV replacement to goal K+ > 4.0 mmol/L   Dementia.  Patient has not been ambulatory for at least 4 years.   Class III obesity (BMI 41.8).      Advance Directive: Full Code  DVT prophylaxis with intermittent compression, heparin held for hemptysis  Discharge planning: at least two more days inpatient status      GENNARO TRAN MD  [facility] Hospitalist    
No results for input(s): \"LACTA\" in the last 72 hours.  Cardiac enzymes:No results for input(s): \"CKTOTAL\", \"CKMB\", \"CKMBINDEX\", \"TROPONINI\" in the last 72 hours.  BNP:No results for input(s): \"BNP\" in the last 72 hours.  Lipid profile:   Recent Labs     12/29/23  0454   CHOL 129   TRIG 63   HDL 62*   LDLCALC 54     Blood Gases: No results found for: \"PH\", \"PCO2\", \"PO2\", \"HCO3\", \"O2SAT\"  Thyroid functions:   Lab Results   Component Value Date/Time    TSH 0.88 12/28/2023 09:31 AM          Radiology Review:  Pertinent images / reports were reviewed as a part of this visit.    CT Chest w/ contrast: No results found for this or any previous visit.      CT Chest w/o contrast: No results found for this or any previous visit.      CTPA: No results found for this or any previous visit.      CXR PA/LAT: No results found for this or any previous visit.      CXR portable: Results for orders placed during the hospital encounter of 12/28/23    XR CHEST PORTABLE    Narrative  EXAMINATION:  ONE XRAY VIEW OF THE CHEST    12/28/2023 8:00 am    COMPARISON:  CXR dated 02/11/2022    HISTORY:  ORDERING SYSTEM PROVIDED HISTORY: SOB  TECHNOLOGIST PROVIDED HISTORY:  Reason for exam:->SOB  Reason for Exam: sob    FINDINGS:  Medical devices: Left shoulder arthroplasty hardware.    Mediastinum/Heart: The mediastinal contours are unchanged compared to prior  exam. The heart is enlarged in size.    Lungs: Vascular prominence and mild patchy opacities in the lung bases,  favored to represent atelectasis.    Pleura: No pleural effusion. No pneumothorax.    Impression  Cardiomegaly and atelectatic lungs.          Carolann Gilbert MD, M.D.            12/29/2023, 11:00 AM          
chloride, 25 mL, PRN  polyethyl glycol-propyl glycol 0.4-0.3 %, 2 drop, PRN        Labs and Imaging   XR CHEST PORTABLE    Result Date: 12/31/2023  EXAMINATION: ONE XRAY VIEW OF THE CHEST 12/31/2023 12:17 pm COMPARISON: 12/28/2023 HISTORY: ORDERING SYSTEM PROVIDED HISTORY: Check resolution of vascular congestion, atelectasis TECHNOLOGIST PROVIDED HISTORY: Reason for exam:->Check resolution of vascular congestion, atelectasis Reason for Exam: follow up FINDINGS: Endotracheal tube, NG tube remain in place.  Right-sided PICC in place terminating in the right atrium.  Cardiomegaly.  Pulmonary vascular congestion.  Possible pulmonary edema.     Right-sided PICC terminates in the right atrium Findings suggest congestive heart failure     XR CHEST PORTABLE    Result Date: 12/28/2023  EXAMINATION: ONE XRAY VIEW OF THE CHEST 12/28/2023 4:16 pm COMPARISON: Earlier today HISTORY: Endotracheal and enteric tube placements. FINDINGS: Patient is mildly rotated.  Stable cardiomediastinal contours.  Endotracheal tube tip is 4.1 cm above the dago.  Stable appearance of the lungs. Enteric tube tip is within the stomach with side port below the gastroesophageal junction.  Cholecystectomy clips.  Partially visualized left shoulder arthroplasty.     Endotracheal tube tip is 4.1 cm above the dago.  Enteric tube is appropriately positioned.     XR CHEST PORTABLE    Result Date: 12/28/2023  EXAMINATION: ONE XRAY VIEW OF THE CHEST 12/28/2023 8:00 am COMPARISON: CXR dated 02/11/2022 HISTORY: ORDERING SYSTEM PROVIDED HISTORY: SOB TECHNOLOGIST PROVIDED HISTORY: Reason for exam:->SOB Reason for Exam: sob FINDINGS: Medical devices: Left shoulder arthroplasty hardware. Mediastinum/Heart: The mediastinal contours are unchanged compared to prior exam. The heart is enlarged in size. Lungs: Vascular prominence and mild patchy opacities in the lung bases, favored to represent atelectasis. Pleura: No pleural effusion. No pneumothorax. 
on CKD 3b on IV diuretic.   Hemoptysis:  Heparin DVT prophylaxis discontinued (12/29).  Presumed secondary to traumatic intubation.   Dementia.  Patient has not been ambulatory for at least 4 years.   Class III obesity (BMI 41.8).      Advance Directive: Full Code  DVT prophylaxis with intermittent compression, heparin held for hemptysis  Discharge planning: at least two more days inpatient status      GENNARO TRAN MD  [facility]    
done.       Blood Cultures:   Lab Results   Component Value Date/Time    BC No Growth after 4 days of incubation. 12/12/2021 07:47 PM     Lab Results   Component Value Date/Time    BLOODCULT2 No Growth after 4 days of incubation. 12/13/2021 02:20 AM     Organism:   Lab Results   Component Value Date/Time    ORG Staph aureus MRSA DNA Detected 12/29/2023 12:36 PM    ORG Staph aureus MRSA 12/29/2023 12:36 PM    ORG Staph aureus MRSA 12/29/2023 12:36 PM         Electronically signed by Willi Carpenter MD on 1/2/2024 at 4:15 PM  
and hypercapnia ([facility]) 12/29/2023 Yes    LUCIA (acute kidney injury) ([facility]) 12/28/2023 Yes    Acute diastolic heart failure ([facility]) 12/28/2023 Yes    Elevated troponin 12/28/2023 Yes    Morbid obesity with BMI of 45.0-49.9, adult ([facility]) 12/28/2023 Yes    Acute on chronic respiratory failure with hypoxia and hypercapnia ([facility]) 12/29/2023 Yes     Ct Vent support but may be weaned off in am   S/p Bronchoscopy   Pulmonary critical care comanagement   Hemoptysis most prob traumatic intubation related   Cardiology does not opine this is ACS no invasive intervention    Patient has advanced diastolic CHF

## 2024-01-03 NOTE — PLAN OF CARE
Problem: Respiratory - Adult  Goal: Achieves optimal ventilation and oxygenation  Outcome: Progressing     Problem: Cardiovascular - Adult  Goal: Maintains optimal cardiac output and hemodynamic stability  Outcome: Progressing     Problem: Cardiovascular - Adult  Goal: Absence of cardiac dysrhythmias or at baseline  Outcome: Progressing     Problem: Musculoskeletal - Adult  Goal: Return mobility to safest level of function  Outcome: Progressing     Problem: Musculoskeletal - Adult  Goal: Maintain proper alignment of affected body part  Outcome: Progressing     Problem: Musculoskeletal - Adult  Goal: Return ADL status to a safe level of function  Outcome: Progressing     Problem: Metabolic/Fluid and Electrolytes - Adult  Goal: Electrolytes maintained within normal limits  Outcome: Progressing     Problem: Metabolic/Fluid and Electrolytes - Adult  Goal: Hemodynamic stability and optimal renal function maintained  Outcome: Progressing     Problem: Metabolic/Fluid and Electrolytes - Adult  Goal: Glucose maintained within prescribed range  Outcome: Progressing     Problem: Hematologic - Adult  Goal: Maintains hematologic stability  Outcome: Progressing     Problem: Pain  Goal: Verbalizes/displays adequate comfort level or baseline comfort level  Outcome: Progressing     Problem: Skin/Tissue Integrity  Goal: Absence of new skin breakdown  Description: 1.  Monitor for areas of redness and/or skin breakdown  2.  Assess vascular access sites hourly  3.  Every 4-6 hours minimum:  Change oxygen saturation probe site  4.  Every 4-6 hours:  If on nasal continuous positive airway pressure, respiratory therapy assess nares and determine need for appliance change or resting period.  Outcome: Progressing     Problem: Skin/Tissue Integrity - Adult  Goal: Incisions, wounds, or drain sites healing without S/S of infection  Outcome: Progressing     Problem: Skin/Tissue Integrity - Adult  Goal: Oral mucous membranes remain

## 2024-01-03 NOTE — CARE COORDINATION
Case Management Discharge Note          Date / Time of Note: 1/3/2024 5:29 PM                  Patient Name: Dyan Chaudhary   YOB: 1950  Diagnosis: Elevated troponin [R79.89]  LUCIA (acute kidney injury) (HCC) [N17.9]  Systolic CHF, acute on chronic (HCC) [I50.23]  Goals of care, counseling/discussion [Z71.89]  Elevated brain natriuretic peptide (BNP) level [R79.89]  Acute respiratory failure with hypoxia (HCC) [J96.01]  Anemia, unspecified type [D64.9]  Vascular dementia, unspecified dementia severity, unspecified whether behavioral, psychotic, or mood disturbance or anxiety (HCC) [F01.50]   Date / Time: 12/28/2023  7:41 AM    Financial:  Payor: MEDICARE / Plan: MEDICARE PART A AND B / Product Type: *No Product type* /      Pharmacy:    [facility] [address] - P [phone] - F [phone]  [address] [place] [address]  Phone: [phone] Fax: [phone]      Assistance purchasing medications?:    Assistance provided by Case Management: None at this time    DISCHARGE Disposition: Long Term Care Facility (LTC)    Nursing Facility:   Discharging to Facility/ Agency   Name: [facility]  Address:  [address] [place] [address]   Phone:  [phone]      LOC at discharge: Long Term Care  MUSA Completed: Yes, nurse needs to complete             NURSING REPORT NUMBER: [phone] or [phone]               NURSING FAX NUMBER: [phone]    Notification completed in HENS/PAS?:  Not Applicable    Transportation:  Transportation PLAN for discharge: EMS transportation   Mode of Transport: Ambulance stretcher - S  Reason for medical transport: Bed confined: Meets the following criteria 1) unable to get out of bed without assistance or ambulate, 2) unable to safely sit up in a wheelchair, 3) unable to maintain erect seating position in a chair for time needed for transport and Confused due to dementia and

## 2024-01-03 NOTE — PLAN OF CARE
Problem: Respiratory - Adult  Goal: Achieves optimal ventilation and oxygenation  1/3/2024 1004 by Keyla Rosas RN  Flowsheets (Taken 1/3/2024 1004)  Achieves optimal ventilation and oxygenation:   Assess for changes in respiratory status   Assess for changes in mentation and behavior     Problem: Metabolic/Fluid and Electrolytes - Adult  Goal: Electrolytes maintained within normal limits  1/3/2024 1004 by Keyla Rosas RN  Flowsheets (Taken 1/3/2024 1004)  Electrolytes maintained within normal limits: Monitor labs and assess patient for signs and symptoms of electrolyte imbalances     Problem: Pain  Goal: Verbalizes/displays adequate comfort level or baseline comfort level  1/3/2024 1004 by Keyla Rosas RN  Flowsheets (Taken 1/3/2024 1004)  Verbalizes/displays adequate comfort level or baseline comfort level:   Encourage patient to monitor pain and request assistance   Assess pain using appropriate pain scale     Problem: Genitourinary - Adult  Goal: Urinary catheter remains patent  1/3/2024 1004 by Keyla Rosas RN  Flowsheets (Taken 1/3/2024 1004)  Urinary catheter remains patent: Assess patency of urinary catheter     Problem: ABCDS Injury Assessment  Goal: Absence of physical injury  Flowsheets (Taken 1/3/2024 1004)  Absence of Physical Injury: Implement safety measures based on patient assessment

## 2024-01-03 NOTE — CARE COORDINATION
Discharge Planning:  SALLY contacted pts Melissa barr [phone] to discuss d/c plans.  Melissa confirmed that she plans for this pt to return to [facility] upon d/c.  SALLY contacted Ailyn at [facility] [phone].  Ailyn confirmed this pt is a long term resident and can return to this facility upon d/c.  No pre cert or COVID test needed.     Case management has presented and reviewed IMM letter #2 to Melissa. Verbalized understanding of their medicare rights and appeal process if needed. Education provided to Melissa .   Melissa  reported no questions and verbalized understanding.  Melissa made aware that he/she has 4 hours prior to discharging from hospital to decide if he/she wishes to pursue the Medicare appeal process.    Plan: LTC resident at [facility].  Can return upon d/c. No pre cert or COVID test needed. Keep nunoMelissa updated.   NAYANA Argueta  [phone]  Electronically signed by NAYANA Hinojosa on 1/3/2024 at 9:29 AM

## 2024-01-27 PROBLEM — R79.89 ELEVATED TROPONIN: Status: RESOLVED | Noted: 2023-12-28 | Resolved: 2024-01-27

## 2024-04-05 ENCOUNTER — INITIAL CONSULT (OUTPATIENT)
Dept: VASCULAR SURGERY | Age: 74
End: 2024-04-05
Payer: COMMERCIAL

## 2024-04-05 DIAGNOSIS — G30.8 MODERATE ALZHEIMER'S DEMENTIA OF OTHER ONSET WITHOUT BEHAVIORAL DISTURBANCE, PSYCHOTIC DISTURBANCE, MOOD DISTURBANCE, OR ANXIETY (HCC): Primary | ICD-10-CM

## 2024-04-05 DIAGNOSIS — I10 ESSENTIAL HYPERTENSION: ICD-10-CM

## 2024-04-05 DIAGNOSIS — M32.9 SYSTEMIC LUPUS ERYTHEMATOSUS, UNSPECIFIED SLE TYPE, UNSPECIFIED ORGAN INVOLVEMENT STATUS (HCC): ICD-10-CM

## 2024-04-05 DIAGNOSIS — E66.01 MORBID OBESITY WITH BMI OF 40.0-44.9, ADULT (HCC): ICD-10-CM

## 2024-04-05 DIAGNOSIS — F02.B0 MODERATE ALZHEIMER'S DEMENTIA OF OTHER ONSET WITHOUT BEHAVIORAL DISTURBANCE, PSYCHOTIC DISTURBANCE, MOOD DISTURBANCE, OR ANXIETY (HCC): Primary | ICD-10-CM

## 2024-04-05 PROCEDURE — 1123F ACP DISCUSS/DSCN MKR DOCD: CPT | Performed by: SURGERY

## 2024-04-05 PROCEDURE — 99204 OFFICE O/P NEW MOD 45 MIN: CPT | Performed by: SURGERY

## 2024-04-05 ASSESSMENT — ENCOUNTER SYMPTOMS
ALLERGIC/IMMUNOLOGIC NEGATIVE: 1
GASTROINTESTINAL NEGATIVE: 1
EYES NEGATIVE: 1
RESPIRATORY NEGATIVE: 1

## 2024-04-05 NOTE — PATIENT INSTRUCTIONS
We are giving you a prescription for 20-30 mmHg compression stockings. Wear these during the day, removing at night.     Return if needed.

## 2024-04-05 NOTE — PROGRESS NOTES
on the right side and 2+ on the left side.        Dorsalis pedis pulses are 0 on the right side and 0 on the left side.        Posterior tibial pulses are 2+ on the right side and 2+ on the left side.      Heart sounds: Normal heart sounds.      Comments:   Doppler 24:  Rt DP: biphasic  Rt PT: biphasic  Rt AT:     Lt DP: biphasic  Lt PT: biphasic  Lt AT:    MEASUREMENTS 24:    RIGHT ANKLE: 19.5 cm   RIGHT CALF: 39.3 cm     LEFT ANKLE: 20.0 cm   LEFT CALF: 38.0 cm     Pulmonary:      Effort: Pulmonary effort is normal.      Breath sounds: Normal breath sounds.   Abdominal:      General: Bowel sounds are normal.   Musculoskeletal:         General: Normal range of motion.      Cervical back: Normal range of motion.      Left lower le+ Pitting Edema present.   Neurological:      Mental Status: She is alert and oriented to person, place, and time.      Deep Tendon Reflexes: Reflexes are normal and symmetric.   Psychiatric:         Speech: Speech normal.         Behavior: Behavior normal.         Thought Content: Thought content normal.         Judgment: Judgment normal.       Ms. Chaudhary has had bilateral hip replacements.Ms. Chaudhary had two c-sections. She had an hysterectomy.  She lives at [facility] and has a history of dementia.    ASSESSMENT:    Problem List Items Addressed This Visit          Unprioritized    Dementia (HCC) - Primary    Essential hypertension    Morbid obesity with BMI of 40.0-44.9, adult (HCC)    Systemic lupus erythematosus (HCC)     I think she has a neuropathy that makes her feel like things are crawling under her skin, I do not think her edema is that bad nor do I think it is giving her any symptoms.  Arterial supply to her leg is adequate especially for someone her does not walk so perhaps an increased dose of gabapentin might help  PLAN:    We are giving you a prescription for 20-30 mmHg compression stockings. Wear these during the day, removing at night.  She says

## 2025-05-10 NOTE — ED NOTES
Bed: A-17  Expected date: 2/11/22  Expected time: 8:31 AM  Means of arrival: Ambulance  Comments:  [address], RN  02/11/22 3164
Called brian [phone] at [address] [facility] the nurse speaking to Dr. Alanna Reece 96  02/11/22 9602
Called picc team [phone] at [address] [place] [address]  02/11/22 2198
Charge nurse and physician unsuccessful with ultrasound IV.      Brooke Page RN  02/11/22 0805
IV attempt to left upper arm unsuccessful. Xray completed at the bedside. Patient then wheeled to CT.      Bessie Balbuena RN  02/11/22 0371
Right IJ triple lumen placed by Dr. Hannah Grant. Patient tolerated well.      Nikki Galvan RN  02/11/22 5649
SBAR to [place], all questions answered. Patient has a right IJ in place. Patient was NSR on cardiac monitor, breathing regular, no distress, is alert & oriented. Patient sent to room 5121 via stretcher.      Fady Worrell RN  02/11/22 9881
Verbal order from the patient's daughter Linda West for Dr. Mary Malin to place a central line.      Shahid Kendall RN  02/11/22 7713
Xray completed at the bedside.      Shahid Kendall RN  02/11/22 8152
Heterosexual